# Patient Record
Sex: FEMALE | Race: BLACK OR AFRICAN AMERICAN | URBAN - METROPOLITAN AREA
[De-identification: names, ages, dates, MRNs, and addresses within clinical notes are randomized per-mention and may not be internally consistent; named-entity substitution may affect disease eponyms.]

---

## 2017-12-01 ENCOUNTER — DOCTOR'S OFFICE (OUTPATIENT)
Dept: URBAN - METROPOLITAN AREA CLINIC 137 | Facility: CLINIC | Age: 37
Setting detail: OPHTHALMOLOGY
End: 2017-12-01
Payer: COMMERCIAL

## 2017-12-01 ENCOUNTER — OPTICAL OFFICE (OUTPATIENT)
Dept: URBAN - METROPOLITAN AREA CLINIC 146 | Facility: CLINIC | Age: 37
Setting detail: OPHTHALMOLOGY
End: 2017-12-01
Payer: COMMERCIAL

## 2017-12-01 DIAGNOSIS — H52.223: ICD-10-CM

## 2017-12-01 DIAGNOSIS — H52.4: ICD-10-CM

## 2017-12-01 DIAGNOSIS — H52.03: ICD-10-CM

## 2017-12-01 PROCEDURE — 92004 COMPRE OPH EXAM NEW PT 1/>: CPT | Performed by: OPHTHALMOLOGY

## 2017-12-01 PROCEDURE — V2020 VISION SVCS FRAMES PURCHASES: HCPCS | Performed by: OPHTHALMOLOGY

## 2017-12-01 PROCEDURE — V2203 LENS SPHCYL BIFOCAL 4.00D/.1: HCPCS | Performed by: OPHTHALMOLOGY

## 2017-12-01 ASSESSMENT — REFRACTION_CURRENTRX
OS_OVR_VA: 20/
OD_OVR_VA: 20/
OS_OVR_VA: 20/
OS_OVR_VA: 20/

## 2017-12-01 ASSESSMENT — REFRACTION_AUTOREFRACTION
OD_AXIS: 008
OD_CYLINDER: +0.75
OS_AXIS: 170
OD_SPHERE: +0.50
OS_SPHERE: PLANO
OS_CYLINDER: +0.25

## 2017-12-01 ASSESSMENT — REFRACTION_OUTSIDERX
OU_VA: 20/15-1
OD_VA1: 20/20
OD_AXIS: 180
OS_SPHERE: PLANO
OS_VA3: 20/
OD_ADD: +1.00
OD_CYLINDER: +0.50
OS_ADD: +1.00
OD_VA2: 20/J-1
OD_VA3: 20/
OS_CYLINDER: +0.25
OS_VA2: 20/J-1
OS_AXIS: 180
OS_VA1: 20/20
OD_SPHERE: PLANO

## 2017-12-01 ASSESSMENT — KERATOMETRY
OS_K2POWER_DIOPTERS: 44.75
OD_AXISANGLE_DEGREES: 097
OD_K1POWER_DIOPTERS: 44.25
OS_AXISANGLE_DEGREES: 101
OS_K1POWER_DIOPTERS: 44.25
OD_K2POWER_DIOPTERS: 44.50

## 2017-12-01 ASSESSMENT — CONFRONTATIONAL VISUAL FIELD TEST (CVF)
OD_FINDINGS: FULL
OS_FINDINGS: FULL

## 2017-12-01 ASSESSMENT — REFRACTION_MANIFEST
OU_VA: 20/
OD_VA2: 20/
OD_VA1: 20/
OS_VA1: 20/
OS_VA1: 20/
OS_VA3: 20/
OU_VA: 20/
OD_VA3: 20/
OD_VA1: 20/
OS_VA2: 20/
OS_VA3: 20/
OD_VA2: 20/
OS_VA2: 20/
OD_VA3: 20/

## 2017-12-01 ASSESSMENT — VISUAL ACUITY
OD_BCVA: 20/30
OS_BCVA: 20/25

## 2017-12-01 ASSESSMENT — AXIALLENGTH_DERIVED: OD_AL: 22.9477

## 2017-12-01 ASSESSMENT — SPHEQUIV_DERIVED: OD_SPHEQUIV: 0.875

## 2018-02-14 ENCOUNTER — HOSPITAL ENCOUNTER (EMERGENCY)
Facility: HOSPITAL | Age: 38
Discharge: HOME | End: 2018-02-14
Attending: EMERGENCY MEDICINE | Admitting: EMERGENCY MEDICINE
Payer: COMMERCIAL

## 2018-02-14 VITALS
OXYGEN SATURATION: 100 % | DIASTOLIC BLOOD PRESSURE: 94 MMHG | RESPIRATION RATE: 18 BRPM | SYSTOLIC BLOOD PRESSURE: 156 MMHG | TEMPERATURE: 98.9 F | HEART RATE: 86 BPM

## 2018-02-14 DIAGNOSIS — S50.812A: Primary | ICD-10-CM

## 2018-02-14 DIAGNOSIS — M79.622 LEFT UPPER ARM PAIN: ICD-10-CM

## 2018-02-14 PROCEDURE — 99283 EMERGENCY DEPT VISIT LOW MDM: CPT

## 2018-02-14 RX ORDER — CEPHALEXIN 500 MG/1
500 CAPSULE ORAL 4 TIMES DAILY
Qty: 28 CAPSULE | Refills: 0 | Status: SHIPPED | OUTPATIENT
Start: 2018-02-14 | End: 2018-02-21

## 2018-02-14 RX ORDER — IBUPROFEN 400 MG/1
400 TABLET ORAL ONCE
Status: COMPLETED | OUTPATIENT
Start: 2018-02-14 | End: 2018-02-14

## 2018-02-14 RX ORDER — CEPHALEXIN 250 MG/1
500 CAPSULE ORAL ONCE
Status: COMPLETED | OUTPATIENT
Start: 2018-02-14 | End: 2018-02-14

## 2018-02-14 RX ADMIN — IBUPROFEN 400 MG: 400 TABLET, FILM COATED ORAL at 16:59

## 2018-02-14 RX ADMIN — CEPHALEXIN 500 MG: 250 CAPSULE ORAL at 16:59

## 2018-02-14 NOTE — ED PROVIDER NOTES
History  Chief Complaint   Patient presents with    Arm Pain     Pt reports left shoulder pain radiating down left hand that started at 0700 this morning, pt reports left sided face "tingling" Pt ambualted into triage, alert and oriented, reports "on and off zap pain" in the head  History provided by:  Patient   used: No     34-year-old female presented with left arm pain upon waking this morning  Notes that it is in the axilla, the shoulder and also down the arm  Has a history of lymph node biopsy in the left axilla about 2 months ago and states she had some pain and paresthesias for about a week after and had resolved  No issues since  No fever, chills, chest pain, shortness of breath, rashes  She has a history of nervous scratching and has multiple excoriations on the forearm  None have the appearance of localized infection  Patient does have some lymphadenopathy in the axilla with tenderness and some pain with motion  No edema  Normal pulse, sensation and strength  Possible early infection secondary to inoculation the of the excoriations  Differential diagnosis also includes muscular strain secondary to sleeping  Will start oral antibiotics and have her return if her symptoms worsen  None       Past Medical History:   Diagnosis Date    Arthritis     Hypertension     Migraine        History reviewed  No pertinent surgical history  History reviewed  No pertinent family history  I have reviewed and agree with the history as documented  Social History   Substance Use Topics    Smoking status: Current Every Day Smoker    Smokeless tobacco: Not on file    Alcohol use Yes        Review of Systems   Constitutional: Negative for activity change, appetite change, chills and fever  Respiratory: Negative for chest tightness and shortness of breath  Gastrointestinal: Negative for abdominal pain  Musculoskeletal: Negative for back pain and neck pain     Skin: Negative for color change  Neurological: Negative for dizziness and weakness  All other systems reviewed and are negative  Physical Exam  ED Triage Vitals [02/14/18 1524]   Temperature Pulse Respirations Blood Pressure SpO2   98 9 °F (37 2 °C) 86 18 156/94 100 %      Temp Source Heart Rate Source Patient Position - Orthostatic VS BP Location FiO2 (%)   Oral Monitor Sitting Left arm --      Pain Score       8           Orthostatic Vital Signs  Vitals:    02/14/18 1524   BP: 156/94   Pulse: 86   Patient Position - Orthostatic VS: Sitting       Physical Exam   Constitutional: She is oriented to person, place, and time  She appears well-developed and well-nourished  No distress  HENT:   Head: Normocephalic  Mouth/Throat: Oropharynx is clear and moist    Neck: Normal range of motion  Neck supple  No cervical spine tenderness  Cardiovascular: Normal rate, regular rhythm and intact distal pulses  Pulmonary/Chest: Effort normal and breath sounds normal    Abdominal: Soft  She exhibits no distension  Musculoskeletal: She exhibits no deformity  Pain with motion of the left arm  Axillary lymphadenopathy with some tenderness  No erythema or edema  Neurological: She is alert and oriented to person, place, and time  Skin: Skin is warm and dry  Capillary refill takes less than 2 seconds  Multiple excoriations of the left forearm  Psychiatric: She has a normal mood and affect  Her behavior is normal    Nursing note and vitals reviewed        ED Medications  Medications   cephalexin (KEFLEX) capsule 500 mg (not administered)   ibuprofen (MOTRIN) tablet 400 mg (not administered)       Diagnostic Studies  Results Reviewed     None                 No orders to display              Procedures  Procedures       Phone Contacts  ED Phone Contact    ED Course  ED Course                                MDM  Number of Diagnoses or Management Options  Excoriation of forearm, left, initial encounter:   Left upper arm pain:   Diagnosis management comments: 78-year-old female who awoke with left arm pain this morning in the axilla radiating down the arm  Has many excoriations with the possibility of early infection  Otherwise may be muscular strain for sleeping  Neurovascular exam unremarkable  Will start on oral antibiotics and have her return if her symptoms do not improve or if they worsen  Patient Progress  Patient progress: stable    CritCare Time    Disposition  Final diagnoses:   Excoriation of forearm, left, initial encounter   Left upper arm pain     Time reflects when diagnosis was documented in both MDM as applicable and the Disposition within this note     Time User Action Codes Description Comment    2/14/2018  4:44 PM [place], [address] of forearm, left, initial encounter     2/14/2018  4:45 PM Blanquita Beckett Add [O59 227] Left upper arm pain       ED Disposition     ED Disposition Condition Comment    Discharge  Devang Flores discharge to home/self care  Condition at discharge: Good        Follow-up Information     Follow up With Specialties Details Why Contact Info Additional Information    [facility] Emergency Medicine  If symptoms worsen [address]  [phone]  ED,  [address], [place], [address]        Patient's Medications   Discharge Prescriptions    CEPHALEXIN (KEFLEX) 500 MG CAPSULE    Take 1 capsule (500 mg total) by mouth 4 (four) times a day for 7 days       Start Date: 2/14/2018 End Date: 2/21/2018       Order Dose: 500 mg       Quantity: 28 capsule    Refills: 0     No discharge procedures on file      ED Provider  Electronically Signed by           Jonathan Crooks MD  02/14/18 1721

## 2018-02-14 NOTE — DISCHARGE INSTRUCTIONS
Arm Pain   WHAT YOU NEED TO KNOW:   Your arm pain may be caused by a number of conditions  Examples include arthritis, nerve problems, or an awkward position while you sleep  X-rays did not show a broken bone in your arm or wrist  Arm pain may be a sign of a serious condition that needs immediate care, such as a heart attack  DISCHARGE INSTRUCTIONS:   Call 911 for any of the following: You have any of the following signs of a heart attack:   · Squeezing, pressure, or pain in your chest that lasts longer than 5 minutes or returns    · Discomfort or pain in your back, neck, jaw, stomach, or arm     · Trouble breathing or a fast, fluttery heartbeat    · Nausea or vomiting    · Lightheadedness or a sudden cold sweat, especially with chest pain or trouble breathing  Return to the emergency department if:   · You have severe pain, or pain that spreads from your arm to other areas  · You have swelling, tingling, or numbness in your hand or fingers, or the skin turns blue  · You cannot move your arm  Contact your healthcare provider if:   · You have questions or concerns about your condition or care  Medicines: You may need any of the following:  · Prescription pain medicine  may be given  Ask how to take this medicine safely  · NSAIDs , such as ibuprofen, help decrease swelling, pain, and fever  This medicine is available with or without a doctor's order  NSAIDs can cause stomach bleeding or kidney problems in certain people  If you take blood thinner medicine, always ask your healthcare provider if NSAIDs are safe for you  Always read the medicine label and follow directions  · Take your medicine as directed  Contact your healthcare provider if you think your medicine is not helping or if you have side effects  Tell him or her if you are allergic to any medicine  Keep a list of the medicines, vitamins, and herbs you take  Include the amounts, and when and why you take them   Bring the list or the pill bottles to follow-up visits  Carry your medicine list with you in case of an emergency  Self-care:   · Rest your arm as directed  A sling may be used to keep your arm from moving while it heals  · Apply ice as directed  Ice helps decrease pain and swelling  Ice may also help prevent tissue damage  Use an ice pack, or put crushed ice in a plastic bag  Cover it with a towel  Apply it to your arm for 20 minutes every few hours, or as directed  Ask how many times to apply ice each day, and for how many days  · Elevate your arm above the level of your heart as often as you can  This will help decrease swelling and pain  Prop your arm on pillows or blankets to keep the area elevated comfortably  · Adjust your position if you work in front of a computer  You may need arm or wrist supports or change the height of your chair  · Keep a pain record  Write down when your pain happens and how severe it is  Include any other symptoms you have with your pain  A record will help you keep track of pain cycles  Bring the record with you to your follow-up visits  It may also help your healthcare provider find out what is causing your pain  Follow up with your healthcare provider as directed: You may need physical therapy  You may need to see an orthopedic specialist  Write down your questions so you remember to ask them during your visits  © 2017 [employer] Information is for End User's use only and may not be sold, redistributed or otherwise used for commercial purposes  All illustrations and images included in CareNotes® are the copyrighted property of [employer]  or [employer] Dyer  The above information is an  only  It is not intended as medical advice for individual conditions or treatments  Talk to your doctor, nurse or pharmacist before following any medical regimen to see if it is safe and effective for you

## 2020-05-04 ENCOUNTER — TELEPHONE (OUTPATIENT)
Dept: FAMILY MEDICINE CLINIC | Facility: CLINIC | Age: 40
End: 2020-05-04

## 2020-05-05 DIAGNOSIS — I10 ESSENTIAL HYPERTENSION: Primary | ICD-10-CM

## 2020-05-06 RX ORDER — AMLODIPINE BESYLATE 5 MG/1
TABLET ORAL
Qty: 30 TABLET | Refills: 4 | Status: SHIPPED | OUTPATIENT
Start: 2020-05-06 | End: 2020-08-10

## 2020-05-19 ENCOUNTER — TELEPHONE (OUTPATIENT)
Dept: OBGYN CLINIC | Facility: CLINIC | Age: 40
End: 2020-05-19

## 2020-05-19 DIAGNOSIS — A60.00 GENITAL HERPES SIMPLEX, UNSPECIFIED SITE: Primary | ICD-10-CM

## 2020-05-19 RX ORDER — VALACYCLOVIR HYDROCHLORIDE 500 MG/1
500 TABLET, FILM COATED ORAL 2 TIMES DAILY
Qty: 6 TABLET | Refills: 0 | Status: SHIPPED | OUTPATIENT
Start: 2020-05-19 | End: 2020-10-15

## 2020-05-26 ENCOUNTER — OFFICE VISIT (OUTPATIENT)
Dept: OBGYN CLINIC | Facility: CLINIC | Age: 40
End: 2020-05-26
Payer: COMMERCIAL

## 2020-05-26 VITALS
BODY MASS INDEX: 29.32 KG/M2 | DIASTOLIC BLOOD PRESSURE: 97 MMHG | HEART RATE: 101 BPM | HEIGHT: 65 IN | SYSTOLIC BLOOD PRESSURE: 141 MMHG | WEIGHT: 176 LBS

## 2020-05-26 DIAGNOSIS — M25.562 ACUTE PAIN OF LEFT KNEE: Primary | ICD-10-CM

## 2020-05-26 PROCEDURE — 99203 OFFICE O/P NEW LOW 30 MIN: CPT | Performed by: ORTHOPAEDIC SURGERY

## 2020-05-27 ENCOUNTER — ANNUAL EXAM (OUTPATIENT)
Dept: OBGYN CLINIC | Facility: CLINIC | Age: 40
End: 2020-05-27

## 2020-05-27 VITALS
WEIGHT: 200 LBS | HEIGHT: 65 IN | SYSTOLIC BLOOD PRESSURE: 122 MMHG | DIASTOLIC BLOOD PRESSURE: 78 MMHG | BODY MASS INDEX: 33.32 KG/M2

## 2020-05-27 DIAGNOSIS — Z01.419 ROUTINE GYNECOLOGICAL EXAMINATION: Primary | ICD-10-CM

## 2020-05-27 DIAGNOSIS — Z11.3 SCREENING FOR STDS (SEXUALLY TRANSMITTED DISEASES): ICD-10-CM

## 2020-05-27 DIAGNOSIS — Z12.31 ENCOUNTER FOR SCREENING MAMMOGRAM FOR MALIGNANT NEOPLASM OF BREAST: ICD-10-CM

## 2020-05-27 PROCEDURE — 87491 CHLMYD TRACH DNA AMP PROBE: CPT | Performed by: OBSTETRICS & GYNECOLOGY

## 2020-05-27 PROCEDURE — 87510 GARDNER VAG DNA DIR PROBE: CPT | Performed by: OBSTETRICS & GYNECOLOGY

## 2020-05-27 PROCEDURE — 87480 CANDIDA DNA DIR PROBE: CPT | Performed by: OBSTETRICS & GYNECOLOGY

## 2020-05-27 PROCEDURE — 87624 HPV HI-RISK TYP POOLED RSLT: CPT | Performed by: OBSTETRICS & GYNECOLOGY

## 2020-05-27 PROCEDURE — 87660 TRICHOMONAS VAGIN DIR PROBE: CPT | Performed by: OBSTETRICS & GYNECOLOGY

## 2020-05-27 PROCEDURE — 87591 N.GONORRHOEAE DNA AMP PROB: CPT | Performed by: OBSTETRICS & GYNECOLOGY

## 2020-05-27 PROCEDURE — G0143 SCR C/V CYTO,THINLAYER,RESCR: HCPCS | Performed by: OBSTETRICS & GYNECOLOGY

## 2020-05-29 ENCOUNTER — TELEPHONE (OUTPATIENT)
Dept: OBGYN CLINIC | Facility: CLINIC | Age: 40
End: 2020-05-29

## 2020-05-29 LAB
C TRACH DNA SPEC QL NAA+PROBE: NEGATIVE
HPV HR 12 DNA CVX QL NAA+PROBE: NEGATIVE
HPV16 DNA CVX QL NAA+PROBE: NEGATIVE
HPV18 DNA CVX QL NAA+PROBE: NEGATIVE
N GONORRHOEA DNA SPEC QL NAA+PROBE: NEGATIVE

## 2020-05-30 LAB
CANDIDA RRNA VAG QL PROBE: NEGATIVE
G VAGINALIS RRNA GENITAL QL PROBE: POSITIVE
T VAGINALIS RRNA GENITAL QL PROBE: NEGATIVE

## 2020-06-01 ENCOUNTER — TELEPHONE (OUTPATIENT)
Dept: OBGYN CLINIC | Facility: CLINIC | Age: 40
End: 2020-06-01

## 2020-06-01 DIAGNOSIS — B96.89 BV (BACTERIAL VAGINOSIS): Primary | ICD-10-CM

## 2020-06-01 DIAGNOSIS — N76.0 BV (BACTERIAL VAGINOSIS): Primary | ICD-10-CM

## 2020-06-01 RX ORDER — METRONIDAZOLE 500 MG/1
500 TABLET ORAL EVERY 12 HOURS SCHEDULED
Qty: 14 TABLET | Refills: 0 | Status: SHIPPED | OUTPATIENT
Start: 2020-06-01 | End: 2020-06-08

## 2020-06-03 ENCOUNTER — HOSPITAL ENCOUNTER (OUTPATIENT)
Dept: MRI IMAGING | Facility: HOSPITAL | Age: 40
Discharge: HOME | End: 2020-06-03

## 2020-06-08 LAB
LAB AP GYN PRIMARY INTERPRETATION: NORMAL
Lab: NORMAL
PATH INTERP SPEC-IMP: NORMAL

## 2020-06-10 DIAGNOSIS — B96.89 BV (BACTERIAL VAGINOSIS): Primary | ICD-10-CM

## 2020-06-10 DIAGNOSIS — N76.0 BV (BACTERIAL VAGINOSIS): Primary | ICD-10-CM

## 2020-06-10 RX ORDER — METRONIDAZOLE 500 MG/1
500 TABLET ORAL EVERY 12 HOURS SCHEDULED
Qty: 14 TABLET | Refills: 0 | Status: SHIPPED | OUTPATIENT
Start: 2020-06-10 | End: 2020-06-17

## 2020-06-26 DIAGNOSIS — B00.9 HERPES: Primary | ICD-10-CM

## 2020-06-26 NOTE — TELEPHONE ENCOUNTER
Pt called and wanted to know if she can be put on suppressive therapy for HSV  She states she has got 2 outbreaks in the past 2mnths and wants to try and keep them under control  She is currently having outbreak now

## 2020-06-29 RX ORDER — VALACYCLOVIR HYDROCHLORIDE 500 MG/1
500 TABLET, FILM COATED ORAL DAILY
Qty: 30 TABLET | Refills: 3 | Status: SHIPPED | OUTPATIENT
Start: 2020-06-29 | End: 2020-12-01

## 2020-08-08 ENCOUNTER — HOSPITAL ENCOUNTER (EMERGENCY)
Facility: HOSPITAL | Age: 40
Discharge: HOME | End: 2020-08-08
Attending: EMERGENCY MEDICINE | Admitting: EMERGENCY MEDICINE
Payer: COMMERCIAL

## 2020-08-08 VITALS
SYSTOLIC BLOOD PRESSURE: 126 MMHG | BODY MASS INDEX: 31.78 KG/M2 | TEMPERATURE: 98 F | OXYGEN SATURATION: 98 % | RESPIRATION RATE: 18 BRPM | WEIGHT: 191 LBS | DIASTOLIC BLOOD PRESSURE: 77 MMHG | HEART RATE: 118 BPM

## 2020-08-08 DIAGNOSIS — K61.1 PERI-RECTAL ABSCESS: Primary | ICD-10-CM

## 2020-08-08 LAB
ALBUMIN SERPL BCP-MCNC: 3.8 G/DL (ref 3.4–4.8)
ALP SERPL-CCNC: 75.1 U/L (ref 35–140)
ALT SERPL W P-5'-P-CCNC: 15 U/L (ref 5–54)
ANION GAP SERPL CALCULATED.3IONS-SCNC: 7 MMOL/L (ref 4–13)
AST SERPL W P-5'-P-CCNC: 14 U/L (ref 15–41)
BASOPHILS # BLD AUTO: 0.04 THOUSANDS/ΜL (ref 0–0.1)
BASOPHILS NFR BLD AUTO: 0 % (ref 0–1)
BILIRUB SERPL-MCNC: 0.37 MG/DL (ref 0.3–1.2)
BUN SERPL-MCNC: 9 MG/DL (ref 6–20)
CALCIUM SERPL-MCNC: 9 MG/DL (ref 8.4–10.2)
CHLORIDE SERPL-SCNC: 105 MMOL/L (ref 96–108)
CO2 SERPL-SCNC: 25 MMOL/L (ref 22–33)
CREAT SERPL-MCNC: 0.77 MG/DL (ref 0.4–1.1)
EOSINOPHIL # BLD AUTO: 0.12 THOUSAND/ΜL (ref 0–0.61)
EOSINOPHIL NFR BLD AUTO: 1 % (ref 0–6)
ERYTHROCYTE [DISTWIDTH] IN BLOOD BY AUTOMATED COUNT: 14.6 % (ref 11.6–15.1)
GFR SERPL CREATININE-BSD FRML MDRD: 112 ML/MIN/1.73SQ M
GLUCOSE SERPL-MCNC: 91 MG/DL (ref 65–140)
HCT VFR BLD AUTO: 40.7 % (ref 34.8–46.1)
HGB BLD-MCNC: 13.8 G/DL (ref 11.5–15.4)
IMM GRANULOCYTES # BLD AUTO: 0.04 THOUSAND/UL (ref 0–0.2)
IMM GRANULOCYTES NFR BLD AUTO: 0 % (ref 0–2)
LACTATE SERPL-SCNC: 0.8 MMOL/L (ref 0–2)
LYMPHOCYTES # BLD AUTO: 3.61 THOUSANDS/ΜL (ref 0.6–4.47)
LYMPHOCYTES NFR BLD AUTO: 30 % (ref 14–44)
MCH RBC QN AUTO: 29.1 PG (ref 26.8–34.3)
MCHC RBC AUTO-ENTMCNC: 33.9 G/DL (ref 31.4–37.4)
MCV RBC AUTO: 86 FL (ref 82–98)
MONOCYTES # BLD AUTO: 1.08 THOUSAND/ΜL (ref 0.17–1.22)
MONOCYTES NFR BLD AUTO: 9 % (ref 4–12)
NEUTROPHILS # BLD AUTO: 7.26 THOUSANDS/ΜL (ref 1.85–7.62)
NEUTS SEG NFR BLD AUTO: 60 % (ref 43–75)
PLATELET # BLD AUTO: 307 THOUSANDS/UL (ref 149–390)
PMV BLD AUTO: 9.3 FL (ref 8.9–12.7)
POTASSIUM SERPL-SCNC: 3.4 MMOL/L (ref 3.5–5)
PROT SERPL-MCNC: 6.9 G/DL (ref 6.4–8.3)
RBC # BLD AUTO: 4.74 MILLION/UL (ref 3.81–5.12)
SODIUM SERPL-SCNC: 137 MMOL/L (ref 133–145)
WBC # BLD AUTO: 12.15 THOUSAND/UL (ref 4.31–10.16)

## 2020-08-08 PROCEDURE — 83605 ASSAY OF LACTIC ACID: CPT | Performed by: PHYSICIAN ASSISTANT

## 2020-08-08 PROCEDURE — 99284 EMERGENCY DEPT VISIT MOD MDM: CPT | Performed by: PHYSICIAN ASSISTANT

## 2020-08-08 PROCEDURE — 46045 I&D ABSC TRANAL UNDER ANES: CPT | Performed by: SURGERY

## 2020-08-08 PROCEDURE — 85025 COMPLETE CBC W/AUTO DIFF WBC: CPT | Performed by: PHYSICIAN ASSISTANT

## 2020-08-08 PROCEDURE — 99283 EMERGENCY DEPT VISIT LOW MDM: CPT

## 2020-08-08 PROCEDURE — 96375 TX/PRO/DX INJ NEW DRUG ADDON: CPT

## 2020-08-08 PROCEDURE — 80053 COMPREHEN METABOLIC PANEL: CPT | Performed by: PHYSICIAN ASSISTANT

## 2020-08-08 PROCEDURE — 96374 THER/PROPH/DIAG INJ IV PUSH: CPT

## 2020-08-08 PROCEDURE — NC001 PR NO CHARGE: Performed by: SURGERY

## 2020-08-08 PROCEDURE — 36415 COLL VENOUS BLD VENIPUNCTURE: CPT | Performed by: PHYSICIAN ASSISTANT

## 2020-08-08 RX ORDER — BUPIVACAINE HYDROCHLORIDE AND EPINEPHRINE 2.5; 5 MG/ML; UG/ML
10 INJECTION, SOLUTION EPIDURAL; INFILTRATION; INTRACAUDAL; PERINEURAL ONCE
Status: DISCONTINUED | OUTPATIENT
Start: 2020-08-08 | End: 2020-08-08 | Stop reason: HOSPADM

## 2020-08-08 RX ORDER — IBUPROFEN 600 MG/1
600 TABLET ORAL EVERY 6 HOURS PRN
Qty: 30 TABLET | Refills: 0 | Status: SHIPPED | OUTPATIENT
Start: 2020-08-08 | End: 2020-10-15

## 2020-08-08 RX ORDER — DIPHENHYDRAMINE HYDROCHLORIDE 50 MG/ML
12.5 INJECTION INTRAMUSCULAR; INTRAVENOUS ONCE
Status: COMPLETED | OUTPATIENT
Start: 2020-08-08 | End: 2020-08-08

## 2020-08-08 RX ORDER — MORPHINE SULFATE 4 MG/ML
4 INJECTION, SOLUTION INTRAMUSCULAR; INTRAVENOUS ONCE
Status: COMPLETED | OUTPATIENT
Start: 2020-08-08 | End: 2020-08-08

## 2020-08-08 RX ADMIN — DIPHENHYDRAMINE HYDROCHLORIDE 12.5 MG: 50 INJECTION, SOLUTION INTRAMUSCULAR; INTRAVENOUS at 11:31

## 2020-08-08 RX ADMIN — MORPHINE SULFATE 4 MG: 4 INJECTION INTRAVENOUS at 11:28

## 2020-08-08 NOTE — PROCEDURES
Preoperative diagnosis:  Perianal abscess  Postoperative diagnosis:  Same  Procedure: Incision and drainage  Surgeon:  Rosas Simmons  Anesthesia:  Local  Estimated blood loss:  Less than 2 cc    Patient was identified by me and placed in the prone position on her stretcher  Her buttocks were now taped apart  Betadine was used to prep the area and 1% neutralized lidocaine/bupivacaine with epinephrine was infused as a local anesthetic  When the area was anesthetized, an elliptical incision was made on the area of most fluctuance  Liquid pus as well as what appeared to be a small inflamed area or cyst was removed  There was no tracking to the anus    The wound was packed and a dressing was applied

## 2020-08-08 NOTE — ED PROVIDER NOTES
History  Chief Complaint   Patient presents with    Abscess     krishna rectal abscess  ( hx  of )     69-year-old female with history of hypertension and prior abscesses to her perineum comes in today complaining of an abscess to her right buttock around her anus that started yesterday  She denies any drainage or fevers  She denies ever seeing a surgeon for these in the past           Prior to Admission Medications   Prescriptions Last Dose Informant Patient Reported? Taking? amLODIPine (NORVASC) 5 mg tablet 2020 at Unknown time  No Yes   Sig: TAKE 1 TABLET BY MOUTH EVERY DAY FOR BLOOD PRESSURE   valACYclovir (VALTREX) 500 mg tablet 2020 at Unknown time  No Yes   Sig: Take 1 tablet (500 mg total) by mouth 2 (two) times a day for 3 days   valACYclovir (VALTREX) 500 mg tablet   No No   Sig: Take 1 tablet (500 mg total) by mouth daily      Facility-Administered Medications: None       Past Medical History:   Diagnosis Date    Arthritis     Hypertension     Migraine        Past Surgical History:   Procedure Laterality Date    APPENDECTOMY      BREAST BIOPSY       SECTION      HYSTERECTOMY      TUBAL LIGATION         Family History   Problem Relation Age of Onset    Lupus Maternal Aunt     Leukemia Maternal Uncle      I have reviewed and agree with the history as documented  E-Cigarette/Vaping    E-Cigarette Use Never User      E-Cigarette/Vaping Substances    Nicotine No     THC No     CBD No     Flavoring No     Other No     Unknown No      Social History     Tobacco Use    Smoking status: Current Every Day Smoker     Packs/day: 0 25     Types: Cigarettes    Smokeless tobacco: Never Used   Substance Use Topics    Alcohol use: Yes     Comment: occasional    Drug use: No       Review of Systems   Constitutional: Negative for activity change  Eyes: Negative for visual disturbance  Respiratory: Negative for shortness of breath  Cardiovascular: Negative for chest pain  Musculoskeletal: Negative for back pain  Skin: Negative for color change  Neurological: Negative for dizziness and headaches  Psychiatric/Behavioral: Negative for behavioral problems  Physical Exam  Physical Exam  Constitutional:       General: She is not in acute distress  Appearance: She is well-developed  HENT:      Head: Normocephalic and atraumatic  Eyes:      Conjunctiva/sclera: Conjunctivae normal    Pulmonary:      Effort: Pulmonary effort is normal  No respiratory distress  Genitourinary:     Comments: Right buttock about 2 cm from the anus there is a 2 cm raised area which is fluctuant, consistent with abscess  Multiple hemorrhoid tags noted  Musculoskeletal: Normal range of motion  Skin:     General: Skin is warm and dry  Findings: No rash  Neurological:      Mental Status: She is alert and oriented to person, place, and time     Psychiatric:         Behavior: Behavior normal          Vital Signs  ED Triage Vitals [08/08/20 1049]   Temperature Pulse Respirations Blood Pressure SpO2   98 °F (36 7 °C) (!) 118 18 (!) 149/115 98 %      Temp src Heart Rate Source Patient Position - Orthostatic VS BP Location FiO2 (%)   -- -- -- -- --      Pain Score       Worst Possible Pain           Vitals:    08/08/20 1049 08/08/20 1152   BP: (!) 149/115 126/77   Pulse: (!) 118          Visual Acuity      ED Medications  Medications   bupivacaine-epinephrine (PF) (MARCAINE/EPINEPHRINE PF) 0 25 %-1:662253 injection 10 mL (has no administration in time range)   morphine (PF) 4 mg/mL injection 4 mg (4 mg Intravenous Given 8/8/20 1128)   diphenhydrAMINE (BENADRYL) injection 12 5 mg (12 5 mg Intravenous Given 8/8/20 1131)       Diagnostic Studies  Results Reviewed     Procedure Component Value Units Date/Time    Comprehensive metabolic panel [[id number]]  (Abnormal) Collected:  08/08/20 1128    Lab Status:  Final result Specimen:  Blood from Arm, Right Updated:  08/08/20 1213     Sodium 137 mmol/L Potassium 3 4 mmol/L      Chloride 105 mmol/L      CO2 25 mmol/L      ANION GAP 7 mmol/L      BUN 9 mg/dL      Creatinine 0 77 mg/dL      Glucose 91 mg/dL      Calcium 9 0 mg/dL      AST 14 U/L      ALT 15 U/L      Alkaline Phosphatase 75 1 U/L      Total Protein 6 9 g/dL      Albumin 3 8 g/dL      Total Bilirubin 0 37 mg/dL      eGFR 112 ml/min/1 73sq m     Narrative:       [place] guidelines for Chronic Kidney Disease (CKD):     Stage 1 with normal or high GFR (GFR > 90 mL/min/1 73 square meters)    Stage 2 Mild CKD (GFR = 60-89 mL/min/1 73 square meters)    Stage 3A Moderate CKD (GFR = 45-59 mL/min/1 73 square meters)    Stage 3B Moderate CKD (GFR = 30-44 mL/min/1 73 square meters)    Stage 4 Severe CKD (GFR = 15-29 mL/min/1 73 square meters)    Stage 5 End Stage CKD (GFR <15 mL/min/1 73 square meters)  Note: GFR calculation is accurate only with a steady state creatinine    Lactic acid [[id number]]  (Normal) Collected:  08/08/20 1128    Lab Status:  Final result Specimen:  Blood from Arm, Right Updated:  08/08/20 1213     LACTIC ACID 0 8 mmol/L     Narrative:       Result may be elevated if tourniquet was used during collection      CBC and differential [[id number]]  (Abnormal) Collected:  08/08/20 1128    Lab Status:  Final result Specimen:  Blood from Arm, Right Updated:  08/08/20 1203     WBC 12 15 Thousand/uL      RBC 4 74 Million/uL      Hemoglobin 13 8 g/dL      Hematocrit 40 7 %      MCV 86 fL      MCH 29 1 pg      MCHC 33 9 g/dL      RDW 14 6 %      MPV 9 3 fL      Platelets 559 Thousands/uL      Neutrophils Relative 60 %      Immat GRANS % 0 %      Lymphocytes Relative 30 %      Monocytes Relative 9 %      Eosinophils Relative 1 %      Basophils Relative 0 %      Neutrophils Absolute 7 26 Thousands/µL      Immature Grans Absolute 0 04 Thousand/uL      Lymphocytes Absolute 3 61 Thousands/µL      Monocytes Absolute 1 08 Thousand/µL      Eosinophils Absolute 0 12 Thousand/µL Basophils Absolute 0 04 Thousands/µL                  No orders to display              Procedures  Procedures         ED Course  ED Course as of Aug 08 1247   Sat Aug 08, 2020   1115 Spoke with Dr Stella Booker, general surgery  He will be in in about 40 minutes to assess patient      1215 Dr Stella Booker here to see patient          US AUDIT      Most Recent Value   Initial Alcohol Screen: US AUDIT-C    1  How often do you have a drink containing alcohol? 2 Filed at: 08/08/2020 1051   2  How many drinks containing alcohol do you have on a typical day you are drinking? 1 Filed at: 08/08/2020 1051   3b  FEMALE Any Age, or MALE 65+: How often do you have 4 or more drinks on one occassion? 2 Filed at: 08/08/2020 1051   Audit-C Score  5 Filed at: 08/08/2020 1051                  PAULIE/DAST-10      Most Recent Value   How many times in the past year have you    Used an illegal drug or used a prescription medication for non-medical reasons? Never Filed at: 08/08/2020 1051                                MDM  Number of Diagnoses or Management Options  Maile-rectal abscess:   Diagnosis management comments: Superficial abscess drained bedside by Dr Stella Booker  Disposition  Final diagnoses:   Maile-rectal abscess     Time reflects when diagnosis was documented in both MDM as applicable and the Disposition within this note     Time User Action Codes Description Comment    8/8/2020 12:45 PM Gutierrez Crespo Add [K61 1] Maile-rectal abscess       ED Disposition     ED Disposition Condition Date/Time Comment    Discharge Stable Sat Aug 8, 2020 12:45 PM Harpreet Valderrama discharge to home/self care              Follow-up Information     Follow up With Specialties Details Why Contact Info    Galina Spencer MD General Surgery Go in 2 weeks  [address]  [facility] [address]  [phone]            Patient's Medications   Discharge Prescriptions    IBUPROFEN (MOTRIN) 600 MG TABLET    Take 1 tablet (600 mg total) by mouth every 6 (six) hours as needed for mild pain for up to 10 days       Start Date: 8/8/2020  End Date: 8/18/2020       Order Dose: 600 mg       Quantity: 30 tablet    Refills: 0     No discharge procedures on file      PDMP Review     None          ED Provider  Electronically Signed by           Johnathan Pollock PA-C  08/08/20 1241

## 2020-08-08 NOTE — CONSULTS
The patient is a 48-year-old [facility] female who has a 24 hour history of perianal pain  She has had prior perineal abscess incision and drainages  She started taking warm soaks yesterday and when the pain got to be too much, she came in today  Past medical history is most pertinent for hypertension for which she takes amlodipine  There are no medication allergies, history of heart murmur or Ortho hardware  The patient is a overweight black female in no distress  On the right buttock about 2 cm lateral to the anus, there is a 1 25 cm swelling which is quite painful  This is a abscess which needs drainage  Patient told she could tolerate this under local anesthesia    Please see procedure note

## 2020-08-10 DIAGNOSIS — I10 ESSENTIAL HYPERTENSION: ICD-10-CM

## 2020-08-10 RX ORDER — AMLODIPINE BESYLATE 5 MG/1
TABLET ORAL
Qty: 90 TABLET | Refills: 1 | Status: SHIPPED | OUTPATIENT
Start: 2020-08-10 | End: 2020-09-04 | Stop reason: SDUPTHER

## 2020-09-04 ENCOUNTER — OFFICE VISIT (OUTPATIENT)
Dept: FAMILY MEDICINE CLINIC | Facility: CLINIC | Age: 40
End: 2020-09-04
Payer: COMMERCIAL

## 2020-09-04 VITALS
SYSTOLIC BLOOD PRESSURE: 122 MMHG | HEIGHT: 65 IN | HEART RATE: 84 BPM | WEIGHT: 186.6 LBS | RESPIRATION RATE: 16 BRPM | BODY MASS INDEX: 31.09 KG/M2 | TEMPERATURE: 98.1 F | DIASTOLIC BLOOD PRESSURE: 90 MMHG | OXYGEN SATURATION: 98 %

## 2020-09-04 DIAGNOSIS — Z28.39 IMMUNIZATION DEFICIENCY: ICD-10-CM

## 2020-09-04 DIAGNOSIS — E53.8 CYANOCOBALAMIN DEFICIENCY: ICD-10-CM

## 2020-09-04 DIAGNOSIS — R51.9 ACUTE INTRACTABLE HEADACHE, UNSPECIFIED HEADACHE TYPE: ICD-10-CM

## 2020-09-04 DIAGNOSIS — Z12.31 VISIT FOR SCREENING MAMMOGRAM: ICD-10-CM

## 2020-09-04 DIAGNOSIS — F33.1 MODERATE EPISODE OF RECURRENT MAJOR DEPRESSIVE DISORDER (HCC): ICD-10-CM

## 2020-09-04 DIAGNOSIS — H53.8 BLURRY VISION: ICD-10-CM

## 2020-09-04 DIAGNOSIS — I10 ESSENTIAL HYPERTENSION: Primary | ICD-10-CM

## 2020-09-04 DIAGNOSIS — R73.03 PREDIABETES: ICD-10-CM

## 2020-09-04 DIAGNOSIS — E55.9 VITAMIN D DEFICIENCY: ICD-10-CM

## 2020-09-04 PROCEDURE — 3725F SCREEN DEPRESSION PERFORMED: CPT | Performed by: FAMILY MEDICINE

## 2020-09-04 PROCEDURE — 99214 OFFICE O/P EST MOD 30 MIN: CPT | Performed by: FAMILY MEDICINE

## 2020-09-04 RX ORDER — AMLODIPINE BESYLATE 10 MG/1
10 TABLET ORAL DAILY
Qty: 90 TABLET | Refills: 1 | Status: SHIPPED | OUTPATIENT
Start: 2020-09-04 | End: 2021-02-22

## 2020-09-04 RX ORDER — MIRTAZAPINE 7.5 MG/1
7.5 TABLET, FILM COATED ORAL
Qty: 90 TABLET | Refills: 1 | Status: SHIPPED | OUTPATIENT
Start: 2020-09-04 | End: 2021-01-25 | Stop reason: SDUPTHER

## 2020-09-04 NOTE — PROGRESS NOTES
Assessment/Plan:    Patient's blood pressure in office elevated especially diastolic number  Will resume mirtazapine for mood since that works in the past will increase amlodipine to 10 mg daily  Will need baseline blood work  Will see her back in 3 weeks at that time the blood work normal will proceed with CT scan of the brain for headache  I have spent 25 minutes with Patient  today in which greater than 50% of this time was spent in counseling/coordination of care regarding Risks and benefits of tx options     Problem List Items Addressed This Visit        Cardiovascular and Mediastinum    Essential hypertension - Primary    Relevant Medications    amLODIPine (NORVASC) 10 mg tablet    Other Relevant Orders    CBC and differential    Comprehensive metabolic panel    TSH, 3rd generation with Free T4 reflex    UA w Reflex to Microscopic w Reflex to Culture -Lab Collect    Microalbumin,Urine    Lipid Panel with Direct LDL reflex       Other    Headache    Relevant Orders    Sedimentation rate, automated    C-reactive protein    Iron Panel (Includes Ferritin, Iron Sat%, Iron, and TIBC)    Vitamin A    Blurry vision    Moderate episode of recurrent major depressive disorder (HCC)    Relevant Medications    mirtazapine (REMERON) 7 5 MG tablet    Prediabetes    Relevant Orders    HEMOGLOBIN A1C W/ EAG ESTIMATION      Other Visit Diagnoses     Visit for screening mammogram        Relevant Orders    Mammo screening bilateral w cad    Vitamin D deficiency        Relevant Orders    Vitamin D 25 hydroxy    Cyanocobalamin deficiency        Relevant Orders    Vitamin B12    Immunization deficiency        Relevant Orders    Hepatitis A antibody, total            Subjective:      Patient ID: Ashwin De Leon is a 35 y o  female  43-year-old female follow-up essential hypertension major depression    She has seen a year ago she was doing well on amlodipine 10 mg daily and mirtazapine 7 5 mg daily for her depression and insomnia  Since then she went off medication she has been working full-time at a [employer] store she has 5 children at home and due to coronavirus quarantine she getting very depressed no suicidal no homicide ideation she does have her mom to help her but she gets overwhelmed  She complains of headaches the past couple months behind her eyes and will cause her to have blurry vision she is compliant with her medication for blood pressure  Denies any chest pain no trouble urination no weight gain no trouble w menses  She wears glasses there is no change in prescription strength      The following portions of the patient's history were reviewed and updated as appropriate:   She has a past medical history of Abnormal Pap smear of cervix, Arthritis, Asthma, Blurry vision (2020), Essential hypertension (2020), Headache (2020), Hypertension, Migraine, Moderate episode of recurrent major depressive disorder ([address] ) (2020), and Prediabetes (2020)  ,  does not have any pertinent problems on file  ,   has a past surgical history that includes Tubal ligation (); Appendectomy;  section; Colposcopy; Hysterectomy (); Knee surgery; Cervical biopsy w/ loop electrode excision; Lymph node dissection (); Breast surgery (Left, 2018); and Breast biopsy (Left, )  ,  family history includes Diabetes in her maternal grandmother, mother, paternal aunt, and paternal grandmother; Heart attack in her mother; Heart disease in her mother; Leukemia in her maternal uncle; Lupus in her maternal aunt; Seizures in her maternal aunt  ,   reports that she has been smoking cigarettes  She has been smoking about 0 20 packs per day  She has never used smokeless tobacco  She reports current alcohol use  She reports that she does not use drugs  ,  has No Known Allergies     Current Outpatient Medications   Medication Sig Dispense Refill    amLODIPine (NORVASC) 10 mg tablet Take 1 tablet (10 mg total) by mouth daily Blood presuure 90 tablet 1    valACYclovir (VALTREX) 500 mg tablet Take 1 tablet (500 mg total) by mouth 2 (two) times a day for 3 days 6 tablet 0    ibuprofen (MOTRIN) 600 mg tablet Take 1 tablet (600 mg total) by mouth every 6 (six) hours as needed for mild pain for up to 10 days 30 tablet 0    mirtazapine (REMERON) 7 5 MG tablet Take 1 tablet (7 5 mg total) by mouth daily at bedtime 90 tablet 1    valACYclovir (VALTREX) 500 mg tablet Take 1 tablet (500 mg total) by mouth daily 30 tablet 3     No current facility-administered medications for this visit  Review of Systems   Constitutional: Positive for fatigue  Negative for activity change, appetite change and unexpected weight change  HENT: Negative for ear pain, sore throat, trouble swallowing and voice change  Eyes: Negative for photophobia and visual disturbance  Respiratory: Negative for cough, chest tightness, shortness of breath and wheezing  Cardiovascular: Negative for chest pain, palpitations and leg swelling  Gastrointestinal: Negative for abdominal pain, constipation, diarrhea, nausea, rectal pain and vomiting  Endocrine: Negative for cold intolerance, polydipsia and polyuria  Genitourinary: Negative for difficulty urinating, dysuria, flank pain, menstrual problem and pelvic pain  Musculoskeletal: Negative for arthralgias, joint swelling and myalgias  Skin: Negative for color change and rash  Allergic/Immunologic: Negative for environmental allergies and immunocompromised state  Neurological: Positive for headaches  Negative for dizziness, weakness and numbness  Hematological: Negative for adenopathy  Does not bruise/bleed easily  Psychiatric/Behavioral: Positive for dysphoric mood  Negative for decreased concentration, self-injury, sleep disturbance and suicidal ideas  The patient is nervous/anxious            Objective:  Vitals:    09/04/20 1330   BP: 122/90   BP Location: Left arm   Patient Position: Sitting Cuff Size: Standard   Pulse: 84   Resp: 16   Temp: 98 1 °F (36 7 °C)   TempSrc: Tympanic   SpO2: 98%   Weight: 84 6 kg (186 lb 9 6 oz)   Height: 5' 5" (1 651 m)     Body mass index is 31 05 kg/m²  Physical Exam  Vitals signs and nursing note reviewed  Constitutional:       Appearance: Normal appearance  She is well-developed  HENT:      Head: Normocephalic and atraumatic  Right Ear: Tympanic membrane normal       Left Ear: Tympanic membrane normal       Mouth/Throat:      Mouth: Mucous membranes are dry  Pharynx: No oropharyngeal exudate  Eyes:      Pupils: Pupils are equal, round, and reactive to light  Neck:      Musculoskeletal: Normal range of motion and neck supple  Thyroid: No thyromegaly  Cardiovascular:      Rate and Rhythm: Normal rate and regular rhythm  Heart sounds: Normal heart sounds  No murmur  Pulmonary:      Effort: Pulmonary effort is normal  No respiratory distress  Breath sounds: Normal breath sounds  No wheezing or rales  Abdominal:      General: Bowel sounds are normal  There is no distension  Palpations: Abdomen is soft  Tenderness: There is no abdominal tenderness  There is no guarding  Genitourinary:     Vagina: Normal  No vaginal discharge  Rectum: Guaiac result negative  Musculoskeletal: Normal range of motion  General: No tenderness  Skin:     General: Skin is warm and dry  Findings: No rash  Neurological:      General: No focal deficit present  Mental Status: She is alert and oriented to person, place, and time  Psychiatric:         Mood and Affect: Mood normal          Behavior: Behavior normal          Thought Content:  Thought content normal          Judgment: Judgment normal

## 2020-09-12 ENCOUNTER — APPOINTMENT (OUTPATIENT)
Dept: LAB | Facility: HOSPITAL | Age: 40
End: 2020-09-12
Payer: COMMERCIAL

## 2020-09-12 DIAGNOSIS — Z28.39 IMMUNIZATION DEFICIENCY: ICD-10-CM

## 2020-09-12 DIAGNOSIS — I10 ESSENTIAL HYPERTENSION: ICD-10-CM

## 2020-09-12 DIAGNOSIS — E55.9 VITAMIN D DEFICIENCY: ICD-10-CM

## 2020-09-12 DIAGNOSIS — R73.03 PREDIABETES: ICD-10-CM

## 2020-09-12 DIAGNOSIS — E53.8 CYANOCOBALAMIN DEFICIENCY: ICD-10-CM

## 2020-09-12 DIAGNOSIS — R51.9 ACUTE INTRACTABLE HEADACHE, UNSPECIFIED HEADACHE TYPE: ICD-10-CM

## 2020-09-12 LAB
25(OH)D3 SERPL-MCNC: 16.7 NG/ML (ref 30–100)
ALBUMIN SERPL BCP-MCNC: 4.1 G/DL (ref 3.4–4.8)
ALP SERPL-CCNC: 80.4 U/L (ref 35–140)
ALT SERPL W P-5'-P-CCNC: 17 U/L (ref 5–54)
ANION GAP SERPL CALCULATED.3IONS-SCNC: 6 MMOL/L (ref 4–13)
AST SERPL W P-5'-P-CCNC: 16 U/L (ref 15–41)
BACTERIA UR QL AUTO: ABNORMAL /HPF
BASOPHILS # BLD AUTO: 0.05 THOUSANDS/ΜL (ref 0–0.1)
BASOPHILS NFR BLD AUTO: 1 % (ref 0–1)
BILIRUB SERPL-MCNC: 0.41 MG/DL (ref 0.3–1.2)
BILIRUB UR QL STRIP: NEGATIVE
BUN SERPL-MCNC: 6 MG/DL (ref 6–20)
CALCIUM SERPL-MCNC: 8.9 MG/DL (ref 8.4–10.2)
CHLORIDE SERPL-SCNC: 106 MMOL/L (ref 96–108)
CHOLEST SERPL-MCNC: 158 MG/DL
CLARITY UR: CLEAR
CO2 SERPL-SCNC: 27 MMOL/L (ref 22–33)
COLOR UR: YELLOW
CREAT SERPL-MCNC: 0.71 MG/DL (ref 0.4–1.1)
CREAT UR-MCNC: 81.9 MG/DL
CRP SERPL QL: 0.5 MG/L (ref 0–1)
EOSINOPHIL # BLD AUTO: 0.16 THOUSAND/ΜL (ref 0–0.61)
EOSINOPHIL NFR BLD AUTO: 2 % (ref 0–6)
ERYTHROCYTE [DISTWIDTH] IN BLOOD BY AUTOMATED COUNT: 14.4 % (ref 11.6–15.1)
ERYTHROCYTE [SEDIMENTATION RATE] IN BLOOD: 2 MM/HOUR (ref 0–20)
EST. AVERAGE GLUCOSE BLD GHB EST-MCNC: 120 MG/DL
FERRITIN SERPL-MCNC: 80 NG/ML (ref 8–388)
GFR SERPL CREATININE-BSD FRML MDRD: 123 ML/MIN/1.73SQ M
GLUCOSE P FAST SERPL-MCNC: 114 MG/DL (ref 70–100)
GLUCOSE UR STRIP-MCNC: NEGATIVE MG/DL
HAV AB SER QL IA: NORMAL
HBA1C MFR BLD: 5.8 %
HCT VFR BLD AUTO: 42 % (ref 34.8–46.1)
HDLC SERPL-MCNC: 52 MG/DL
HGB BLD-MCNC: 14.5 G/DL (ref 11.5–15.4)
HGB UR QL STRIP.AUTO: ABNORMAL
IMM GRANULOCYTES # BLD AUTO: 0.01 THOUSAND/UL (ref 0–0.2)
IMM GRANULOCYTES NFR BLD AUTO: 0 % (ref 0–2)
IRON SATN MFR SERPL: 22 %
IRON SERPL-MCNC: 66 UG/DL (ref 50–170)
KETONES UR STRIP-MCNC: NEGATIVE MG/DL
LDLC SERPL CALC-MCNC: 92 MG/DL (ref 0–100)
LEUKOCYTE ESTERASE UR QL STRIP: NEGATIVE
LYMPHOCYTES # BLD AUTO: 4.77 THOUSANDS/ΜL (ref 0.6–4.47)
LYMPHOCYTES NFR BLD AUTO: 47 % (ref 14–44)
MCH RBC QN AUTO: 29.4 PG (ref 26.8–34.3)
MCHC RBC AUTO-ENTMCNC: 34.5 G/DL (ref 31.4–37.4)
MCV RBC AUTO: 85 FL (ref 82–98)
MICROALBUMIN UR-MCNC: 8.2 MG/L (ref 0–20)
MICROALBUMIN/CREAT 24H UR: 10 MG/G CREATININE (ref 0–30)
MONOCYTES # BLD AUTO: 0.79 THOUSAND/ΜL (ref 0.17–1.22)
MONOCYTES NFR BLD AUTO: 8 % (ref 4–12)
NEUTROPHILS # BLD AUTO: 4.09 THOUSANDS/ΜL (ref 1.85–7.62)
NEUTS SEG NFR BLD AUTO: 42 % (ref 43–75)
NITRITE UR QL STRIP: NEGATIVE
NON-SQ EPI CELLS URNS QL MICRO: ABNORMAL /HPF
PH UR STRIP.AUTO: 7.5 [PH]
PLATELET # BLD AUTO: 305 THOUSANDS/UL (ref 149–390)
PMV BLD AUTO: 9 FL (ref 8.9–12.7)
POTASSIUM SERPL-SCNC: 3.5 MMOL/L (ref 3.5–5)
PROT SERPL-MCNC: 7 G/DL (ref 6.4–8.3)
PROT UR STRIP-MCNC: NEGATIVE MG/DL
RBC # BLD AUTO: 4.93 MILLION/UL (ref 3.81–5.12)
RBC #/AREA URNS AUTO: ABNORMAL /HPF
SODIUM SERPL-SCNC: 139 MMOL/L (ref 133–145)
SP GR UR STRIP.AUTO: 1.01 (ref 1–1.03)
TIBC SERPL-MCNC: 297 UG/DL (ref 250–450)
TRIGL SERPL-MCNC: 68.9 MG/DL
TSH SERPL DL<=0.05 MIU/L-ACNC: 1.56 UIU/ML (ref 0.34–5.6)
UROBILINOGEN UR QL STRIP.AUTO: 0.2 E.U./DL
VIT B12 SERPL-MCNC: 792 PG/ML (ref 100–900)
WBC # BLD AUTO: 9.87 THOUSAND/UL (ref 4.31–10.16)
WBC #/AREA URNS AUTO: ABNORMAL /HPF

## 2020-09-12 PROCEDURE — 84443 ASSAY THYROID STIM HORMONE: CPT

## 2020-09-12 PROCEDURE — 82728 ASSAY OF FERRITIN: CPT

## 2020-09-12 PROCEDURE — 82306 VITAMIN D 25 HYDROXY: CPT

## 2020-09-12 PROCEDURE — 86708 HEPATITIS A ANTIBODY: CPT

## 2020-09-12 PROCEDURE — 83550 IRON BINDING TEST: CPT

## 2020-09-12 PROCEDURE — 36415 COLL VENOUS BLD VENIPUNCTURE: CPT

## 2020-09-12 PROCEDURE — 82043 UR ALBUMIN QUANTITATIVE: CPT

## 2020-09-12 PROCEDURE — 84590 ASSAY OF VITAMIN A: CPT

## 2020-09-12 PROCEDURE — 80061 LIPID PANEL: CPT

## 2020-09-12 PROCEDURE — 83036 HEMOGLOBIN GLYCOSYLATED A1C: CPT

## 2020-09-12 PROCEDURE — 82570 ASSAY OF URINE CREATININE: CPT

## 2020-09-12 PROCEDURE — 81001 URINALYSIS AUTO W/SCOPE: CPT | Performed by: FAMILY MEDICINE

## 2020-09-12 PROCEDURE — 85025 COMPLETE CBC W/AUTO DIFF WBC: CPT

## 2020-09-12 PROCEDURE — 83540 ASSAY OF IRON: CPT

## 2020-09-12 PROCEDURE — 80053 COMPREHEN METABOLIC PANEL: CPT

## 2020-09-12 PROCEDURE — 85652 RBC SED RATE AUTOMATED: CPT

## 2020-09-12 PROCEDURE — 86140 C-REACTIVE PROTEIN: CPT

## 2020-09-12 PROCEDURE — 82607 VITAMIN B-12: CPT

## 2020-09-12 PROCEDURE — 81003 URINALYSIS AUTO W/O SCOPE: CPT | Performed by: FAMILY MEDICINE

## 2020-09-15 LAB — VIT A SERPL-MCNC: 44.8 UG/DL (ref 20.1–62)

## 2020-09-18 ENCOUNTER — HOSPITAL ENCOUNTER (OUTPATIENT)
Dept: MAMMOGRAPHY | Facility: HOSPITAL | Age: 40
Discharge: HOME | End: 2020-09-18
Attending: OBSTETRICS & GYNECOLOGY
Payer: COMMERCIAL

## 2020-09-18 VITALS — BODY MASS INDEX: 30.99 KG/M2 | WEIGHT: 186 LBS | HEIGHT: 65 IN

## 2020-09-18 DIAGNOSIS — Z12.31 ENCOUNTER FOR SCREENING MAMMOGRAM FOR MALIGNANT NEOPLASM OF BREAST: ICD-10-CM

## 2020-09-18 PROCEDURE — 77063 BREAST TOMOSYNTHESIS BI: CPT

## 2020-09-18 PROCEDURE — 77067 SCR MAMMO BI INCL CAD: CPT

## 2020-09-18 NOTE — RESULT ENCOUNTER NOTE
Please notify patient normal result dense breast overall reassuring mammo  Benign finding to be repeated in 12m

## 2020-09-30 ENCOUNTER — TELEPHONE (OUTPATIENT)
Dept: FAMILY MEDICINE CLINIC | Facility: CLINIC | Age: 40
End: 2020-09-30

## 2020-09-30 NOTE — TELEPHONE ENCOUNTER
Patient called asking for a note to be excused from work today she was tested for covid it was negative but she does have a migraine today and was told by her employer she needs a note stating she can get migraines due to her blood presuure

## 2020-10-02 ENCOUNTER — OFFICE VISIT (OUTPATIENT)
Dept: FAMILY MEDICINE CLINIC | Facility: CLINIC | Age: 40
End: 2020-10-02
Payer: COMMERCIAL

## 2020-10-02 VITALS
TEMPERATURE: 98.6 F | WEIGHT: 194 LBS | SYSTOLIC BLOOD PRESSURE: 114 MMHG | RESPIRATION RATE: 16 BRPM | OXYGEN SATURATION: 99 % | BODY MASS INDEX: 32.32 KG/M2 | DIASTOLIC BLOOD PRESSURE: 80 MMHG | HEART RATE: 90 BPM | HEIGHT: 65 IN

## 2020-10-02 DIAGNOSIS — R73.03 PREDIABETES: ICD-10-CM

## 2020-10-02 DIAGNOSIS — H53.8 BLURRY VISION: ICD-10-CM

## 2020-10-02 DIAGNOSIS — F33.1 MODERATE EPISODE OF RECURRENT MAJOR DEPRESSIVE DISORDER (HCC): ICD-10-CM

## 2020-10-02 DIAGNOSIS — R51.9 ACUTE INTRACTABLE HEADACHE, UNSPECIFIED HEADACHE TYPE: ICD-10-CM

## 2020-10-02 DIAGNOSIS — E55.9 VITAMIN D DEFICIENCY: ICD-10-CM

## 2020-10-02 DIAGNOSIS — Z28.39 IMMUNIZATION DEFICIENCY: ICD-10-CM

## 2020-10-02 DIAGNOSIS — I10 ESSENTIAL HYPERTENSION: Primary | ICD-10-CM

## 2020-10-02 PROCEDURE — 3074F SYST BP LT 130 MM HG: CPT | Performed by: FAMILY MEDICINE

## 2020-10-02 PROCEDURE — 90686 IIV4 VACC NO PRSV 0.5 ML IM: CPT

## 2020-10-02 PROCEDURE — 90632 HEPA VACCINE ADULT IM: CPT

## 2020-10-02 PROCEDURE — 90471 IMMUNIZATION ADMIN: CPT

## 2020-10-02 PROCEDURE — 3079F DIAST BP 80-89 MM HG: CPT | Performed by: FAMILY MEDICINE

## 2020-10-02 PROCEDURE — 99214 OFFICE O/P EST MOD 30 MIN: CPT | Performed by: FAMILY MEDICINE

## 2020-10-02 PROCEDURE — 90472 IMMUNIZATION ADMIN EACH ADD: CPT

## 2020-10-02 RX ORDER — MAG HYDROX/ALUMINUM HYD/SIMETH 400-400-40
1 SUSPENSION, ORAL (FINAL DOSE FORM) ORAL DAILY
Qty: 90 CAPSULE | Refills: 2 | Status: SHIPPED | OUTPATIENT
Start: 2020-10-02 | End: 2021-12-02

## 2020-10-15 ENCOUNTER — HOSPITAL ENCOUNTER (EMERGENCY)
Facility: HOSPITAL | Age: 40
Discharge: HOME | End: 2020-10-15
Attending: EMERGENCY MEDICINE | Admitting: EMERGENCY MEDICINE
Payer: COMMERCIAL

## 2020-10-15 VITALS
WEIGHT: 196 LBS | OXYGEN SATURATION: 100 % | BODY MASS INDEX: 32.62 KG/M2 | RESPIRATION RATE: 17 BRPM | HEART RATE: 97 BPM | SYSTOLIC BLOOD PRESSURE: 121 MMHG | DIASTOLIC BLOOD PRESSURE: 81 MMHG | TEMPERATURE: 99.4 F

## 2020-10-15 DIAGNOSIS — J01.90 ACUTE SINUSITIS: Primary | ICD-10-CM

## 2020-10-15 LAB — SARS-COV-2 RNA RESP QL NAA+PROBE: NEGATIVE

## 2020-10-15 PROCEDURE — 99284 EMERGENCY DEPT VISIT MOD MDM: CPT | Performed by: PHYSICIAN ASSISTANT

## 2020-10-15 PROCEDURE — U0003 INFECTIOUS AGENT DETECTION BY NUCLEIC ACID (DNA OR RNA); SEVERE ACUTE RESPIRATORY SYNDROME CORONAVIRUS 2 (SARS-COV-2) (CORONAVIRUS DISEASE [COVID-19]), AMPLIFIED PROBE TECHNIQUE, MAKING USE OF HIGH THROUGHPUT TECHNOLOGIES AS DESCRIBED BY CMS-2020-01-R: HCPCS | Performed by: PHYSICIAN ASSISTANT

## 2020-10-15 PROCEDURE — 99283 EMERGENCY DEPT VISIT LOW MDM: CPT

## 2020-10-15 RX ORDER — AMOXICILLIN AND CLAVULANATE POTASSIUM 875; 125 MG/1; MG/1
1 TABLET, FILM COATED ORAL 2 TIMES DAILY
Qty: 20 TABLET | Refills: 0 | Status: SHIPPED | OUTPATIENT
Start: 2020-10-15 | End: 2020-10-25

## 2020-10-15 RX ORDER — ALBUTEROL SULFATE 90 UG/1
2 AEROSOL, METERED RESPIRATORY (INHALATION) EVERY 6 HOURS PRN
COMMUNITY
End: 2020-10-15 | Stop reason: SDUPTHER

## 2020-10-15 RX ORDER — ALBUTEROL SULFATE 90 UG/1
2 AEROSOL, METERED RESPIRATORY (INHALATION) EVERY 6 HOURS PRN
Qty: 1 INHALER | Refills: 0 | Status: SHIPPED | OUTPATIENT
Start: 2020-10-15 | End: 2021-10-01 | Stop reason: SDUPTHER

## 2020-11-17 ENCOUNTER — OFFICE VISIT (OUTPATIENT)
Dept: FAMILY MEDICINE CLINIC | Facility: CLINIC | Age: 40
End: 2020-11-17
Payer: COMMERCIAL

## 2020-11-17 VITALS
OXYGEN SATURATION: 97 % | DIASTOLIC BLOOD PRESSURE: 78 MMHG | HEIGHT: 65 IN | HEART RATE: 85 BPM | TEMPERATURE: 97.7 F | RESPIRATION RATE: 16 BRPM | BODY MASS INDEX: 32.06 KG/M2 | WEIGHT: 192.4 LBS | SYSTOLIC BLOOD PRESSURE: 116 MMHG

## 2020-11-17 DIAGNOSIS — S33.5XXA LUMBAR SPRAIN, INITIAL ENCOUNTER: ICD-10-CM

## 2020-11-17 DIAGNOSIS — M54.50 ACUTE MIDLINE LOW BACK PAIN WITHOUT SCIATICA: ICD-10-CM

## 2020-11-17 PROCEDURE — 99213 OFFICE O/P EST LOW 20 MIN: CPT | Performed by: NURSE PRACTITIONER

## 2020-11-17 PROCEDURE — 3008F BODY MASS INDEX DOCD: CPT | Performed by: FAMILY MEDICINE

## 2020-11-17 RX ORDER — IBUPROFEN 800 MG/1
800 TABLET ORAL EVERY 6 HOURS PRN
Qty: 30 TABLET | Refills: 0 | OUTPATIENT
Start: 2020-11-17 | End: 2020-12-21

## 2020-11-17 RX ORDER — CYCLOBENZAPRINE HCL 5 MG
5 TABLET ORAL
Qty: 5 TABLET | Refills: 0 | OUTPATIENT
Start: 2020-11-17 | End: 2020-12-21

## 2020-11-17 RX ORDER — PREDNISONE 20 MG/1
10 TABLET ORAL DAILY
Qty: 3 TABLET | Refills: 0 | Status: SHIPPED | OUTPATIENT
Start: 2020-11-17 | End: 2020-11-22

## 2020-11-18 ENCOUNTER — HOSPITAL ENCOUNTER (EMERGENCY)
Facility: HOSPITAL | Age: 40
Discharge: HOME | End: 2020-11-18
Attending: INTERNAL MEDICINE | Admitting: INTERNAL MEDICINE
Payer: COMMERCIAL

## 2020-11-18 ENCOUNTER — APPOINTMENT (EMERGENCY)
Dept: RADIOLOGY | Facility: HOSPITAL | Age: 40
End: 2020-11-18
Payer: COMMERCIAL

## 2020-11-18 VITALS
SYSTOLIC BLOOD PRESSURE: 132 MMHG | DIASTOLIC BLOOD PRESSURE: 90 MMHG | OXYGEN SATURATION: 99 % | RESPIRATION RATE: 17 BRPM | TEMPERATURE: 98 F | HEART RATE: 87 BPM

## 2020-11-18 DIAGNOSIS — M54.50 RIGHT-SIDED LOW BACK PAIN WITHOUT SCIATICA, UNSPECIFIED CHRONICITY: Primary | ICD-10-CM

## 2020-11-18 PROCEDURE — 99284 EMERGENCY DEPT VISIT MOD MDM: CPT | Performed by: INTERNAL MEDICINE

## 2020-11-18 PROCEDURE — 99283 EMERGENCY DEPT VISIT LOW MDM: CPT

## 2020-11-18 PROCEDURE — 72100 X-RAY EXAM L-S SPINE 2/3 VWS: CPT

## 2020-11-18 PROCEDURE — 96372 THER/PROPH/DIAG INJ SC/IM: CPT

## 2020-11-18 PROCEDURE — 73030 X-RAY EXAM OF SHOULDER: CPT

## 2020-11-18 RX ORDER — CYCLOBENZAPRINE HCL 10 MG
10 TABLET ORAL ONCE
Status: COMPLETED | OUTPATIENT
Start: 2020-11-18 | End: 2020-11-18

## 2020-11-18 RX ORDER — KETOROLAC TROMETHAMINE 30 MG/ML
30 INJECTION, SOLUTION INTRAMUSCULAR; INTRAVENOUS ONCE
Status: COMPLETED | OUTPATIENT
Start: 2020-11-18 | End: 2020-11-18

## 2020-11-18 RX ADMIN — KETOROLAC TROMETHAMINE 30 MG: 30 INJECTION, SOLUTION INTRAMUSCULAR at 07:37

## 2020-11-18 RX ADMIN — CYCLOBENZAPRINE HYDROCHLORIDE 10 MG: 10 TABLET, FILM COATED ORAL at 07:37

## 2020-12-01 DIAGNOSIS — B00.9 HERPES: ICD-10-CM

## 2020-12-01 RX ORDER — VALACYCLOVIR HYDROCHLORIDE 500 MG/1
TABLET, FILM COATED ORAL
Qty: 30 TABLET | Refills: 3 | Status: SHIPPED | OUTPATIENT
Start: 2020-12-01 | End: 2021-04-21

## 2020-12-21 ENCOUNTER — APPOINTMENT (EMERGENCY)
Dept: RADIOLOGY | Facility: HOSPITAL | Age: 40
End: 2020-12-21
Payer: COMMERCIAL

## 2020-12-21 ENCOUNTER — HOSPITAL ENCOUNTER (EMERGENCY)
Facility: HOSPITAL | Age: 40
Discharge: HOME | End: 2020-12-21
Attending: EMERGENCY MEDICINE | Admitting: EMERGENCY MEDICINE
Payer: COMMERCIAL

## 2020-12-21 ENCOUNTER — APPOINTMENT (EMERGENCY)
Dept: CT IMAGING | Facility: HOSPITAL | Age: 40
End: 2020-12-21
Payer: COMMERCIAL

## 2020-12-21 VITALS
SYSTOLIC BLOOD PRESSURE: 131 MMHG | BODY MASS INDEX: 31.99 KG/M2 | HEIGHT: 65 IN | OXYGEN SATURATION: 99 % | WEIGHT: 192 LBS | TEMPERATURE: 97.8 F | DIASTOLIC BLOOD PRESSURE: 95 MMHG | HEART RATE: 104 BPM | RESPIRATION RATE: 20 BRPM

## 2020-12-21 DIAGNOSIS — S16.1XXA CERVICAL STRAIN, ACUTE: ICD-10-CM

## 2020-12-21 DIAGNOSIS — S40.011A CONTUSION OF RIGHT SHOULDER: ICD-10-CM

## 2020-12-21 DIAGNOSIS — S33.5XXA LUMBAR SPRAIN, INITIAL ENCOUNTER: ICD-10-CM
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
## 2020-12-21 DIAGNOSIS — S70.01XA CONTUSION OF RIGHT HIP: ICD-10-CM

## 2020-12-21 LAB
ALBUMIN SERPL BCP-MCNC: 3.6 G/DL (ref 3.4–4.8)
ALP SERPL-CCNC: 69 U/L (ref 35–140)
ALT SERPL W P-5'-P-CCNC: 14 U/L (ref 5–54)
ANION GAP SERPL CALCULATED.3IONS-SCNC: 5 MMOL/L (ref 4–13)
AST SERPL W P-5'-P-CCNC: 15 U/L (ref 15–41)
BASOPHILS # BLD AUTO: 0.04 THOUSANDS/ΜL (ref 0–0.1)
BASOPHILS NFR BLD AUTO: 0 % (ref 0–1)
BILIRUB SERPL-MCNC: 0.33 MG/DL (ref 0.3–1.2)
BUN SERPL-MCNC: 10 MG/DL (ref 6–20)
CALCIUM SERPL-MCNC: 8.6 MG/DL (ref 8.4–10.2)
CHLORIDE SERPL-SCNC: 104 MMOL/L (ref 96–108)
CO2 SERPL-SCNC: 25 MMOL/L (ref 22–33)
CREAT SERPL-MCNC: 0.66 MG/DL (ref 0.4–1.1)
EOSINOPHIL # BLD AUTO: 0.16 THOUSAND/ΜL (ref 0–0.61)
EOSINOPHIL NFR BLD AUTO: 2 % (ref 0–6)
ERYTHROCYTE [DISTWIDTH] IN BLOOD BY AUTOMATED COUNT: 14.5 % (ref 11.6–15.1)
EXT PREG TEST URINE: NEGATIVE
EXT. CONTROL ED NAV: NORMAL
GFR SERPL CREATININE-BSD FRML MDRD: 128 ML/MIN/1.73SQ M
GLUCOSE SERPL-MCNC: 109 MG/DL (ref 65–140)
HCT VFR BLD AUTO: 38.7 % (ref 34.8–46.1)
HGB BLD-MCNC: 13.3 G/DL (ref 11.5–15.4)
IMM GRANULOCYTES # BLD AUTO: 0.02 THOUSAND/UL (ref 0–0.2)
IMM GRANULOCYTES NFR BLD AUTO: 0 % (ref 0–2)
LYMPHOCYTES # BLD AUTO: 2.69 THOUSANDS/ΜL (ref 0.6–4.47)
LYMPHOCYTES NFR BLD AUTO: 29 % (ref 14–44)
MCH RBC QN AUTO: 29.6 PG (ref 26.8–34.3)
MCHC RBC AUTO-ENTMCNC: 34.4 G/DL (ref 31.4–37.4)
MCV RBC AUTO: 86 FL (ref 82–98)
MONOCYTES # BLD AUTO: 0.9 THOUSAND/ΜL (ref 0.17–1.22)
MONOCYTES NFR BLD AUTO: 10 % (ref 4–12)
NEUTROPHILS # BLD AUTO: 5.42 THOUSANDS/ΜL (ref 1.85–7.62)
NEUTS SEG NFR BLD AUTO: 59 % (ref 43–75)
PLATELET # BLD AUTO: 290 THOUSANDS/UL (ref 149–390)
PMV BLD AUTO: 8.6 FL (ref 8.9–12.7)
POTASSIUM SERPL-SCNC: 3.7 MMOL/L (ref 3.5–5)
PROT SERPL-MCNC: 6.4 G/DL (ref 6.4–8.3)
RBC # BLD AUTO: 4.49 MILLION/UL (ref 3.81–5.12)
SODIUM SERPL-SCNC: 134 MMOL/L (ref 133–145)
WBC # BLD AUTO: 9.23 THOUSAND/UL (ref 4.31–10.16)

## 2020-12-21 PROCEDURE — 70450 CT HEAD/BRAIN W/O DYE: CPT

## 2020-12-21 PROCEDURE — 80053 COMPREHEN METABOLIC PANEL: CPT | Performed by: EMERGENCY MEDICINE

## 2020-12-21 PROCEDURE — 74177 CT ABD & PELVIS W/CONTRAST: CPT

## 2020-12-21 PROCEDURE — 71260 CT THORAX DX C+: CPT

## 2020-12-21 PROCEDURE — 96375 TX/PRO/DX INJ NEW DRUG ADDON: CPT

## 2020-12-21 PROCEDURE — 85025 COMPLETE CBC W/AUTO DIFF WBC: CPT | Performed by: EMERGENCY MEDICINE

## 2020-12-21 PROCEDURE — 73502 X-RAY EXAM HIP UNI 2-3 VIEWS: CPT

## 2020-12-21 PROCEDURE — 73010 X-RAY EXAM OF SHOULDER BLADE: CPT

## 2020-12-21 PROCEDURE — 99284 EMERGENCY DEPT VISIT MOD MDM: CPT | Performed by: EMERGENCY MEDICINE

## 2020-12-21 PROCEDURE — 81025 URINE PREGNANCY TEST: CPT | Performed by: EMERGENCY MEDICINE

## 2020-12-21 PROCEDURE — 73030 X-RAY EXAM OF SHOULDER: CPT

## 2020-12-21 PROCEDURE — 99284 EMERGENCY DEPT VISIT MOD MDM: CPT

## 2020-12-21 PROCEDURE — 72125 CT NECK SPINE W/O DYE: CPT

## 2020-12-21 PROCEDURE — 36415 COLL VENOUS BLD VENIPUNCTURE: CPT | Performed by: EMERGENCY MEDICINE

## 2020-12-21 PROCEDURE — 96374 THER/PROPH/DIAG INJ IV PUSH: CPT

## 2020-12-21 RX ORDER — CYCLOBENZAPRINE HCL 10 MG
10 TABLET ORAL 3 TIMES DAILY PRN
Qty: 30 TABLET | Refills: 0 | Status: SHIPPED | OUTPATIENT
Start: 2020-12-21 | End: 2021-06-23 | Stop reason: ALTCHOICE

## 2020-12-21 RX ORDER — KETOROLAC TROMETHAMINE 30 MG/ML
30 INJECTION, SOLUTION INTRAMUSCULAR; INTRAVENOUS ONCE
Status: COMPLETED | OUTPATIENT
Start: 2020-12-21 | End: 2020-12-21

## 2020-12-21 RX ORDER — MORPHINE SULFATE 4 MG/ML
4 INJECTION, SOLUTION INTRAMUSCULAR; INTRAVENOUS ONCE
Status: COMPLETED | OUTPATIENT
Start: 2020-12-21 | End: 2020-12-21

## 2020-12-21 RX ORDER — HYDROCODONE BITARTRATE AND ACETAMINOPHEN 5; 325 MG/1; MG/1
1 TABLET ORAL EVERY 6 HOURS PRN
Qty: 16 TABLET | Refills: 0 | Status: SHIPPED | OUTPATIENT
Start: 2020-12-21 | End: 2021-02-22 | Stop reason: ALTCHOICE

## 2020-12-21 RX ORDER — IBUPROFEN 600 MG/1
600 TABLET ORAL EVERY 6 HOURS PRN
Qty: 60 TABLET | Refills: 0 | Status: SHIPPED | OUTPATIENT
Start: 2020-12-21 | End: 2021-03-24 | Stop reason: ALTCHOICE

## 2020-12-21 RX ADMIN — MORPHINE SULFATE 4 MG: 4 INJECTION INTRAVENOUS at 03:27

## 2020-12-21 RX ADMIN — IOHEXOL 100 ML: 350 INJECTION, SOLUTION INTRAVENOUS at 04:07

## 2020-12-21 RX ADMIN — KETOROLAC TROMETHAMINE 30 MG: 30 INJECTION, SOLUTION INTRAMUSCULAR at 03:11

## 2020-12-23 ENCOUNTER — OFFICE VISIT (OUTPATIENT)
Dept: FAMILY MEDICINE CLINIC | Facility: CLINIC | Age: 40
End: 2020-12-23
Payer: COMMERCIAL

## 2020-12-23 VITALS
DIASTOLIC BLOOD PRESSURE: 78 MMHG | SYSTOLIC BLOOD PRESSURE: 114 MMHG | HEART RATE: 91 BPM | WEIGHT: 195.4 LBS | HEIGHT: 65 IN | TEMPERATURE: 97.9 F | BODY MASS INDEX: 32.55 KG/M2 | OXYGEN SATURATION: 98 %

## 2020-12-23 DIAGNOSIS — J02.9 PHARYNGITIS, UNSPECIFIED ETIOLOGY: ICD-10-CM

## 2020-12-23 DIAGNOSIS — E04.1 NODULE OF LEFT LOBE OF THYROID GLAND: ICD-10-CM

## 2020-12-23 DIAGNOSIS — J02.9 SORE THROAT: Primary | ICD-10-CM

## 2020-12-23 PROCEDURE — 99213 OFFICE O/P EST LOW 20 MIN: CPT | Performed by: NURSE PRACTITIONER

## 2020-12-23 PROCEDURE — 87880 STREP A ASSAY W/OPTIC: CPT | Performed by: NURSE PRACTITIONER

## 2020-12-23 PROCEDURE — 3008F BODY MASS INDEX DOCD: CPT | Performed by: NURSE PRACTITIONER

## 2020-12-23 RX ORDER — AMOXICILLIN 875 MG/1
875 TABLET, COATED ORAL 2 TIMES DAILY
Qty: 20 TABLET | Refills: 0 | Status: SHIPPED | OUTPATIENT
Start: 2020-12-23 | End: 2021-01-02

## 2020-12-31 ENCOUNTER — HOSPITAL ENCOUNTER (OUTPATIENT)
Dept: ULTRASOUND IMAGING | Facility: HOSPITAL | Age: 40
Discharge: HOME | End: 2020-12-31
Payer: COMMERCIAL

## 2020-12-31 DIAGNOSIS — E04.1 NODULE OF LEFT LOBE OF THYROID GLAND: ICD-10-CM

## 2020-12-31 PROCEDURE — 76536 US EXAM OF HEAD AND NECK: CPT

## 2021-01-05 DIAGNOSIS — E04.1 NODULE OF LEFT LOBE OF THYROID GLAND: Primary | ICD-10-CM

## 2021-01-05 NOTE — PROGRESS NOTES
Ultrasound-guided thyroid biopsy of the left lobe of the thyroid gland  Patient had recent ultrasound recommendation for thyroid biopsy at this time  Patient prefers to go to [facility] for thyroid biopsy we have notified her of the results and to contact the radiology department to schedule her procedure

## 2021-01-14 ENCOUNTER — HOSPITAL ENCOUNTER (EMERGENCY)
Facility: HOSPITAL | Age: 41
Discharge: HOME | End: 2021-01-14
Attending: INTERNAL MEDICINE
Payer: COMMERCIAL

## 2021-01-14 VITALS
BODY MASS INDEX: 32.62 KG/M2 | SYSTOLIC BLOOD PRESSURE: 115 MMHG | TEMPERATURE: 98.2 F | HEART RATE: 89 BPM | WEIGHT: 196 LBS | RESPIRATION RATE: 16 BRPM | OXYGEN SATURATION: 98 % | DIASTOLIC BLOOD PRESSURE: 85 MMHG

## 2021-01-14 DIAGNOSIS — Z20.822 ENCOUNTER FOR LABORATORY TESTING FOR COVID-19 VIRUS: Primary | ICD-10-CM

## 2021-01-14 PROCEDURE — U0003 INFECTIOUS AGENT DETECTION BY NUCLEIC ACID (DNA OR RNA); SEVERE ACUTE RESPIRATORY SYNDROME CORONAVIRUS 2 (SARS-COV-2) (CORONAVIRUS DISEASE [COVID-19]), AMPLIFIED PROBE TECHNIQUE, MAKING USE OF HIGH THROUGHPUT TECHNOLOGIES AS DESCRIBED BY CMS-2020-01-R: HCPCS | Performed by: PHYSICIAN ASSISTANT

## 2021-01-14 PROCEDURE — U0005 INFEC AGEN DETEC AMPLI PROBE: HCPCS | Performed by: PHYSICIAN ASSISTANT

## 2021-01-14 PROCEDURE — 99283 EMERGENCY DEPT VISIT LOW MDM: CPT

## 2021-01-14 PROCEDURE — 99284 EMERGENCY DEPT VISIT MOD MDM: CPT | Performed by: PHYSICIAN ASSISTANT

## 2021-01-14 NOTE — ED PROVIDER NOTES
History  Chief Complaint   Patient presents with    Headache     nausea and headache, requesting COVID test     Pt with Past Medical History: Arthritis, Asthma, HTN, Migraine, Depression, Prediabetes, Vitamin D deficiency  Past Surgical History: APPENDECTOMY,  SECTION x2, HYSTERECTOMY , [place], TUBAL LIGATION   Presents to ED requesting COVID testing; someone at work was + and pt wants to be checked, unsure who, so unsure of exact contact, pt has noted few days of intermittent HA and slight nausea, no vomiting, no fever, no cp, no sob, no abd pain, no Vomit/Diarrhea, no urinary complaints          Prior to Admission Medications   Prescriptions Last Dose Informant Patient Reported? Taking?    Cholecalciferol (Vitamin D3) 125 MCG (5000 UT) CAPS   No No   Sig: Take 1 capsule (5,000 Units total) by mouth daily   HYDROcodone-acetaminophen (NORCO) 5-325 mg per tablet   No No   Sig: Take 1 tablet by mouth every 6 (six) hours as needed for pain for up to 16 dosesMax Daily Amount: 4 tablets   albuterol (PROVENTIL HFA,VENTOLIN HFA) 90 mcg/act inhaler   No No   Sig: Inhale 2 puffs every 6 (six) hours as needed for wheezing   amLODIPine (NORVASC) 10 mg tablet   No No   Sig: Take 1 tablet (10 mg total) by mouth daily Blood presuure   cyclobenzaprine (FLEXERIL) 10 mg tablet   No No   Sig: Take 1 tablet (10 mg total) by mouth 3 (three) times a day as needed for muscle spasms   ibuprofen (MOTRIN) 600 mg tablet   No No   Sig: Take 1 tablet (600 mg total) by mouth every 6 (six) hours as needed for mild pain or moderate pain   mirtazapine (REMERON) 7 5 MG tablet   No No   Sig: Take 1 tablet (7 5 mg total) by mouth daily at bedtime   valACYclovir (VALTREX) 500 mg tablet   No No   Sig: TAKE 1 TABLET BY MOUTH EVERY DAY      Facility-Administered Medications: None       Past Medical History:   Diagnosis Date    Abnormal Pap smear of cervix     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, 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    Arthritis     Asthma     Blurry vision 2020    Essential hypertension 2020    Headache 2020    Hypertension     Immunization deficiency 10/2/2020    Hep a nonimmune    Migraine     Moderate episode of recurrent major depressive disorder ([address] ) 2020    Prediabetes 2020    Vitamin D deficiency 10/2/2020       Past Surgical History:   Procedure Laterality Date    APPENDECTOMY      BREAST BIOPSY Left 2017    BREAST BIOPSY Right     2 core biopsies    BREAST EXCISIONAL BIOPSY Left 2018    BREAST SURGERY Left 2018    Left breast mass excision    CERVICAL BIOPSY  W/ LOOP ELECTRODE EXCISION       SECTION      X2    COLPOSCOPY      HYSTERECTOMY  2012    heavy bleeding, ovaries remain, also had abnormal pap    KNEE SURGERY      LYMPH NODE DISSECTION  2018    under armpit    TUBAL LIGATION  2007       Family History   Problem Relation Age of Onset    Lupus Maternal Aunt 50    Seizures Maternal Aunt     Leukemia Maternal Uncle 25    Diabetes Mother     Heart attack Mother     Heart disease Mother         Internal Defibrilation    Diabetes Maternal Grandmother     Diabetes Paternal Grandmother     Diabetes Paternal Aunt     No Known Problems Father     Endometrial cancer Sister 29    Colon cancer Sister 28    No Known Problems Daughter     No Known Problems Son     No Known Problems Son      I have reviewed and agree with the history as documented  E-Cigarette/Vaping    E-Cigarette Use Never User      E-Cigarette/Vaping Substances    Nicotine No     THC No     CBD No     Flavoring No     Other No     Unknown No      Social History     Tobacco Use    Smoking status: Current Every Day Smoker     Packs/day: 0 50     Types: Cigarettes    Smokeless tobacco: Never Used   Substance Use Topics    Alcohol use: Yes     Frequency: 2-4 times a month     Comment: occasional    Drug use: No       Review of Systems   Constitutional: Negative for chills and fever     HENT: Negative for hearing loss, sore throat and trouble swallowing  Eyes: Negative for visual disturbance  Respiratory: Negative for cough and shortness of breath  Cardiovascular: Negative for chest pain and leg swelling  Gastrointestinal: Positive for nausea  Negative for abdominal pain and vomiting  Genitourinary: Negative for dysuria and frequency  Musculoskeletal: Negative for arthralgias and myalgias  Skin: Negative for color change and pallor  Neurological: Positive for headaches  Negative for dizziness and weakness  Psychiatric/Behavioral: Negative for behavioral problems  All other systems reviewed and are negative  Physical Exam  Physical Exam  Vitals signs and nursing note reviewed  Constitutional:       General: She is not in acute distress  Appearance: Normal appearance  She is well-developed  HENT:      Head: Normocephalic and atraumatic  Right Ear: External ear normal       Left Ear: External ear normal       Nose: Nose normal  No rhinorrhea  Mouth/Throat:      Mouth: Mucous membranes are moist       Pharynx: Oropharynx is clear  Eyes:      Conjunctiva/sclera: Conjunctivae normal    Neck:      Musculoskeletal: Normal range of motion  Cardiovascular:      Rate and Rhythm: Normal rate and regular rhythm  Pulmonary:      Effort: Pulmonary effort is normal  No respiratory distress  Breath sounds: Normal breath sounds  Musculoskeletal: Normal range of motion  Right lower leg: No edema  Left lower leg: No edema  Lymphadenopathy:      Cervical: No cervical adenopathy  Skin:     General: Skin is warm and dry  Capillary Refill: Capillary refill takes less than 2 seconds  Neurological:      General: No focal deficit present  Mental Status: She is alert and oriented to person, place, and time  Motor: No weakness     Psychiatric:         Mood and Affect: Mood normal          Behavior: Behavior normal          Vital Signs  ED Triage Vitals   Temperature Pulse Respirations Blood Pressure SpO2   01/14/21 0944 01/14/21 0944 01/14/21 0944 01/14/21 0944 01/14/21 0944   98 2 °F (36 8 °C) 82 16 111/96 99 %      Temp Source Heart Rate Source Patient Position - Orthostatic VS BP Location FiO2 (%)   01/14/21 0944 01/14/21 0944 01/14/21 1000 01/14/21 0944 --   Oral Monitor Sitting Left arm       Pain Score       --                  Vitals:    01/14/21 0944 01/14/21 1000   BP: 111/96 115/85   Pulse: 82 89   Patient Position - Orthostatic VS:  Sitting         Visual Acuity      ED Medications  Medications - No data to display    Diagnostic Studies  Results Reviewed     Procedure Component Value Units Date/Time    Novel Coronavirus (COVID-19), PCR [facility] [[id number]] Collected: 01/14/21 1001    Lab Status: In process Specimen: Nasopharyngeal Swab Updated: 01/14/21 1009                 No orders to display              Procedures  Procedures         ED Course                                           MDM    Disposition  Final diagnoses:   Encounter for laboratory testing for COVID-19 virus     Time reflects when diagnosis was documented in both MDM as applicable and the Disposition within this note     Time User Action Codes Description Comment    1/14/2021  9:56 AM Cleo Kelly Add [Z20 [id number]] Encounter for laboratory testing for COVID-19 virus       ED Disposition     ED Disposition Condition Date/Time Comment    Discharge Stable Thu Jan 14, 2021  9:58 AM Good Ramirez discharge to home/self care              Follow-up Information     Follow up With Specialties Details Why Contact Info    Cecil Roper MD Family Medicine  As needed [address] [place] [address] [phone] [address] [place]   [phone]            Discharge Medication List as of 1/14/2021  9:58 AM      CONTINUE these medications which have NOT CHANGED    Details   albuterol (PROVENTIL HFA,VENTOLIN HFA) 90 mcg/act inhaler Inhale 2 puffs every 6 (six) hours as needed for wheezing, Starting Thu 10/15/2020, Normal amLODIPine (NORVASC) 10 mg tablet Take 1 tablet (10 mg total) by mouth daily Blood presuure, Starting Fri 9/4/2020, Until Wed 12/23/2020, Normal      Cholecalciferol (Vitamin D3) 125 MCG (5000 UT) CAPS Take 1 capsule (5,000 Units total) by mouth daily, Starting Fri 10/2/2020, Until Thu 12/31/2020, Normal      cyclobenzaprine (FLEXERIL) 10 mg tablet Take 1 tablet (10 mg total) by mouth 3 (three) times a day as needed for muscle spasms, Starting Mon 12/21/2020, Normal      HYDROcodone-acetaminophen (NORCO) 5-325 mg per tablet Take 1 tablet by mouth every 6 (six) hours as needed for pain for up to 16 dosesMax Daily Amount: 4 tablets, Starting Mon 12/21/2020, Normal      ibuprofen (MOTRIN) 600 mg tablet Take 1 tablet (600 mg total) by mouth every 6 (six) hours as needed for mild pain or moderate pain, Starting Mon 12/21/2020, Normal      mirtazapine (REMERON) 7 5 MG tablet Take 1 tablet (7 5 mg total) by mouth daily at bedtime, Starting Fri 9/4/2020, Until Wed 12/23/2020, Normal      valACYclovir (VALTREX) 500 mg tablet TAKE 1 TABLET BY MOUTH EVERY DAY, Normal           No discharge procedures on file      PDMP Review       Value Time User    PDMP Reviewed  Yes 12/21/2020  5:26 AM Maeve Hester MD          ED Provider  Electronically Signed by           Shari Cobos PA-C  01/14/21 7704

## 2021-01-14 NOTE — Clinical Note
Zoe Flowers was seen and treated in our emergency department on 1/14/2021  Other - See Comments        Diagnosis:     Jessica    She may return on this date: You were tested today for COVID-19  You must continue quarantining until test results are negative  If test is positive, you must continue isolation for 10 days since onset of symptoms, must be fever free for 24 hours and show improvement of symptoms  Then follow-up with your doctor for return to work note     If you have any questions or concerns, please don't hesitate to call        Tomas Garcia PA-C    ______________________________           _______________          _______________  Hospital Representative                              Date                                Time

## 2021-01-14 NOTE — DISCHARGE INSTRUCTIONS
You were tested today for COVID-19  You must continue quarantining until test results are negative  If test is positive, you must continue isolation for 10 days since onset of symptoms, must be fever free for 24 hours and show improvement of symptoms  We are recommending a vitamin regimen of Vitamin D3 2000 IU daily, Vitamin-C 1gram twice a day, Multivitamin daily, Zinc 220 mg daily  When you sleep you should try to lay on your stomach as much as possible, or side but avoid sleeping on back      Follow-up with your doctor as needed

## 2021-01-15 LAB — SARS-COV-2 RNA SPEC QL NAA+PROBE: NOT DETECTED

## 2021-01-16 ENCOUNTER — HOSPITAL ENCOUNTER (EMERGENCY)
Facility: HOSPITAL | Age: 41
Discharge: HOME | End: 2021-01-16
Attending: EMERGENCY MEDICINE | Admitting: EMERGENCY MEDICINE
Payer: COMMERCIAL

## 2021-01-16 ENCOUNTER — APPOINTMENT (EMERGENCY)
Dept: RADIOLOGY | Facility: HOSPITAL | Age: 41
End: 2021-01-16
Payer: COMMERCIAL

## 2021-01-16 VITALS
HEIGHT: 65 IN | OXYGEN SATURATION: 97 % | RESPIRATION RATE: 16 BRPM | WEIGHT: 196 LBS | TEMPERATURE: 98.4 F | HEART RATE: 64 BPM | SYSTOLIC BLOOD PRESSURE: 127 MMHG | DIASTOLIC BLOOD PRESSURE: 76 MMHG | BODY MASS INDEX: 32.65 KG/M2

## 2021-01-16 DIAGNOSIS — M54.2 NECK PAIN: Primary | ICD-10-CM

## 2021-01-16 LAB
ANION GAP SERPL CALCULATED.3IONS-SCNC: 9 MMOL/L (ref 4–13)
BASOPHILS # BLD MANUAL: 0.12 THOUSAND/UL (ref 0–0.1)
BASOPHILS NFR MAR MANUAL: 1 % (ref 0–1)
BUN SERPL-MCNC: 13 MG/DL (ref 5–25)
CALCIUM SERPL-MCNC: 8.8 MG/DL (ref 8.3–10.1)
CHLORIDE SERPL-SCNC: 105 MMOL/L (ref 100–108)
CO2 SERPL-SCNC: 26 MMOL/L (ref 21–32)
CREAT SERPL-MCNC: 0.93 MG/DL (ref 0.6–1.3)
EOSINOPHIL # BLD MANUAL: 0 THOUSAND/UL (ref 0–0.4)
EOSINOPHIL NFR BLD MANUAL: 0 % (ref 0–6)
ERYTHROCYTE [DISTWIDTH] IN BLOOD BY AUTOMATED COUNT: 14.6 % (ref 11.6–15.1)
GFR SERPL CREATININE-BSD FRML MDRD: 88 ML/MIN/1.73SQ M
GLUCOSE SERPL-MCNC: 113 MG/DL (ref 65–140)
HCG SERPL QL: NEGATIVE
HCT VFR BLD AUTO: 40.9 % (ref 34.8–46.1)
HGB BLD-MCNC: 13.4 G/DL (ref 11.5–15.4)
LYMPHOCYTES # BLD AUTO: 61 % (ref 14–44)
LYMPHOCYTES # BLD AUTO: 7.11 THOUSAND/UL (ref 0.6–4.47)
MCH RBC QN AUTO: 29.1 PG (ref 26.8–34.3)
MCHC RBC AUTO-ENTMCNC: 32.8 G/DL (ref 31.4–37.4)
MCV RBC AUTO: 89 FL (ref 82–98)
MONOCYTES # BLD AUTO: 0.47 THOUSAND/UL (ref 0–1.22)
MONOCYTES NFR BLD: 4 % (ref 4–12)
NEUTROPHILS # BLD MANUAL: 3.84 THOUSAND/UL (ref 1.85–7.62)
NEUTS BAND NFR BLD MANUAL: 1 % (ref 0–8)
NEUTS SEG NFR BLD AUTO: 32 % (ref 43–75)
NRBC BLD AUTO-RTO: 0 /100 WBCS
PLATELET # BLD AUTO: 338 THOUSANDS/UL (ref 149–390)
PLATELET BLD QL SMEAR: ADEQUATE
PMV BLD AUTO: 9 FL (ref 8.9–12.7)
POTASSIUM SERPL-SCNC: 3.4 MMOL/L (ref 3.5–5.3)
RBC # BLD AUTO: 4.6 MILLION/UL (ref 3.81–5.12)
SODIUM SERPL-SCNC: 140 MMOL/L (ref 136–145)
TOTAL CELLS COUNTED SPEC: 100
TROPONIN I SERPL-MCNC: <0.02 NG/ML
VARIANT LYMPHS # BLD AUTO: 1 %
WBC # BLD AUTO: 11.65 THOUSAND/UL (ref 4.31–10.16)

## 2021-01-16 PROCEDURE — 72040 X-RAY EXAM NECK SPINE 2-3 VW: CPT

## 2021-01-16 PROCEDURE — 85007 BL SMEAR W/DIFF WBC COUNT: CPT | Performed by: EMERGENCY MEDICINE

## 2021-01-16 PROCEDURE — 93005 ELECTROCARDIOGRAM TRACING: CPT

## 2021-01-16 PROCEDURE — 85027 COMPLETE CBC AUTOMATED: CPT | Performed by: EMERGENCY MEDICINE

## 2021-01-16 PROCEDURE — 84484 ASSAY OF TROPONIN QUANT: CPT | Performed by: EMERGENCY MEDICINE

## 2021-01-16 PROCEDURE — 84703 CHORIONIC GONADOTROPIN ASSAY: CPT | Performed by: EMERGENCY MEDICINE

## 2021-01-16 PROCEDURE — 80048 BASIC METABOLIC PNL TOTAL CA: CPT | Performed by: EMERGENCY MEDICINE

## 2021-01-16 PROCEDURE — 36415 COLL VENOUS BLD VENIPUNCTURE: CPT | Performed by: EMERGENCY MEDICINE

## 2021-01-16 PROCEDURE — 96374 THER/PROPH/DIAG INJ IV PUSH: CPT

## 2021-01-16 PROCEDURE — 71045 X-RAY EXAM CHEST 1 VIEW: CPT

## 2021-01-16 PROCEDURE — 99285 EMERGENCY DEPT VISIT HI MDM: CPT | Performed by: EMERGENCY MEDICINE

## 2021-01-16 PROCEDURE — 99284 EMERGENCY DEPT VISIT MOD MDM: CPT

## 2021-01-16 RX ORDER — METHOCARBAMOL 500 MG/1
500 TABLET, FILM COATED ORAL 2 TIMES DAILY
Qty: 14 TABLET | Refills: 0 | Status: SHIPPED | OUTPATIENT
Start: 2021-01-16 | End: 2021-02-22 | Stop reason: ALTCHOICE

## 2021-01-16 RX ORDER — KETOROLAC TROMETHAMINE 30 MG/ML
15 INJECTION, SOLUTION INTRAMUSCULAR; INTRAVENOUS ONCE
Status: COMPLETED | OUTPATIENT
Start: 2021-01-16 | End: 2021-01-16

## 2021-01-16 RX ORDER — LIDOCAINE 50 MG/G
1 PATCH TOPICAL ONCE
Status: DISCONTINUED | OUTPATIENT
Start: 2021-01-16 | End: 2021-01-16 | Stop reason: HOSPADM

## 2021-01-16 RX ORDER — NAPROXEN 375 MG/1
375 TABLET ORAL 2 TIMES DAILY WITH MEALS
Qty: 14 TABLET | Refills: 0 | Status: SHIPPED | OUTPATIENT
Start: 2021-01-16 | End: 2021-02-22 | Stop reason: ALTCHOICE

## 2021-01-16 RX ADMIN — LIDOCAINE 5% 1 PATCH: 700 PATCH TOPICAL at 20:48

## 2021-01-16 RX ADMIN — KETOROLAC TROMETHAMINE 15 MG: 30 INJECTION, SOLUTION INTRAMUSCULAR at 20:48

## 2021-01-16 NOTE — Clinical Note
Jimmy Red was seen and treated in our emergency department on 1/16/2021  Diagnosis:     Blanche Beebe  may return to work on return date  She may return on this date: 01/18/2021         If you have any questions or concerns, please don't hesitate to call        Kerwin Duran MD    ______________________________           _______________          _______________  Hospital Representative                              Date                                Time

## 2021-01-16 NOTE — Clinical Note
Jim Gary was seen and treated in our emergency department on 1/16/2021  Diagnosis:     Nwe George  may return to work on return date  She may return on this date: 01/18/2021         If you have any questions or concerns, please don't hesitate to call        Maribel Raman MD    ______________________________           _______________          _______________  Hospital Representative                              Date                                Time

## 2021-01-17 NOTE — ED PROVIDER NOTES
History  Chief Complaint   Patient presents with    Neck Pain     c/o left sided neck stifness, radiating to her back down her arm with numbness and tingling in her fingers        History provided by:  Patient   used: No    Neck Pain  Pain location:  L side  Quality:  Aching  Pain radiates to:  Does not radiate  Pain severity:  Moderate  Duration:  1 day  Timing:  Constant  Progression:  Worsening  Chronicity:  New  Context: not fall, not MCA and not MVC    Relieved by:  None tried  Worsened by:  Nothing  Ineffective treatments:  None tried  Associated symptoms: no numbness and no syncope        Prior to Admission Medications   Prescriptions Last Dose Informant Patient Reported? Taking?    Cholecalciferol (Vitamin D3) 125 MCG (5000 UT) CAPS   No No   Sig: Take 1 capsule (5,000 Units total) by mouth daily   HYDROcodone-acetaminophen (NORCO) 5-325 mg per tablet Not Taking at Unknown time  No No   Sig: Take 1 tablet by mouth every 6 (six) hours as needed for pain for up to 16 dosesMax Daily Amount: 4 tablets   Patient not taking: Reported on 1/16/2021   albuterol (PROVENTIL HFA,VENTOLIN HFA) 90 mcg/act inhaler   No No   Sig: Inhale 2 puffs every 6 (six) hours as needed for wheezing   amLODIPine (NORVASC) 10 mg tablet   No No   Sig: Take 1 tablet (10 mg total) by mouth daily Blood presuure   cyclobenzaprine (FLEXERIL) 10 mg tablet   No No   Sig: Take 1 tablet (10 mg total) by mouth 3 (three) times a day as needed for muscle spasms   ibuprofen (MOTRIN) 600 mg tablet   No No   Sig: Take 1 tablet (600 mg total) by mouth every 6 (six) hours as needed for mild pain or moderate pain   mirtazapine (REMERON) 7 5 MG tablet   No No   Sig: Take 1 tablet (7 5 mg total) by mouth daily at bedtime   valACYclovir (VALTREX) 500 mg tablet   No No   Sig: TAKE 1 TABLET BY MOUTH EVERY DAY      Facility-Administered Medications: None       Past Medical History:   Diagnosis Date    Abnormal Pap smear of cervix     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, 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    Arthritis     Asthma     Blurry vision 2020    Essential hypertension 2020    Headache 2020    Hypertension     Immunization deficiency 10/2/2020    Hep a nonimmune    Migraine     Moderate episode of recurrent major depressive disorder ([facility] [address] ) 2020    Prediabetes 2020    Vitamin D deficiency 10/2/2020       Past Surgical History:   Procedure Laterality Date    APPENDECTOMY      BREAST BIOPSY Left 2017    BREAST BIOPSY Right     2 core biopsies    BREAST EXCISIONAL BIOPSY Left 2018    BREAST SURGERY Left 2018    Left breast mass excision    CERVICAL BIOPSY  W/ LOOP ELECTRODE EXCISION       SECTION      X2    COLPOSCOPY      HYSTERECTOMY  2012    heavy bleeding, ovaries remain, also had abnormal pap    KNEE SURGERY      LYMPH NODE DISSECTION      under armpit    TUBAL LIGATION  2007       Family History   Problem Relation Age of Onset    Lupus Maternal Aunt 50    Seizures Maternal Aunt     Leukemia Maternal Uncle 25    Diabetes Mother     Heart attack Mother     Heart disease Mother         Internal Defibrilation    Diabetes Maternal Grandmother     Diabetes Paternal Grandmother     Diabetes Paternal Aunt     No Known Problems Father     Endometrial cancer Sister 29    Colon cancer Sister 28    No Known Problems Daughter     No Known Problems Son     No Known Problems Son      I have reviewed and agree with the history as documented      E-Cigarette/Vaping    E-Cigarette Use Never User      E-Cigarette/Vaping Substances    Nicotine No     THC No     CBD No     Flavoring No     Other No     Unknown No      Social History     Tobacco Use    Smoking status: Current Every Day Smoker     Packs/day: 0 50     Types: Cigarettes    Smokeless tobacco: Never Used   Substance Use Topics    Alcohol use: Yes     Frequency: 2-4 times a month     Comment: occasional    Drug use: No       Review of Systems Cardiovascular: Negative for syncope  Musculoskeletal: Positive for neck pain  Neurological: Negative for numbness  All other systems reviewed and are negative  Physical Exam  Physical Exam  Vitals signs and nursing note reviewed  Constitutional:       General: She is not in acute distress  Appearance: She is well-developed  HENT:      Head: Normocephalic and atraumatic  Eyes:      Pupils: Pupils are equal, round, and reactive to light  Neck:      Musculoskeletal: Normal range of motion and neck supple  Cardiovascular:      Rate and Rhythm: Normal rate and regular rhythm  Heart sounds: Normal heart sounds  No murmur  Pulmonary:      Effort: Pulmonary effort is normal  No respiratory distress  Breath sounds: Normal breath sounds  No wheezing or rales  Abdominal:      General: Bowel sounds are normal  There is no distension  Palpations: Abdomen is soft  Tenderness: There is no abdominal tenderness  There is no guarding or rebound  Musculoskeletal: Normal range of motion  General: No deformity  Lymphadenopathy:      Cervical: No cervical adenopathy  Skin:     Capillary Refill: Capillary refill takes less than 2 seconds  Findings: No erythema or rash  Neurological:      Mental Status: She is alert and oriented to person, place, and time  Cranial Nerves: No cranial nerve deficit  Motor: No abnormal muscle tone        Coordination: Coordination normal    Psychiatric:         Behavior: Behavior normal          Vital Signs  ED Triage Vitals   Temperature Pulse Respirations Blood Pressure SpO2   01/16/21 1918 01/16/21 1918 01/16/21 1918 01/16/21 1934 01/16/21 1918   98 4 °F (36 9 °C) 101 16 140/90 97 %      Temp Source Heart Rate Source Patient Position - Orthostatic VS BP Location FiO2 (%)   01/16/21 1918 01/16/21 1918 01/16/21 1934 01/16/21 1934 --   Oral Monitor Sitting Right arm       Pain Score       01/16/21 1918       5           Vitals: 01/16/21 1918 01/16/21 1934 01/16/21 2030   BP:  140/90 127/76   Pulse: 101 98 64   Patient Position - Orthostatic VS:  Sitting Sitting         Visual Acuity  Visual Acuity      Most Recent Value   L Pupil Size (mm)  4   R Pupil Size (mm)  4          ED Medications  Medications   ketorolac (TORADOL) injection 15 mg (15 mg Intravenous Given 1/16/21 2048)       Diagnostic Studies  Results Reviewed     Procedure Component Value Units Date/Time    Manual Differential(PHLEBS Do Not Order) [[id number]]  (Abnormal) Collected: 01/16/21 1954    Lab Status: Final result Specimen: Blood from Arm, Right Updated: 01/16/21 2117     Segmented % 32 %      Bands % 1 %      Lymphocytes % 61 %      Monocytes % 4 %      Eosinophils, % 0 %      Basophils % 1 %      Atypical Lymphocytes % 1 %      Absolute Neutrophils 3 84 Thousand/uL      Lymphocytes Absolute 7 11 Thousand/uL      Monocytes Absolute 0 47 Thousand/uL      Eosinophils Absolute 0 00 Thousand/uL      Basophils Absolute 0 12 Thousand/uL      Total Counted 100     Platelet Estimate Adequate    CBC and differential [[id number]]  (Abnormal) Collected: 01/16/21 1954    Lab Status: Final result Specimen: Blood from Arm, Right Updated: 01/16/21 2117     WBC 11 65 Thousand/uL      RBC 4 60 Million/uL      Hemoglobin 13 4 g/dL      Hematocrit 40 9 %      MCV 89 fL      MCH 29 1 pg      MCHC 32 8 g/dL      RDW 14 6 %      MPV 9 0 fL      Platelets 333 Thousands/uL      nRBC 0 /100 WBCs     Narrative: This is an appended report  These results have been appended to a previously verified report      hCG, qualitative pregnancy [[id number]]  (Normal) Collected: 01/16/21 1954    Lab Status: Final result Specimen: Blood from Arm, Right Updated: 01/16/21 2021     Preg, Serum Negative    Basic metabolic panel [[id number]]  (Abnormal) Collected: 01/16/21 1954    Lab Status: Final result Specimen: Blood from Arm, Right Updated: 01/16/21 2021     Sodium 140 mmol/L      Potassium 3 4 mmol/L Chloride 105 mmol/L      CO2 26 mmol/L      ANION GAP 9 mmol/L      BUN 13 mg/dL      Creatinine 0 93 mg/dL      Glucose 113 mg/dL      Calcium 8 8 mg/dL      eGFR 88 ml/min/1 73sq m     Narrative:      [place] guidelines for Chronic Kidney Disease (CKD):     Stage 1 with normal or high GFR (GFR > 90 mL/min/1 73 square meters)    Stage 2 Mild CKD (GFR = 60-89 mL/min/1 73 square meters)    Stage 3A Moderate CKD (GFR = 45-59 mL/min/1 73 square meters)    Stage 3B Moderate CKD (GFR = 30-44 mL/min/1 73 square meters)    Stage 4 Severe CKD (GFR = 15-29 mL/min/1 73 square meters)    Stage 5 End Stage CKD (GFR <15 mL/min/1 73 square meters)  Note: GFR calculation is accurate only with a steady state creatinine    Troponin I [[id number]]  (Normal) Collected: 01/16/21 1954    Lab Status: Final result Specimen: Blood from Arm, Right Updated: 01/16/21 2017     Troponin I <0 02 ng/mL                  XR chest 1 view portable   ED Interpretation by Cherelle Prakash MD (01/16 2031)   No acute disease  XR spine cervical 2 or 3 vw injury   ED Interpretation by Cherelle Prakash MD (01/16 2031)   No acute fracture identified  Procedures  ECG 12 Lead Documentation Only    Date/Time: 1/16/2021 7:30 PM  Performed by: Cherelle Prakash MD  Authorized by: Cherelle Prakash MD     Indications / Diagnosis:  Left neck pain  ECG reviewed by me, the ED Provider: yes    Interpretation:     Interpretation: normal    Rate:     ECG rate:  91    ECG rate assessment: normal    Rhythm:     Rhythm: sinus rhythm    Ectopy:     Ectopy: none    QRS:     QRS axis:  Normal    QRS intervals:  Normal  Conduction:     Conduction: normal    ST segments:     ST segments:  Normal  T waves:     T waves: normal               ED Course                                           MDM  Number of Diagnoses or Management Options  Neck pain: new and requires workup  Diagnosis management comments: Left-sided neck pain  History of physical exam consistent with cervical radiculopathy  EKG, laboratory studies unremarkable  Cervical spine x-ray, chest x-ray with no acute abnormalities  Patient given Toradol, Lidoderm patch improvement of symptoms  PCP follow-up recommended, return precautions discussed with patient       Amount and/or Complexity of Data Reviewed  Clinical lab tests: ordered and reviewed  Tests in the radiology section of CPT®: ordered and reviewed  Tests in the medicine section of CPT®: ordered and reviewed    Risk of Complications, Morbidity, and/or Mortality  Presenting problems: high  Diagnostic procedures: moderate  Management options: high    Patient Progress  Patient progress: improved      Disposition  Final diagnoses:   Neck pain     Time reflects when diagnosis was documented in both MDM as applicable and the Disposition within this note     Time User Action Codes Description Comment    1/16/2021  8:49 PM Lucio Henry Add [M54 2] Neck pain       ED Disposition     ED Disposition Condition Date/Time Comment    Discharge Stable Sat Jan 16, 2021  8:49 PM Lanre Pollock discharge to home/self care              Follow-up Information     Follow up With Specialties Details Why Contact Info Additional Information    Al Pinedo MD Family Medicine Schedule an appointment as soon as possible for a visit in 2 days As needed [address] [facility], [address] [place] [address] [phone] Emergency Department Emergency Medicine Go to  If symptoms worsen [address] [facility]  [address] Emergency Department, [address], [place], [address] [facility] Physical Therapy Call in 3 days follow-up neck pain [phone]          Discharge Medication List as of 1/16/2021  8:58 PM      START taking these medications    Details   methocarbamol (ROBAXIN) 500 mg tablet Take 1 tablet (500 mg total) by mouth 2 (two) times a day for 7 days, Starting Sat 1/16/2021, Until Sat 1/23/2021, Print      naproxen (NAPROSYN) 375 mg tablet Take 1 tablet (375 mg total) by mouth 2 (two) times a day with meals for 7 days, Starting Sat 1/16/2021, Until Sat 1/23/2021, Print         CONTINUE these medications which have NOT CHANGED    Details   albuterol (PROVENTIL HFA,VENTOLIN HFA) 90 mcg/act inhaler Inhale 2 puffs every 6 (six) hours as needed for wheezing, Starting Thu 10/15/2020, Normal      amLODIPine (NORVASC) 10 mg tablet Take 1 tablet (10 mg total) by mouth daily Blood presuure, Starting Fri 9/4/2020, Until Wed 12/23/2020, Normal      Cholecalciferol (Vitamin D3) 125 MCG (5000 UT) CAPS Take 1 capsule (5,000 Units total) by mouth daily, Starting Fri 10/2/2020, Until Thu 12/31/2020, Normal      cyclobenzaprine (FLEXERIL) 10 mg tablet Take 1 tablet (10 mg total) by mouth 3 (three) times a day as needed for muscle spasms, Starting Mon 12/21/2020, Normal      HYDROcodone-acetaminophen (NORCO) 5-325 mg per tablet Take 1 tablet by mouth every 6 (six) hours as needed for pain for up to 16 dosesMax Daily Amount: 4 tablets, Starting Mon 12/21/2020, Normal      ibuprofen (MOTRIN) 600 mg tablet Take 1 tablet (600 mg total) by mouth every 6 (six) hours as needed for mild pain or moderate pain, Starting Mon 12/21/2020, Normal      mirtazapine (REMERON) 7 5 MG tablet Take 1 tablet (7 5 mg total) by mouth daily at bedtime, Starting Fri 9/4/2020, Until Wed 12/23/2020, Normal      valACYclovir (VALTREX) 500 mg tablet TAKE 1 TABLET BY MOUTH EVERY DAY, Normal           No discharge procedures on file      PDMP Review       Value Time User    PDMP Reviewed  Yes 12/21/2020  5:26 AM Maeve Hester MD          ED Provider  Electronically Signed by           Jesse Ulloa MD  01/16/21 3468

## 2021-01-19 LAB
ATRIAL RATE: 95 BPM
P AXIS: 66 DEGREES
PR INTERVAL: 196 MS
QRS AXIS: 66 DEGREES
QRSD INTERVAL: 78 MS
QT INTERVAL: 326 MS
QTC INTERVAL: 401 MS
T WAVE AXIS: 47 DEGREES
VENTRICULAR RATE: 91 BPM

## 2021-01-19 PROCEDURE — 93010 ELECTROCARDIOGRAM REPORT: CPT | Performed by: INTERNAL MEDICINE

## 2021-01-25 ENCOUNTER — OFFICE VISIT (OUTPATIENT)
Dept: FAMILY MEDICINE CLINIC | Facility: CLINIC | Age: 41
End: 2021-01-25
Payer: COMMERCIAL

## 2021-01-25 VITALS
WEIGHT: 197.2 LBS | BODY MASS INDEX: 31.69 KG/M2 | RESPIRATION RATE: 18 BRPM | OXYGEN SATURATION: 98 % | TEMPERATURE: 98.3 F | SYSTOLIC BLOOD PRESSURE: 112 MMHG | HEIGHT: 66 IN | HEART RATE: 89 BPM | DIASTOLIC BLOOD PRESSURE: 80 MMHG

## 2021-01-25 DIAGNOSIS — Z72.0 TOBACCO USE: ICD-10-CM

## 2021-01-25 DIAGNOSIS — E04.1 NODULE OF LEFT LOBE OF THYROID GLAND: Primary | ICD-10-CM

## 2021-01-25 DIAGNOSIS — E27.8 ADRENAL MASS, LEFT (HCC): ICD-10-CM

## 2021-01-25 DIAGNOSIS — F33.1 MODERATE EPISODE OF RECURRENT MAJOR DEPRESSIVE DISORDER (HCC): ICD-10-CM

## 2021-01-25 DIAGNOSIS — I10 ESSENTIAL HYPERTENSION: ICD-10-CM

## 2021-01-25 DIAGNOSIS — R63.8 INCREASED BMI: ICD-10-CM

## 2021-01-25 PROCEDURE — 3079F DIAST BP 80-89 MM HG: CPT | Performed by: FAMILY MEDICINE

## 2021-01-25 PROCEDURE — 3074F SYST BP LT 130 MM HG: CPT | Performed by: FAMILY MEDICINE

## 2021-01-25 PROCEDURE — 4004F PT TOBACCO SCREEN RCVD TLK: CPT | Performed by: FAMILY MEDICINE

## 2021-01-25 PROCEDURE — 3008F BODY MASS INDEX DOCD: CPT | Performed by: FAMILY MEDICINE

## 2021-01-25 PROCEDURE — 99214 OFFICE O/P EST MOD 30 MIN: CPT | Performed by: FAMILY MEDICINE

## 2021-01-25 RX ORDER — VARENICLINE TARTRATE 25 MG
KIT ORAL
Qty: 53 TABLET | Refills: 0 | Status: SHIPPED | OUTPATIENT
Start: 2021-01-25 | End: 2021-02-22 | Stop reason: ALTCHOICE

## 2021-01-25 RX ORDER — MIRTAZAPINE 15 MG/1
15 TABLET, FILM COATED ORAL
Qty: 30 TABLET | Refills: 1 | Status: SHIPPED | OUTPATIENT
Start: 2021-01-25 | End: 2021-02-22 | Stop reason: SDUPTHER
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
## 2021-01-26 NOTE — NURSING NOTE
Call placed to patient to discuss upcoming appointment at [place] [address] radiology department and complete consultation with patient  Patient is having US guided thyroid biopsy   Reviewed with patient her allergies NKDA, no current anticoagulant medication present , also discussed the pre and post procedure expectations  Patient states " Am I going to be put to sleep, I am a very anxious person " Explained to the patient that this study is done under local skin anesthetics and if she wishes she can communicate with her primary to ask for something to calm her but then she will be required to get a   Her response " If it is not in the OR I should be fine " Explained to the patient that the staff is very understanding and will work and comfort her  Reminded patient of location and time expected for procedure, Patient expressed understanding by verbalizing and repeating instructions  My number was also supplied to patient to call if any further questions or concerns should arise pre or post procedure

## 2021-01-27 ENCOUNTER — HOSPITAL ENCOUNTER (OUTPATIENT)
Dept: RADIOLOGY | Facility: HOSPITAL | Age: 41
Discharge: HOME | End: 2021-01-27
Admitting: STUDENT IN AN ORGANIZED HEALTH CARE EDUCATION/TRAINING PROGRAM
Payer: COMMERCIAL

## 2021-01-27 DIAGNOSIS — E04.1 NODULE OF LEFT LOBE OF THYROID GLAND: ICD-10-CM

## 2021-01-27 PROCEDURE — 88173 CYTOPATH EVAL FNA REPORT: CPT | Performed by: PATHOLOGY

## 2021-01-27 PROCEDURE — 10005 FNA BX W/US GDN 1ST LES: CPT

## 2021-01-27 RX ORDER — LIDOCAINE HYDROCHLORIDE 10 MG/ML
2 INJECTION, SOLUTION EPIDURAL; INFILTRATION; INTRACAUDAL; PERINEURAL ONCE
Status: COMPLETED | OUTPATIENT
Start: 2021-01-27 | End: 2021-01-27

## 2021-01-27 RX ADMIN — LIDOCAINE HYDROCHLORIDE 2.5 ML: 10 INJECTION, SOLUTION EPIDURAL; INFILTRATION; INTRACAUDAL; PERINEURAL at 09:55

## 2021-02-22 ENCOUNTER — OFFICE VISIT (OUTPATIENT)
Dept: FAMILY MEDICINE CLINIC | Facility: CLINIC | Age: 41
End: 2021-02-22
Payer: COMMERCIAL

## 2021-02-22 VITALS
OXYGEN SATURATION: 98 % | SYSTOLIC BLOOD PRESSURE: 126 MMHG | RESPIRATION RATE: 14 BRPM | BODY MASS INDEX: 31.34 KG/M2 | HEIGHT: 66 IN | DIASTOLIC BLOOD PRESSURE: 84 MMHG | TEMPERATURE: 97.5 F | HEART RATE: 91 BPM | WEIGHT: 195 LBS

## 2021-02-22 DIAGNOSIS — F33.1 MODERATE EPISODE OF RECURRENT MAJOR DEPRESSIVE DISORDER (HCC): ICD-10-CM

## 2021-02-22 DIAGNOSIS — I10 ESSENTIAL HYPERTENSION: ICD-10-CM

## 2021-02-22 DIAGNOSIS — R06.83 SNORING: Primary | ICD-10-CM

## 2021-02-22 DIAGNOSIS — Z72.0 TOBACCO USE: ICD-10-CM

## 2021-02-22 DIAGNOSIS — G47.00 INSOMNIA, UNSPECIFIED TYPE: ICD-10-CM

## 2021-02-22 DIAGNOSIS — E04.1 NODULE OF LEFT LOBE OF THYROID GLAND: ICD-10-CM

## 2021-02-22 PROCEDURE — 3725F SCREEN DEPRESSION PERFORMED: CPT | Performed by: FAMILY MEDICINE

## 2021-02-22 PROCEDURE — 99214 OFFICE O/P EST MOD 30 MIN: CPT | Performed by: FAMILY MEDICINE

## 2021-02-22 RX ORDER — AMLODIPINE BESYLATE 10 MG/1
10 TABLET ORAL DAILY
Qty: 90 TABLET | Refills: 1 | Status: SHIPPED | OUTPATIENT
Start: 2021-02-22 | End: 2021-08-16

## 2021-02-22 RX ORDER — VARENICLINE TARTRATE 1 MG/1
1 TABLET, FILM COATED ORAL 2 TIMES DAILY
Qty: 60 TABLET | Refills: 2 | Status: SHIPPED | OUTPATIENT
Start: 2021-02-22 | End: 2021-03-24 | Stop reason: SDUPTHER

## 2021-02-22 RX ORDER — QUETIAPINE FUMARATE 25 MG/1
25 TABLET, FILM COATED ORAL
Qty: 30 TABLET | Refills: 1 | Status: SHIPPED | OUTPATIENT
Start: 2021-02-22 | End: 2021-03-17

## 2021-02-22 RX ORDER — MIRTAZAPINE 15 MG/1
7.5 TABLET, FILM COATED ORAL
Qty: 30 TABLET | Refills: 1
Start: 2021-02-22 | End: 2021-03-24 | Stop reason: ALTCHOICE

## 2021-02-22 NOTE — PROGRESS NOTES
Assessment/Plan:    Regarding patient's mood she is feeling better could be from increased dose of mirtazapine and also thyroid biopsy results came back normal   Will decrease mirtazapine to 7 5 mg daily continue Chantix 1 mg twice a day and on Seroquel at bedtime to help to sleep  Refer patient to Sleep Medicine for evaluation  See her back in 1 month  I have spent 25 minutes with Patient  today in which greater than 50% of this time was spent in counseling/coordination of care regarding Risks and benefits of tx options  Problem List Items Addressed This Visit        Endocrine    Nodule of left lobe of thyroid gland       Other    Moderate episode of recurrent major depressive disorder (HCC)    Relevant Medications    varenicline (CHANTIX) 1 mg tablet    mirtazapine (REMERON) 15 mg tablet    QUEtiapine (SEROquel) 25 mg tablet    Tobacco use    Relevant Medications    varenicline (CHANTIX) 1 mg tablet    Insomnia    Relevant Medications    QUEtiapine (SEROquel) 25 mg tablet      Other Visit Diagnoses     Snoring    -  Primary    Relevant Orders    Ambulatory referral to Sleep Medicine            Subjective:      Patient ID: Sydnie Comer is a 38 y o  female  79-year-old female follow-up tobacco smoking cessation depression anxiety and thyroid nodule biopsy  Thyroid nodule biopsy came back pathology benign  Last visit mirtazapine was increased to 15 mg daily for increased stress  Her nephew has leukemia at 1year-old he is being treated  she feels better although she still can not able to sleep  She does snore wake up very tired  She was on Chantix for 3 weeks she noticed she smoking more than before so she stopped was sought no side effect medication        The following portions of the patient's history were reviewed and updated as appropriate:   Past Medical History:  She has a past medical history of Abnormal Pap smear of cervix, Adrenal mass, left ([address] ) (1/25/2021), Arthritis, Asthma, Blurry vision (2020), Essential hypertension (2020), Headache (2020), Hypertension, Immunization deficiency (10/2/2020), Insomnia (2021), Migraine, Moderate episode of recurrent major depressive disorder ([facility] [address] ) (2020), Prediabetes (2020), Tobacco use (2021), and Vitamin D deficiency (10/2/2020)  ,  _______________________________________________________________________  Medical Problems:  does not have any pertinent problems on file ,  _______________________________________________________________________  Past Surgical History:   has a past surgical history that includes Tubal ligation (); Appendectomy;  section; Colposcopy; Knee surgery; Cervical biopsy w/ loop electrode excision; Lymph node dissection (); Breast surgery (Left, 2018); Breast biopsy (Left, 2017); Breast excisional biopsy (Left, 2018); Breast biopsy (Right); Hysterectomy (); and US guided thyroid biopsy (2021)  ,  _______________________________________________________________________  Family History:  family history includes Colon cancer (age of onset: 28) in her sister; Diabetes in her maternal grandmother, mother, paternal aunt, and paternal grandmother; Endometrial cancer (age of onset: 29) in her sister; Heart attack in her mother; Heart disease in her mother; Leukemia (age of onset: 25) in her maternal uncle; Lupus (age of onset: 50) in her maternal aunt; No Known Problems in her daughter, father, son, and son; Seizures in her maternal aunt  ,  _______________________________________________________________________  Social History:   reports that she has been smoking cigarettes  She has been smoking about 1 00 pack per day  She has never used smokeless tobacco  She reports current alcohol use  She reports that she does not use drugs  ,  _______________________________________________________________________  Allergies:  has No Known Allergies     _______________________________________________________________________  Current Outpatient Medications   Medication Sig Dispense Refill    albuterol (PROVENTIL HFA,VENTOLIN HFA) 90 mcg/act inhaler Inhale 2 puffs every 6 (six) hours as needed for wheezing 1 Inhaler 0    amLODIPine (NORVASC) 10 mg tablet TAKE 1 TABLET (10 MG TOTAL) BY MOUTH DAILY BLOOD PRESUURE 90 tablet 1    Cholecalciferol (Vitamin D3) 125 MCG (5000 UT) CAPS Take 1 capsule (5,000 Units total) by mouth daily 90 capsule 2    cyclobenzaprine (FLEXERIL) 10 mg tablet Take 1 tablet (10 mg total) by mouth 3 (three) times a day as needed for muscle spasms 30 tablet 0    ibuprofen (MOTRIN) 600 mg tablet Take 1 tablet (600 mg total) by mouth every 6 (six) hours as needed for mild pain or moderate pain 60 tablet 0    mirtazapine (REMERON) 15 mg tablet Take 0 5 tablets (7 5 mg total) by mouth daily at bedtime 30 tablet 1    valACYclovir (VALTREX) 500 mg tablet TAKE 1 TABLET BY MOUTH EVERY DAY 30 tablet 3    QUEtiapine (SEROquel) 25 mg tablet Take 1 tablet (25 mg total) by mouth daily at bedtime 30 tablet 1    varenicline (CHANTIX) 1 mg tablet Take 1 tablet (1 mg total) by mouth 2 (two) times a day 60 tablet 2     No current facility-administered medications for this visit       _______________________________________________________________________  Review of Systems   Constitutional: Negative for activity change, appetite change, fatigue and unexpected weight change  HENT: Negative for ear pain, sore throat, trouble swallowing and voice change  Eyes: Negative for photophobia and visual disturbance  Respiratory: Negative for cough, chest tightness, shortness of breath and wheezing  Cardiovascular: Negative for chest pain, palpitations and leg swelling  Gastrointestinal: Negative for abdominal pain, constipation, diarrhea, nausea, rectal pain and vomiting  Endocrine: Negative for cold intolerance, polydipsia and polyuria  Genitourinary: Negative for difficulty urinating, dysuria, flank pain, menstrual problem and pelvic pain  Musculoskeletal: Negative for arthralgias, joint swelling and myalgias  Skin: Negative for color change and rash  Allergic/Immunologic: Negative for environmental allergies and immunocompromised state  Neurological: Negative for dizziness, weakness, numbness and headaches  Hematological: Negative for adenopathy  Does not bruise/bleed easily  Psychiatric/Behavioral: Positive for sleep disturbance  Negative for decreased concentration, dysphoric mood, self-injury and suicidal ideas  The patient is not nervous/anxious  Objective:  Vitals:    02/22/21 1454   BP: 126/84   BP Location: Left arm   Patient Position: Sitting   Cuff Size: Standard   Pulse: 91   Resp: 14   Temp: 97 5 °F (36 4 °C)   TempSrc: Temporal   SpO2: 98%   Weight: 88 5 kg (195 lb)   Height: 5' 5 5" (1 664 m)     Body mass index is 31 96 kg/m²  Physical Exam  Vitals signs and nursing note reviewed  Constitutional:       Appearance: Normal appearance  HENT:      Head: Normocephalic and atraumatic  Neurological:      General: No focal deficit present  Mental Status: She is alert and oriented to person, place, and time  Mental status is at baseline  Psychiatric:         Mood and Affect: Mood normal          Behavior: Behavior normal          Thought Content:  Thought content normal          Judgment: Judgment normal

## 2021-02-28 ENCOUNTER — HOSPITAL ENCOUNTER (EMERGENCY)
Facility: HOSPITAL | Age: 41
Discharge: HOME | End: 2021-02-28
Attending: EMERGENCY MEDICINE | Admitting: EMERGENCY MEDICINE
Payer: COMMERCIAL

## 2021-02-28 VITALS
DIASTOLIC BLOOD PRESSURE: 96 MMHG | SYSTOLIC BLOOD PRESSURE: 130 MMHG | HEART RATE: 92 BPM | RESPIRATION RATE: 18 BRPM | OXYGEN SATURATION: 100 % | TEMPERATURE: 98.4 F

## 2021-02-28 DIAGNOSIS — Z20.822 CONTACT WITH AND (SUSPECTED) EXPOSURE TO COVID-19: Primary | ICD-10-CM

## 2021-02-28 PROCEDURE — 99281 EMR DPT VST MAYX REQ PHY/QHP: CPT | Performed by: PHYSICIAN ASSISTANT

## 2021-02-28 PROCEDURE — 99283 EMERGENCY DEPT VISIT LOW MDM: CPT

## 2021-02-28 PROCEDURE — U0005 INFEC AGEN DETEC AMPLI PROBE: HCPCS | Performed by: PHYSICIAN ASSISTANT

## 2021-02-28 PROCEDURE — U0003 INFECTIOUS AGENT DETECTION BY NUCLEIC ACID (DNA OR RNA); SEVERE ACUTE RESPIRATORY SYNDROME CORONAVIRUS 2 (SARS-COV-2) (CORONAVIRUS DISEASE [COVID-19]), AMPLIFIED PROBE TECHNIQUE, MAKING USE OF HIGH THROUGHPUT TECHNOLOGIES AS DESCRIBED BY CMS-2020-01-R: HCPCS | Performed by: PHYSICIAN ASSISTANT

## 2021-02-28 NOTE — DISCHARGE INSTRUCTIONS
You were tested today for COVID-19  You must continue quarantining until test results are negative  If test is positive, you must continue isolation for 10 days since onset of symptoms, must be fever free for 24 hours and show improvement of symptoms  Please follow-up with your primary care doctor for any notes needed  We are recommending a vitamin regimen of Vitamin D3 2000 IU daily, Vitamin-C 1gram twice a day, Multivitamin daily, Zinc 220 mg daily    When you sleep you should try to lay on your stomach as much as possible, or side but avoid sleeping on back

## 2021-02-28 NOTE — ED PROVIDER NOTES
History  Chief Complaint   Patient presents with    Medical Problem     pt presents to ed for covid exposure, son tested positive     Pt with PMH: Left Adrenal mass, Arthritis, Asthma, HTN, Headache, Insomnia, Migraine, Moderate episode of recurrent major depressive disorder, Tobacco use, Vitamin D deficiency  Past Surgical History: APPENDECTOMY, B/L BREAST BIOPSY, CERVICAL LEEP,  SECTION X2, HYSTERECTOMY , T/L KNEE SURGERY, axillary LYMPH NODE DISSECTION, TUBAL LIGATION , [address] THYROID BIOPSY 2021  presents to ED requesting COVID testing  Pt states she lives with son and daughter (who is also here being tested), and just found out that son tested + for COVID  Pt  states no prolonged contact bc son isn't home much and not around much, but wanted to come get tested to be sure, bc work  Patient denies any complaints to me emergency department  Prior to Admission Medications   Prescriptions Last Dose Informant Patient Reported? Taking?    Cholecalciferol (Vitamin D3) 125 MCG (5000 UT) CAPS   No No   Sig: Take 1 capsule (5,000 Units total) by mouth daily   QUEtiapine (SEROquel) 25 mg tablet   No No   Sig: Take 1 tablet (25 mg total) by mouth daily at bedtime   albuterol (PROVENTIL HFA,VENTOLIN HFA) 90 mcg/act inhaler   No No   Sig: Inhale 2 puffs every 6 (six) hours as needed for wheezing   amLODIPine (NORVASC) 10 mg tablet   No No   Sig: TAKE 1 TABLET (10 MG TOTAL) BY MOUTH DAILY BLOOD PRESUURE   cyclobenzaprine (FLEXERIL) 10 mg tablet   No No   Sig: Take 1 tablet (10 mg total) by mouth 3 (three) times a day as needed for muscle spasms   ibuprofen (MOTRIN) 600 mg tablet   No No   Sig: Take 1 tablet (600 mg total) by mouth every 6 (six) hours as needed for mild pain or moderate pain   mirtazapine (REMERON) 15 mg tablet   No No   Sig: Take 0 5 tablets (7 5 mg total) by mouth daily at bedtime   valACYclovir (VALTREX) 500 mg tablet   No No   Sig: TAKE 1 TABLET BY MOUTH EVERY DAY varenicline (CHANTIX) 1 mg tablet   No No   Sig: Take 1 tablet (1 mg total) by mouth 2 (two) times a day      Facility-Administered Medications: None       Past Medical History:   Diagnosis Date    Abnormal Pap smear of cervix     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, 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    Adrenal mass, left ([facility] [address] ) 2021    1 8 x 2 1 cm on ct 2020    Arthritis     Asthma     Blurry vision 2020    Essential hypertension 2020    Headache 2020    Hypertension     Immunization deficiency 10/2/2020    Hep a nonimmune    Insomnia 2021    Migraine     Moderate episode of recurrent major depressive disorder ([facility] [address] ) 2020    Prediabetes 2020    Tobacco use 2021    Vitamin D deficiency 10/2/2020       Past Surgical History:   Procedure Laterality Date    APPENDECTOMY      BREAST BIOPSY Left 2017    BREAST BIOPSY Right     2 core biopsies    BREAST EXCISIONAL BIOPSY Left 2018    BREAST SURGERY Left 2018    Left breast mass excision    CERVICAL BIOPSY  W/ LOOP ELECTRODE EXCISION       SECTION      X2    COLPOSCOPY      HYSTERECTOMY      heavy bleeding, ovaries remain, also had abnormal pap    KNEE SURGERY      LYMPH NODE DISSECTION      under armpit    TUBAL LIGATION  2007    US GUIDED THYROID BIOPSY  2021       Family History   Problem Relation Age of Onset    Lupus Maternal Aunt 50    Seizures Maternal Aunt     Leukemia Maternal Uncle 25    Diabetes Mother     Heart attack Mother     Heart disease Mother         Internal Defibrilation    Diabetes Maternal Grandmother     Diabetes Paternal Grandmother     Diabetes Paternal Aunt     No Known Problems Father     Endometrial cancer Sister 29    Colon cancer Sister 28    No Known Problems Daughter     No Known Problems Son     No Known Problems Son      I have reviewed and agree with the history as documented      E-Cigarette/Vaping    E-Cigarette Use Never User      E-Cigarette/Vaping Substances  Nicotine No     THC No     CBD No     Flavoring No     Other No     Unknown No      Social History     Tobacco Use    Smoking status: Current Every Day Smoker     Packs/day: 1 00     Types: Cigarettes    Smokeless tobacco: Never Used   Substance Use Topics    Alcohol use: Yes     Frequency: 2-4 times a month     Comment: occasional    Drug use: No       Review of Systems   Constitutional: Negative for chills and fever  HENT: Negative for hearing loss and sore throat  Eyes: Negative for visual disturbance  Respiratory: Negative for cough and shortness of breath  Cardiovascular: Negative for chest pain and leg swelling  Gastrointestinal: Negative for abdominal pain and vomiting  Genitourinary: Negative for dysuria and frequency  Musculoskeletal: Negative for arthralgias and myalgias  Skin: Negative for color change and pallor  Neurological: Negative for dizziness, weakness and headaches  Psychiatric/Behavioral: Negative for behavioral problems  All other systems reviewed and are negative  Physical Exam  Physical Exam  Vitals signs and nursing note reviewed  Constitutional:       General: She is not in acute distress  Appearance: Normal appearance  She is well-developed  She is not ill-appearing  HENT:      Head: Normocephalic and atraumatic  Right Ear: External ear normal       Left Ear: External ear normal       Nose: Nose normal       Mouth/Throat:      Mouth: Mucous membranes are moist       Pharynx: Oropharynx is clear  Eyes:      Conjunctiva/sclera: Conjunctivae normal    Neck:      Musculoskeletal: Normal range of motion  Cardiovascular:      Rate and Rhythm: Normal rate and regular rhythm  Pulmonary:      Effort: Pulmonary effort is normal  No respiratory distress  Breath sounds: Normal breath sounds  Musculoskeletal: Normal range of motion  General: No signs of injury  Skin:     General: Skin is warm and dry  Findings: No rash  Neurological:      General: No focal deficit present  Mental Status: She is alert and oriented to person, place, and time  Motor: No weakness  Psychiatric:         Behavior: Behavior normal          Vital Signs  ED Triage Vitals [02/28/21 1144]   Temperature Pulse Respirations Blood Pressure SpO2   98 4 °F (36 9 °C) 92 18 130/96 100 %      Temp Source Heart Rate Source Patient Position - Orthostatic VS BP Location FiO2 (%)   Oral Monitor -- Right arm --      Pain Score       No Pain           Vitals:    02/28/21 1144   BP: 130/96   Pulse: 92         Visual Acuity      ED Medications  Medications - No data to display    Diagnostic Studies  Results Reviewed     Procedure Component Value Units Date/Time    Novel Coronavirus Akshat [facility] [[id number]] Collected: 02/28/21 1200    Lab Status: No result Specimen: Nares from Nasopharyngeal Swab                  No orders to display              Procedures  Procedures         ED Course                             SBIRT 22yo+      Most Recent Value   SBIRT (25 yo +)   In order to provide better care to our patients, we are screening all of our patients for alcohol and drug use  Would it be okay to ask you these screening questions? No Filed at: 02/28/2021 1146                    MDM    Disposition  Final diagnoses:   Contact with and (suspected) exposure to covid-19     Time reflects when diagnosis was documented in both MDM as applicable and the Disposition within this note     Time User Action Codes Description Comment    2/28/2021 11:49 AM Samson Owusu Add [Z20 [id number]] Contact with and (suspected) exposure to covid-19       ED Disposition     ED Disposition Condition Date/Time Comment    Discharge Stable Sun Feb 28, 2021 11:48 AM Leonardo Acevedo discharge to home/self care              Follow-up Information     Follow up With Specialties Details Why Contact Info    Russ Cartagena MD Family Medicine  As needed [address] [place] [address],  [address] [place] [address] [address]            Discharge Medication List as of 2/28/2021 11:49 AM      CONTINUE these medications which have NOT CHANGED    Details   albuterol (PROVENTIL HFA,VENTOLIN HFA) 90 mcg/act inhaler Inhale 2 puffs every 6 (six) hours as needed for wheezing, Starting Thu 10/15/2020, Normal      amLODIPine (NORVASC) 10 mg tablet TAKE 1 TABLET (10 MG TOTAL) BY MOUTH DAILY BLOOD PRESUURE, Starting Mon 2/22/2021, Until Sun 5/23/2021, Normal      Cholecalciferol (Vitamin D3) 125 MCG (5000 UT) CAPS Take 1 capsule (5,000 Units total) by mouth daily, Starting Fri 10/2/2020, Until Mon 2/22/2021, Normal      cyclobenzaprine (FLEXERIL) 10 mg tablet Take 1 tablet (10 mg total) by mouth 3 (three) times a day as needed for muscle spasms, Starting Mon 12/21/2020, Normal      ibuprofen (MOTRIN) 600 mg tablet Take 1 tablet (600 mg total) by mouth every 6 (six) hours as needed for mild pain or moderate pain, Starting Mon 12/21/2020, Normal      mirtazapine (REMERON) 15 mg tablet Take 0 5 tablets (7 5 mg total) by mouth daily at bedtime, Starting Mon 2/22/2021, Until Wed 3/24/2021, No Print      QUEtiapine (SEROquel) 25 mg tablet Take 1 tablet (25 mg total) by mouth daily at bedtime, Starting Mon 2/22/2021, Until Wed 3/24/2021, Normal      valACYclovir (VALTREX) 500 mg tablet TAKE 1 TABLET BY MOUTH EVERY DAY, Normal      varenicline (CHANTIX) 1 mg tablet Take 1 tablet (1 mg total) by mouth 2 (two) times a day, Starting Mon 2/22/2021, Until Wed 3/24/2021, Normal           No discharge procedures on file      PDMP Review       Value Time User    PDMP Reviewed  Yes 12/21/2020  5:26 AM Franki Sweeney MD          ED Provider  Electronically Signed by           Radha Juarez PA-C  02/28/21 9218

## 2021-03-01 LAB — SARS-COV-2 RNA RESP QL NAA+PROBE: NEGATIVE

## 2021-03-17 DIAGNOSIS — G47.00 INSOMNIA, UNSPECIFIED TYPE: ICD-10-CM

## 2021-03-17 DIAGNOSIS — F33.1 MODERATE EPISODE OF RECURRENT MAJOR DEPRESSIVE DISORDER (HCC): ICD-10-CM

## 2021-03-17 RX ORDER — QUETIAPINE FUMARATE 25 MG/1
TABLET, FILM COATED ORAL
Qty: 30 TABLET | Refills: 1 | Status: SHIPPED | OUTPATIENT
Start: 2021-03-17 | End: 2021-03-24 | Stop reason: SDUPTHER

## 2021-03-24 ENCOUNTER — OFFICE VISIT (OUTPATIENT)
Dept: FAMILY MEDICINE CLINIC | Facility: CLINIC | Age: 41
End: 2021-03-24
Payer: COMMERCIAL

## 2021-03-24 VITALS
WEIGHT: 201.2 LBS | HEART RATE: 86 BPM | HEIGHT: 66 IN | TEMPERATURE: 97.4 F | SYSTOLIC BLOOD PRESSURE: 126 MMHG | BODY MASS INDEX: 32.33 KG/M2 | DIASTOLIC BLOOD PRESSURE: 90 MMHG | OXYGEN SATURATION: 99 %

## 2021-03-24 DIAGNOSIS — F33.1 MODERATE EPISODE OF RECURRENT MAJOR DEPRESSIVE DISORDER (HCC): ICD-10-CM

## 2021-03-24 DIAGNOSIS — I10 ESSENTIAL HYPERTENSION: ICD-10-CM

## 2021-03-24 DIAGNOSIS — M25.462 BILATERAL KNEE EFFUSIONS: Primary | ICD-10-CM

## 2021-03-24 DIAGNOSIS — M25.461 BILATERAL KNEE EFFUSIONS: Primary | ICD-10-CM

## 2021-03-24 DIAGNOSIS — G47.00 INSOMNIA, UNSPECIFIED TYPE: ICD-10-CM

## 2021-03-24 DIAGNOSIS — Z72.0 TOBACCO USE: ICD-10-CM

## 2021-03-24 PROCEDURE — 99214 OFFICE O/P EST MOD 30 MIN: CPT | Performed by: FAMILY MEDICINE

## 2021-03-24 RX ORDER — VARENICLINE TARTRATE 1 MG/1
1 TABLET, FILM COATED ORAL 2 TIMES DAILY
Qty: 60 TABLET | Refills: 3 | Status: SHIPPED | OUTPATIENT
Start: 2021-03-24 | End: 2021-08-03

## 2021-03-24 RX ORDER — PREDNISONE 20 MG/1
20 TABLET ORAL DAILY
Qty: 7 TABLET | Refills: 0 | Status: SHIPPED | OUTPATIENT
Start: 2021-03-24 | End: 2021-03-31

## 2021-03-24 RX ORDER — MELOXICAM 7.5 MG/1
7.5 TABLET ORAL DAILY
Qty: 30 TABLET | Refills: 1 | Status: SHIPPED | OUTPATIENT
Start: 2021-03-24 | End: 2021-05-17

## 2021-03-24 RX ORDER — QUETIAPINE FUMARATE 25 MG/1
25 TABLET, FILM COATED ORAL
Qty: 30 TABLET | Refills: 2 | Status: SHIPPED | OUTPATIENT
Start: 2021-03-24 | End: 2021-06-23 | Stop reason: ALTCHOICE

## 2021-03-24 NOTE — PROGRESS NOTES
Assessment/Plan:    Refer to orthopedics for bilateral knee effusion left more than right  She will need arthrocentesis  Advised for knee brace  Start patient on prednisone meloxicam and topical diclofenac  Her blood pressure is stable continue amlodipine  Stop mirtazapine since she is doing better  Continue Seroquel at nighttime to help with sleep and mood  Continue Chantix  See her back in 3 months sooner if needed  She will need hep a vaccine then and complete physical   Recommend pneumonia vaccine for her since she smoked and obesity  I have spent 25 minutes with Patient  today in which greater than 50% of this time was spent in counseling/coordination of care regarding Risks and benefits of tx options  Problem List Items Addressed This Visit        Cardiovascular and Mediastinum    Essential hypertension       Musculoskeletal and Integument    Bilateral knee effusions - Primary    Relevant Medications    meloxicam (MOBIC) 7 5 mg tablet    Diclofenac Sodium (VOLTAREN) 1 %    predniSONE 20 mg tablet    Other Relevant Orders    Ambulatory referral to Orthopedic Surgery       Other    Moderate episode of recurrent major depressive disorder (HCC)    Relevant Medications    QUEtiapine (SEROquel) 25 mg tablet    varenicline (CHANTIX) 1 mg tablet    Tobacco use    Relevant Medications    varenicline (CHANTIX) 1 mg tablet    Insomnia    Relevant Medications    QUEtiapine (SEROquel) 25 mg tablet            Subjective:      Patient ID: Jimmy Red is a 38 y o  female  42-year-old female follow-up mood and tobacco smoking cessation  Patient is on mirtazapine 7 5 mg daily Seroquel 25 mg at bedtime to help with insomnia  Since then she is doing much better her mood is stable stress is gone and she is sleeping better at night  She is also smoking last   She taking Chantix 1 mg twice a day  She complained of left knee pain worse than the right and swollen the past week    She works at [employer] where house on her feet all day very painful  She had right knee effusion in the past and had to get fluid removed and had a surgery done for ligament torn  She has a knee brace  She has ibuprofen to take does not seem to help  She has high blood pressure on amlodipine 10 mg daily  Denies any trauma  The following portions of the patient's history were reviewed and updated as appropriate:   Past Medical History:  She has a past medical history of Abnormal Pap smear of cervix, [facility] ) (2021), Arthritis, Asthma, Blurry vision (2020), Essential hypertension (2020), Headache (2020), Hypertension, Immunization deficiency (10/2/2020), Insomnia (2021), Migraine, Moderate episode of recurrent major depressive disorder ([facility] ) (2020), Prediabetes (2020), Tobacco use (2021), and Vitamin D deficiency (10/2/2020)  ,  _______________________________________________________________________  Medical Problems:  does not have any pertinent problems on file ,  _______________________________________________________________________  Past Surgical History:   has a past surgical history that includes Tubal ligation (); Appendectomy;  section; Colposcopy; Knee surgery; Cervical biopsy w/ loop electrode excision; Lymph node dissection (); Breast surgery (Left, 2018); Breast biopsy (Left, 2017); Breast excisional biopsy (Left, 2018); Breast biopsy (Right); Hysterectomy (); and US guided thyroid biopsy (2021)  ,  _______________________________________________________________________  Family History:  family history includes Colon cancer (age of onset: 28) in her sister; Diabetes in her maternal grandmother, mother, paternal aunt, and paternal grandmother; Endometrial cancer (age of onset: 29) in her sister;  Heart attack in her mother; Heart disease in her mother; Leukemia (age of onset: 25) in her maternal uncle; Lupus (age of onset: 50) in her maternal aunt; No Known Problems in her daughter, father, son, and son; Seizures in her maternal aunt  ,  _______________________________________________________________________  Social History:   reports that she has been smoking cigarettes  She has been smoking about 1 00 pack per day  She has never used smokeless tobacco  She reports current alcohol use  She reports that she does not use drugs  ,  _______________________________________________________________________  Allergies:  has No Known Allergies     _______________________________________________________________________  Current Outpatient Medications   Medication Sig Dispense Refill    albuterol (PROVENTIL HFA,VENTOLIN HFA) 90 mcg/act inhaler Inhale 2 puffs every 6 (six) hours as needed for wheezing 1 Inhaler 0    amLODIPine (NORVASC) 10 mg tablet TAKE 1 TABLET (10 MG TOTAL) BY MOUTH DAILY BLOOD PRESUURE 90 tablet 1    Cholecalciferol (Vitamin D3) 125 MCG (5000 UT) CAPS Take 1 capsule (5,000 Units total) by mouth daily 90 capsule 2    cyclobenzaprine (FLEXERIL) 10 mg tablet Take 1 tablet (10 mg total) by mouth 3 (three) times a day as needed for muscle spasms 30 tablet 0    QUEtiapine (SEROquel) 25 mg tablet Take 1 tablet (25 mg total) by mouth daily at bedtime 30 tablet 2    valACYclovir (VALTREX) 500 mg tablet TAKE 1 TABLET BY MOUTH EVERY DAY 30 tablet 3    varenicline (CHANTIX) 1 mg tablet Take 1 tablet (1 mg total) by mouth 2 (two) times a day 60 tablet 3    Diclofenac Sodium (VOLTAREN) 1 % Apply 2 g topically 4 (four) times a day 100 g 2    meloxicam (MOBIC) 7 5 mg tablet Take 1 tablet (7 5 mg total) by mouth daily 30 tablet 1    predniSONE 20 mg tablet Take 1 tablet (20 mg total) by mouth daily for 7 days 7 tablet 0     No current facility-administered medications for this visit       _______________________________________________________________________  Review of Systems   Constitutional: Negative for activity change, appetite change, fatigue and unexpected weight change  HENT: Negative for ear pain, sore throat, trouble swallowing and voice change  Eyes: Negative for photophobia and visual disturbance  Respiratory: Negative for cough, chest tightness, shortness of breath and wheezing  Cardiovascular: Negative for chest pain, palpitations and leg swelling  Gastrointestinal: Negative for abdominal pain, constipation, diarrhea, nausea, rectal pain and vomiting  Endocrine: Negative for cold intolerance, polydipsia and polyuria  Genitourinary: Negative for difficulty urinating, dysuria, flank pain, menstrual problem and pelvic pain  Musculoskeletal: Negative for arthralgias, joint swelling and myalgias  Skin: Negative for color change and rash  Allergic/Immunologic: Negative for environmental allergies and immunocompromised state  Neurological: Negative for dizziness, weakness, numbness and headaches  Hematological: Negative for adenopathy  Does not bruise/bleed easily  Psychiatric/Behavioral: Negative for decreased concentration, dysphoric mood, self-injury, sleep disturbance and suicidal ideas  The patient is not nervous/anxious  Objective:  Vitals:    03/24/21 1610   BP: 126/90   BP Location: Left arm   Patient Position: Sitting   Cuff Size: Standard   Pulse: 86   Temp: (!) 97 4 °F (36 3 °C)   TempSrc: Temporal   SpO2: 99%   Weight: 91 3 kg (201 lb 3 2 oz)   Height: 5' 5 5" (1 664 m)     Body mass index is 32 97 kg/m²  Physical Exam  Vitals signs and nursing note reviewed  Constitutional:       Appearance: Normal appearance  She is obese  Pulmonary:      Effort: Pulmonary effort is normal    Musculoskeletal:         General: Swelling and tenderness present  Comments: BL knee w fluid, tender on palpation, no erythema, no warthm  +1 pitting edema BL LE   Skin:     General: Skin is warm and dry  Neurological:      Mental Status: She is alert and oriented to person, place, and time  Mental status is at baseline  Psychiatric:         Mood and Affect: Mood normal          Behavior: Behavior normal          Thought Content:  Thought content normal          Judgment: Judgment normal

## 2021-03-31 ENCOUNTER — OFFICE VISIT (OUTPATIENT)
Dept: OBGYN CLINIC | Facility: CLINIC | Age: 41
End: 2021-03-31
Payer: COMMERCIAL

## 2021-03-31 VITALS
HEIGHT: 65 IN | HEART RATE: 86 BPM | SYSTOLIC BLOOD PRESSURE: 131 MMHG | BODY MASS INDEX: 31.82 KG/M2 | WEIGHT: 191 LBS | DIASTOLIC BLOOD PRESSURE: 89 MMHG

## 2021-03-31 DIAGNOSIS — M23.92 INTERNAL DERANGEMENT OF LEFT KNEE: Primary | ICD-10-CM

## 2021-03-31 PROCEDURE — 99213 OFFICE O/P EST LOW 20 MIN: CPT | Performed by: PHYSICIAN ASSISTANT

## 2021-03-31 PROCEDURE — 4004F PT TOBACCO SCREEN RCVD TLK: CPT | Performed by: PHYSICIAN ASSISTANT

## 2021-03-31 NOTE — PROGRESS NOTES
Patient Name:  Anuj Granados  MRN:  [id number]    Assessment & Plan     Left knee pain  Possible medial meniscus tear  1  Patient has tried and failed conservative measurements including home exercise program, anti-inflammatories, and activity modification  2  MRI left knee be obtained to evaluate for meniscal pathology  3  Follow-up after MRI  Chief Complaint     Left Knee Pain    History of the Present Illness     Anuj Granados is a 38 y o  female who reports to the office today for evaluation of left knee  Patient sustained twisting injury at work 5/14/20 since then she notes chronic left knee pain and swelling  She notes subjective weakness and instability  Pain is localized primarily to the medial aspect of the knee  Pain is worse with walking up down steps  She states her knee will give out on her as well  She has tried meloxicam oral steroids without relief  She has also participated in a home exercise program over the past two months without relief as well  No numbness or tingling  No fevers or chills  Physical Exam     /89   Pulse 86   Ht 5' 5" (1 651 m)   Wt 86 6 kg (191 lb)   BMI 31 78 kg/m²     Left knee: Skin intact  Small effusion  No erythema ecchymosis or swelling  Tenderness to palpation medial joint line  No tenderness to palpation lateral joint line  Range of motion 0-120 with pain noted at terminal flexion  Stable to varus and valgus stress  Stable Lachman test   Positive Lashaun's test     Eyes:  Anicteric sclerae  ENT:  Trachea midline  Lungs:  Normal respiratory effort  Cardiovascular:  Capillary refill is less than 2 seconds  Lymphatic:  No palpable lymphadenopathy  Skin:  Intact without erythema  Neurologic:  Sensation grossly intact to light touch  Psychiatric:  Mood and affect are appropriate      Past Medical History:   Diagnosis Date    Abnormal Pap smear of cervix     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, 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    Adrenal mass, left ([address] ) 1/25/2021 1 8 x 2 1 cm on ct 2020    Arthritis     Asthma     Blurry vision 2020    Essential hypertension 2020    Headache 2020    Hypertension     Immunization deficiency 10/2/2020    Hep a nonimmune    Insomnia 2021    Migraine     Moderate episode of recurrent major depressive disorder [facility] ) 2020    Prediabetes 2020    Tobacco use 2021    Vitamin D deficiency 10/2/2020       Past Surgical History:   Procedure Laterality Date    APPENDECTOMY      BREAST BIOPSY Left 2017    BREAST BIOPSY Right     2 core biopsies    BREAST EXCISIONAL BIOPSY Left 2018    BREAST SURGERY Left 2018    Left breast mass excision    CERVICAL BIOPSY  W/ LOOP ELECTRODE EXCISION       SECTION      X2    COLPOSCOPY      HYSTERECTOMY      heavy bleeding, ovaries remain, also had abnormal pap    KNEE SURGERY      LYMPH NODE DISSECTION      under armpit    TUBAL LIGATION  2007    US GUIDED THYROID BIOPSY  2021       No Known Allergies    Current Outpatient Medications on File Prior to Visit   Medication Sig Dispense Refill    albuterol (PROVENTIL HFA,VENTOLIN HFA) 90 mcg/act inhaler Inhale 2 puffs every 6 (six) hours as needed for wheezing 1 Inhaler 0    amLODIPine (NORVASC) 10 mg tablet TAKE 1 TABLET (10 MG TOTAL) BY MOUTH DAILY BLOOD PRESUURE 90 tablet 1    Cholecalciferol (Vitamin D3) 125 MCG (5000 UT) CAPS Take 1 capsule (5,000 Units total) by mouth daily 90 capsule 2    cyclobenzaprine (FLEXERIL) 10 mg tablet Take 1 tablet (10 mg total) by mouth 3 (three) times a day as needed for muscle spasms 30 tablet 0    Diclofenac Sodium (VOLTAREN) 1 % Apply 2 g topically 4 (four) times a day 100 g 2    meloxicam (MOBIC) 7 5 mg tablet Take 1 tablet (7 5 mg total) by mouth daily 30 tablet 1    predniSONE 20 mg tablet Take 1 tablet (20 mg total) by mouth daily for 7 days 7 tablet 0    QUEtiapine (SEROquel) 25 mg tablet Take 1 tablet (25 mg total) by mouth daily at bedtime 30 tablet 2    valACYclovir (VALTREX) 500 mg tablet TAKE 1 TABLET BY MOUTH EVERY DAY 30 tablet 3    varenicline (CHANTIX) 1 mg tablet Take 1 tablet (1 mg total) by mouth 2 (two) times a day 60 tablet 3     No current facility-administered medications on file prior to visit  Social History     Tobacco Use    Smoking status: Current Every Day Smoker     Packs/day: 1 00     Types: Cigarettes    Smokeless tobacco: Never Used   Substance Use Topics    Alcohol use: Yes     Frequency: 2-4 times a month     Comment: occasional    Drug use: No       Family History   Problem Relation Age of Onset    Lupus Maternal Aunt 50    Seizures Maternal Aunt     Leukemia Maternal Uncle 25    Diabetes Mother     Heart attack Mother     Heart disease Mother         Internal Defibrilation    Diabetes Maternal Grandmother     Diabetes Paternal Grandmother     Diabetes Paternal Aunt     No Known Problems Father     Endometrial cancer Sister 29    Colon cancer Sister 28    No Known Problems Daughter     No Known Problems Son     No Known Problems Son        Review of Systems     As stated in the HPI  All other systems were reviewed and are negative

## 2021-03-31 NOTE — LETTER
March 31, 2021     Patient: Nasir Hester   YOB: 1980   Date of Visit: 3/31/2021       To Whom it May Concern:    Nasir Hester is under my professional care  She was seen in my office on 3/31/2021  If you have any questions or concerns, please don't hesitate to call           Sincerely,          Patito Sahu PA-C

## 2021-04-01 ENCOUNTER — OFFICE VISIT (OUTPATIENT)
Dept: PULMONOLOGY | Facility: CLINIC | Age: 41
End: 2021-04-01
Payer: COMMERCIAL

## 2021-04-01 VITALS
SYSTOLIC BLOOD PRESSURE: 134 MMHG | RESPIRATION RATE: 18 BRPM | OXYGEN SATURATION: 94 % | DIASTOLIC BLOOD PRESSURE: 86 MMHG | HEART RATE: 91 BPM | BODY MASS INDEX: 32.82 KG/M2 | HEIGHT: 65 IN | TEMPERATURE: 97.9 F | WEIGHT: 197 LBS

## 2021-04-01 DIAGNOSIS — G47.19 EXCESSIVE DAYTIME SLEEPINESS: Primary | ICD-10-CM

## 2021-04-01 DIAGNOSIS — R06.83 SNORING: ICD-10-CM

## 2021-04-01 DIAGNOSIS — G25.81 RESTLESS LEG SYNDROME: ICD-10-CM

## 2021-04-01 PROCEDURE — 99245 OFF/OP CONSLTJ NEW/EST HI 55: CPT | Performed by: INTERNAL MEDICINE

## 2021-04-01 NOTE — LETTER
April 1, 2021     MD Bernadine Brown 5  [address] [facility]  Professor Brianna [address]    Patient: Viki Fall   YOB: 1980   Date of Visit: 4/1/2021       Dear Dr Johnnie Mchugh:    Thank you for referring Viki Fall to me for evaluation  Below are my notes for this consultation  If you have questions, please do not hesitate to call me  I look forward to following your patient along with you  Sincerely,        Angel Baeza DO        CC: No Recipients  Angel Baeza DO  4/1/2021 12:59 PM  Sign when Signing Visit  Sleep Consultation   Viki Fall 38 y o  female MRN: [id number]      Reason for consultation: Snoring, daytime sleepiness    Requesting physician: Dr Johnnie Mchugh    Assessment/Plan  38 y o  F with PMHx of prediabetes, HTN, asthma, tobacco abuse, depression who comes in for evaluation of snoring and excessive daytime sleepiness  1   Snoring/Excessive daytime sleepiness -  Mallampati class 4, Body mass index is 32 78 kg/m² ,    She is at risk for obstructive sleep apnea based on [employer] survey  -  Check a home study to assess for obstructive sleep apnea      -  I discussed in depth the diagnostic studies and treatment options involved with obstructive sleep apnea      -  I also discussed in depth the risk of leaving sleep apnea untreated including hypertension, heart failure, arrhythmia, MI and stroke  -  The patient is agreeable to undergo testing and treatment of obstructive sleep apnea  She understands that pitfalls she may encounter along the way and is willing to attempt CPAP treatment  2  Restless legs       -  Treat HIRAL as above       -  Check Iron studies and magnesium to ensure that is not a cause for PLMs    3  Sleep onset insomnia - she was recently given low dose seroquel to help her fall asleep  She has not yet started using it  4   Current everyday smoker - Rx for Chantix was given  She will start that in an attempt to quit smoking  She does admit to some dyspnea on exertion  Will consider a PFT at next visit  History of Present Illness   HPI:  Aniyah Rivas is a 38 y o  female with PMHx as below who comes in for evaluation of snoring and daytime sleepiness  Patient notes weight gain and symptoms of difficulty falling asleep, difficulty staying asleep, snoring, excessive daytime sleepiness with an [place] score of 9, awakenings with snoring, morning headaches, and awakenings with dry mouth  she note symptoms of restless legs  she denies symptoms of cataplexy, sleep paralysis, hypnopompic or hypnagogic hallucinations  Sleep History:  she goes to bed at approximately 10 pm, will get to sleep in 30 - 45 min, will get out of bed at 4:50 am   she will get up 3 times at night for unknown reason  It will then take a few minutes to fall back asleep    she naps on the weekend for 30 minutes but does not feel better  She does not take any medications to help her fall asleep but was recently prescribed Seroquel  She has not yet taken the medication  ROS:   Review of Systems   Constitutional: Positive for fatigue and unexpected weight change  Eyes: Negative  Respiratory: Positive for apnea and shortness of breath  Cardiovascular: Positive for leg swelling  Gastrointestinal: Negative  Endocrine: Negative  Genitourinary: Negative  Musculoskeletal: Negative  Skin: Negative  Allergic/Immunologic: Negative  Neurological: Negative  Hematological: Negative            Historical Information   Past Medical History:   Diagnosis Date    Abnormal Pap smear of cervix     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, 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    Adrenal mass, left ([address] ) 1/25/2021    1 8 x 2 1 cm on ct 12/21/2020    Arthritis     Asthma     Blurry vision 9/4/2020    Essential hypertension 9/4/2020    Headache 9/4/2020    Hypertension     Immunization deficiency 10/2/2020    Hep a nonimmune    Insomnia 2/22/2021    Migraine     Moderate episode of recurrent major depressive disorder ([facility] [address] ) 2020    Prediabetes 2020    Tobacco use 2021    Vitamin D deficiency 10/2/2020     Past Surgical History:   Procedure Laterality Date    APPENDECTOMY      BREAST BIOPSY Left 2017    BREAST BIOPSY Right     2 core biopsies    BREAST EXCISIONAL BIOPSY Left 2018    BREAST SURGERY Left 2018    Left breast mass excision    CERVICAL BIOPSY  W/ LOOP ELECTRODE EXCISION       SECTION      X2    COLPOSCOPY      HYSTERECTOMY      heavy bleeding, ovaries remain, also had abnormal pap    KNEE SURGERY      LYMPH NODE DISSECTION      under armpit    TUBAL LIGATION     [address] THYROID BIOPSY  2021     Family History   Problem Relation Age of Onset    Lupus Maternal Aunt 50    Seizures Maternal Aunt     Leukemia Maternal Uncle 25    Diabetes Mother     Heart attack Mother     Heart disease Mother         Internal Defibrilation    Diabetes Maternal Grandmother     Diabetes Paternal Grandmother     Diabetes Paternal Aunt     No Known Problems Father     Endometrial cancer Sister 29    Colon cancer Sister 28    No Known Problems Daughter     No Known Problems Son     No Known Problems Son      Social History     Socioeconomic History    Marital status: Single     Spouse name: Not on file    Number of children: Not on file    Years of education: 8    Highest education level: Not on file   Occupational History    Not on file   Social Needs    Financial resource strain: Not on file    Food insecurity     Worry: Not on file     Inability: Not on file    Transportation needs     Medical: Not on file     Non-medical: Not on file   Tobacco Use    Smoking status: Current Every Day Smoker     Packs/day: 1 00     Years: 20 00     Pack years: 20 00     Types: Cigarettes    Smokeless tobacco: Never Used    Tobacco comment: pt is down to  5 packs a day   Substance and Sexual Activity    Alcohol use:  Yes Frequency: 2-4 times a month     Comment: occasional    Drug use: No    Sexual activity: Not Currently     Partners: Male   Lifestyle    Physical activity     Days per week: Not on file     Minutes per session: Not on file    Stress: Not on file   Relationships    Social connections     Talks on phone: Not on file     Gets together: Not on file     Attends Taoism service: Not on file     Active member of club or organization: Not on file     Attends meetings of clubs or organizations: Not on file     Relationship status: Not on file    Intimate partner violence     Fear of current or ex partner: Not on file     Emotionally abused: Not on file     Physically abused: Not on file     Forced sexual activity: Not on file   Other Topics Concern    Not on file   Social History Narrative    Most recent tobacco use screenin-    Do you currently or have you served in the [employer]: No    Were you activated, into active duty, as a member of the [employer] or as a Reservist: No    Occupation: unemployed    Education: 10    Marital status: Single    Sexual orientation: Heterosexual    Exercise level: Occasional    Diet: Regular    General stress level: Low    Alcohol intake: Occasional    Caffeine intake: Moderate    Chewing tobacco: none    Illicit drugs: denies    Guns present in home: No    Seat belts used routinely: Yes    Sunscreen used routinely: No    Smoke alarm in home: Yes    Advance directive: No    Live alone or with others: with others    Are there stairs in your home: Yes    Pets: Yes    Deaf or serious difficulty hearing: No    Blind or serious difficulty seeing: Yes    Difficulty concentrating, remembering or making decisions: No    Difficulty walking or climbing stairs: No    Difficulty dressing or bathing: No    Difficulty doing errands alone: No    Passive smoke exposure:  Yes    Are you currently employed: No    Asbestos exposure: No    TB exposure: No    Environmental exposure: No    Animal exposure: Yes    cat and dog    Blood Transfusion: No    Sexually active: No    Presence of domestic violence: No    Do you feel safe at home: Yes    no cpap or nebulizer       Occupational History: Warehouse    Meds/Allergies   No Known Allergies    Home medications:  Prior to Admission medications    Medication Sig Start Date End Date Taking? Authorizing Provider   albuterol (PROVENTIL HFA,VENTOLIN HFA) 90 mcg/act inhaler Inhale 2 puffs every 6 (six) hours as needed for wheezing 10/15/20  Yes Jorden Pen D Severino, PA-C   amLODIPine (NORVASC) 10 mg tablet TAKE 1 TABLET (10 MG TOTAL) BY MOUTH DAILY BLOOD PRESUURE 2/22/21 5/23/21 Yes Al Pinedo MD   cyclobenzaprine (FLEXERIL) 10 mg tablet Take 1 tablet (10 mg total) by mouth 3 (three) times a day as needed for muscle spasms 12/21/20  Yes Daron Hensley MD   Diclofenac Sodium (VOLTAREN) 1 % Apply 2 g topically 4 (four) times a day 3/24/21  Yes Al Pinedo MD   meloxicam (MOBIC) 7 5 mg tablet Take 1 tablet (7 5 mg total) by mouth daily 3/24/21 4/23/21 Yes Al Pinedo MD   QUEtiapine (SEROquel) 25 mg tablet Take 1 tablet (25 mg total) by mouth daily at bedtime 3/24/21  Yes Al Pinedo MD   Cholecalciferol (Vitamin D3) 125 MCG (5000 UT) CAPS Take 1 capsule (5,000 Units total) by mouth daily 10/2/20 3/24/21  Al Pinedo MD   predniSONE 20 mg tablet Take 1 tablet (20 mg total) by mouth daily for 7 days 3/24/21 3/31/21  Al Pinedo MD   valACYclovir (VALTREX) 500 mg tablet TAKE 1 TABLET BY MOUTH EVERY DAY 12/1/20 3/24/21  Marcelo Gruber MD   varenicline (CHANTIX) 1 mg tablet Take 1 tablet (1 mg total) by mouth 2 (two) times a day  Patient not taking: Reported on 4/1/2021 3/24/21 4/23/21  Al Pinedo MD       Vitals:   Blood pressure 134/86, pulse 91, temperature 97 9 °F (36 6 °C), temperature source Tympanic, resp  rate 18, height 5' 5" (1 651 m), weight 89 4 kg (197 lb), SpO2 94 % , RA, Body mass index is 32 78 kg/m²         Physical Exam  General: Pleasant, Awake alert and oriented x 3, conversant without conversational dyspnea, NAD, normal affect  HEENT:  PERRL, Sclera noninjected, nonicteric OU, Nares patent,  no craniofacial abnormalities, Mucous membranes, moist, no oral lesions, normal dentition  NECK: Trachea midline, no accessory muscle use, no stridor, no cervical or supraclavicular adenopathy, JVP not elevated  CARDIAC: Reg, single s1/S2, no m/r/g  PULM: CTA bilaterally no wheezing, rhonchi or rales  ABD: Normoactive bowel sounds, soft nontender, nondistended, no rebound, no rigidity, no guarding  EXT: No cyanosis, no clubbing, no edema, normal capillary refill  NEURO: no focal neurologic deficits, AAOx3, moving all extremities appropriately    Labs: I have personally reviewed pertinent lab results    Lab Results   Component Value Date    WBC 11 65 (H) 01/16/2021    HGB 13 4 01/16/2021    HCT 40 9 01/16/2021    MCV 89 01/16/2021     01/16/2021      Lab Results   Component Value Date    CALCIUM 8 8 01/16/2021    K 3 4 (L) 01/16/2021    CO2 26 01/16/2021     01/16/2021    BUN 13 01/16/2021    CREATININE 0 93 01/16/2021     Lab Results   Component Value Date    IRON 66 09/12/2020    TIBC 297 09/12/2020    FERRITIN 80 09/12/2020     Lab Results   Component Value Date    [id number] 273 09/12/2020     CXR - IMPRESSION:  No acute cardiopulmonary disease      CT chest - IMPRESSION:  No acute pathology visualized on CT of the chest, abdomen and pelvis with IV without oral contrast     Sleep studies:  None    DO Evelyn Doherty 73 Sleep Physician

## 2021-04-01 NOTE — PROGRESS NOTES
Sleep Consultation   Jose Burns 38 y o  female MRN: [id number]      Reason for consultation: Snoring, daytime sleepiness    Requesting physician: Dr Jens Haines    Assessment/Plan  38 y o  F with PMHx of prediabetes, HTN, asthma, tobacco abuse, depression who comes in for evaluation of snoring and excessive daytime sleepiness  1   Snoring/Excessive daytime sleepiness -  Mallampati class 4, Body mass index is 32 78 kg/m² ,    She is at risk for obstructive sleep apnea based on [employer] survey  -  Check a home study to assess for obstructive sleep apnea      -  I discussed in depth the diagnostic studies and treatment options involved with obstructive sleep apnea      -  I also discussed in depth the risk of leaving sleep apnea untreated including hypertension, heart failure, arrhythmia, MI and stroke  -  The patient is agreeable to undergo testing and treatment of obstructive sleep apnea  She understands that pitfalls she may encounter along the way and is willing to attempt CPAP treatment  2  Restless legs       -  Treat HIRAL as above       -  Check Iron studies and magnesium to ensure that is not a cause for PLMs    3  Sleep onset insomnia - she was recently given low dose seroquel to help her fall asleep  She has not yet started using it  4   Current everyday smoker - Rx for Chantix was given  She will start that in an attempt to quit smoking  She does admit to some dyspnea on exertion  Will consider a PFT at next visit  History of Present Illness   HPI:  Jose Burns is a 38 y o  female with PMHx as below who comes in for evaluation of snoring and daytime sleepiness  Patient notes weight gain and symptoms of difficulty falling asleep, difficulty staying asleep, snoring, excessive daytime sleepiness with an [place] score of 9, awakenings with snoring, morning headaches, and awakenings with dry mouth  she note symptoms of restless legs    she denies symptoms of cataplexy, sleep paralysis, hypnopompic or hypnagogic hallucinations  Sleep History:  she goes to bed at approximately 10 pm, will get to sleep in 30 - 45 min, will get out of bed at 4:50 am   she will get up 3 times at night for unknown reason  It will then take a few minutes to fall back asleep    she naps on the weekend for 30 minutes but does not feel better  She does not take any medications to help her fall asleep but was recently prescribed Seroquel  She has not yet taken the medication  ROS:   Review of Systems   Constitutional: Positive for fatigue and unexpected weight change  Eyes: Negative  Respiratory: Positive for apnea and shortness of breath  Cardiovascular: Positive for leg swelling  Gastrointestinal: Negative  Endocrine: Negative  Genitourinary: Negative  Musculoskeletal: Negative  Skin: Negative  Allergic/Immunologic: Negative  Neurological: Negative  Hematological: Negative            Historical Information   Past Medical History:   Diagnosis Date    Abnormal Pap smear of cervix     2018 HSV 1, 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    Adrenal mass, left ([facility] [address] ) 2021    1 8 x 2 1 cm on ct 2020    Arthritis     Asthma     Blurry vision 2020    Essential hypertension 2020    Headache 2020    Hypertension     Immunization deficiency 10/2/2020    Hep a nonimmune    Insomnia 2021    Migraine     Moderate episode of recurrent major depressive disorder ([facility] [address] ) 2020    Prediabetes 2020    Tobacco use 2021    Vitamin D deficiency 10/2/2020     Past Surgical History:   Procedure Laterality Date    APPENDECTOMY      BREAST BIOPSY Left 2017    BREAST BIOPSY Right     2 core biopsies    BREAST EXCISIONAL BIOPSY Left 2018    BREAST SURGERY Left 2018    Left breast mass excision    CERVICAL BIOPSY  W/ LOOP ELECTRODE EXCISION       SECTION      X2    COLPOSCOPY      HYSTERECTOMY      heavy bleeding, ovaries remain, also had abnormal pap    KNEE SURGERY      LYMPH NODE DISSECTION  2018    under armpit    TUBAL LIGATION  2007    US GUIDED THYROID BIOPSY  1/27/2021     Family History   Problem Relation Age of Onset    Lupus Maternal Aunt 50    Seizures Maternal Aunt     Leukemia Maternal Uncle 25    Diabetes Mother     Heart attack Mother     Heart disease Mother         Internal Defibrilation    Diabetes Maternal Grandmother     Diabetes Paternal Grandmother     Diabetes Paternal Aunt     No Known Problems Father     Endometrial cancer Sister 29    Colon cancer Sister 28    No Known Problems Daughter     No Known Problems Son     No Known Problems Son      Social History     Socioeconomic History    Marital status: Single     Spouse name: Not on file    Number of children: Not on file    Years of education: 8    Highest education level: Not on file   Occupational History    Not on file   Social Needs    Financial resource strain: Not on file    Food insecurity     Worry: Not on file     Inability: Not on file    Transportation needs     Medical: Not on file     Non-medical: Not on file   Tobacco Use    Smoking status: Current Every Day Smoker     Packs/day: 1 00     Years: 20 00     Pack years: 20 00     Types: Cigarettes    Smokeless tobacco: Never Used    Tobacco comment: pt is down to  5 packs a day   Substance and Sexual Activity    Alcohol use: Yes     Frequency: 2-4 times a month     Comment: occasional    Drug use: No    Sexual activity: Not Currently     Partners: Male   Lifestyle    Physical activity     Days per week: Not on file     Minutes per session: Not on file    Stress: Not on file   Relationships    Social connections     Talks on phone: Not on file     Gets together: Not on file     Attends Tenriism service: Not on file     Active member of club or organization: Not on file     Attends meetings of clubs or organizations: Not on file     Relationship status: Not on file   [facility] Intimate partner violence     Fear of current or ex partner: Not on file     Emotionally abused: Not on file     Physically abused: Not on file     Forced sexual activity: Not on file   Other Topics Concern    Not on file   Social History Narrative    Most recent tobacco use screenin-    Do you currently or have you served in the [employer]: No    Were you activated, into active duty, as a member of the [employer] or as a Reservist: No    Occupation: unemployed    Education: 10    Marital status: Single    Sexual orientation: Heterosexual    Exercise level: Occasional    Diet: Regular    General stress level: Low    Alcohol intake: Occasional    Caffeine intake: Moderate    Chewing tobacco: none    Illicit drugs: denies    Guns present in home: No    Seat belts used routinely: Yes    Sunscreen used routinely: No    Smoke alarm in home: Yes    Advance directive: No    Live alone or with others: with others    Are there stairs in your home: Yes    Pets: Yes    Deaf or serious difficulty hearing: No    Blind or serious difficulty seeing: Yes    Difficulty concentrating, remembering or making decisions: No    Difficulty walking or climbing stairs: No    Difficulty dressing or bathing: No    Difficulty doing errands alone: No    Passive smoke exposure: Yes    Are you currently employed: No    Asbestos exposure: No    TB exposure: No    Environmental exposure: No    Animal exposure: Yes    cat and dog    Blood Transfusion: No    Sexually active: No    Presence of domestic violence: No    Do you feel safe at home: Yes    no cpap or nebulizer       Occupational History: Warehouse    Meds/Allergies   No Known Allergies    Home medications:  Prior to Admission medications    Medication Sig Start Date End Date Taking?  Authorizing Provider   albuterol (PROVENTIL HFA,VENTOLIN HFA) 90 mcg/act inhaler Inhale 2 puffs every 6 (six) hours as needed for wheezing 10/15/20  Yes Shannon D Severino, PA-C   amLODIPine (NORVASC) 10 mg tablet TAKE 1 TABLET (10 MG TOTAL) BY MOUTH DAILY BLOOD PRESUURE 2/22/21 5/23/21 Yes Al Pinedo MD   cyclobenzaprine (FLEXERIL) 10 mg tablet Take 1 tablet (10 mg total) by mouth 3 (three) times a day as needed for muscle spasms 12/21/20  Yes Alfie Kyle MD   Diclofenac Sodium (VOLTAREN) 1 % Apply 2 g topically 4 (four) times a day 3/24/21  Yes Al Pinedo MD   meloxicam (MOBIC) 7 5 mg tablet Take 1 tablet (7 5 mg total) by mouth daily 3/24/21 4/23/21 Yes Al Pinedo MD   QUEtiapine (SEROquel) 25 mg tablet Take 1 tablet (25 mg total) by mouth daily at bedtime 3/24/21  Yes Al Pinedo MD   Cholecalciferol (Vitamin D3) 125 MCG (5000 UT) CAPS Take 1 capsule (5,000 Units total) by mouth daily 10/2/20 3/24/21  Al Pinedo MD   predniSONE 20 mg tablet Take 1 tablet (20 mg total) by mouth daily for 7 days 3/24/21 3/31/21  Al Pinedo MD   valACYclovir (VALTREX) 500 mg tablet TAKE 1 TABLET BY MOUTH EVERY DAY 12/1/20 3/24/21  Ced Mejia MD   varenicline (CHANTIX) 1 mg tablet Take 1 tablet (1 mg total) by mouth 2 (two) times a day  Patient not taking: Reported on 4/1/2021 3/24/21 4/23/21  Al Pinedo MD       Vitals:   Blood pressure 134/86, pulse 91, temperature 97 9 °F (36 6 °C), temperature source Tympanic, resp  rate 18, height 5' 5" (1 651 m), weight 89 4 kg (197 lb), SpO2 94 % , RA, Body mass index is 32 78 kg/m²         Physical Exam  General: Pleasant, Awake alert and oriented x 3, conversant without conversational dyspnea, NAD, normal affect  HEENT:  PERRL, Sclera noninjected, nonicteric OU, Nares patent,  no craniofacial abnormalities, Mucous membranes, moist, no oral lesions, normal dentition  NECK: Trachea midline, no accessory muscle use, no stridor, no cervical or supraclavicular adenopathy, JVP not elevated  CARDIAC: Reg, single s1/S2, no m/r/g  PULM: CTA bilaterally no wheezing, rhonchi or rales  ABD: Normoactive bowel sounds, soft nontender, nondistended, no rebound, no rigidity, no guarding  EXT: No cyanosis, no clubbing, no edema, normal capillary refill  NEURO: no focal neurologic deficits, AAOx3, moving all extremities appropriately    Labs: I have personally reviewed pertinent lab results    Lab Results   Component Value Date    WBC 11 65 (H) 01/16/2021    HGB 13 4 01/16/2021    HCT 40 9 01/16/2021    MCV 89 01/16/2021     01/16/2021      Lab Results   Component Value Date    CALCIUM 8 8 01/16/2021    K 3 4 (L) 01/16/2021    CO2 26 01/16/2021     01/16/2021    BUN 13 01/16/2021    CREATININE 0 93 01/16/2021     Lab Results   Component Value Date    IRON 66 09/12/2020    TIBC 297 09/12/2020    FERRITIN 80 09/12/2020     Lab Results   Component Value Date    [id number] 647 09/12/2020     CXR - IMPRESSION:  No acute cardiopulmonary disease      CT chest - IMPRESSION:  No acute pathology visualized on CT of the chest, abdomen and pelvis with IV without oral contrast     Sleep studies:  None    DO Evelyn Steel 73 Sleep Physician

## 2021-04-12 ENCOUNTER — TELEPHONE (OUTPATIENT)
Dept: SLEEP CENTER | Facility: CLINIC | Age: 41
End: 2021-04-12

## 2021-04-12 DIAGNOSIS — G47.33 OSA (OBSTRUCTIVE SLEEP APNEA): Primary | ICD-10-CM

## 2021-04-19 ENCOUNTER — TRANSCRIBE ORDERS (OUTPATIENT)
Dept: SLEEP CENTER | Facility: CLINIC | Age: 41
End: 2021-04-19

## 2021-04-20 ENCOUNTER — HOSPITAL ENCOUNTER (OUTPATIENT)
Dept: MRI IMAGING | Facility: HOSPITAL | Age: 41
Discharge: HOME | End: 2021-04-20
Payer: COMMERCIAL

## 2021-04-20 DIAGNOSIS — B00.9 HERPES: ICD-10-CM

## 2021-04-20 DIAGNOSIS — M23.92 INTERNAL DERANGEMENT OF LEFT KNEE: ICD-10-CM

## 2021-04-20 PROCEDURE — 73721 MRI JNT OF LWR EXTRE W/O DYE: CPT

## 2021-04-20 PROCEDURE — G1004 CDSM NDSC: HCPCS

## 2021-04-21 RX ORDER — VALACYCLOVIR HYDROCHLORIDE 500 MG/1
TABLET, FILM COATED ORAL
Qty: 30 TABLET | Refills: 3 | Status: SHIPPED | OUTPATIENT
Start: 2021-04-21 | End: 2021-09-28

## 2021-05-15 DIAGNOSIS — M25.461 BILATERAL KNEE EFFUSIONS: ICD-10-CM

## 2021-05-15 DIAGNOSIS — M25.462 BILATERAL KNEE EFFUSIONS: ICD-10-CM

## 2021-05-17 RX ORDER — MELOXICAM 7.5 MG/1
TABLET ORAL
Qty: 30 TABLET | Refills: 1 | Status: SHIPPED | OUTPATIENT
Start: 2021-05-17 | End: 2021-06-23 | Stop reason: ALTCHOICE

## 2021-06-17 ENCOUNTER — OFFICE VISIT (OUTPATIENT)
Dept: OBGYN CLINIC | Facility: CLINIC | Age: 41
End: 2021-06-17
Payer: COMMERCIAL

## 2021-06-17 VITALS
WEIGHT: 200.2 LBS | DIASTOLIC BLOOD PRESSURE: 82 MMHG | HEIGHT: 65 IN | BODY MASS INDEX: 33.36 KG/M2 | SYSTOLIC BLOOD PRESSURE: 120 MMHG

## 2021-06-17 DIAGNOSIS — Z01.411 ENCOUNTER FOR GYNECOLOGICAL EXAMINATION WITH ABNORMAL FINDING: Primary | ICD-10-CM

## 2021-06-17 DIAGNOSIS — Z12.31 ENCOUNTER FOR SCREENING MAMMOGRAM FOR BREAST CANCER: ICD-10-CM

## 2021-06-17 DIAGNOSIS — B37.2 CUTANEOUS CANDIDIASIS: ICD-10-CM

## 2021-06-17 PROBLEM — J02.9 SORE THROAT: Status: RESOLVED | Noted: 2020-12-23 | Resolved: 2021-06-17

## 2021-06-17 PROBLEM — J02.9 PHARYNGITIS: Status: RESOLVED | Noted: 2020-12-23 | Resolved: 2021-06-17

## 2021-06-17 PROCEDURE — 4004F PT TOBACCO SCREEN RCVD TLK: CPT | Performed by: PHYSICIAN ASSISTANT

## 2021-06-17 PROCEDURE — 3079F DIAST BP 80-89 MM HG: CPT | Performed by: PHYSICIAN ASSISTANT

## 2021-06-17 PROCEDURE — G0143 SCR C/V CYTO,THINLAYER,RESCR: HCPCS | Performed by: PHYSICIAN ASSISTANT

## 2021-06-17 PROCEDURE — 0503F POSTPARTUM CARE VISIT: CPT | Performed by: PHYSICIAN ASSISTANT

## 2021-06-17 PROCEDURE — 87624 HPV HI-RISK TYP POOLED RSLT: CPT | Performed by: PHYSICIAN ASSISTANT

## 2021-06-17 PROCEDURE — 99396 PREV VISIT EST AGE 40-64: CPT | Performed by: PHYSICIAN ASSISTANT

## 2021-06-17 PROCEDURE — 3008F BODY MASS INDEX DOCD: CPT | Performed by: PHYSICIAN ASSISTANT

## 2021-06-17 PROCEDURE — 3074F SYST BP LT 130 MM HG: CPT | Performed by: PHYSICIAN ASSISTANT

## 2021-06-17 RX ORDER — NYSTATIN 100000 U/G
CREAM TOPICAL 2 TIMES DAILY
Qty: 30 G | Refills: 0 | Status: SHIPPED | OUTPATIENT
Start: 2021-06-17 | End: 2022-01-19

## 2021-06-17 NOTE — PROGRESS NOTES
Assessment/Plan:    No problem-specific Assessment & Plan notes found for this encounter  Diagnoses and all orders for this visit:    Encounter for gynecological examination with abnormal finding  -     Liquid-based pap, screening    Cutaneous candidiasis  -     nystatin (MYCOSTATIN) cream; Apply topically 2 (two) times a day    Encounter for screening mammogram for breast cancer  -     Mammo screening bilateral w 3d & cad; Future        Vaginal Pap done  Order for mammogram entered  Rx for Nystatin cream for breast rash sent to pharmacy  Call if symptoms worsen or change  If no problems, patient to return in 1 year for routine gyn care  Subjective:      Patient ID: Parisa Young is a 38 y o  female  Patient is here for yearly gyn exam   States she is doing well overall  S/p JOSHUA in 2012; still has both ovaries  Experiences hot flashes  Patient denies bowel/bladder changes, vaginal bleeding, pelvic pain, bloating, abdominal pain, n/v, and change in appetite  Recently had thyroid biopsy  Has history of HPV; vaginal Pap last year was negative with negative HPV  Patient due for mammogram in September  Complains of pruritic, erythematous rash under b/l breasts with odor the last week  Has been using hydrocortisone; rash is mostly resolved  Still having some mild irritation  She denies new masses, nipple discharge, and pain/tenderness  The following portions of the patient's history were reviewed and updated as appropriate: allergies, current medications, past family history, past medical history, past social history, past surgical history and problem list     Review of Systems   Constitutional: Positive for diaphoresis (Hot flashes)  Negative for appetite change and unexpected weight change  Cardiovascular:        Pruritic rash under b/l breasts  No masses, nipple discharge, and pain/tenderness     Gastrointestinal: Negative for abdominal distention, abdominal pain, constipation, diarrhea, nausea and vomiting  Genitourinary: Negative for difficulty urinating, dysuria, frequency, genital sores, hematuria, menstrual problem, pelvic pain, urgency, vaginal bleeding, vaginal discharge and vaginal pain  Objective:      /82 (BP Location: Left arm, Patient Position: Sitting, Cuff Size: Standard)   Ht 5' 5" (1 651 m)   Wt 90 8 kg (200 lb 3 2 oz)   BMI 33 32 kg/m²          Physical Exam  Vitals reviewed  Exam conducted with a chaperone present  Constitutional:       Appearance: Normal appearance  She is well-developed  Neck:      Thyroid: No thyromegaly  Pulmonary:      Effort: Pulmonary effort is normal    Chest:      Breasts: Breasts are symmetrical          Right: Normal  No swelling, bleeding, inverted nipple, mass, nipple discharge, skin change or tenderness  Left: Normal  No swelling, bleeding, inverted nipple, mass, nipple discharge, skin change or tenderness  Comments: No visible rash present  Abdominal:      General: Abdomen is flat  There is no distension  Palpations: Abdomen is soft  Tenderness: There is no abdominal tenderness  Genitourinary:     General: Normal vulva  Pubic Area: No rash  Labia:         Right: No rash, tenderness, lesion or injury  Left: No rash, tenderness, lesion or injury  Vagina: Normal  No vaginal discharge, erythema, tenderness or bleeding  Uterus: Absent  Adnexa: Right adnexa normal and left adnexa normal         Right: No mass, tenderness or fullness  Left: No mass, tenderness or fullness  Comments: Cervix and uterus surgically absent  Musculoskeletal:      Cervical back: Neck supple  Lymphadenopathy:      Cervical: No cervical adenopathy  Upper Body:      Right upper body: No supraclavicular or axillary adenopathy  Left upper body: No supraclavicular or axillary adenopathy  Lower Body: No right inguinal adenopathy  No left inguinal adenopathy  Skin:     General: Skin is warm and dry  Neurological:      Mental Status: She is alert and oriented to person, place, and time  Psychiatric:         Mood and Affect: Mood normal          Behavior: Behavior normal  Behavior is cooperative  Thought Content:  Thought content normal          Judgment: Judgment normal

## 2021-06-17 NOTE — PATIENT INSTRUCTIONS
Mammogram due September 2021  Rx for Nystatin cream sent to pharmacy  Call if symptoms worsen or change

## 2021-06-18 LAB
HPV HR 12 DNA CVX QL NAA+PROBE: NEGATIVE
HPV16 DNA CVX QL NAA+PROBE: NEGATIVE
HPV18 DNA CVX QL NAA+PROBE: NEGATIVE

## 2021-06-23 ENCOUNTER — OFFICE VISIT (OUTPATIENT)
Dept: FAMILY MEDICINE CLINIC | Facility: CLINIC | Age: 41
End: 2021-06-23
Payer: COMMERCIAL

## 2021-06-23 ENCOUNTER — TELEPHONE (OUTPATIENT)
Dept: FAMILY MEDICINE CLINIC | Facility: CLINIC | Age: 41
End: 2021-06-23

## 2021-06-23 VITALS
TEMPERATURE: 98.1 F | WEIGHT: 204 LBS | OXYGEN SATURATION: 98 % | DIASTOLIC BLOOD PRESSURE: 84 MMHG | BODY MASS INDEX: 33.99 KG/M2 | SYSTOLIC BLOOD PRESSURE: 112 MMHG | HEIGHT: 65 IN | HEART RATE: 100 BPM

## 2021-06-23 DIAGNOSIS — Z11.4 SCREENING FOR HIV (HUMAN IMMUNODEFICIENCY VIRUS): ICD-10-CM

## 2021-06-23 DIAGNOSIS — I10 ESSENTIAL HYPERTENSION: ICD-10-CM

## 2021-06-23 DIAGNOSIS — Z11.59 NEED FOR HEPATITIS C SCREENING TEST: Primary | ICD-10-CM

## 2021-06-23 DIAGNOSIS — E55.9 VITAMIN D DEFICIENCY: ICD-10-CM

## 2021-06-23 DIAGNOSIS — E27.8 ADRENAL MASS, LEFT (HCC): ICD-10-CM

## 2021-06-23 DIAGNOSIS — R68.2 DRY MOUTH: ICD-10-CM

## 2021-06-23 DIAGNOSIS — E54 ASCORBIC ACID DEFICIENCY: ICD-10-CM

## 2021-06-23 DIAGNOSIS — M25.462 BILATERAL KNEE EFFUSIONS: ICD-10-CM

## 2021-06-23 DIAGNOSIS — M25.461 BILATERAL KNEE EFFUSIONS: ICD-10-CM

## 2021-06-23 DIAGNOSIS — Z72.0 TOBACCO USE: ICD-10-CM

## 2021-06-23 DIAGNOSIS — Z23 NEED FOR VACCINATION: ICD-10-CM

## 2021-06-23 DIAGNOSIS — R73.03 PREDIABETES: ICD-10-CM

## 2021-06-23 LAB
LAB AP GYN PRIMARY INTERPRETATION: NORMAL
Lab: NORMAL

## 2021-06-23 PROCEDURE — 90732 PPSV23 VACC 2 YRS+ SUBQ/IM: CPT | Performed by: FAMILY MEDICINE

## 2021-06-23 PROCEDURE — 90471 IMMUNIZATION ADMIN: CPT | Performed by: FAMILY MEDICINE

## 2021-06-23 PROCEDURE — 90472 IMMUNIZATION ADMIN EACH ADD: CPT | Performed by: FAMILY MEDICINE

## 2021-06-23 PROCEDURE — 99214 OFFICE O/P EST MOD 30 MIN: CPT | Performed by: FAMILY MEDICINE

## 2021-06-23 PROCEDURE — 90632 HEPA VACCINE ADULT IM: CPT | Performed by: FAMILY MEDICINE

## 2021-06-23 NOTE — PROGRESS NOTES
Assessment/Plan:    Will need blood work for patient since she has prediabetes last year  With dry mouth concern her sugar  Will try to statins with swallow to help with dry mouth  Second hep a vaccine given to her today to finish the series  Advised to check with insurance for HPV vaccine coverage  Pneumovax 23 given to her today  Advised to quit smoking  Continue amlodipine 10 milligram daily  Will stop Seroquel since the cause her severe drowsiness  Close monitor needed  Will see her back in 3 months for physical     I have spent 25 minutes with Patient  today in which greater than 50% of this time was spent in counseling/coordination of care regarding Prognosis, Risks and benefits of tx options and Intructions for management           Problem List Items Addressed This Visit        Cardiovascular and Mediastinum    Essential hypertension    Relevant Orders    Comprehensive metabolic panel       Musculoskeletal and Integument    Bilateral knee effusions    Relevant Orders    TROY Screen w/ Reflex to Titer/Pattern    RF Screen w/ Reflex to Titer    Uric acid    Sedimentation rate, automated    C-reactive protein    Lyme Total Antibody Profile with reflex to WB       Other    Prediabetes    Relevant Orders    Hemoglobin A1C    CBC and differential    TSH, 3rd generation with Free T4 reflex    Vitamin D deficiency    Relevant Orders    Vitamin D 25 hydroxy    Tobacco use    Adrenal mass, left (HCC)    Relevant Orders    Cortisol Level, AM Specimen      Other Visit Diagnoses     Need for hepatitis C screening test    -  Primary    Relevant Orders    Hepatitis C Antibody ([facility])    Screening for HIV (human immunodeficiency virus)        Relevant Orders    HIV 1/2 Antigen/Antibody (4th Generation) w Reflex [facility]    Ascorbic acid deficiency        Relevant Orders    Vitamin C    Dry mouth        Relevant Medications    nystatin (MYCOSTATIN) 500,000 units/5 mL suspension    Need for vaccination Relevant Orders    PNEUMOCOCCAL POLYSACCHARIDE VACCINE 23-VALENT =>1YO SQ IM (Completed)    HEPATITIS A VACCINE ADULT IM (Completed)            Subjective:      Patient ID: Harpreet Valderrama is a 38 y o  female  50-year-old female follow-up mood blood pressure and smoking  He she went off mirtazapine because her mood is getting better  Seroquel making her too sleepy and she did want wake up to go to work  She went off that too  Currently she is not taking anything for mood  Last month her got brother  from overdose and that has stressing her out a lot but she is dealing with it better  She continue to smoke  She is cutting down quite a bit  She has see Orthopedic for left knee swelling  MRI done show effusion in the knee and loss of cartilage but no ligament tear  Orthopedic has not been contacting patient for appointment  She had right knee arthroscopy done in the past for the same reason  She complained of dry mouth the past month  She tried Biotene with no relief improvement  She is on amlodipine 10 milligram daily for blood pressure  She needs 2nd hep a vaccine  The following portions of the patient's history were reviewed and updated as appropriate:   Past Medical History:  She has a past medical history of Abnormal Pap smear of cervix, Adrenal mass, left ([facility] [address] ) (2021), Arthritis, Asthma, Blurry vision (2020), Essential hypertension (2020), Headache (2020), Hypertension, Immunization deficiency (10/2/2020), Insomnia (2021), Migraine, Moderate episode of recurrent major depressive disorder ([facility] [address] ) (2020), Prediabetes (2020), Tobacco use (2021), and Vitamin D deficiency (10/2/2020)  ,  _______________________________________________________________________  Medical Problems:  does not have any pertinent problems on file ,  _______________________________________________________________________  Past Surgical History:   has a past surgical history that includes Tubal ligation (); Appendectomy;  section; Colposcopy; Knee surgery; Cervical biopsy w/ loop electrode excision; Lymph node dissection (); Breast surgery (Left, 2018); Breast biopsy (Left, 2017); Breast excisional biopsy (Left, 2018); Breast biopsy (Right); Hysterectomy (); and US guided thyroid biopsy (2021)  ,  _______________________________________________________________________  Family History:  family history includes Colon cancer (age of onset: 28) in her sister; Diabetes in her maternal grandmother, mother, paternal aunt, and paternal grandmother; Endometrial cancer (age of onset: 29) in her sister; Heart attack in her mother; Heart disease in her mother; Leukemia (age of onset: 25) in her maternal uncle; Lupus (age of onset: 50) in her maternal aunt; No Known Problems in her daughter, father, son, and son; Seizures in her maternal aunt  ,  _______________________________________________________________________  Social History:   reports that she has been smoking cigarettes  She has a 20 00 pack-year smoking history  She has never used smokeless tobacco  She reports current alcohol use  She reports that she does not use drugs  ,  _______________________________________________________________________  Allergies:  has No Known Allergies     _______________________________________________________________________  Current Outpatient Medications   Medication Sig Dispense Refill    albuterol (PROVENTIL HFA,VENTOLIN HFA) 90 mcg/act inhaler Inhale 2 puffs every 6 (six) hours as needed for wheezing 1 Inhaler 0    amLODIPine (NORVASC) 10 mg tablet TAKE 1 TABLET (10 MG TOTAL) BY MOUTH DAILY BLOOD PRESUURE 90 tablet 1    Cholecalciferol (Vitamin D3) 125 MCG (5000 UT) CAPS Take 1 capsule (5,000 Units total) by mouth daily 90 capsule 2    Diclofenac Sodium (VOLTAREN) 1 % Apply 2 g topically 4 (four) times a day 100 g 2    nystatin (MYCOSTATIN) cream Apply topically 2 (two) times a day 30 g 0    valACYclovir (VALTREX) 500 mg tablet TAKE 1 TABLET BY MOUTH EVERY DAY 30 tablet 3    nystatin (MYCOSTATIN) 500,000 units/5 mL suspension Apply 5 mL (500,000 Units total) to the mouth or throat 3 (three) times a day For dry mouth 473 mL 1    varenicline (CHANTIX) 1 mg tablet Take 1 tablet (1 mg total) by mouth 2 (two) times a day (Patient not taking: Reported on 4/1/2021) 60 tablet 3     No current facility-administered medications for this visit      _______________________________________________________________________  Review of Systems   Constitutional: Negative for activity change, appetite change, fatigue and unexpected weight change  HENT: Negative for ear pain, sore throat, trouble swallowing and voice change  Eyes: Negative for photophobia and visual disturbance  Respiratory: Negative for cough, chest tightness, shortness of breath and wheezing  Cardiovascular: Negative for chest pain, palpitations and leg swelling  Gastrointestinal: Negative for abdominal pain, constipation, diarrhea, nausea, rectal pain and vomiting  Endocrine: Negative for cold intolerance, polydipsia and polyuria  Genitourinary: Negative for difficulty urinating, dysuria, flank pain, menstrual problem and pelvic pain  Musculoskeletal: Positive for arthralgias and joint swelling  Negative for myalgias  Skin: Negative for color change and rash  Allergic/Immunologic: Negative for environmental allergies and immunocompromised state  Neurological: Negative for dizziness, weakness, numbness and headaches  Hematological: Negative for adenopathy  Does not bruise/bleed easily  Psychiatric/Behavioral: Negative for decreased concentration, dysphoric mood, self-injury, sleep disturbance and suicidal ideas  The patient is not nervous/anxious            Objective:  Vitals:    06/23/21 1352   BP: 112/84   BP Location: Left arm   Patient Position: Sitting   Cuff Size: Standard   Pulse: 100   Temp: 98 1 °F (36 7 °C)   TempSrc: Tympanic   SpO2: 98%   Weight: 92 5 kg (204 lb)   Height: 5' 5" (1 651 m)     Body mass index is 33 95 kg/m²  Physical Exam  Vitals and nursing note reviewed  Constitutional:       Appearance: Normal appearance  She is normal weight  Pulmonary:      Effort: Pulmonary effort is normal    Musculoskeletal:         General: Swelling present  Skin:     General: Skin is warm and dry  Neurological:      General: No focal deficit present  Mental Status: She is alert and oriented to person, place, and time  Mental status is at baseline  Psychiatric:         Mood and Affect: Mood normal          Behavior: Behavior normal          Thought Content:  Thought content normal          Judgment: Judgment normal

## 2021-06-28 ENCOUNTER — APPOINTMENT (OUTPATIENT)
Dept: LAB | Facility: HOSPITAL | Age: 41
End: 2021-06-28
Attending: FAMILY MEDICINE
Payer: COMMERCIAL

## 2021-06-28 DIAGNOSIS — E54 ASCORBIC ACID DEFICIENCY: ICD-10-CM

## 2021-06-28 DIAGNOSIS — M25.462 BILATERAL KNEE EFFUSIONS: ICD-10-CM

## 2021-06-28 DIAGNOSIS — M25.461 BILATERAL KNEE EFFUSIONS: ICD-10-CM

## 2021-06-28 DIAGNOSIS — E55.9 VITAMIN D DEFICIENCY: ICD-10-CM

## 2021-06-28 DIAGNOSIS — I10 ESSENTIAL HYPERTENSION: ICD-10-CM

## 2021-06-28 DIAGNOSIS — Z11.4 SCREENING FOR HIV (HUMAN IMMUNODEFICIENCY VIRUS): ICD-10-CM

## 2021-06-28 DIAGNOSIS — R73.03 PREDIABETES: ICD-10-CM

## 2021-06-28 DIAGNOSIS — Z11.59 NEED FOR HEPATITIS C SCREENING TEST: ICD-10-CM

## 2021-06-28 LAB
ALBUMIN SERPL BCP-MCNC: 4.1 G/DL (ref 3.4–4.8)
ALP SERPL-CCNC: 68 U/L (ref 35–140)
ALT SERPL W P-5'-P-CCNC: 15 U/L (ref 5–54)
ANION GAP SERPL CALCULATED.3IONS-SCNC: 9 MMOL/L (ref 4–13)
AST SERPL W P-5'-P-CCNC: 22 U/L (ref 15–41)
BASOPHILS # BLD AUTO: 0.05 THOUSANDS/ΜL (ref 0–0.1)
BASOPHILS NFR BLD AUTO: 0 % (ref 0–1)
BILIRUB SERPL-MCNC: 0.37 MG/DL (ref 0.3–1.2)
BUN SERPL-MCNC: 12 MG/DL (ref 6–20)
CALCIUM SERPL-MCNC: 9.1 MG/DL (ref 8.4–10.2)
CHLORIDE SERPL-SCNC: 103 MMOL/L (ref 96–108)
CO2 SERPL-SCNC: 23 MMOL/L (ref 22–33)
CREAT SERPL-MCNC: 0.8 MG/DL (ref 0.4–1.1)
CRP SERPL QL: 0.9 MG/L (ref 0–1)
EOSINOPHIL # BLD AUTO: 0.16 THOUSAND/ΜL (ref 0–0.61)
EOSINOPHIL NFR BLD AUTO: 1 % (ref 0–6)
ERYTHROCYTE [DISTWIDTH] IN BLOOD BY AUTOMATED COUNT: 14.5 % (ref 11.6–15.1)
ERYTHROCYTE [SEDIMENTATION RATE] IN BLOOD: 6 MM/HOUR (ref 0–20)
EST. AVERAGE GLUCOSE BLD GHB EST-MCNC: 120 MG/DL
GFR SERPL CREATININE-BSD FRML MDRD: 106 ML/MIN/1.73SQ M
GLUCOSE P FAST SERPL-MCNC: 127 MG/DL (ref 70–105)
HBA1C MFR BLD: 5.8 %
HCT VFR BLD AUTO: 39.8 % (ref 34.8–46.1)
HGB BLD-MCNC: 13.5 G/DL (ref 11.5–15.4)
IMM GRANULOCYTES # BLD AUTO: 0.02 THOUSAND/UL (ref 0–0.2)
IMM GRANULOCYTES NFR BLD AUTO: 0 % (ref 0–2)
LYMPHOCYTES # BLD AUTO: 4.73 THOUSANDS/ΜL (ref 0.6–4.47)
LYMPHOCYTES NFR BLD AUTO: 41 % (ref 14–44)
MCH RBC QN AUTO: 29.6 PG (ref 26.8–34.3)
MCHC RBC AUTO-ENTMCNC: 33.9 G/DL (ref 31.4–37.4)
MCV RBC AUTO: 87 FL (ref 82–98)
MONOCYTES # BLD AUTO: 0.82 THOUSAND/ΜL (ref 0.17–1.22)
MONOCYTES NFR BLD AUTO: 7 % (ref 4–12)
NEUTROPHILS # BLD AUTO: 5.83 THOUSANDS/ΜL (ref 1.85–7.62)
NEUTS SEG NFR BLD AUTO: 51 % (ref 43–75)
PLATELET # BLD AUTO: 321 THOUSANDS/UL (ref 149–390)
PMV BLD AUTO: 9.5 FL (ref 8.9–12.7)
POTASSIUM SERPL-SCNC: 3.6 MMOL/L (ref 3.5–5)
PROT SERPL-MCNC: 7.1 G/DL (ref 6.4–8.3)
RBC # BLD AUTO: 4.56 MILLION/UL (ref 3.81–5.12)
SODIUM SERPL-SCNC: 135 MMOL/L (ref 133–145)
TSH SERPL DL<=0.05 MIU/L-ACNC: 1.35 UIU/ML (ref 0.34–5.6)
URATE SERPL-MCNC: 5.4 MG/DL (ref 2.6–8)
WBC # BLD AUTO: 11.61 THOUSAND/UL (ref 4.31–10.16)

## 2021-06-28 PROCEDURE — 84443 ASSAY THYROID STIM HORMONE: CPT

## 2021-06-28 PROCEDURE — 85025 COMPLETE CBC W/AUTO DIFF WBC: CPT

## 2021-06-28 PROCEDURE — 86140 C-REACTIVE PROTEIN: CPT

## 2021-06-28 PROCEDURE — 36415 COLL VENOUS BLD VENIPUNCTURE: CPT

## 2021-06-28 PROCEDURE — 82306 VITAMIN D 25 HYDROXY: CPT

## 2021-06-28 PROCEDURE — 83036 HEMOGLOBIN GLYCOSYLATED A1C: CPT

## 2021-06-28 PROCEDURE — 86618 LYME DISEASE ANTIBODY: CPT

## 2021-06-28 PROCEDURE — 86038 ANTINUCLEAR ANTIBODIES: CPT

## 2021-06-28 PROCEDURE — 87389 HIV-1 AG W/HIV-1&-2 AB AG IA: CPT

## 2021-06-28 PROCEDURE — 86430 RHEUMATOID FACTOR TEST QUAL: CPT

## 2021-06-28 PROCEDURE — 84550 ASSAY OF BLOOD/URIC ACID: CPT

## 2021-06-28 PROCEDURE — 80053 COMPREHEN METABOLIC PANEL: CPT

## 2021-06-28 PROCEDURE — 85652 RBC SED RATE AUTOMATED: CPT

## 2021-06-28 PROCEDURE — 82180 ASSAY OF ASCORBIC ACID: CPT

## 2021-06-28 PROCEDURE — 86803 HEPATITIS C AB TEST: CPT

## 2021-06-29 LAB
25(OH)D3 SERPL-MCNC: 21.5 NG/ML (ref 30–100)
B BURGDOR IGG+IGM SER-ACNC: 36
HCV AB SER QL: NORMAL
RHEUMATOID FACT SER QL LA: NEGATIVE

## 2021-06-30 LAB
HIV 1+2 AB+HIV1 P24 AG SERPL QL IA: NORMAL
RYE IGE QN: NEGATIVE

## 2021-07-01 ENCOUNTER — OFFICE VISIT (OUTPATIENT)
Dept: PULMONOLOGY | Facility: CLINIC | Age: 41
End: 2021-07-01
Payer: COMMERCIAL

## 2021-07-01 ENCOUNTER — TELEPHONE (OUTPATIENT)
Dept: FAMILY MEDICINE CLINIC | Facility: CLINIC | Age: 41
End: 2021-07-01

## 2021-07-01 VITALS
RESPIRATION RATE: 18 BRPM | HEIGHT: 65 IN | TEMPERATURE: 97.5 F | SYSTOLIC BLOOD PRESSURE: 128 MMHG | BODY MASS INDEX: 34.02 KG/M2 | DIASTOLIC BLOOD PRESSURE: 70 MMHG | HEART RATE: 93 BPM | WEIGHT: 204.2 LBS | OXYGEN SATURATION: 98 %

## 2021-07-01 DIAGNOSIS — R06.02 SHORTNESS OF BREATH: ICD-10-CM

## 2021-07-01 DIAGNOSIS — Z72.0 TOBACCO USE: Primary | ICD-10-CM

## 2021-07-01 DIAGNOSIS — G47.00 INSOMNIA, UNSPECIFIED TYPE: ICD-10-CM

## 2021-07-01 DIAGNOSIS — R06.83 SNORING: ICD-10-CM

## 2021-07-01 DIAGNOSIS — G47.19 EXCESSIVE DAYTIME SLEEPINESS: ICD-10-CM

## 2021-07-01 PROCEDURE — 3078F DIAST BP <80 MM HG: CPT | Performed by: INTERNAL MEDICINE

## 2021-07-01 PROCEDURE — 99214 OFFICE O/P EST MOD 30 MIN: CPT | Performed by: INTERNAL MEDICINE

## 2021-07-01 PROCEDURE — 4004F PT TOBACCO SCREEN RCVD TLK: CPT | Performed by: INTERNAL MEDICINE

## 2021-07-01 PROCEDURE — 3008F BODY MASS INDEX DOCD: CPT | Performed by: INTERNAL MEDICINE

## 2021-07-01 PROCEDURE — 3074F SYST BP LT 130 MM HG: CPT | Performed by: INTERNAL MEDICINE

## 2021-07-01 NOTE — PROGRESS NOTES
Progress note - Pulmonary Medicine   Mildred Faria 38 y o  female MRN: [id number]       Impression & Plan:   38 y o  F with PMHx of prediabetes, HTN, asthma, tobacco abuse, depression who comes in for evaluation of snoring and excessive daytime sleepiness  1   Snoring/Excessive daytime sleepiness -  Mallampati class 4, Body mass index is 32 78 kg/m² ,    She is at risk for obstructive sleep apnea based on [employer] survey  -  Patient much have in lab diagnostic study  She had difficulty scheduling it as she did not have someone to watch her child so she can get the study  We will attempt to reschedule  -  I reminded her of the risk of leaving sleep apnea untreated including hypertension, heart failure, arrhythmia, MI and stroke      2  Restless legs - Iron studies are adequate       -  Will hold on therapy at this time until we can treat possible HIRAL as above        3    Current everyday smoker         -  Check PFTs to assess for obstructive lung disease        -  Spiriva samples were given to trial to see if there is any benefit        -  Continue PRN albuterol every 6 hrs        -  She has tried Chantix but has not had a lot of benefit  She will trial nicotine replacement therapy     ______________________________________________________________________    HPI:    Mildred Faria presents today for follow-up for HIRAL, RLS and current everyday smoker  She has noted shortness of breath, some wheezing and cough  She is still smoking 1/2 pack per day  She has been trying Chantix with little success to quit  She has previously tried patches but had a rash  She is interested in quitting and willing to try alternative treatment  She still has snoring, daytime sleepiness  She has noted some improvement with switch to daytime shift for work  She still notes restless leg symptoms as well  Review of Systems:  Review of Systems   Constitutional: Negative  HENT: Negative      Eyes: Negative  Respiratory: Positive for cough, shortness of breath and wheezing  Negative for chest tightness  Cardiovascular: Negative  Gastrointestinal: Negative  Endocrine: Negative  Genitourinary: Negative  Musculoskeletal: Negative  Skin: Negative  Neurological: Negative  Hematological: Negative  Psychiatric/Behavioral: Negative  Social history updates:  Social History     Tobacco Use   Smoking Status Current Every Day Smoker    Packs/day: 1 00    Years: 20 00    Pack years: 20 00    Types: Cigarettes   Smokeless Tobacco Never Used   Tobacco Comment    pt is down to  5 packs a day     Social History     Socioeconomic History    Marital status: Single     Spouse name: Not on file    Number of children: Not on file    Years of education: 8    Highest education level: Not on file   Occupational History    Not on file   Tobacco Use    Smoking status: Current Every Day Smoker     Packs/day: 1 00     Years: 20 00     Pack years: 20 00     Types: Cigarettes    Smokeless tobacco: Never Used    Tobacco comment: pt is down to  5 packs a day   Vaping Use    Vaping Use: Never used   Substance and Sexual Activity    Alcohol use: Yes     Comment: occasional    Drug use: No    Sexual activity: Not Currently     Partners: Male     Birth control/protection: Female Sterilization   Other Topics Concern    Not on file   Social History Narrative    Most recent tobacco use screenin-    Do you currently or have you served in [employer]: No    Were you activated, into active duty, as a member of the [employer] or as a Reservist: No    Occupation: unemployed    Education: 10    Marital status: Single    Sexual orientation: Heterosexual    Exercise level: Occasional    Diet: Regular    General stress level: Low    Alcohol intake: Occasional    Caffeine intake:  Moderate    Chewing tobacco: none    Illicit drugs: denies    Guns present in home: No    Seat belts used routinely: Yes    Sunscreen used routinely: No    Smoke alarm in home: Yes    Advance directive: No    Live alone or with others: with others    Are there stairs in your home: Yes    Pets: Yes    Deaf or serious difficulty hearing: No    Blind or serious difficulty seeing: Yes    Difficulty concentrating, remembering or making decisions: No    Difficulty walking or climbing stairs: No    Difficulty dressing or bathing: No    Difficulty doing errands alone: No    Passive smoke exposure: Yes    Are you currently employed: No    Asbestos exposure: No    TB exposure: No    Environmental exposure: No    Animal exposure: Yes    cat and dog    Blood Transfusion: No    Sexually active: No    Presence of domestic violence: No    Do you feel safe at home: Yes    no cpap or nebulizer     Social Determinants of Health     Financial Resource Strain:     Difficulty of Paying Living Expenses:    Food Insecurity:     Worried About Running Out of Food in the Last Year:     Ran Out of Food in the Last Year:    Transportation Needs:     Lack of Transportation (Medical):      Lack of Transportation (Non-Medical):    Physical Activity:     Days of Exercise per Week:     Minutes of Exercise per Session:    Stress:     Feeling of Stress :    Social Connections:     Frequency of Communication with Friends and Family:     Frequency of Social Gatherings with Friends and Family:     Attends Zoro[employer]ism Services:     Active Member of Clubs or Organizations:     Attends Club or Organization Meetings:     Marital Status:    Intimate Partner Violence:     Fear of Current or Ex-Partner:     Emotionally Abused:     Physically Abused:     Sexually Abused:        PhysicalExamination:  Vitals:   /70 (BP Location: Right arm, Patient Position: Sitting, Cuff Size: Adult)   Pulse 93   Temp 97 5 °F (36 4 °C) (Tympanic)   Resp 18   Ht 5' 5" (1 651 m)   Wt 92 6 kg (204 lb 3 2 oz)   SpO2 98%   BMI 33 98 kg/m²   General: Pleasant, Awake alert and oriented x 3, conversant without conversational dyspnea, NAD, normal affect  HEENT:  PERRL, Sclera noninjected, nonicteric OU, Nares patent,  no craniofacial abnormalities, Mucous membranes, moist, no oral lesions, normal dentition  NECK: Trachea midline, no accessory muscle use, no stridor, no cervical or supraclavicular adenopathy, JVP not elevated  CARDIAC: Reg, single s1/S2, no m/r/g  PULM: CTA bilaterally no wheezing, rhonchi or rales  ABD: Normoactive bowel sounds, soft nontender, nondistended, no rebound, no rigidity, no guarding  EXT: No cyanosis, no clubbing, no edema, normal capillary refill  NEURO: no focal neurologic deficits, AAOx3, moving all extremities appropriately      Diagnostic Data:  Labs: I personally reviewed the most recent laboratory data pertinent to today's visit  I have personally reviewed pertinent lab results  Lab Results   Component Value Date    WBC 11 61 (H) 06/28/2021    HGB 13 5 06/28/2021    HCT 39 8 06/28/2021    MCV 87 06/28/2021     06/28/2021     Lab Results   Component Value Date    CALCIUM 9 1 06/28/2021    K 3 6 06/28/2021    CO2 23 06/28/2021     06/28/2021    BUN 12 06/28/2021    CREATININE 0 80 06/28/2021     No results found for: IGE  Lab Results   Component Value Date    ALT 15 06/28/2021    AST 22 06/28/2021    ALKPHOS 68 0 06/28/2021       PFT results: The most recent pulmonary function tests were reviewed    None    Imaging:  I personally reviewed the images on the [facility] system pertinent to today's visit  CXR - IMPRESSION:  No acute cardiopulmonary disease      CT chest - IMPRESSION:  No acute pathology visualized on CT of the chest, abdomen and pelvis with IV without oral contrast       Luis Olvera DO

## 2021-07-01 NOTE — TELEPHONE ENCOUNTER
Patient informed of recent lab results  She has no questions at this time  She would like to schedule a COVID vaccine  The patient was instruction to try scheduling through the [facility], and was encouraged to call us back if she had difficulty setting up an appointment

## 2021-07-01 NOTE — TELEPHONE ENCOUNTER
----- Message from Darrell Bryant MD sent at 7/1/2021  9:52 AM EDT -----  Please let pt know blood work shows her blood sugar still in the prediabetes range, need to watch her sugar intake, lower it, her iron and vitamin d level is getting better, need to continue with vitamin d supplement   She test negative for lyme and a  utoimmune arthritis, no rheumatoid arthritis

## 2021-07-01 NOTE — LETTER
July 1, 2021     Patient: Joyce Rivers   YOB: 1980   Date of Visit: 7/1/2021       To Whom it May Concern:    Joyce Rivers is under my professional care  She was seen in my office on 7/1/2021  She may return to school on 7/2/21  If you have any questions or concerns, please don't hesitate to call           Sincerely,          Guadalupe Cortez,         CC: No Recipients

## 2021-07-01 NOTE — LETTER
July 1, 2021     MD Bernadine Yung 5  [address] [facility]  [address]    Patient: Juan Alberto Chavis   YOB: 1980   Date of Visit: 7/1/2021       Dear Dr Jarret Calvillo:    Thank you for referring Juan Alberto Chavis to me for evaluation  Below are my notes for this consultation  If you have questions, please do not hesitate to call me  I look forward to following your patient along with you  Sincerely,        Jaxson Valdez DO        CC: No Recipients  Jaxson Valdez DO  7/1/2021  8:06 PM  Sign when Signing Visit  Progress note - Pulmonary Medicine   Juan Alberto Chavis 38 y o  female MRN: [id number]       Impression & Plan:   38 y o  F with PMHx of prediabetes, HTN, asthma, tobacco abuse, depression who comes in for evaluation of snoring and excessive daytime sleepiness  1   Snoring/Excessive daytime sleepiness -  Mallampati class 4, Body mass index is 32 78 kg/m² ,    She is at risk for obstructive sleep apnea based on [employer] survey  -  Patient much have in lab diagnostic study  She had difficulty scheduling it as she did not have someone to watch her child so she can get the study  We will attempt to reschedule  -  I reminded her of the risk of leaving sleep apnea untreated including hypertension, heart failure, arrhythmia, MI and stroke      2  Restless legs - Iron studies are adequate       -  Will hold on therapy at this time until we can treat possible HIRAL as above        3    Current everyday smoker         -  Check PFTs to assess for obstructive lung disease        -  Spiriva samples were given to trial to see if there is any benefit        -  Continue PRN albuterol every 6 hrs        -  She has tried Chantix but has not had a lot of benefit  She will trial nicotine replacement therapy     ______________________________________________________________________    HPI:    Juan Alberto Chavis presents today for follow-up for HIRAL, RLS and current everyday smoker  She has noted shortness of breath, some wheezing and cough  She is still smoking 1/2 pack per day  She has been trying Chantix with little success to quit  She has previously tried patches but had a rash  She is interested in quitting and willing to try alternative treatment  She still has snoring, daytime sleepiness  She has noted some improvement with switch to daytime shift for work  She still notes restless leg symptoms as well  Review of Systems:  Review of Systems   Constitutional: Negative  HENT: Negative  Eyes: Negative  Respiratory: Positive for cough, shortness of breath and wheezing  Negative for chest tightness  Cardiovascular: Negative  Gastrointestinal: Negative  Endocrine: Negative  Genitourinary: Negative  Musculoskeletal: Negative  Skin: Negative  Neurological: Negative  Hematological: Negative  Psychiatric/Behavioral: Negative            Social history updates:  Social History     Tobacco Use   Smoking Status Current Every Day Smoker    Packs/day: 1 00    Years: 20 00    Pack years: 20 00    Types: Cigarettes   Smokeless Tobacco Never Used   Tobacco Comment    pt is down to  5 packs a day     Social History     Socioeconomic History    Marital status: Single     Spouse name: Not on file    Number of children: Not on file    Years of education: 8    Highest education level: Not on file   Occupational History    Not on file   Tobacco Use    Smoking status: Current Every Day Smoker     Packs/day: 1 00     Years: 20 00     Pack years: 20 00     Types: Cigarettes    Smokeless tobacco: Never Used    Tobacco comment: pt is down to  5 packs a day   Vaping Use    Vaping Use: Never used   Substance and Sexual Activity    Alcohol use: Yes     Comment: occasional    Drug use: No    Sexual activity: Not Currently     Partners: Male     Birth control/protection: Female Sterilization   Other Topics Concern    Not on file   Social History Narrative    Most recent tobacco use screenin-    Do you currently or have you served in the [employer]: No    Were you activated, into active duty, as a member of the [employer] or as a Reservist: No    Occupation: unemployed    Education: 10    Marital status: Single    Sexual orientation: Heterosexual    Exercise level: Occasional    Diet: Regular    General stress level: Low    Alcohol intake: Occasional    Caffeine intake: Moderate    Chewing tobacco: none    Illicit drugs: denies    Guns present in home: No    Seat belts used routinely: Yes    Sunscreen used routinely: No    Smoke alarm in home: Yes    Advance directive: No    Live alone or with others: with others    Are there stairs in your home: Yes    Pets: Yes    Deaf or serious difficulty hearing: No    Blind or serious difficulty seeing: Yes    Difficulty concentrating, remembering or making decisions: No    Difficulty walking or climbing stairs: No    Difficulty dressing or bathing: No    Difficulty doing errands alone: No    Passive smoke exposure: Yes    Are you currently employed: No    Asbestos exposure: No    TB exposure: No    Environmental exposure: No    Animal exposure: Yes    cat and dog    Blood Transfusion: No    Sexually active: No    Presence of domestic violence: No    Do you feel safe at home: Yes    no cpap or nebulizer     Social Determinants of Health     Financial Resource Strain:     Difficulty of Paying Living Expenses:    Food Insecurity:     Worried About Running Out of Food in the Last Year:     Ran Out of Food in the Last Year:    Transportation Needs:     Lack of Transportation (Medical):      Lack of Transportation (Non-Medical):    Physical Activity:     Days of Exercise per Week:     Minutes of Exercise per Session:    Stress:     Feeling of Stress :    Social Connections:     Frequency of Communication with Friends and Family:     Frequency of Social Gatherings with Friends and Family:     Attends Taoism Services:     Active Member of Clubs or Organizations:     Attends Club or Organization Meetings:     Marital Status:    Intimate Partner Violence:     Fear of Current or Ex-Partner:     Emotionally Abused:     Physically Abused:     Sexually Abused:        PhysicalExamination:  Vitals:   /70 (BP Location: Right arm, Patient Position: Sitting, Cuff Size: Adult)   Pulse 93   Temp 97 5 °F (36 4 °C) (Tympanic)   Resp 18   Ht 5' 5" (1 651 m)   Wt 92 6 kg (204 lb 3 2 oz)   SpO2 98%   BMI 33 98 kg/m²   General: Pleasant, Awake alert and oriented x 3, conversant without conversational dyspnea, NAD, normal affect  HEENT:  PERRL, Sclera noninjected, nonicteric OU, Nares patent,  no craniofacial abnormalities, Mucous membranes, moist, no oral lesions, normal dentition  NECK: Trachea midline, no accessory muscle use, no stridor, no cervical or supraclavicular adenopathy, JVP not elevated  CARDIAC: Reg, single s1/S2, no m/r/g  PULM: CTA bilaterally no wheezing, rhonchi or rales  ABD: Normoactive bowel sounds, soft nontender, nondistended, no rebound, no rigidity, no guarding  EXT: No cyanosis, no clubbing, no edema, normal capillary refill  NEURO: no focal neurologic deficits, AAOx3, moving all extremities appropriately      Diagnostic Data:  Labs: I personally reviewed the most recent laboratory data pertinent to today's visit  I have personally reviewed pertinent lab results  Lab Results   Component Value Date    WBC 11 61 (H) 06/28/2021    HGB 13 5 06/28/2021    HCT 39 8 06/28/2021    MCV 87 06/28/2021     06/28/2021     Lab Results   Component Value Date    CALCIUM 9 1 06/28/2021    K 3 6 06/28/2021    CO2 23 06/28/2021     06/28/2021    BUN 12 06/28/2021    CREATININE 0 80 06/28/2021     No results found for: IGE  Lab Results   Component Value Date    ALT 15 06/28/2021    AST 22 06/28/2021    ALKPHOS 68 0 06/28/2021       PFT results:   The most recent pulmonary function tests were reviewed    None    Imaging:  I personally reviewed the images on the [facility] system pertinent to today's visit  CXR - IMPRESSION:  No acute cardiopulmonary disease      CT chest - IMPRESSION:  No acute pathology visualized on CT of the chest, abdomen and pelvis with IV without oral contrast       Vedia Pounds, DO

## 2021-07-02 LAB — VIT C SERPL-MCNC: 1.2 MG/DL (ref 0.4–2)

## 2021-07-28 ENCOUNTER — HOSPITAL ENCOUNTER (OUTPATIENT)
Dept: PULMONOLOGY | Facility: HOSPITAL | Age: 41
Discharge: HOME | End: 2021-07-28
Attending: INTERNAL MEDICINE
Payer: COMMERCIAL

## 2021-07-28 DIAGNOSIS — R06.02 SHORTNESS OF BREATH: ICD-10-CM

## 2021-07-28 DIAGNOSIS — Z72.0 TOBACCO USE: ICD-10-CM

## 2021-07-28 PROCEDURE — 94729 DIFFUSING CAPACITY: CPT

## 2021-07-28 PROCEDURE — 94760 N-INVAS EAR/PLS OXIMETRY 1: CPT

## 2021-07-28 PROCEDURE — 94729 DIFFUSING CAPACITY: CPT | Performed by: INTERNAL MEDICINE

## 2021-07-28 PROCEDURE — 94060 EVALUATION OF WHEEZING: CPT

## 2021-07-28 PROCEDURE — 94726 PLETHYSMOGRAPHY LUNG VOLUMES: CPT | Performed by: INTERNAL MEDICINE

## 2021-07-28 PROCEDURE — 94726 PLETHYSMOGRAPHY LUNG VOLUMES: CPT

## 2021-07-28 PROCEDURE — 94060 EVALUATION OF WHEEZING: CPT | Performed by: INTERNAL MEDICINE

## 2021-07-28 RX ORDER — ALBUTEROL SULFATE 2.5 MG/3ML
2.5 SOLUTION RESPIRATORY (INHALATION) ONCE
Status: COMPLETED | OUTPATIENT
Start: 2021-07-28 | End: 2021-07-28

## 2021-07-28 RX ADMIN — ALBUTEROL SULFATE 2.5 MG: 2.5 SOLUTION RESPIRATORY (INHALATION) at 15:29

## 2021-08-03 ENCOUNTER — OFFICE VISIT (OUTPATIENT)
Dept: OBGYN CLINIC | Facility: CLINIC | Age: 41
End: 2021-08-03
Payer: COMMERCIAL

## 2021-08-03 VITALS
WEIGHT: 197 LBS | DIASTOLIC BLOOD PRESSURE: 77 MMHG | SYSTOLIC BLOOD PRESSURE: 116 MMHG | HEIGHT: 65 IN | BODY MASS INDEX: 32.82 KG/M2 | HEART RATE: 88 BPM

## 2021-08-03 DIAGNOSIS — M17.12 PRIMARY OSTEOARTHRITIS OF LEFT KNEE: Primary | ICD-10-CM

## 2021-08-03 PROCEDURE — 99213 OFFICE O/P EST LOW 20 MIN: CPT | Performed by: ORTHOPAEDIC SURGERY

## 2021-08-03 PROCEDURE — 20610 DRAIN/INJ JOINT/BURSA W/O US: CPT | Performed by: ORTHOPAEDIC SURGERY

## 2021-08-03 PROCEDURE — 3078F DIAST BP <80 MM HG: CPT | Performed by: ORTHOPAEDIC SURGERY

## 2021-08-03 PROCEDURE — 3008F BODY MASS INDEX DOCD: CPT | Performed by: ORTHOPAEDIC SURGERY

## 2021-08-03 PROCEDURE — 4004F PT TOBACCO SCREEN RCVD TLK: CPT | Performed by: ORTHOPAEDIC SURGERY

## 2021-08-03 PROCEDURE — 3074F SYST BP LT 130 MM HG: CPT | Performed by: ORTHOPAEDIC SURGERY

## 2021-08-03 RX ORDER — METHYLPREDNISOLONE ACETATE 40 MG/ML
1 INJECTION, SUSPENSION INTRA-ARTICULAR; INTRALESIONAL; INTRAMUSCULAR; SOFT TISSUE
Status: COMPLETED | OUTPATIENT
Start: 2021-08-03 | End: 2021-08-03

## 2021-08-03 RX ORDER — LIDOCAINE HYDROCHLORIDE 10 MG/ML
4 INJECTION, SOLUTION INFILTRATION; PERINEURAL
Status: COMPLETED | OUTPATIENT
Start: 2021-08-03 | End: 2021-08-03

## 2021-08-03 RX ADMIN — LIDOCAINE HYDROCHLORIDE 4 ML: 10 INJECTION, SOLUTION INFILTRATION; PERINEURAL at 10:35

## 2021-08-03 RX ADMIN — METHYLPREDNISOLONE ACETATE 1 ML: 40 INJECTION, SUSPENSION INTRA-ARTICULAR; INTRALESIONAL; INTRAMUSCULAR; SOFT TISSUE at 10:35

## 2021-08-03 NOTE — LETTER
August 3, 2021     Patient: Renuka Lakhani   YOB: 1980   Date of Visit: 8/3/2021       To Whom it May Concern:    Renuka Lakhani is under my professional care  She was seen in my office on 8/3/2021  If you have any questions or concerns, please don't hesitate to call           Sincerely,          Ann Forrest MD

## 2021-08-03 NOTE — PROGRESS NOTES
Patient Name:  Kamala Pineda  MRN:  [id number]    Assessment & Plan     Left knee DJD  1  Steroid injection performed today into the left knee  2  Activities as tolerated with modification to avoid pain  3  Follow-up as needed  Briefly discussed hyaluronic acid injections in the future as well  History of the Present Illness     79-year-old female returns to the office today for follow-up regarding her left knee  She underwent an MRI and is here to review the results  She was last seen on 3/31/21  She still notes persistent pain in the left knee  Pain is localized primarily to the lateral aspect  Pain is worse with prolonged standing and walking  She notes mild swelling  She denies stiffness  She denies weakness or instability  She takes over-the-counter anti-inflammatories with mild relief  General ROS:  Negative for fever or chills  Neurological ROS:  Negative for numbness or tingling  Physical Exam     /77   Pulse 88   Ht 5' 5" (1 651 m)   Wt 89 4 kg (197 lb)   BMI 32 78 kg/m²     Left knee:  No gross deformity  Skin intact  No erythema ecchymosis or swelling  No effusion  No joint line tenderness  Range of motion is intact and full without pain  Stable to varus and valgus stress  Stable Lachman test   Negative Lashaun's test     Data Review     I have personally reviewed pertinent films in PACS, and my interpretation follows  MRI left knee 4/20/21:  Tricompartmental degenerative changes  No evidence of meniscal pathology        Large joint arthrocentesis: L knee  Procedure Details  Location: knee - L knee  Needle size: 22 G  Ultrasound guidance: no  Approach: anterolateral  Medications administered: 4 mL lidocaine 1 %; 1 mL methylPREDNISolone acetate 40 mg/mL    Patient tolerance: patient tolerated the procedure well with no immediate complications  Dressing:  Sterile dressing applied          Scribe Attestation    I,:  Eulalia Spence PA-C am acting as a scribe while in the presence of the attending physician :       I,:  Wan Sargent MD personally performed the services described in this documentation    as scribed in my presence :

## 2021-09-22 ENCOUNTER — OFFICE VISIT (OUTPATIENT)
Dept: FAMILY MEDICINE CLINIC | Facility: CLINIC | Age: 41
End: 2021-09-22
Payer: COMMERCIAL

## 2021-09-22 ENCOUNTER — TELEPHONE (OUTPATIENT)
Dept: FAMILY MEDICINE CLINIC | Facility: CLINIC | Age: 41
End: 2021-09-22

## 2021-09-22 VITALS
WEIGHT: 202.6 LBS | TEMPERATURE: 98.8 F | SYSTOLIC BLOOD PRESSURE: 116 MMHG | HEART RATE: 88 BPM | HEIGHT: 65 IN | DIASTOLIC BLOOD PRESSURE: 74 MMHG | BODY MASS INDEX: 33.76 KG/M2 | OXYGEN SATURATION: 98 %

## 2021-09-22 DIAGNOSIS — Z28.39 IMMUNIZATION DEFICIENCY: ICD-10-CM

## 2021-09-22 DIAGNOSIS — E55.9 VITAMIN D DEFICIENCY: ICD-10-CM

## 2021-09-22 DIAGNOSIS — I10 ESSENTIAL HYPERTENSION: Primary | ICD-10-CM

## 2021-09-22 DIAGNOSIS — Z72.0 TOBACCO USE: ICD-10-CM

## 2021-09-22 DIAGNOSIS — E53.8 CYANOCOBALAMIN DEFICIENCY: ICD-10-CM

## 2021-09-22 DIAGNOSIS — M25.461 BILATERAL KNEE EFFUSIONS: ICD-10-CM

## 2021-09-22 DIAGNOSIS — R73.03 PREDIABETES: ICD-10-CM

## 2021-09-22 DIAGNOSIS — M25.462 BILATERAL KNEE EFFUSIONS: ICD-10-CM

## 2021-09-22 DIAGNOSIS — R63.8 INCREASED BMI: ICD-10-CM

## 2021-09-22 DIAGNOSIS — E66.9 OBESITY (BMI 30.0-34.9): ICD-10-CM

## 2021-09-22 DIAGNOSIS — F33.1 MODERATE EPISODE OF RECURRENT MAJOR DEPRESSIVE DISORDER (HCC): ICD-10-CM

## 2021-09-22 PROBLEM — E66.811 OBESITY (BMI 30.0-34.9): Status: ACTIVE | Noted: 2021-09-22

## 2021-09-22 PROCEDURE — 99214 OFFICE O/P EST MOD 30 MIN: CPT | Performed by: FAMILY MEDICINE

## 2021-09-22 RX ORDER — BUPROPION HYDROCHLORIDE 150 MG/1
150 TABLET ORAL EVERY MORNING
Qty: 90 TABLET | Refills: 1 | Status: SHIPPED | OUTPATIENT
Start: 2021-09-22 | End: 2022-01-19 | Stop reason: SDUPTHER

## 2021-09-22 NOTE — PROGRESS NOTES
Assessment/Plan:  Patient blood pressure is at goal   Continue amlodipine 10 mg daily  Refer to weight management for obesity  Monitor his blood sugar  Patient refused flu COVID vaccine  Check with insurance for HPV vaccine coverage  Will notify her orthopedic for left knee pain swelling not improved on steroid injection  Start patient on Wellbutrin to help with mood and quit smoking  She only had Pap smear with gyn  Close monitor needed  Follow-up blood sugar in the future  I have spent 25 minutes with Patient  today in which greater than 50% of this time was spent in counseling/coordination of care regarding Prognosis, Risks and benefits of tx options and Intructions for management  Problem List Items Addressed This Visit        Cardiovascular and Mediastinum    Essential hypertension - Primary       Other    Moderate episode of recurrent major depressive disorder (HCC)    Relevant Medications    buPROPion (WELLBUTRIN XL) 150 mg 24 hr tablet    Prediabetes    Tobacco use    Relevant Medications    buPROPion (WELLBUTRIN XL) 150 mg 24 hr tablet    Obesity (BMI 30 0-34  9)    Relevant Orders    Ambulatory referral to Weight Management      Other Visit Diagnoses     Increased BMI                Subjective:      Patient ID: Bertha Larry is a 38 y o  female  48-year-old female follow-up blood pressure she is on amlodipine 10 mg daily  She is off all medicine for her mood since she is doing better  However she suffer a loss of her 9week-old nephew sudden infant death syndrome and she had to bury her brother  from overdose  Stress is high  She working 12 hour shift  Her 3 yo 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 has 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  She suffer from left knee pains swelling  Last steroid injection did not help  She benefit a lot from arthrocentesis in the past   She saw pulmonologist was given Spiriva sample she does well shortness of breath improved  She still smokes    She has not had COVID vaccine she is afraid of the vaccine  She works at [employer]  She complained of weight gain unable to lose weight  She has prediabetes  The following portions of the patient's history were reviewed and updated as appropriate:   Past Medical History:  She has a past medical history of Abnormal Pap smear of cervix, Adrenal mass, left ([facility] ) (2021), Arthritis, Asthma, Blurry vision (2020), Essential hypertension (2020), Headache (2020), Hypertension, Immunization deficiency (10/2/2020), Insomnia (2021), Migraine, Moderate episode of recurrent major depressive disorder ([facility] ) (2020), Obesity (BMI 30 0-34 9) (2021), Prediabetes (2020), Tobacco use (2021), and Vitamin D deficiency (10/2/2020)  ,  _______________________________________________________________________  Medical Problems:  does not have any pertinent problems on file ,  _______________________________________________________________________  Past Surgical History:   has a past surgical history that includes Tubal ligation (); Appendectomy;  section; Colposcopy; Knee surgery; Cervical biopsy w/ loop electrode excision; Lymph node dissection (); Breast surgery (Left, 2018); Breast biopsy (Left, 2017); Breast excisional biopsy (Left, 2018); Breast biopsy (Right); Hysterectomy (); and US guided thyroid biopsy (2021)  ,  _______________________________________________________________________  Family History:  family history includes Colon cancer (age of onset: 28) in her sister; Diabetes in her maternal grandmother, mother, paternal aunt, and paternal grandmother; Endometrial cancer (age of onset: 29) in her sister; Heart attack in her mother; Heart disease in her mother; Leukemia (age of onset: 25) in her maternal uncle; Lupus (age of onset: 50) in her maternal aunt;  No Known Problems in her daughter, father, son, and son; Seizures in her maternal aunt ,  _______________________________________________________________________  Social History:   reports that she has been smoking cigarettes  She has a 20 00 pack-year smoking history  She has never used smokeless tobacco  She reports current alcohol use  She reports that she does not use drugs  ,  _______________________________________________________________________  Allergies:  has No Known Allergies     _______________________________________________________________________  Current Outpatient Medications   Medication Sig Dispense Refill    albuterol (PROVENTIL HFA,VENTOLIN HFA) 90 mcg/act inhaler Inhale 2 puffs every 6 (six) hours as needed for wheezing 1 Inhaler 0    amLODIPine (NORVASC) 10 mg tablet TAKE 1 TABLET (10 MG TOTAL) BY MOUTH DAILY BLOOD PRESUURE 90 tablet 0    Cholecalciferol (Vitamin D3) 125 MCG (5000 UT) CAPS Take 1 capsule (5,000 Units total) by mouth daily 90 capsule 2    Diclofenac Sodium (VOLTAREN) 1 % Apply 2 g topically 4 (four) times a day 100 g 2    nystatin (MYCOSTATIN) 500,000 units/5 mL suspension Apply 5 mL (500,000 Units total) to the mouth or throat 3 (three) times a day For dry mouth 473 mL 1    nystatin (MYCOSTATIN) cream Apply topically 2 (two) times a day 30 g 0    valACYclovir (VALTREX) 500 mg tablet TAKE 1 TABLET BY MOUTH EVERY DAY 30 tablet 3    buPROPion (WELLBUTRIN XL) 150 mg 24 hr tablet Take 1 tablet (150 mg total) by mouth every morning 90 tablet 1     No current facility-administered medications for this visit      _______________________________________________________________________  Review of Systems   Constitutional: Positive for unexpected weight change  Negative for activity change, appetite change and fatigue  HENT: Negative for ear pain, sore throat, trouble swallowing and voice change  Eyes: Negative for photophobia and visual disturbance  Respiratory: Negative for cough, chest tightness, shortness of breath and wheezing      Cardiovascular: Negative for chest pain, palpitations and leg swelling  Gastrointestinal: Negative for abdominal pain, constipation, diarrhea, nausea, rectal pain and vomiting  Endocrine: Negative for cold intolerance, polydipsia and polyuria  Genitourinary: Negative for difficulty urinating, dysuria, flank pain, menstrual problem and pelvic pain  Musculoskeletal: Positive for arthralgias and joint swelling  Negative for myalgias  Skin: Negative for color change and rash  Allergic/Immunologic: Negative for environmental allergies and immunocompromised state  Neurological: Negative for dizziness, weakness, numbness and headaches  Hematological: Negative for adenopathy  Does not bruise/bleed easily  Psychiatric/Behavioral: Negative for decreased concentration, dysphoric mood, self-injury, sleep disturbance and suicidal ideas  The patient is nervous/anxious  Objective:  Vitals:    09/22/21 1305   BP: 116/74   BP Location: Left arm   Patient Position: Sitting   Cuff Size: Standard   Pulse: 88   Temp: 98 8 °F (37 1 °C)   TempSrc: Tympanic   SpO2: 98%   Weight: 91 9 kg (202 lb 9 6 oz)   Height: 5' 5" (1 651 m)     Body mass index is 33 71 kg/m²  Physical Exam  Vitals and nursing note reviewed  Constitutional:       Appearance: Normal appearance  Pulmonary:      Effort: Pulmonary effort is normal    Genitourinary:     Rectum: Guaiac stool:      Neurological:      Mental Status: She is alert and oriented to person, place, and time  Mental status is at baseline  Psychiatric:         Mood and Affect: Mood normal          Behavior: Behavior normal          Thought Content:  Thought content normal          Judgment: Judgment normal

## 2021-09-22 NOTE — TELEPHONE ENCOUNTER
At check out, pt said that you were going to order her lab work, however none was ordered  Did you want to do that?

## 2021-09-22 NOTE — LETTER
September 22, 2021     Patient: Hima Bhagat   YOB: 1980   Date of Visit: 9/22/2021       To Whom it May Concern:    Hima Bhagat is under my professional care  She was seen in my office on 9/22/2021  If you have any questions or concerns, please don't hesitate to call           Sincerely,          Valeriy Curran MD

## 2021-09-27 ENCOUNTER — TELEPHONE (OUTPATIENT)
Dept: EMERGENCY DEPT | Facility: HOSPITAL | Age: 41
End: 2021-09-27

## 2021-09-27 ENCOUNTER — HOSPITAL ENCOUNTER (EMERGENCY)
Facility: HOSPITAL | Age: 41
Discharge: HOME | End: 2021-09-27
Payer: COMMERCIAL

## 2021-09-27 VITALS
TEMPERATURE: 98.5 F | HEIGHT: 64 IN | SYSTOLIC BLOOD PRESSURE: 125 MMHG | BODY MASS INDEX: 34.49 KG/M2 | OXYGEN SATURATION: 100 % | DIASTOLIC BLOOD PRESSURE: 96 MMHG | WEIGHT: 202 LBS | RESPIRATION RATE: 20 BRPM | HEART RATE: 98 BPM

## 2021-09-27 DIAGNOSIS — Z20.822 COVID-19 VIRUS TEST RESULT UNKNOWN: ICD-10-CM

## 2021-09-27 DIAGNOSIS — B00.9 HERPES: ICD-10-CM

## 2021-09-27 DIAGNOSIS — T88.7XXA MEDICATION SIDE EFFECT: ICD-10-CM

## 2021-09-27 DIAGNOSIS — R09.81 NASAL CONGESTION: ICD-10-CM

## 2021-09-27 DIAGNOSIS — R68.2 DRY MOUTH: Primary | ICD-10-CM

## 2021-09-27 LAB — SARS-COV-2 RNA RESP QL NAA+PROBE: POSITIVE

## 2021-09-27 PROCEDURE — 99283 EMERGENCY DEPT VISIT LOW MDM: CPT

## 2021-09-27 PROCEDURE — 87635 SARS-COV-2 COVID-19 AMP PRB: CPT | Performed by: PHYSICIAN ASSISTANT

## 2021-09-27 PROCEDURE — 99284 EMERGENCY DEPT VISIT MOD MDM: CPT | Performed by: PHYSICIAN ASSISTANT

## 2021-09-27 NOTE — ED PROVIDER NOTES
History  Chief Complaint   Patient presents with    Medication Problem     pt reports starting new medication on thursday, is now experiencing sore throat, dry mouth, and diminished sense of test     19-year-old female is coming in today complaining of dry mouth, sore throat, decreased sense of taste that has been progressively getting worse since starting Wellbutrin on Thursday  She also has nasal congestion and occasional loss of smell secondary to the congestion  No known exposures to COVID that she is aware of  Not vaccinated for COVID          Prior to Admission Medications   Prescriptions Last Dose Informant Patient Reported? Taking?    Cholecalciferol (Vitamin D3) 125 MCG (5000 UT) CAPS   No No   Sig: Take 1 capsule (5,000 Units total) by mouth daily   Diclofenac Sodium (VOLTAREN) 1 %   No No   Sig: Apply 2 g topically 4 (four) times a day   albuterol (PROVENTIL HFA,VENTOLIN HFA) 90 mcg/act inhaler   No No   Sig: Inhale 2 puffs every 6 (six) hours as needed for wheezing   amLODIPine (NORVASC) 10 mg tablet   No No   Sig: TAKE 1 TABLET (10 MG TOTAL) BY MOUTH DAILY BLOOD PRESUURE   buPROPion (WELLBUTRIN XL) 150 mg 24 hr tablet   No No   Sig: Take 1 tablet (150 mg total) by mouth every morning   nystatin (MYCOSTATIN) 500,000 units/5 mL suspension   No No   Sig: Apply 5 mL (500,000 Units total) to the mouth or throat 3 (three) times a day For dry mouth   nystatin (MYCOSTATIN) cream   No No   Sig: Apply topically 2 (two) times a day   valACYclovir (VALTREX) 500 mg tablet   No No   Sig: TAKE 1 TABLET BY MOUTH EVERY DAY      Facility-Administered Medications: None       Past Medical History:   Diagnosis Date    Abnormal Pap smear of cervix     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, 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    Adrenal mass, left (HCC) 1/25/2021    1 8 x 2 1 cm on ct 12/21/2020    Arthritis     Asthma     Blurry vision 9/4/2020    Essential hypertension 9/4/2020    Headache 9/4/2020    Hypertension     Immunization deficiency 10/2/2020    Hep a nonimmune    Insomnia 2021    Migraine     Moderate episode of recurrent major depressive disorder ([address] ) 2020    Obesity (BMI 30 0-34 9) 2021    Prediabetes 2020    Tobacco use 2021    Vitamin D deficiency 10/2/2020       Past Surgical History:   Procedure Laterality Date    APPENDECTOMY      BREAST BIOPSY Left 2017    BREAST BIOPSY Right     2 core biopsies    BREAST EXCISIONAL BIOPSY Left 2018    BREAST SURGERY Left 2018    Left breast mass excision    CERVICAL BIOPSY  W/ LOOP ELECTRODE EXCISION       SECTION      X2    COLPOSCOPY      HYSTERECTOMY  2012    heavy bleeding, ovaries remain, also had abnormal pap    KNEE SURGERY      LYMPH NODE DISSECTION  2018    under armpit    TUBAL LIGATION     [address] THYROID BIOPSY  2021       Family History   Problem Relation Age of Onset    Lupus Maternal Aunt 50    Seizures Maternal Aunt     Leukemia Maternal Uncle 25    Diabetes Mother     Heart attack Mother     Heart disease Mother         Internal Defibrilation    Diabetes Maternal Grandmother     Diabetes Paternal Grandmother     Diabetes Paternal Aunt     No Known Problems Father     Endometrial cancer Sister 29    Colon cancer Sister 28    No Known Problems Daughter     No Known Problems Son     No Known Problems Son      I have reviewed and agree with the history as documented      E-Cigarette/Vaping    E-Cigarette Use Never User      E-Cigarette/Vaping Substances    Nicotine No     THC No     CBD No     Flavoring No     Other No     Unknown No      Social History     Tobacco Use    Smoking status: Current Every Day Smoker     Packs/day: 1 00     Years: 20 00     Pack years: 20 00     Types: Cigarettes    Smokeless tobacco: Never Used    Tobacco comment: pt is down to  5 packs a day   Vaping Use    Vaping Use: Never used   Substance Use Topics    Alcohol use: Yes     Comment: occasional    Drug use: No       Review of Systems   Constitutional: Negative for fever  HENT: Positive for congestion and sore throat  Negative for nosebleeds  Eyes: Negative for redness  Respiratory: Negative for shortness of breath  Cardiovascular: Negative for chest pain  Gastrointestinal: Negative for blood in stool  Genitourinary: Negative for hematuria  Musculoskeletal: Negative for gait problem  Skin: Negative for rash  Neurological: Negative for seizures  Psychiatric/Behavioral: Negative for behavioral problems  Physical Exam  Physical Exam  Constitutional:       General: She is not in acute distress  Appearance: Normal appearance  HENT:      Head: Normocephalic and atraumatic  Right Ear: Tympanic membrane and external ear normal       Left Ear: External ear normal  A middle ear effusion is present  Nose: Congestion present  Mouth/Throat:      Mouth: Mucous membranes are moist       Pharynx: No oropharyngeal exudate or posterior oropharyngeal erythema  Eyes:      Extraocular Movements: Extraocular movements intact  Cardiovascular:      Rate and Rhythm: Normal rate  Pulmonary:      Effort: Pulmonary effort is normal    Musculoskeletal:         General: Normal range of motion  Cervical back: Normal range of motion  Skin:     General: Skin is warm and dry  Neurological:      General: No focal deficit present  Mental Status: She is alert     Psychiatric:         Mood and Affect: Mood normal          Behavior: Behavior normal          Vital Signs  ED Triage Vitals   Temperature Pulse Respirations Blood Pressure SpO2   09/27/21 1834 09/27/21 1833 09/27/21 1833 09/27/21 1833 09/27/21 1833   98 5 °F (36 9 °C) 98 20 125/96 100 %      Temp Source Heart Rate Source Patient Position - Orthostatic VS BP Location FiO2 (%)   09/27/21 1834 09/27/21 1833 09/27/21 1833 09/27/21 1833 --   Oral Monitor Standing Left arm       Pain Score       --                  Vitals:    09/27/21 1833   BP: 125/96 Pulse: 98   Patient Position - Orthostatic VS: Standing         Visual Acuity      ED Medications  Medications - No data to display    Diagnostic Studies  Results Reviewed     Procedure Component Value Units Date/Time    Novel Coronavirus [facility] - 2 Hour Stat [[id number]] Collected: 09/27/21 1845    Lab Status: In process Specimen: Nares from Nose Updated: 09/27/21 1849                 No orders to display              Procedures  Procedures         ED Course                                           MDM  Number of Diagnoses or Management Options  COVID-19 virus test result unknown  Dry mouth  Medication side effect  Nasal congestion  Diagnosis management comments: Will check for COVID and if neg, advised patient to discuss med side effects with PCP      Disposition  Final diagnoses:   Dry mouth   Medication side effect   Nasal congestion   COVID-19 virus test result unknown     Time reflects when diagnosis was documented in both MDM as applicable and the Disposition within this note     Time User Action Codes Description Comment    9/27/2021  6:44 PM Ayaan Spicer Add [R68 2] Dry mouth     9/27/2021  6:44 PM Ashley Gillespie, [address] [place] [address] [id number]  [id number]] Medication side effect     9/27/2021  6:44 PM Ayaan Spicer Add [R09 81] Nasal congestion     9/27/2021  6:45 PM Ayaan Spicer Add [Z20 822] UCGHL-17 virus test result unknown       ED Disposition     ED Disposition Condition Date/Time Comment    Discharge Stable Mon Sep 27, 2021  6:44 PM Ashley Peck discharge to home/self care              Follow-up Information     Follow up With Specialties Details Why Contact Info    Zenon Stafford MD Family Medicine   [address] [place] [address] [facility]   [phone]            Discharge Medication List as of 9/27/2021  6:46 PM      CONTINUE these medications which have NOT CHANGED    Details   albuterol (PROVENTIL HFA,VENTOLIN HFA) 90 mcg/act inhaler Inhale 2 puffs every 6 (six) hours as needed for wheezing, Starting Thu 10/15/2020, Normal      amLODIPine (NORVASC) 10 mg tablet TAKE 1 TABLET (10 MG TOTAL) BY MOUTH DAILY BLOOD PRESUURE, Starting Mon 8/16/2021, Until Sun 11/14/2021, Normal      buPROPion (WELLBUTRIN XL) 150 mg 24 hr tablet Take 1 tablet (150 mg total) by mouth every morning, Starting Wed 9/22/2021, Normal      Cholecalciferol (Vitamin D3) 125 MCG (5000 UT) CAPS Take 1 capsule (5,000 Units total) by mouth daily, Starting Fri 10/2/2020, Until Wed 9/22/2021, Normal      Diclofenac Sodium (VOLTAREN) 1 % Apply 2 g topically 4 (four) times a day, Starting Wed 3/24/2021, Normal      nystatin (MYCOSTATIN) 500,000 units/5 mL suspension Apply 5 mL (500,000 Units total) to the mouth or throat 3 (three) times a day For dry mouth, Starting Wed 6/23/2021, Normal      nystatin (MYCOSTATIN) cream Apply topically 2 (two) times a day, Starting Thu 6/17/2021, Normal      valACYclovir (VALTREX) 500 mg tablet TAKE 1 TABLET BY MOUTH EVERY DAY, Normal           No discharge procedures on file      PDMP Review       Value Time User    PDMP Reviewed  Yes 12/21/2020  5:26 AM Ella Gilbert MD          ED Provider  Electronically Signed by           Shola Staton PA-C  09/27/21 2036

## 2021-09-28 ENCOUNTER — TELEMEDICINE (OUTPATIENT)
Dept: FAMILY MEDICINE CLINIC | Facility: CLINIC | Age: 41
End: 2021-09-28
Payer: COMMERCIAL

## 2021-09-28 ENCOUNTER — TELEPHONE (OUTPATIENT)
Dept: FAMILY MEDICINE CLINIC | Facility: CLINIC | Age: 41
End: 2021-09-28

## 2021-09-28 ENCOUNTER — TELEPHONE (OUTPATIENT)
Dept: EMERGENCY DEPT | Facility: HOSPITAL | Age: 41
End: 2021-09-28

## 2021-09-28 DIAGNOSIS — R73.03 PREDIABETES: ICD-10-CM

## 2021-09-28 DIAGNOSIS — Z72.0 TOBACCO USE: ICD-10-CM

## 2021-09-28 DIAGNOSIS — E66.9 OBESITY (BMI 30.0-34.9): ICD-10-CM

## 2021-09-28 DIAGNOSIS — I10 ESSENTIAL HYPERTENSION: Primary | ICD-10-CM

## 2021-09-28 DIAGNOSIS — E27.8 ADRENAL MASS, LEFT (HCC): ICD-10-CM

## 2021-09-28 PROCEDURE — 99214 OFFICE O/P EST MOD 30 MIN: CPT | Performed by: NURSE PRACTITIONER

## 2021-09-28 RX ORDER — ONDANSETRON 2 MG/ML
4 INJECTION INTRAMUSCULAR; INTRAVENOUS ONCE AS NEEDED
Status: CANCELLED | OUTPATIENT
Start: 2021-09-29

## 2021-09-28 RX ORDER — VALACYCLOVIR HYDROCHLORIDE 500 MG/1
TABLET, FILM COATED ORAL
Qty: 30 TABLET | Refills: 3 | Status: SHIPPED | OUTPATIENT
Start: 2021-09-28 | End: 2022-03-07

## 2021-09-28 RX ORDER — SODIUM CHLORIDE 9 MG/ML
20 INJECTION, SOLUTION INTRAVENOUS ONCE
Status: CANCELLED | OUTPATIENT
Start: 2021-09-29

## 2021-09-28 RX ORDER — ACETAMINOPHEN 325 MG/1
650 TABLET ORAL ONCE AS NEEDED
Status: CANCELLED | OUTPATIENT
Start: 2021-09-29

## 2021-09-28 RX ORDER — ALBUTEROL SULFATE 90 UG/1
3 AEROSOL, METERED RESPIRATORY (INHALATION) ONCE AS NEEDED
Status: CANCELLED | OUTPATIENT
Start: 2021-09-29

## 2021-09-28 NOTE — TELEPHONE ENCOUNTER
----- Message from Catarino Solano PA-C sent at 9/27/2021  8:07 PM EDT -----  Informed patient of positive COVID results  Explained she would be a candidate for MAB infusion  Advised f/u with PCP to get that set up   Advised return to ED for severe SOB, quarantine x 10+ days, household members need to quarantine as well

## 2021-09-28 NOTE — PATIENT INSTRUCTIONS
COVID-19 and Chronic Health Conditions   WHAT YOU NEED TO KNOW:   Your chronic condition may increase your risk for COVID-19 or serious problems it can cause  Healthcare providers might need to make changes that affect how you usually manage your chronic health condition  Providers may change hours of operation or not have patients come in to be seen  You may not be able to make appointments to get blood drawn or to have tests or procedures  This may continue until the virus that causes COVID-19 is controlled  Until then, you can take steps to manage your condition  The steps will also lower your risk for COVID-19 or the serious problems it causes  If you do develop COVID-19, healthcare providers will tell you when it is okay to be around others after you recover  This depends on your chronic condition, any symptoms of COVID-19 that developed, and how severe the symptoms were  DISCHARGE INSTRUCTIONS:   If you think you may be infected with the coronavirus,  do the following to protect others:  · If emergency care is needed,  tell the  about the possible infection, or call ahead and tell the emergency department  · Call a healthcare provider  for instructions if symptoms are mild  Do not  arrive without calling first  Your provider will need to protect staff members and other patients  · Cover your mouth and nose  while you are getting medical care  This will help lower the risk of infecting others  Call your local emergency number [phone] in the [address] [place] [address],[address]) or an emergency department if:   · You have trouble breathing or shortness of breath  · You have chest pain or pressure that lasts longer than 5 minutes  · You become confused or hard to wake  · Your lips or face are blue  · You have a fever of 104°F (40°C) or higher  Call your doctor or healthcare provider if:   · You do not  have symptoms of COVID-19 but had close physical contact within 14 days with someone who tested positive      · You have questions or concerns about your COVID-19 or your chronic condition  What you need to know about serious problems from the virus:  Serious health problems may improve or continue for weeks or months, and possibly years  Health problems that continue may be called long COVID  · The virus can affect many parts of the body  Information is still being learned  You may develop these or other health problems:    ? Serious lower respiratory conditions, such as pneumonia or acute respiratory distress syndrome (ARDS)    ? Blood vessel damage, leading to blood clots    ? Organ damage from a lack of oxygen or from blood clots    ? Sleep problems    ? Problems thinking clearly, remembering information, or concentrating    ? Mood changes, depression, or anxiety    · Anyone can develop serious problems from the virus,  but some health conditions increase the risk  Healthcare professionals are still learning how the virus affects a person who has a chronic health condition  Protect yourself from infection, even if your chronic condition is not listed below  More is being learned about the virus every day  Health conditions known to increase the risk for COVID-19 or its serious complications include the following:    ? Obesity, diabetes, cancer, Down syndrome, or a liver disease    ? Kidney failure, chronic kidney disease, or sickle cell disease    ? Lung disease, COPD, moderate-to-severe asthma, cystic fibrosis, or pulmonary fibrosis (scarring in your lungs)    ? A severe heart disease, such as coronary artery disease or heart failure    ? A brain blood vessel disorder or disease, or a condition such as dementia    ? Hypertension (or high blood pressure), or thalassemia    ? An immune system problem, HIV or AIDS, or a blood, bone marrow, or organ transplant    · Other factors that increase your risk  are age 72 or older or living in a long-term care facility   Pregnancy, cigarette smoking, or long-term corticosteroid use can also increase your risk  How the 2019 coronavirus spreads: The virus spreads quickly and easily  The virus can be passed starting 2 days before symptoms begin or before a positive test if symptoms never begin  The following are ways the virus is thought to spread, but more information may be coming:  · Droplets are the main way all coronaviruses spread  The virus travels in droplets that form when a person talks, coughs, or sneezes  The droplets can also float in the air for minutes or hours  Infection happens when you breathe in the droplets or get them in your eyes or nose  Close personal contact with an infected person increases your risk for infection  This means being within 6 feet (2 meters) of the person for at least 15 minutes over 24 hours  · Person-to-person contact can spread the virus  For example, a person with the virus on his or her hands can spread it by shaking hands with someone  · The virus can stay on objects and surfaces for a short time  You may become infected by touching the object or surface and then touching your eyes or mouth  · An infected animal may be able to infect a person who touches it  This may happen at live markets or on a farm  Manage your chronic health condition during this time:  If you do not have a regular healthcare provider, experts recommend you contact a local community health center or health department  The following can help you manage your condition and prevent COVID-19:  · Get emergency care for your condition if needed  Talk to your healthcare providers about symptoms of your chronic condition that need immediate care  Your providers can help you create a plan or add exacerbation management to your plan  The plan will include when to go to an emergency department and when to call your local emergency number  This will depend on where you live and the services that are available during this time      · Go to dialysis appointments as scheduled  It is important to stay on schedule  You will need to have enough food to be able to follow the emergency diet plan if you must miss a session  The emergency diet needs to be part of the management plan for your condition  · Reschedule any upcoming appointments as needed  Medical facilities may be closed until the coronavirus is better controlled  This means you may need to reschedule a surgery, procedure, or check-up appointment  If you cannot have a phone or video appointment, you will need to make a new appointment  Some providers may be scheduling appointments several months in advance  Some surgeries and procedures will happen as scheduled  This depends on the medical condition and the reason for the surgery or procedure  You may need to have extra testing for COVID-19 several days before  · Follow any regular management plan you use  Your healthcare provider will tell you if you need to make any changes to your regular management plan  For example, if you have asthma, continue to follow your asthma action plan  If you have diabetes, you may need to check your blood sugar level more often  Stress and illness can make blood sugar levels go up  You may need to adjust medicine such as insulin  If you have a heart condition or high blood pressure, you may need to check your blood pressure more often  Stress and illness can also raise your blood pressure  · Talk to your healthcare providers about your medicines  You may be able to get more than 1 month of medicine at a time  This will lower the number of times you need to go to a pharmacy to get your medicines  Make sure you have enough medicine if you have a condition that can lead to an emergency  Examples include asthma medicines, insulin, or an epinephrine pen  Check the expiration dates on the medicines you currently have  Ask for refills as soon as possible, if needed   If it is not time to refill prescriptions, you may be able to get an emergency supply of some medicines  Medicine plans vary, so ask your healthcare provider or pharmacist for options  · Have supplies available in your home  If possible, get extra supplies you use regularly  Examples include absorbent pads, syringes, and wound cleaning solutions  This will limit the number of trips out of your home to get supplies  · Know the signs and symptoms of COVID-19  Signs and symptoms usually start about 5 days after infection but can take 2 to 14 days  Signs and symptoms range from mild to severe  You may feel like you have the flu or a bad cold  Your chronic health condition may cause some of the same symptoms COVID-19 causes  This can make it hard to know if a symptom is from COVID-19 or your chronic condition  Keep a record of any new or worsening symptom you have  This is especially important if you have a condition that often causes shortness of breath  Your provider can tell you if you should be tested for COVID-19  Information is still being learned  Tell your healthcare provider if you think you were infected but develop signs or symptoms not listed below:    ? A cough    ? Shortness of breath or trouble breathing that may become severe    ? A fever of at least 100 4°F, or 38°C (may be lower in adults 65 or older)    ? Chills that might include shaking    ? Muscle pain, body aches, or a headache    ? A sore throat    ? Suddenly not being able to taste or smell anything    ? Feeling very tired (fatigue)    ? Congestion (stuffy head and nose), or a runny nose    ? Diarrhea, nausea, or vomiting    What you can do to prevent having to go out of your home during this time:   · Ask your healthcare provider for other ways to have appointments  Some providers offer phone, video, or other types of appointments  · Have food, medicines, and other supplies delivered  Some pharmacies can send certain medicines to you through the mail   Grocery stores and restaurants may be able to deliver food and other items  If possible, have delivered items placed somewhere  Try not to have someone hand you an item  You will be so close to the person that the virus can spread between you  · Ask someone to get items you need  The person can get groceries, medicines, or other needed items for you  Choose a person who does not have signs or symptoms of COVID-19 or has tested negative for it  The person should not be waiting for test results  He or she should not have a condition that increases the risk for COVID-19 or serious problems it causes  Lower your risk for COVID-19:  The best way to prevent infection is to avoid anyone who is infected, but this can be hard to do  An infected person can spread the virus before signs or symptoms begin, or even if signs or symptoms never develop  The following are ways to prevent the spread of the virus and lower your risk for COVID-19:      · Wash your hands often throughout the day  Use soap and water  Rub your soapy hands together, lacing your fingers  Wash the front and back of each hand, and in between your fingers  Use the fingers of one hand to scrub under the fingernails of the other hand  Wash for at least 20 seconds  Rinse with warm, running water for several seconds  Then dry your hands with a clean towel or paper towel  Use hand  that contains alcohol if soap and water are not available  Do not touch your eyes, nose, or mouth without washing your hands first  Teach children how to wash their hands  · Cover sneezes and coughs  Turn your face away and cover your mouth and nose with a tissue  Throw the tissue away  Use the bend of your arm if a tissue is not available  Then wash your hands well with soap and water or use hand   Turn your head and cover your face if someone near you is coughing or sneezing  Teach children how to cover a cough or sneeze  Remind them to wash their hands      · Make a habit of not touching your face   If you get the virus on your hands, you can transfer it to your eyes, nose, or mouth and become infected  · Wear a face covering (mask) around anyone who does not live in your home  A covering helps protect the person wearing it from being infected or passing the virus to others  Use a cloth covering with at least 2 layers  You can also create layers by putting a cloth face covering over a disposable non-medical mask  Cover your mouth and your nose  The covering should fit snugly against the bridge of your nose  Securely fasten it under your chin and on the sides of your face  Do not use coverings on children younger than 2 years or on anyone who has breathing problems or cannot remove it  Your healthcare provider can tell you what to do if you cannot wear a face covering  · Clean and disinfect high-touch surfaces and objects in your home often  Some surfaces and objects may need to be cleaned several times each day, depending on how often they are used  Use disinfecting wipes, or make a solution by mixing 4 teaspoons of bleach with 1 quart (4 cups) of water  Do not  use any cleaning or disinfecting products that can trigger an asthma attack or other breathing problems  Open windows or have circulating air as you clean  Do not  mix ammonia with bleach  This will create toxic fumes  How to follow social distancing guidelines:  Social distancing means people avoid close personal contact so the virus cannot spread from one person to another  Close personal contact means being within 6 feet (2 meters) of someone for at least 15 minutes over 24 hours  National and local social distancing rules vary  Rules may change over time as restrictions are lifted, but they may return if an outbreak happens where you live  It is important to know and follow all current social distancing rules in your area  The following are general guidelines:  · Stay home if you are sick or think you may have COVID-19    It is important to stay home if you are waiting for a testing appointment or for test results  Even if you do not have symptoms, you can pass the virus to others  · Limit trips out of your home  Plan your route so you make the fewest stops possible to limit contact  Keep track of places you go  This will help contact tracers notify others if you become infected  Wear a face covering while you are out  You will need to wear the covering on mass transit, such as a bus or the subway  You will also need to wear it while you travel on an airplane  · Stay at least 6 feet (2 meters) away from anyone who does not live in your home  Do not shake hands with, hug, or kiss a person as a greeting  Stand or walk as far from others as possible, especially around anyone who is sneezing or coughing  If you must use public transportation (such as a bus or subway), try to sit or stand away from others  Check in on anyone who lives alone  · Do not have anyone over to your home unless it is necessary  It is okay to have medical workers or other helpers in to provide care  Wear a face covering, and remind others to wear a mask or face covering  Remind them to wash their hands when they arrive and before they leave  Do not  have anyone over just to visit, even if you both do not feel sick  The virus can pass from one of you to the other before symptoms of COVID-19 begin  Some people never even develop symptoms  It is important that you continue social distancing with everyone, including children  It may be hard to tell a child not to hug or kiss you  Explain that this is how he or she can help you stay healthy  · Do not go to someone else's home unless it is necessary  Do not go over to visit, even if the person is lonely  Only go if you need to help him or her  · Avoid in-person gatherings and crowds  Gatherings or crowds of 10 or more individuals can cause the virus to spread   Avoid places such as [place], beaches, sporting events, and tourist attractions  For events such as parties, holiday meals, [employer] services, and conferences, attend virtually (on a computer), if possible  · Stay safe if you must go out to work  Keep physical distance between you and other workers as much as possible  Follow your employer's rules so everyone stays safe  Help strengthen your immune system:   · Ask about vaccines you may need  Talk to your healthcare provider about COVID-19 vaccines  The current vaccines are given as a shot in 1 or 2 doses  Your healthcare provider can give you more information about what to expect, depending on your chronic condition  Get the influenza (flu) vaccine as soon as recommended each year, usually starting in September or October  Get the pneumonia vaccine if recommended  · Do not smoke  Nicotine and other chemicals in cigarettes and cigars can increase your risk for infection and for serious COVID-19 effects  Ask your healthcare provider for information if you currently smoke and need help to quit  E-cigarettes or smokeless tobacco still contain nicotine  Talk to your healthcare provider before you use these products  · Eat a variety of healthy foods  Examples include vegetables, fruits, whole-grain breads and cereals, lean meats and poultry, fish, low-fat dairy products, and cooked beans  Healthy foods contain nutrients that help keep your immune system strong  · Find ways to manage stress  You may be feeling more stressed than usual because of the COVID-19 outbreak  The situation is very stressful to many people  Talk to your healthcare providers about ways to manage stress during this time  Stress can lead to breathing problems or make the problems worse  Stress can trigger an attack or exacerbation of many health conditions  It is important to do things that help you feel more relaxed, such as the following:     ? Pick 1 or 2 times a day to watch the news   Constant news watching can increase your stress levels  ? Talk to a friend on the phone or through a video chat  ? Take a warm, soothing bath  ? Listen to music  ? Exercise can also help relieve stress  This may be hard if your regular gym or outdoor exercise area is closed  If you do not have exercise equipment at home, try walking inside your home  You can walk quickly or turn on music and dance  Follow up with your doctor or healthcare provider as directed: Your providers will tell you when you can come in for tests, procedures, or check-ups  Bring your symptom record with you to all appointments  Write down your questions so you remember to ask them during your visits  For more information:   · Centers for Disease Control and Prevention  [address] , [address]  Phone: 5- 887 - 8496754  Phone: 2- 539 - 3014931  Web Address: [employer]    © [address] [place] [address] 2021 Information is for End User's use only and may not be sold, redistributed or otherwise used for commercial purposes  All illustrations and images included in CareNotes® are the copyrighted property of [employer] D A M [employer] Inc  or [facility] Alejandra Early   The above information is an  only  It is not intended as medical advice for individual conditions or treatments  Talk to your doctor, nurse or pharmacist before following any medical regimen to see if it is safe and effective for you

## 2021-09-28 NOTE — PROGRESS NOTES
COVID-19 Outpatient Progress Note- VIRTUAL [facility]     Assessment/Plan:   patient of Dr Chapo Desai presents in office via virtual visit and well for complaints of few days of sore throat sinus congestion postnasal dripping intermittent dry cough    she was tested for COVID yesterday and positive she has not been immunized   her BMI is 34 67 she has underlying medical history of hypertension pre diabetes and she is a smoker  discussed supportive measures hydration vitamin-C zinc supplements adding aspirin 81 mg once daily for now for the next few weeks   I have ordered the monoclonal antibody treatment as agreed by patient    schedule that [facility] for tomorrow at 8:00 a m    will follow-up with patient in 48 hours  she is aware if any shortness of breath chest pain increased fevers or generalized weakness to go to the ER    Problem List Items Addressed This Visit        Cardiovascular and Mediastinum    Essential hypertension - Primary       Other    Prediabetes    Tobacco use    Adrenal mass, left (HCC)    Obesity (BMI 30 0-34  9)          CASIRIVIMAB-IMDEVIMAB COVID-19 EUA      Plan start: 9/28/2021    Plan provider: Myla Wilson MD  Casirivimab and imdevimab are investigational medicines used to treat mild to moderate symptoms of COVID-19 in non-hospitalized adults and adolescents (15years of age and older who weigh at least 80 pounds (40 kg)), and who are at high risk for developing severe COVID-19 symptoms or the need for hospitalization  Casirivimab and imdevimab are investigational because they are still being studied  There is limited information known about the safety and effectiveness of using casirivimab and imdevimab to treat people with COVID-19  The FDA has authorized the emergency use of casirivimab and imdevimab for the treatment of COVID-19 under an Emergency Use Authorization (EUA)  Possible side effects of casirivimab and imdevimab:     Allergic reactions can happen during and after infusion with casirivimab and imdevimab  Possible reactions include: fever, chills, nausea, headache, shortness of breath, low blood pressure, wheezing, swelling of your lips, face, or throat, rash including hives, itching, muscle aches, and dizziness  The side effects of getting any medicine by vein may include brief pain, bleeding, bruising of the skin, soreness, swelling, and possible infection at the infusion site  These are not all the possible side effects of casirivimab and imdevimab  Not a lot of people have been given casirivimab and imdevimab  Serious and unexpected side effects may happen  Casirivimab and imdevimab are still being studied so it is possible that all of the risks are not known at this time  It is possible that casirivimab and imdevimab could interfere with your body's own ability to fight off a future infection of SARS-CoV-2  Similarly, casirivimab and imdevimab may reduce your body's immune response to a vaccine for SARS-CoV-2  Specific studies have not been conducted to address these possible risks  Emergency Use Authorization:    The [facility] FDA has made casirivimab and imdevimab available under an emergency access mechanism called an EUA  The EUA is supported by a [place] of Health and Human Service (HHS) declaration that circumstances exist to justify the emergency use of drugs and biological products during the COVID-19 pandemic  Casirivimab and imdevimab have not undergone the same type of review as an FDA-approved or cleared product  The FDA may issue an EUA when certain criteria are met, which includes that there are no adequate, approved, available alternatives  In addition, the FDA decision is based on the totality of scientific evidence available showing that it is reasonable to believe that the product meets certain criteria for safety, performance, and labeling and may be effective in treatment of patients during the COVID-19 pandemic   All of these criteria must be met to allow for the product to be used in the treatment of patients during the COVID-19 pandemic  The EUA for casirivimab and imdevimab is in effect for the duration of the COVID-19 declaration justifying emergency use of these products, unless terminated or revoked (after which the products may no longer be used)  Regarding COVID-19 Vaccination:    Currently there is no data or safety or efficacy of COVID-19 vaccination in persons who received monoclonal antibodies as part of COVID-19 treatment  Treatment should be deferred for at least 90 days to avoid interference of the treatment with vaccine-induced immune responses (this is based on estimated half-life of therapies and evidence suggesting reinfection is uncommon within 90 days of initial infection)  The patient consents to proceed with casirivimab and imdevimab infusion  Verification of patient location:    Patient is located in the following state in which I hold an active license [place]    Encounter provider Ava Mckeon    Provider located at [address] [place] [address] [address] [facility] [address] [facility] [address] [phone]    Recent Visits  Date Type Provider Dept   09/22/21 Telephone Raymundo Coyle MD [facility]   09/22/21 Telephone Raymundo Coyle MD [facility]   09/22/21 Office Visit Al Bautista MD [facility] [id number] [facility] recent visits within past 7 days and meeting all other requirements  Today's Visits  Date Type Provider Dept   09/28/21 Telemedicine ADDIE Mckeon 112 today's visits and meeting all other requirements  Future Appointments  No visits were found meeting these conditions  Showing future appointments within next 150 days and meeting all other requirements     This virtual check-in was done via [employer] and patient was informed that this is a secure, HIPAA-compliant platform   She agrees to proceed  Patient agrees to participate in a virtual check in via telephone or video visit instead of presenting to the office to address urgent/immediate medical needs  Patient is aware this is a billable service  After connecting through [facility], the patient was identified by name and date of birth  Juan Alberto Chavis was informed that this was a telemedicine visit and that the exam was being conducted confidentially over secure lines  My office door was closed  No one else was in the room  Juan Alberto Chavis acknowledged consent and understanding of privacy and security of the telemedicine visit  I informed the patient that I have reviewed her record in Epic and presented the opportunity for her to ask any questions regarding the visit today  The patient agreed to participate  Subjective:   Juan Alberto Chavis is a 38 y o  female who has been screened for COVID-19  Symptom change since last report: unchanged  Patient's symptoms include chills, fatigue, nasal congestion, rhinorrhea, sore throat, anosmia, loss of taste and cough  Patient denies abdominal pain, nausea, diarrhea and headaches  Date of symptom onset: 9/26/2021  Date of exposure: 9/21/2021  Date of positive COVID-19 PCR: 9/28/2021  COVID-19 vaccination status: Not vaccinated    Karen Roman has been staying home and has isolated themselves in her home  She is taking care to not share personal items and is cleaning all surfaces that are touched often, like counters, tabletops, and doorknobs using household cleaning sprays or wipes  She is wearing a mask when she leaves her room       Lab Results   Component Value Date    SARSCOV2 Positive (A) 09/27/2021    SARSCOV2 Not Detected 01/14/2021    SARSCOV2 Negative 07/30/2020     Past Medical History:   Diagnosis Date    Abnormal Pap smear of cervix     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, 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    Adrenal mass, left ([address] ) 1/25/2021    1 8 x 2 1 cm on ct 12/21/2020    Arthritis     Asthma     Blurry vision 9/4/2020    Essential hypertension 2020    Headache 2020    Hypertension     Immunization deficiency 10/2/2020    Hep a nonimmune    Insomnia 2021    Migraine     Moderate episode of recurrent major depressive disorder ([facility] ) 2020    Obesity (BMI 30 0-34 9) 2021    Prediabetes 2020    Tobacco use 2021    Vitamin D deficiency 10/2/2020     Past Surgical History:   Procedure Laterality Date    APPENDECTOMY      BREAST BIOPSY Left 2017    BREAST BIOPSY Right     2 core biopsies    BREAST EXCISIONAL BIOPSY Left 2018    BREAST SURGERY Left 2018    Left breast mass excision    CERVICAL BIOPSY  W/ LOOP ELECTRODE EXCISION       SECTION      X2    COLPOSCOPY      HYSTERECTOMY      heavy bleeding, ovaries remain, also had abnormal pap    KNEE SURGERY      LYMPH NODE DISSECTION      under armpit    TUBAL LIGATION      US GUIDED THYROID BIOPSY  2021     Current Outpatient Medications   Medication Sig Dispense Refill    albuterol (PROVENTIL HFA,VENTOLIN HFA) 90 mcg/act inhaler Inhale 2 puffs every 6 (six) hours as needed for wheezing 1 Inhaler 0    amLODIPine (NORVASC) 10 mg tablet TAKE 1 TABLET (10 MG TOTAL) BY MOUTH DAILY BLOOD PRESUURE 90 tablet 0    buPROPion (WELLBUTRIN XL) 150 mg 24 hr tablet Take 1 tablet (150 mg total) by mouth every morning 90 tablet 1    Cholecalciferol (Vitamin D3) 125 MCG (5000 UT) CAPS Take 1 capsule (5,000 Units total) by mouth daily 90 capsule 2    Diclofenac Sodium (VOLTAREN) 1 % Apply 2 g topically 4 (four) times a day 100 g 2    nystatin (MYCOSTATIN) 500,000 units/5 mL suspension Apply 5 mL (500,000 Units total) to the mouth or throat 3 (three) times a day For dry mouth 473 mL 1    valACYclovir (VALTREX) 500 mg tablet TAKE 1 TABLET BY MOUTH EVERY DAY 30 tablet 3    nystatin (MYCOSTATIN) cream Apply topically 2 (two) times a day (Patient not taking: Reported on 2021) 30 g 0     No current facility-administered medications for this visit  No Known Allergies    Review of Systems   Constitutional: Positive for chills and fatigue  HENT: Positive for congestion, rhinorrhea and sore throat  Respiratory: Positive for cough  Gastrointestinal: Negative for abdominal pain, diarrhea and nausea  Genitourinary: Negative for difficulty urinating and flank pain  Neurological: Negative for headaches  Psychiatric/Behavioral: Negative for sleep disturbance and suicidal ideas  The patient is nervous/anxious  Objective: There were no vitals filed for this visit  Physical Exam  Constitutional:       Comments: BMI 34 67    HENT:      Head: Atraumatic  Nose: Congestion and rhinorrhea present  Pulmonary:      Effort: Pulmonary effort is normal    Neurological:      Mental Status: She is alert and oriented to person, place, and time  Psychiatric:         Mood and Affect: Mood normal          Behavior: Behavior normal        SPENT 25 minutes with patient care and plan of care monitoring and follow up needed    VIRTUAL VISIT DISCLAIMER    Malorie Romeo verbally agrees to participate in [employer]  Pt is aware that [employer] could be limited without vital signs or the ability to perform a full hands-on physical Marene Dad understands she or the provider may request at any time to terminate the video visit and request the patient to seek care or treatment in person

## 2021-09-28 NOTE — RESULT ENCOUNTER NOTE
Informed patient of positive COVID results  Explained she would be a candidate for MAB infusion  Advised f/u with PCP to get that set up   Advised return to ED for severe SOB, quarantine x 10+ days, household members need to quarantine as well

## 2021-09-28 NOTE — Clinical Note
Please add patient on the schedule for Wednesday 9/30/2021    At the same time as today for virtual follow-up post monoclonal antibodies treatment

## 2021-09-29 ENCOUNTER — TELEPHONE (OUTPATIENT)
Dept: FAMILY MEDICINE CLINIC | Facility: CLINIC | Age: 41
End: 2021-09-29

## 2021-09-29 ENCOUNTER — HOSPITAL ENCOUNTER (OUTPATIENT)
Dept: INFUSION CENTER | Facility: HOSPITAL | Age: 41
Discharge: HOME | End: 2021-09-29
Attending: FAMILY MEDICINE
Payer: COMMERCIAL

## 2021-09-29 VITALS
SYSTOLIC BLOOD PRESSURE: 109 MMHG | HEART RATE: 92 BPM | DIASTOLIC BLOOD PRESSURE: 69 MMHG | RESPIRATION RATE: 18 BRPM | OXYGEN SATURATION: 98 % | TEMPERATURE: 99.8 F

## 2021-09-29 DIAGNOSIS — E27.8 ADRENAL MASS, LEFT (HCC): ICD-10-CM

## 2021-09-29 DIAGNOSIS — I10 ESSENTIAL HYPERTENSION: Primary | ICD-10-CM

## 2021-09-29 DIAGNOSIS — Z72.0 TOBACCO USE: ICD-10-CM

## 2021-09-29 DIAGNOSIS — E66.9 OBESITY (BMI 30.0-34.9): ICD-10-CM

## 2021-09-29 DIAGNOSIS — R73.03 PREDIABETES: ICD-10-CM

## 2021-09-29 PROCEDURE — M0243 CASIRIVI AND IMDEVI INFUSION: HCPCS | Performed by: FAMILY MEDICINE

## 2021-09-29 RX ORDER — ONDANSETRON 2 MG/ML
4 INJECTION INTRAMUSCULAR; INTRAVENOUS ONCE AS NEEDED
Status: CANCELLED | OUTPATIENT
Start: 2021-09-29

## 2021-09-29 RX ORDER — ALBUTEROL SULFATE 90 UG/1
3 AEROSOL, METERED RESPIRATORY (INHALATION) ONCE AS NEEDED
Status: DISCONTINUED | OUTPATIENT
Start: 2021-09-29 | End: 2021-10-02 | Stop reason: HOSPADM

## 2021-09-29 RX ORDER — ONDANSETRON 2 MG/ML
4 INJECTION INTRAMUSCULAR; INTRAVENOUS ONCE AS NEEDED
Status: DISCONTINUED | OUTPATIENT
Start: 2021-09-29 | End: 2021-10-02 | Stop reason: HOSPADM

## 2021-09-29 RX ORDER — ACETAMINOPHEN 325 MG/1
650 TABLET ORAL ONCE AS NEEDED
Status: CANCELLED | OUTPATIENT
Start: 2021-09-29

## 2021-09-29 RX ORDER — SODIUM CHLORIDE 9 MG/ML
20 INJECTION, SOLUTION INTRAVENOUS ONCE
Status: CANCELLED | OUTPATIENT
Start: 2021-09-29

## 2021-09-29 RX ORDER — ALBUTEROL SULFATE 90 UG/1
3 AEROSOL, METERED RESPIRATORY (INHALATION) ONCE AS NEEDED
Status: CANCELLED | OUTPATIENT
Start: 2021-09-29

## 2021-09-29 RX ORDER — ACETAMINOPHEN 325 MG/1
650 TABLET ORAL ONCE AS NEEDED
Status: DISCONTINUED | OUTPATIENT
Start: 2021-09-29 | End: 2021-10-02 | Stop reason: HOSPADM

## 2021-09-29 RX ORDER — SODIUM CHLORIDE 9 MG/ML
20 INJECTION, SOLUTION INTRAVENOUS ONCE
Status: COMPLETED | OUTPATIENT
Start: 2021-09-29 | End: 2021-09-29

## 2021-09-29 RX ADMIN — IMDEVIMAB: 1332 INJECTION, SOLUTION, CONCENTRATE INTRAVENOUS at 08:31

## 2021-09-29 RX ADMIN — SODIUM CHLORIDE 20 ML/HR: 9 INJECTION, SOLUTION INTRAVENOUS at 08:14

## 2021-09-29 NOTE — TELEPHONE ENCOUNTER
Hydrate   Tylenol or motrin as needed for fevers or headaches   Discussed yesterday   Tylenol 500 mg every 6 8 hours - Ibuprofen up to 400 mg in between if needed

## 2021-10-01 ENCOUNTER — TELEMEDICINE (OUTPATIENT)
Dept: FAMILY MEDICINE CLINIC | Facility: CLINIC | Age: 41
End: 2021-10-01
Payer: COMMERCIAL

## 2021-10-01 DIAGNOSIS — J01.90 ACUTE SINUSITIS: ICD-10-CM

## 2021-10-01 DIAGNOSIS — U07.1 COVID-19: Primary | ICD-10-CM

## 2021-10-01 PROCEDURE — 99213 OFFICE O/P EST LOW 20 MIN: CPT | Performed by: NURSE PRACTITIONER

## 2021-10-01 RX ORDER — ALBUTEROL SULFATE 90 UG/1
2 AEROSOL, METERED RESPIRATORY (INHALATION) EVERY 6 HOURS PRN
Qty: 18 G | Refills: 1 | Status: SHIPPED | OUTPATIENT
Start: 2021-10-01 | End: 2021-10-31

## 2021-10-01 RX ORDER — AMOXICILLIN 875 MG/1
875 TABLET, COATED ORAL 2 TIMES DAILY
Qty: 10 TABLET | Refills: 0 | Status: SHIPPED | OUTPATIENT
Start: 2021-10-01 | End: 2021-10-06

## 2021-10-05 ENCOUNTER — TELEMEDICINE (OUTPATIENT)
Dept: FAMILY MEDICINE CLINIC | Facility: CLINIC | Age: 41
End: 2021-10-05
Payer: COMMERCIAL

## 2021-10-05 DIAGNOSIS — Z86.16 PERSONAL HISTORY OF COVID-19: Primary | ICD-10-CM

## 2021-10-05 PROCEDURE — G2012 BRIEF CHECK IN BY MD/QHP: HCPCS | Performed by: NURSE PRACTITIONER

## 2021-10-06 ENCOUNTER — TELEPHONE (OUTPATIENT)
Dept: FAMILY MEDICINE CLINIC | Facility: CLINIC | Age: 41
End: 2021-10-06

## 2021-10-21 DIAGNOSIS — I83.813 VARICOSE VEINS OF BOTH LOWER EXTREMITIES WITH PAIN: Primary | ICD-10-CM

## 2021-10-27 ENCOUNTER — CONSULT (OUTPATIENT)
Dept: VASCULAR SURGERY | Facility: CLINIC | Age: 41
End: 2021-10-27
Payer: COMMERCIAL

## 2021-10-27 VITALS
TEMPERATURE: 98.6 F | HEIGHT: 64 IN | SYSTOLIC BLOOD PRESSURE: 126 MMHG | HEART RATE: 101 BPM | WEIGHT: 200 LBS | RESPIRATION RATE: 18 BRPM | BODY MASS INDEX: 34.15 KG/M2 | DIASTOLIC BLOOD PRESSURE: 84 MMHG

## 2021-10-27 DIAGNOSIS — I83.813 VARICOSE VEINS OF BOTH LOWER EXTREMITIES WITH PAIN: Primary | ICD-10-CM

## 2021-10-27 PROCEDURE — 99243 OFF/OP CNSLTJ NEW/EST LOW 30: CPT | Performed by: NURSE PRACTITIONER

## 2021-11-09 DIAGNOSIS — I10 ESSENTIAL HYPERTENSION: ICD-10-CM

## 2021-11-09 RX ORDER — AMLODIPINE BESYLATE 10 MG/1
10 TABLET ORAL DAILY
Qty: 90 TABLET | Refills: 0 | Status: SHIPPED | OUTPATIENT
Start: 2021-11-09 | End: 2022-01-19 | Stop reason: SDUPTHER

## 2021-12-01 DIAGNOSIS — E55.9 VITAMIN D DEFICIENCY: ICD-10-CM

## 2021-12-02 RX ORDER — CHOLECALCIFEROL (VITAMIN D3) 125 MCG
CAPSULE ORAL
Qty: 30 CAPSULE | Refills: 8 | Status: SHIPPED | OUTPATIENT
Start: 2021-12-02 | End: 2022-05-20 | Stop reason: SDUPTHER

## 2021-12-30 ENCOUNTER — APPOINTMENT (OUTPATIENT)
Dept: LAB | Facility: HOSPITAL | Age: 41
End: 2021-12-30
Attending: FAMILY MEDICINE
Payer: COMMERCIAL

## 2021-12-30 DIAGNOSIS — R73.03 PREDIABETES: ICD-10-CM

## 2021-12-30 DIAGNOSIS — M25.461 BILATERAL KNEE EFFUSIONS: ICD-10-CM

## 2021-12-30 DIAGNOSIS — Z72.0 TOBACCO USE: ICD-10-CM

## 2021-12-30 DIAGNOSIS — Z28.39 IMMUNIZATION DEFICIENCY: ICD-10-CM

## 2021-12-30 DIAGNOSIS — M25.462 BILATERAL KNEE EFFUSIONS: ICD-10-CM

## 2021-12-30 DIAGNOSIS — E53.8 CYANOCOBALAMIN DEFICIENCY: ICD-10-CM

## 2021-12-30 DIAGNOSIS — E55.9 VITAMIN D DEFICIENCY: ICD-10-CM

## 2021-12-30 DIAGNOSIS — I10 ESSENTIAL HYPERTENSION: ICD-10-CM

## 2021-12-30 LAB
25(OH)D3 SERPL-MCNC: 19.2 NG/ML (ref 30–100)
ALBUMIN SERPL BCP-MCNC: 4.2 G/DL (ref 3.4–4.8)
ALP SERPL-CCNC: 83.3 U/L (ref 35–140)
ALT SERPL W P-5'-P-CCNC: 15 U/L (ref 5–54)
ANION GAP SERPL CALCULATED.3IONS-SCNC: 7 MMOL/L (ref 4–13)
AST SERPL W P-5'-P-CCNC: 15 U/L (ref 15–41)
BASOPHILS # BLD MANUAL: 0.09 THOUSAND/UL (ref 0–0.1)
BASOPHILS NFR MAR MANUAL: 1 % (ref 0–1)
BILIRUB SERPL-MCNC: 0.37 MG/DL (ref 0.3–1.2)
BUN SERPL-MCNC: 12 MG/DL (ref 6–20)
CALCIUM SERPL-MCNC: 9.3 MG/DL (ref 8.4–10.2)
CHLORIDE SERPL-SCNC: 104 MMOL/L (ref 96–108)
CHOLEST SERPL-MCNC: 156 MG/DL
CO2 SERPL-SCNC: 27 MMOL/L (ref 22–33)
CREAT SERPL-MCNC: 0.78 MG/DL (ref 0.4–1.1)
EOSINOPHIL # BLD MANUAL: 0.19 THOUSAND/UL (ref 0–0.4)
EOSINOPHIL NFR BLD MANUAL: 2 % (ref 0–6)
ERYTHROCYTE [DISTWIDTH] IN BLOOD BY AUTOMATED COUNT: 14.8 % (ref 11.6–15.1)
EST. AVERAGE GLUCOSE BLD GHB EST-MCNC: 123 MG/DL
GFR SERPL CREATININE-BSD FRML MDRD: 94 ML/MIN/1.73SQ M
GLUCOSE P FAST SERPL-MCNC: 102 MG/DL (ref 70–105)
HBA1C MFR BLD: 5.9 %
HCT VFR BLD AUTO: 41.8 % (ref 34.8–46.1)
HDLC SERPL-MCNC: 60 MG/DL
HGB BLD-MCNC: 13.9 G/DL (ref 11.5–15.4)
LDLC SERPL CALC-MCNC: 66 MG/DL (ref 0–100)
LYMPHOCYTES # BLD AUTO: 4.93 THOUSAND/UL (ref 0.6–4.47)
LYMPHOCYTES # BLD AUTO: 52 % (ref 14–44)
MCH RBC QN AUTO: 29 PG (ref 26.8–34.3)
MCHC RBC AUTO-ENTMCNC: 33.3 G/DL (ref 31.4–37.4)
MCV RBC AUTO: 87 FL (ref 82–98)
MONOCYTES # BLD AUTO: 0.57 THOUSAND/UL (ref 0–1.22)
MONOCYTES NFR BLD: 6 % (ref 4–12)
NEUTROPHILS # BLD MANUAL: 3.51 THOUSAND/UL (ref 1.85–7.62)
NEUTS SEG NFR BLD AUTO: 37 % (ref 43–75)
PLATELET # BLD AUTO: 317 THOUSANDS/UL (ref 149–390)
PLATELET BLD QL SMEAR: ADEQUATE
PMV BLD AUTO: 8.6 FL (ref 8.9–12.7)
POTASSIUM SERPL-SCNC: 4 MMOL/L (ref 3.5–5)
PROT SERPL-MCNC: 7.6 G/DL (ref 6.4–8.3)
RBC # BLD AUTO: 4.8 MILLION/UL (ref 3.81–5.12)
RBC MORPH BLD: NORMAL
RUBV IGG SERPL IA-ACNC: >175 IU/ML
SODIUM SERPL-SCNC: 138 MMOL/L (ref 133–145)
TRIGL SERPL-MCNC: 149.6 MG/DL
TSH SERPL DL<=0.05 MIU/L-ACNC: 1.77 UIU/ML (ref 0.34–5.6)
URATE SERPL-MCNC: 4.5 MG/DL (ref 2.6–8)
VARIANT LYMPHS # BLD AUTO: 2 %
VIT B12 SERPL-MCNC: 550 PG/ML (ref 100–900)
WBC # BLD AUTO: 9.48 THOUSAND/UL (ref 4.31–10.16)

## 2021-12-30 PROCEDURE — 84550 ASSAY OF BLOOD/URIC ACID: CPT

## 2021-12-30 PROCEDURE — 36415 COLL VENOUS BLD VENIPUNCTURE: CPT

## 2021-12-30 PROCEDURE — 83036 HEMOGLOBIN GLYCOSYLATED A1C: CPT

## 2021-12-30 PROCEDURE — 86038 ANTINUCLEAR ANTIBODIES: CPT

## 2021-12-30 PROCEDURE — 82306 VITAMIN D 25 HYDROXY: CPT

## 2021-12-30 PROCEDURE — 86735 MUMPS ANTIBODY: CPT

## 2021-12-30 PROCEDURE — 86708 HEPATITIS A ANTIBODY: CPT

## 2021-12-30 PROCEDURE — 86762 RUBELLA ANTIBODY: CPT

## 2021-12-30 PROCEDURE — 86706 HEP B SURFACE ANTIBODY: CPT

## 2021-12-30 PROCEDURE — 85027 COMPLETE CBC AUTOMATED: CPT

## 2021-12-30 PROCEDURE — 84443 ASSAY THYROID STIM HORMONE: CPT

## 2021-12-30 PROCEDURE — 80061 LIPID PANEL: CPT

## 2021-12-30 PROCEDURE — 82607 VITAMIN B-12: CPT

## 2021-12-30 PROCEDURE — 86430 RHEUMATOID FACTOR TEST QUAL: CPT

## 2021-12-30 PROCEDURE — 80053 COMPREHEN METABOLIC PANEL: CPT

## 2021-12-30 PROCEDURE — 85007 BL SMEAR W/DIFF WBC COUNT: CPT

## 2021-12-30 PROCEDURE — 86765 RUBEOLA ANTIBODY: CPT

## 2021-12-31 LAB
HAV AB SER QL IA: REACTIVE
HBV SURFACE AB SER-ACNC: >1000 MIU/ML
RHEUMATOID FACT SER QL LA: NEGATIVE
RYE IGE QN: NEGATIVE

## 2022-01-03 LAB
MEV IGG SER QL: NORMAL
MUV IGG SER QL: NORMAL

## 2022-01-19 ENCOUNTER — OFFICE VISIT (OUTPATIENT)
Dept: FAMILY MEDICINE CLINIC | Facility: CLINIC | Age: 42
End: 2022-01-19
Payer: COMMERCIAL

## 2022-01-19 ENCOUNTER — TELEPHONE (OUTPATIENT)
Dept: FAMILY MEDICINE CLINIC | Facility: CLINIC | Age: 42
End: 2022-01-19

## 2022-01-19 VITALS
TEMPERATURE: 97.9 F | OXYGEN SATURATION: 98 % | HEART RATE: 95 BPM | BODY MASS INDEX: 34.15 KG/M2 | HEIGHT: 64 IN | DIASTOLIC BLOOD PRESSURE: 98 MMHG | SYSTOLIC BLOOD PRESSURE: 138 MMHG | WEIGHT: 200 LBS

## 2022-01-19 DIAGNOSIS — R53.83 FATIGUE, UNSPECIFIED TYPE: ICD-10-CM

## 2022-01-19 DIAGNOSIS — R06.83 SNORING: ICD-10-CM

## 2022-01-19 DIAGNOSIS — F33.1 MODERATE EPISODE OF RECURRENT MAJOR DEPRESSIVE DISORDER (HCC): ICD-10-CM

## 2022-01-19 DIAGNOSIS — R73.03 PREDIABETES: ICD-10-CM

## 2022-01-19 DIAGNOSIS — E55.9 VITAMIN D DEFICIENCY: ICD-10-CM

## 2022-01-19 DIAGNOSIS — Z72.0 TOBACCO USE: ICD-10-CM

## 2022-01-19 DIAGNOSIS — U07.1 COVID-19: ICD-10-CM

## 2022-01-19 DIAGNOSIS — I10 ESSENTIAL HYPERTENSION: ICD-10-CM

## 2022-01-19 DIAGNOSIS — E53.8 CYANOCOBALAMIN DEFICIENCY: Primary | ICD-10-CM

## 2022-01-19 PROCEDURE — 99214 OFFICE O/P EST MOD 30 MIN: CPT | Performed by: FAMILY MEDICINE

## 2022-01-19 RX ORDER — AMLODIPINE BESYLATE 10 MG/1
10 TABLET ORAL DAILY
Qty: 90 TABLET | Refills: 0 | Status: SHIPPED | OUTPATIENT
Start: 2022-01-19 | End: 2022-04-18

## 2022-01-19 RX ORDER — ALBUTEROL SULFATE 90 UG/1
AEROSOL, METERED RESPIRATORY (INHALATION)
COMMUNITY
Start: 2021-12-01 | End: 2022-04-25

## 2022-01-19 RX ORDER — BUPROPION HYDROCHLORIDE 150 MG/1
150 TABLET ORAL EVERY MORNING
Qty: 90 TABLET | Refills: 1 | Status: SHIPPED | OUTPATIENT
Start: 2022-01-19

## 2022-01-19 NOTE — LETTER
January 19, 2022     Patient: Horace Oakes   YOB: 1980   Date of Visit: 1/19/2022       To Whom it May Concern:    Horace Oakes is under my professional care  She was seen in my office on 1/19/2022  If you have any questions or concerns, please don't hesitate to call           Sincerely,          Ml Harrell MD

## 2022-01-19 NOTE — PROGRESS NOTES
Assessment/Plan:  Blood pressure repeat is 120/82 continue amlodipine 10 mg daily  Advised to follow-up with sleep medicine for snoring and rule out sleep apnea  Continue Wellbutrin for now  Advised to COVID vaccine  Review blood test results with patient detail A1c is 5 9 much improved from before  LDL 66 vitamin-D is low 19 B12 is low at 500  Advised vitamin-D B12 supplementation  Monitor her sugar every 6 months  Follow-up in 3 months blood pressure  Advised to check with insurance for HPV vaccine coverage  I have spent 30 minutes with Patient  today in which greater than 50% of this time was spent in counseling/coordination of care regarding Diagnostic results, Prognosis, Risks and benefits of tx options and Intructions for management  Problem List Items Addressed This Visit        Cardiovascular and Mediastinum    Essential hypertension    Relevant Medications    amLODIPine (NORVASC) 10 mg tablet       Other    Moderate episode of recurrent major depressive disorder (HCC)    Relevant Medications    buPROPion (WELLBUTRIN XL) 150 mg 24 hr tablet    Prediabetes    Vitamin D deficiency    Tobacco use    Relevant Medications    buPROPion (WELLBUTRIN XL) 150 mg 24 hr tablet    COVID-19      Other Visit Diagnoses     Cyanocobalamin deficiency    -  Primary    Relevant Medications    cyanocobalamin (VITAMIN B-12) 2000 MCG tablet    Fatigue, unspecified type        Snoring                Subjective:      Patient ID: Cayla Oates is a 42 y o  female  69-year-old female follow-up essential hypertension she is on amlodipine 10 mg daily  She continues to smoke for mood and smoking cessation she is on Wellbutrin 150 mg daily  She takes medication every day  Stress trigger her to smoke again  She is in a relationship with her boyfriend for 2 years  She had hysterectomy in the past   She had COVID infection September 27th she did not get vaccinated    She had antibody infusion and then she felt better  She has chronic fatigue and trouble with sleeping her boyfriend noticed she is snoring a lot at night  She has a referral to Sleep Medicine but never went  The following portions of the patient's history were reviewed and updated as appropriate:   Past Medical History:  She has a past medical history of Abnormal Pap smear of cervix, Adrenal mass, left ([facility] ) (2021), Arthritis, Asthma, Blurry vision (2020), Essential hypertension (2020), Headache (2020), Hypertension, Immunization deficiency (10/2/2020), Insomnia (2021), Migraine, Moderate episode of recurrent major depressive disorder ([facility] ) (2020), Obesity (BMI 30 0-34 9) (2021), Prediabetes (2020), Tobacco use (2021), and Vitamin D deficiency (10/2/2020)  ,  _______________________________________________________________________  Medical Problems:  does not have any pertinent problems on file ,  _______________________________________________________________________  Past Surgical History:   has a past surgical history that includes Tubal ligation (); Appendectomy;  section; Colposcopy; Knee surgery; Cervical biopsy w/ loop electrode excision; Lymph node dissection (); Breast surgery (Left, 2018); Breast biopsy (Left, 2017); Breast excisional biopsy (Left, 2018); Breast biopsy (Right); Hysterectomy (); and US guided thyroid biopsy (2021)  ,  _______________________________________________________________________  Family History:  family history includes Colon cancer (age of onset: 28) in her sister; Diabetes in her maternal grandmother, mother, paternal aunt, and paternal grandmother; Endometrial cancer (age of onset: 29) in her sister; Heart attack in her mother; Heart disease in her mother; Leukemia (age of onset: 25) in her maternal uncle; Lupus (age of onset: 50) in her maternal aunt;  No Known Problems in her daughter, father, son, and son; Seizures in her maternal aunt ,  _______________________________________________________________________  Social History:   reports that she has been smoking cigarettes  She has a 10 00 pack-year smoking history  She has never used smokeless tobacco  She reports current alcohol use  She reports that she does not use drugs  ,  _______________________________________________________________________  Allergies:  has No Known Allergies     _______________________________________________________________________  Current Outpatient Medications   Medication Sig Dispense Refill    albuterol (PROVENTIL HFA,VENTOLIN HFA) 90 mcg/act inhaler       amLODIPine (NORVASC) 10 mg tablet Take 1 tablet (10 mg total) by mouth daily Blood presuure 90 tablet 0    buPROPion (WELLBUTRIN XL) 150 mg 24 hr tablet Take 1 tablet (150 mg total) by mouth every morning 90 tablet 1    [employer] D3 125 MCG (5000 UT) capsule TAKE 1 CAPSULE (5,000 UNITS TOTAL) BY MOUTH DAILY 30 capsule 8    Diclofenac Sodium (VOLTAREN) 1 % Apply 2 g topically 4 (four) times a day 100 g 2    valACYclovir (VALTREX) 500 mg tablet TAKE 1 TABLET BY MOUTH EVERY DAY 30 tablet 3    cyanocobalamin (VITAMIN B-12) 2000 MCG tablet Take 1 tablet (2,000 mcg total) by mouth daily 90 tablet 2     No current facility-administered medications for this visit      _______________________________________________________________________  Review of Systems   Constitutional: Negative for activity change, appetite change, fatigue and unexpected weight change  HENT: Negative for ear pain, sore throat, trouble swallowing and voice change  Eyes: Negative for photophobia and visual disturbance  Respiratory: Negative for cough, chest tightness, shortness of breath and wheezing  Cardiovascular: Negative for chest pain, palpitations and leg swelling  Gastrointestinal: Negative for abdominal pain, constipation, diarrhea, nausea, rectal pain and vomiting     Endocrine: Negative for cold intolerance, polydipsia and polyuria  Genitourinary: Negative for difficulty urinating, dysuria, flank pain, menstrual problem and pelvic pain  Musculoskeletal: Negative for arthralgias, joint swelling and myalgias  Skin: Negative for color change and rash  Allergic/Immunologic: Negative for environmental allergies and immunocompromised state  Neurological: Negative for dizziness, weakness, numbness and headaches  Hematological: Negative for adenopathy  Does not bruise/bleed easily  Psychiatric/Behavioral: Positive for sleep disturbance  Negative for decreased concentration, dysphoric mood, self-injury and suicidal ideas  The patient is not nervous/anxious  Objective:  Vitals:    01/19/22 1558   BP: 138/98   BP Location: Left arm   Patient Position: Sitting   Cuff Size: Standard   Pulse: 95   Temp: 97 9 °F (36 6 °C)   TempSrc: Tympanic   SpO2: 98%   Weight: 90 7 kg (200 lb)   Height: 5' 4" (1 626 m)     Body mass index is 34 33 kg/m²  Physical Exam  Vitals and nursing note reviewed  Constitutional:       Appearance: Normal appearance  She is well-developed  She is obese  HENT:      Head: Normocephalic and atraumatic  Right Ear: Tympanic membrane, ear canal and external ear normal       Left Ear: Tympanic membrane, ear canal and external ear normal       Nose: Nose normal       Mouth/Throat:      Pharynx: No oropharyngeal exudate  Eyes:      Pupils: Pupils are equal, round, and reactive to light  Neck:      Thyroid: No thyromegaly  Cardiovascular:      Rate and Rhythm: Normal rate and regular rhythm  Pulses: Normal pulses  Heart sounds: Normal heart sounds  No murmur heard  Pulmonary:      Effort: Pulmonary effort is normal  No respiratory distress  Breath sounds: Normal breath sounds  No wheezing or rales  Abdominal:      General: Bowel sounds are normal  There is no distension  Palpations: Abdomen is soft  Tenderness: There is no abdominal tenderness   There is no guarding  Genitourinary:     Vagina: Normal    Musculoskeletal:         General: No tenderness  Normal range of motion  Cervical back: Normal range of motion and neck supple  Skin:     General: Skin is warm and dry  Capillary Refill: Capillary refill takes less than 2 seconds  Findings: No rash  Neurological:      General: No focal deficit present  Mental Status: She is alert and oriented to person, place, and time  Mental status is at baseline  Psychiatric:         Mood and Affect: Mood normal          Behavior: Behavior normal          Thought Content:  Thought content normal          Judgment: Judgment normal

## 2022-02-16 ENCOUNTER — HOSPITAL ENCOUNTER (EMERGENCY)
Facility: HOSPITAL | Age: 42
Discharge: HOME | End: 2022-02-16
Attending: EMERGENCY MEDICINE | Admitting: EMERGENCY MEDICINE
Payer: COMMERCIAL

## 2022-02-16 VITALS
TEMPERATURE: 98 F | HEIGHT: 64 IN | DIASTOLIC BLOOD PRESSURE: 87 MMHG | HEART RATE: 105 BPM | BODY MASS INDEX: 33.29 KG/M2 | RESPIRATION RATE: 18 BRPM | WEIGHT: 195 LBS | OXYGEN SATURATION: 100 % | SYSTOLIC BLOOD PRESSURE: 133 MMHG

## 2022-02-16 DIAGNOSIS — N76.4 ABSCESS OF RIGHT GENITAL LABIA: Primary | ICD-10-CM

## 2022-02-16 PROCEDURE — 99282 EMERGENCY DEPT VISIT SF MDM: CPT

## 2022-02-16 PROCEDURE — 99284 EMERGENCY DEPT VISIT MOD MDM: CPT | Performed by: PHYSICIAN ASSISTANT

## 2022-02-16 PROCEDURE — 56405 I&D VULVA/PERINEAL ABSCESS: CPT | Performed by: PHYSICIAN ASSISTANT

## 2022-02-16 RX ORDER — CEPHALEXIN 500 MG/1
500 CAPSULE ORAL 3 TIMES DAILY
Qty: 21 CAPSULE | Refills: 0 | Status: SHIPPED | OUTPATIENT
Start: 2022-02-16 | End: 2022-02-23

## 2022-02-16 RX ORDER — IBUPROFEN 600 MG/1
600 TABLET ORAL EVERY 6 HOURS PRN
Qty: 20 TABLET | Refills: 0 | Status: SHIPPED | OUTPATIENT
Start: 2022-02-16 | End: 2022-03-04

## 2022-02-16 RX ORDER — LIDOCAINE HYDROCHLORIDE AND EPINEPHRINE 10; 10 MG/ML; UG/ML
10 INJECTION, SOLUTION INFILTRATION; PERINEURAL ONCE
Status: COMPLETED | OUTPATIENT
Start: 2022-02-16 | End: 2022-02-16

## 2022-02-16 RX ORDER — IBUPROFEN 600 MG/1
600 TABLET ORAL ONCE
Status: COMPLETED | OUTPATIENT
Start: 2022-02-16 | End: 2022-02-16

## 2022-02-16 RX ORDER — OXYCODONE HYDROCHLORIDE AND ACETAMINOPHEN 5; 325 MG/1; MG/1
1 TABLET ORAL EVERY 6 HOURS PRN
Qty: 10 TABLET | Refills: 0 | Status: SHIPPED | OUTPATIENT
Start: 2022-02-16 | End: 2022-02-19

## 2022-02-16 RX ADMIN — IBUPROFEN 600 MG: 600 TABLET ORAL at 10:11

## 2022-02-16 RX ADMIN — LIDOCAINE HYDROCHLORIDE,EPINEPHRINE BITARTRATE 10 ML: 10; .01 INJECTION, SOLUTION INFILTRATION; PERINEURAL at 10:12

## 2022-02-16 NOTE — ED PROVIDER NOTES
History  Chief Complaint   Patient presents with    Abscess     right groin abcess x 2 days     Pt with Past Medical History: Adrenal mass, left, Arthritis, Asthma, HTN, Insomnia, Migraine, major depressive disorder  Past Surgical History: APPENDECTOMY, Left BREAST EXCISIONAL BIOPSY, Left BREAST SURGERY, CERVICAL BIOPSY  W/ LOOP EXCISION,  SECTION X2, HYSTERECTOMY- KNEE SURGERY, TUBAL LIGATION  Presents to ED c/o few day history of gradual onset of worsening, right labial abscess; that pt states did drain alittle, then "filled back up again"  PT has been waxed in past, prior h/o abscesses  NO fever, no NV, no vag bleeding/dc, no other complaints  Prior to Admission Medications   Prescriptions Last Dose Informant Patient Reported? Taking?    [facility] D3 125 MCG (5000 UT) capsule 2022 at Unknown time  No Yes   Sig: TAKE 1 CAPSULE (5,000 UNITS TOTAL) BY MOUTH DAILY   Diclofenac Sodium (VOLTAREN) 1 % Past Week at Unknown time Self No Yes   Sig: Apply 2 g topically 4 (four) times a day   albuterol (PROVENTIL HFA,VENTOLIN HFA) 90 mcg/act inhaler 2022 at Unknown time  Yes Yes   amLODIPine (NORVASC) 10 mg tablet 2022 at Unknown time  No Yes   Sig: Take 1 tablet (10 mg total) by mouth daily Blood presuure   buPROPion (WELLBUTRIN XL) 150 mg 24 hr tablet 2022 at Unknown time  No Yes   Sig: Take 1 tablet (150 mg total) by mouth every morning   cyanocobalamin (VITAMIN B-12) 2000 MCG tablet 2022 at Unknown time  No Yes   Sig: Take 1 tablet (2,000 mcg total) by mouth daily   valACYclovir (VALTREX) 500 mg tablet 2022 at Unknown time Self No Yes   Sig: TAKE 1 TABLET BY MOUTH EVERY DAY      Facility-Administered Medications: None       Past Medical History:   Diagnosis Date    Abnormal Pap smear of cervix     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, 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    Adrenal mass, left ([address] ) 2021    1 8 x 2 1 cm on ct 2020    Arthritis     Asthma     Blurry vision 2020    Essential hypertension 2020    Headache 2020    Hypertension     Immunization deficiency 10/2/2020    Hep a nonimmune    Insomnia 2021    Migraine     Moderate episode of recurrent major depressive disorder ([address] ) 2020    Obesity (BMI 30 0-34 9) 2021    Prediabetes 2020    Tobacco use 2021    Vitamin D deficiency 10/2/2020       Past Surgical History:   Procedure Laterality Date    APPENDECTOMY      BREAST BIOPSY Left 2017    BREAST BIOPSY Right     2 core biopsies    BREAST EXCISIONAL BIOPSY Left 2018    BREAST SURGERY Left 2018    Left breast mass excision    CERVICAL BIOPSY  W/ LOOP ELECTRODE EXCISION       SECTION      X2    COLPOSCOPY      HYSTERECTOMY  2012    heavy bleeding, ovaries remain, also had abnormal pap    KNEE SURGERY      LYMPH NODE DISSECTION      under armpit    TUBAL LIGATION      US GUIDED THYROID BIOPSY  2021       Family History   Problem Relation Age of Onset    Lupus Maternal Aunt 50    Seizures Maternal Aunt     Leukemia Maternal Uncle 25    Diabetes Mother     Heart attack Mother     Heart disease Mother         Internal Defibrilation    Diabetes Maternal Grandmother     Diabetes Paternal Grandmother     Diabetes Paternal Aunt     No Known Problems Father     Endometrial cancer Sister 29    Colon cancer Sister 28    No Known Problems Daughter     No Known Problems Son     No Known Problems Son      I have reviewed and agree with the history as documented      E-Cigarette/Vaping    E-Cigarette Use Never User      E-Cigarette/Vaping Substances    Nicotine No     THC No     CBD No     Flavoring No     Other No     Unknown No      Social History     Tobacco Use    Smoking status: Current Every Day Smoker     Packs/day: 0 50     Years: 20 00     Pack years: 10 00     Types: Cigarettes    Smokeless tobacco: Never Used    Tobacco comment: pt is down to  5 packs a day   Vaping Use    Vaping Use: Never used   Substance Use Topics    Alcohol use: Yes     Comment: occasional    Drug use: No       Review of Systems   Constitutional: Negative for chills and fever  HENT: Negative for hearing loss and sore throat  Respiratory: Negative for cough  Cardiovascular: Negative for chest pain  Gastrointestinal: Negative for abdominal pain and vomiting  Genitourinary: Negative for dysuria and frequency  Musculoskeletal: Negative for arthralgias and myalgias  Skin: Negative for pallor  Neurological: Negative for weakness  Psychiatric/Behavioral: Negative for behavioral problems  All other systems reviewed and are negative  Physical Exam  Physical Exam  Vitals and nursing note reviewed  Constitutional:       General: She is in acute distress (mild)  Appearance: She is well-developed  She is obese  HENT:      Head: Normocephalic and atraumatic  Right Ear: External ear normal       Left Ear: External ear normal       Nose: Nose normal       Mouth/Throat:      Mouth: Mucous membranes are moist       Pharynx: Oropharynx is clear  Eyes:      Conjunctiva/sclera: Conjunctivae normal    Cardiovascular:      Rate and Rhythm: Normal rate and regular rhythm  Pulmonary:      Effort: Pulmonary effort is normal  No respiratory distress  Genitourinary:         Comments: + tender, raised, fluctuant lesion/abscess noted to right upper labia majora, no dc,   Musculoskeletal:         General: Normal range of motion  Cervical back: Normal range of motion  Lymphadenopathy:      Cervical: No cervical adenopathy  Lower Body: No right inguinal adenopathy  No left inguinal adenopathy  Skin:     General: Skin is warm and dry  Findings: Lesion present  No erythema  Neurological:      Mental Status: She is alert     Psychiatric:         Behavior: Behavior normal          Vital Signs  ED Triage Vitals [02/16/22 0957]   Temperature Pulse Respirations Blood Pressure SpO2   98 °F (36 7 °C) 105 18 133/87 100 %      Temp Source Heart Rate Source Patient Position - Orthostatic VS BP Location FiO2 (%)   Oral -- -- -- --      Pain Score       8           Vitals:    02/16/22 0957   BP: 133/87   Pulse: 105         Visual Acuity      ED Medications  Medications   lidocaine-epinephrine (XYLOCAINE/EPINEPHRINE) 1 %-1:100,000 injection 10 mL (10 mL Infiltration Given 2/16/22 1012)   ibuprofen (MOTRIN) tablet 600 mg (600 mg Oral Given 2/16/22 1011)       Diagnostic Studies  Results Reviewed     None                 No orders to display              Procedures  Incision and drain    Date/Time: 2/16/2022 10:54 AM  Performed by: Aman Duque PA-C  Authorized by: Aman Duque PA-C   Universal Protocol:  Procedure performed by: (PA student, under my direct supervision)  Consent: Verbal consent obtained  Risks and benefits: risks, benefits and alternatives were discussed  Consent given by: patient  Time out: Immediately prior to procedure a "time out" was called to verify the correct patient, procedure, equipment, support staff and site/side marked as required  Patient understanding: patient states understanding of the procedure being performed  Patient identity confirmed: verbally with patient      Patient location:  ED  Location:     Type:  Abscess    Size:  2    Location:  Anogenital    Anogenital location:  Vulva  Pre-procedure details:     Skin preparation:  Betadine  Anesthesia (see MAR for exact dosages): Anesthesia method:  Local infiltration    Local anesthetic:  Lidocaine 1% WITH epi  Procedure details:     Complexity:  Simple    Incision types:  Single straight    Scalpel blade:  11    Approach:  Open    Incision depth:  Subcutaneous    Wound management:  Probed and deloculated    Drainage:  Purulent and bloody    Drainage amount: mild  Wound treatment:  Packing placed    Packing materials:  1/4 in iodoform gauze  Post-procedure details:     Patient tolerance of procedure:   Tolerated well, no immediate complications             ED Course                                             MDM    Disposition  Final diagnoses:   Abscess of right genital labia     Time reflects when diagnosis was documented in both MDM as applicable and the Disposition within this note     Time User Action Codes Description Comment    2/16/2022 10:12 AM Jarret Rowley Add [N76 4] Abscess of right genital labia       ED Disposition     ED Disposition Condition Date/Time Comment    Discharge Stable Wed Feb 16, 2022 10:51 AM Tamanna Rogers discharge to home/self care              Follow-up Information     Follow up With Specialties Details Why Contact Info Additional Information    Jeremie Thurston MD Family Medicine   [address] [facility] [address]       [address] Emergency Department Emergency Medicine  in 24-48 hours for packing removal [address] [facility],[phone]  [facility] Emergency Department, [address], [address] [facility] [address]          Discharge Medication List as of 2/16/2022 10:53 AM      START taking these medications    Details   cephalexin (KEFLEX) 500 mg capsule Take 1 capsule (500 mg total) by mouth 3 (three) times a day for 7 days, Starting Wed 2/16/2022, Until Wed 2/23/2022, Normal      ibuprofen (MOTRIN) 600 mg tablet Take 1 tablet (600 mg total) by mouth every 6 (six) hours as needed for mild pain, Starting Wed 2/16/2022, Normal      oxyCODONE-acetaminophen (PERCOCET) 5-325 mg per tablet Take 1 tablet by mouth every 6 (six) hours as needed for moderate pain for up to 3 days Max Daily Amount: 4 tablets, Starting Wed 2/16/2022, Until Sat 2/19/2022 at 2359, Normal         CONTINUE these medications which have NOT CHANGED    Details   albuterol (PROVENTIL HFA,VENTOLIN HFA) 90 mcg/act inhaler Starting Wed 12/1/2021, Historical Med      amLODIPine (NORVASC) 10 mg tablet Take 1 tablet (10 mg total) by mouth daily Blood presuure, Starting Wed 1/19/2022, Until Tue 4/19/2022, Normal      buPROPion (WELLBUTRIN XL) 150 mg 24 hr tablet Take 1 tablet (150 mg total) by mouth every morning, Starting Wed 1/19/2022, Normal      CVS D3 125 MCG (5000 UT) capsule TAKE 1 CAPSULE (5,000 UNITS TOTAL) BY MOUTH DAILY, Normal      cyanocobalamin (VITAMIN B-12) 2000 MCG tablet Take 1 tablet (2,000 mcg total) by mouth daily, Starting Wed 1/19/2022, Until Tue 4/19/2022, Normal      Diclofenac Sodium (VOLTAREN) 1 % Apply 2 g topically 4 (four) times a day, Starting Wed 3/24/2021, Normal      valACYclovir (VALTREX) 500 mg tablet TAKE 1 TABLET BY MOUTH EVERY DAY, Normal             No discharge procedures on file      PDMP Review       Value Time User    PDMP Reviewed  Yes 12/21/2020  5:26 AM Maximo Elaine MD          ED Provider  Electronically Signed by           Heather Carter PA-C  02/16/22 2861

## 2022-02-16 NOTE — DISCHARGE INSTRUCTIONS
Use Tylenol every 4 hours or Motrin every 6 hours; you can alternate the 2 medications taking something every 3 hours for pain or fever  Take all oral antibiotics until done  Follow-up with your doctor or ED in 24-48 hours for packing removal, recheck

## 2022-02-16 NOTE — Clinical Note
[facility] was seen and treated in our emergency department on 2/16/2022  Diagnosis:     Ondina Morales  may return to work on return date  She may return on this date: 02/18/2022         If you have any questions or concerns, please don't hesitate to call        Alondra Morley PA-C    ______________________________           _______________          _______________  Hospital Representative                              Date                                Time

## 2022-03-04 ENCOUNTER — HOSPITAL ENCOUNTER (EMERGENCY)
Facility: HOSPITAL | Age: 42
Discharge: HOME | End: 2022-03-04
Attending: EMERGENCY MEDICINE
Payer: COMMERCIAL

## 2022-03-04 ENCOUNTER — APPOINTMENT (EMERGENCY)
Dept: CT IMAGING | Facility: HOSPITAL | Age: 42
End: 2022-03-04
Payer: COMMERCIAL

## 2022-03-04 VITALS
SYSTOLIC BLOOD PRESSURE: 119 MMHG | RESPIRATION RATE: 16 BRPM | BODY MASS INDEX: 33.5 KG/M2 | DIASTOLIC BLOOD PRESSURE: 78 MMHG | HEIGHT: 64 IN | TEMPERATURE: 98.8 F | HEART RATE: 76 BPM | WEIGHT: 196.21 LBS | OXYGEN SATURATION: 100 %

## 2022-03-04 DIAGNOSIS — E04.1 THYROID NODULE: ICD-10-CM

## 2022-03-04 DIAGNOSIS — M54.9 BACK PAIN: Primary | ICD-10-CM

## 2022-03-04 DIAGNOSIS — I44.0 PROLONGED P-R INTERVAL: ICD-10-CM

## 2022-03-04 LAB
ANION GAP SERPL CALCULATED.3IONS-SCNC: 7 MMOL/L (ref 4–13)
BASOPHILS # BLD AUTO: 0.07 THOUSANDS/ΜL (ref 0–0.1)
BASOPHILS NFR BLD AUTO: 1 % (ref 0–1)
BUN SERPL-MCNC: 9 MG/DL (ref 5–25)
CALCIUM SERPL-MCNC: 9.4 MG/DL (ref 8.4–10.2)
CARDIAC TROPONIN I PNL SERPL HS: <2 NG/L
CARDIAC TROPONIN I PNL SERPL HS: <2 NG/L
CHLORIDE SERPL-SCNC: 103 MMOL/L (ref 96–108)
CO2 SERPL-SCNC: 27 MMOL/L (ref 21–32)
CREAT SERPL-MCNC: 0.76 MG/DL (ref 0.6–1.3)
EOSINOPHIL # BLD AUTO: 0.2 THOUSAND/ΜL (ref 0–0.61)
EOSINOPHIL NFR BLD AUTO: 2 % (ref 0–6)
ERYTHROCYTE [DISTWIDTH] IN BLOOD BY AUTOMATED COUNT: 14.5 % (ref 11.6–15.1)
GFR SERPL CREATININE-BSD FRML MDRD: 97 ML/MIN/1.73SQ M
GLUCOSE SERPL-MCNC: 87 MG/DL (ref 65–140)
HCT VFR BLD AUTO: 41.8 % (ref 34.8–46.1)
HGB BLD-MCNC: 14.1 G/DL (ref 11.5–15.4)
IMM GRANULOCYTES # BLD AUTO: 0.04 THOUSAND/UL (ref 0–0.2)
IMM GRANULOCYTES NFR BLD AUTO: 0 % (ref 0–2)
LYMPHOCYTES # BLD AUTO: 4.41 THOUSANDS/ΜL (ref 0.6–4.47)
LYMPHOCYTES NFR BLD AUTO: 41 % (ref 14–44)
MCH RBC QN AUTO: 29.4 PG (ref 26.8–34.3)
MCHC RBC AUTO-ENTMCNC: 33.7 G/DL (ref 31.4–37.4)
MCV RBC AUTO: 87 FL (ref 82–98)
MONOCYTES # BLD AUTO: 0.83 THOUSAND/ΜL (ref 0.17–1.22)
MONOCYTES NFR BLD AUTO: 8 % (ref 4–12)
NEUTROPHILS # BLD AUTO: 5.28 THOUSANDS/ΜL (ref 1.85–7.62)
NEUTS SEG NFR BLD AUTO: 48 % (ref 43–75)
NRBC BLD AUTO-RTO: 0 /100 WBCS
PLATELET # BLD AUTO: 321 THOUSANDS/UL (ref 149–390)
PMV BLD AUTO: 8.9 FL (ref 8.9–12.7)
POTASSIUM SERPL-SCNC: 3.6 MMOL/L (ref 3.5–5.3)
RBC # BLD AUTO: 4.8 MILLION/UL (ref 3.81–5.12)
SODIUM SERPL-SCNC: 137 MMOL/L (ref 135–147)
WBC # BLD AUTO: 10.83 THOUSAND/UL (ref 4.31–10.16)

## 2022-03-04 PROCEDURE — 36415 COLL VENOUS BLD VENIPUNCTURE: CPT | Performed by: EMERGENCY MEDICINE

## 2022-03-04 PROCEDURE — 80048 BASIC METABOLIC PNL TOTAL CA: CPT | Performed by: EMERGENCY MEDICINE

## 2022-03-04 PROCEDURE — 99284 EMERGENCY DEPT VISIT MOD MDM: CPT

## 2022-03-04 PROCEDURE — 71275 CT ANGIOGRAPHY CHEST: CPT

## 2022-03-04 PROCEDURE — 96374 THER/PROPH/DIAG INJ IV PUSH: CPT

## 2022-03-04 PROCEDURE — 74174 CTA ABD&PLVS W/CONTRAST: CPT

## 2022-03-04 PROCEDURE — 99285 EMERGENCY DEPT VISIT HI MDM: CPT | Performed by: EMERGENCY MEDICINE

## 2022-03-04 PROCEDURE — 85025 COMPLETE CBC W/AUTO DIFF WBC: CPT | Performed by: EMERGENCY MEDICINE

## 2022-03-04 PROCEDURE — 84484 ASSAY OF TROPONIN QUANT: CPT | Performed by: EMERGENCY MEDICINE

## 2022-03-04 RX ORDER — LIDOCAINE 50 MG/G
1 PATCH TOPICAL ONCE
Status: DISCONTINUED | OUTPATIENT
Start: 2022-03-04 | End: 2022-03-04 | Stop reason: HOSPADM

## 2022-03-04 RX ORDER — NAPROXEN 250 MG/1
250 TABLET ORAL 2 TIMES DAILY PRN
Qty: 20 TABLET | Refills: 0 | Status: SHIPPED | OUTPATIENT
Start: 2022-03-04 | End: 2022-04-26

## 2022-03-04 RX ORDER — KETOROLAC TROMETHAMINE 30 MG/ML
15 INJECTION, SOLUTION INTRAMUSCULAR; INTRAVENOUS ONCE
Status: COMPLETED | OUTPATIENT
Start: 2022-03-04 | End: 2022-03-04

## 2022-03-04 RX ORDER — SODIUM CHLORIDE 9 MG/ML
3 INJECTION INTRAVENOUS
Status: DISCONTINUED | OUTPATIENT
Start: 2022-03-04 | End: 2022-03-04 | Stop reason: HOSPADM

## 2022-03-04 RX ORDER — ACETAMINOPHEN 325 MG/1
975 TABLET ORAL ONCE
Status: COMPLETED | OUTPATIENT
Start: 2022-03-04 | End: 2022-03-04

## 2022-03-04 RX ORDER — ACETAMINOPHEN 500 MG
1000 TABLET ORAL EVERY 6 HOURS PRN
Qty: 30 TABLET | Refills: 0 | Status: SHIPPED | OUTPATIENT
Start: 2022-03-04

## 2022-03-04 RX ADMIN — ACETAMINOPHEN 975 MG: 325 TABLET ORAL at 12:14

## 2022-03-04 RX ADMIN — IOHEXOL 100 ML: 350 INJECTION, SOLUTION INTRAVENOUS at 13:17

## 2022-03-04 RX ADMIN — LIDOCAINE 5% 1 PATCH: 700 PATCH TOPICAL at 12:19

## 2022-03-04 RX ADMIN — KETOROLAC TROMETHAMINE 15 MG: 30 INJECTION, SOLUTION INTRAMUSCULAR at 12:13

## 2022-03-04 NOTE — DISCHARGE INSTRUCTIONS
You were evaluated for back pain with numbness in your left arm  CT scan of her chest showed normal vessels coming from your heart  In addition blood test to check for heart attack were normal     EKG of your heart showed increased TX interval   This is generally a nonsignificant finding and should be followed up with primary care doctor  In addition CT scan showed an incidental thyroid nodule  This should be followed up with an ultrasound of your thyroid to ensure you do not have cancer of the thyroid  Follow-up with your primary care doctor regarding this  Come back to emergency department if you have new or worsening symptoms

## 2022-03-04 NOTE — Clinical Note
Maggie Donato was seen and treated in our emergency department on 3/4/2022  No restrictions            Diagnosis:     Preeti Meza  may return to work on return date  She may return on this date: 03/06/2022         If you have any questions or concerns, please don't hesitate to call        Sheldon Saunders, DO    ______________________________           _______________          _______________  Hospital Representative                              Date                                Time

## 2022-03-04 NOTE — ED PROVIDER NOTES
History  Chief Complaint   Patient presents with    Back Pain     Pt presents to ED from work w/ upper left back pain starting one hour ago, denies injury  Pt (+) hx HTN, asthma  43 y/o Female PMH asthma, HTN, obesity, tobacco abuse, adrenal mass presents with left back pain, decreased sensation in LUE  Pain radiates to the neck  Not worse with movement  Patient can think of no inciting event  No associated symptoms  Family history of CAD  No SOB  Back Pain  Location:  Thoracic spine  Quality:  Aching  Radiates to: L neck  Pain severity:  Moderate  Pain is:  Same all the time  Onset quality:  Sudden  Timing:  Constant  Progression:  Worsening  Chronicity:  New  Relieved by:  Nothing  Worsened by:  Nothing  Ineffective treatments:  None tried  Associated symptoms: numbness (LUE)        Prior to Admission Medications   Prescriptions Last Dose Informant Patient Reported? Taking?    [employer]S D3 125 MCG (5000 UT) capsule   No No   Sig: TAKE 1 CAPSULE (5,000 UNITS TOTAL) BY MOUTH DAILY   Diclofenac Sodium (VOLTAREN) 1 %  Self No No   Sig: Apply 2 g topically 4 (four) times a day   albuterol (PROVENTIL HFA,VENTOLIN HFA) 90 mcg/act inhaler   Yes No   amLODIPine (NORVASC) 10 mg tablet   No No   Sig: Take 1 tablet (10 mg total) by mouth daily Blood presuure   buPROPion (WELLBUTRIN XL) 150 mg 24 hr tablet   No No   Sig: Take 1 tablet (150 mg total) by mouth every morning   cyanocobalamin (VITAMIN B-12) 2000 MCG tablet   No No   Sig: Take 1 tablet (2,000 mcg total) by mouth daily   valACYclovir (VALTREX) 500 mg tablet  Self No No   Sig: TAKE 1 TABLET BY MOUTH EVERY DAY      Facility-Administered Medications: None       Past Medical History:   Diagnosis Date    Abnormal Pap smear of cervix     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, 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    Adrenal mass, left (HCC) 1/25/2021    1 8 x 2 1 cm on ct 12/21/2020    Arthritis     Asthma     Blurry vision 9/4/2020    Essential hypertension 9/4/2020    Headache 9/4/2020    Hypertension     Immunization deficiency 10/2/2020    Hep a nonimmune    Insomnia 2021    Migraine     Moderate episode of recurrent major depressive disorder ([facility] [address] ) 2020    Obesity (BMI 30 0-34 9) 2021    Prediabetes 2020    Tobacco use 2021    Vitamin D deficiency 10/2/2020       Past Surgical History:   Procedure Laterality Date    APPENDECTOMY      BREAST BIOPSY Left 2017    BREAST BIOPSY Right     2 core biopsies    BREAST EXCISIONAL BIOPSY Left 2018    BREAST SURGERY Left 2018    Left breast mass excision    CERVICAL BIOPSY  W/ LOOP ELECTRODE EXCISION       SECTION      X2    COLPOSCOPY      HYSTERECTOMY  2012    heavy bleeding, ovaries remain, also had abnormal pap    KNEE SURGERY      LYMPH NODE DISSECTION  2018    under armpit    TUBAL LIGATION     [address] THYROID BIOPSY  2021       Family History   Problem Relation Age of Onset    Lupus Maternal Aunt 50    Seizures Maternal Aunt     Leukemia Maternal Uncle 25    Diabetes Mother     Heart attack Mother     Heart disease Mother         Internal Defibrilation    Diabetes Maternal Grandmother     Diabetes Paternal Grandmother     Diabetes Paternal Aunt     No Known Problems Father     Endometrial cancer Sister 29    Colon cancer Sister 28    No Known Problems Daughter     No Known Problems Son     No Known Problems Son      I have reviewed and agree with the history as documented      E-Cigarette/Vaping    E-Cigarette Use Never User      E-Cigarette/Vaping Substances    Nicotine No     THC No     CBD No     Flavoring No     Other No     Unknown No      Social History     Tobacco Use    Smoking status: Current Every Day Smoker     Packs/day: 0 50     Years: 20 00     Pack years: 10 00     Types: Cigarettes    Smokeless tobacco: Never Used    Tobacco comment: pt is down to  5 packs a day   Vaping Use    Vaping Use: Never used   Substance Use Topics    Alcohol use: Yes     Comment: occasional    Drug use: No       Review of Systems   Musculoskeletal: Positive for back pain  Neurological: Positive for numbness (LUE)  All other systems reviewed and are negative  Physical Exam  Physical Exam  Vitals and nursing note reviewed  Constitutional:       General: She is not in acute distress  Appearance: Normal appearance  She is not ill-appearing  HENT:      Head: Normocephalic and atraumatic  Right Ear: External ear normal       Left Ear: External ear normal       Nose: Nose normal       Mouth/Throat:      Mouth: Mucous membranes are moist    Eyes:      General:         Right eye: No discharge  Left eye: No discharge  Conjunctiva/sclera: Conjunctivae normal    Neck:      Comments: No pain on palpation of the cervical spine or lateral neck  Cardiovascular:      Rate and Rhythm: Normal rate and regular rhythm  Pulses: Normal pulses  Heart sounds: No murmur heard  Pulmonary:      Effort: Pulmonary effort is normal       Breath sounds: Normal breath sounds  Abdominal:      General: Abdomen is flat  There is no distension  Tenderness: There is no abdominal tenderness  Musculoskeletal:         General: Tenderness (Minimal tenderness to palpation the posterior rib cage lateral to musculature in the left mid thoracic region  No central spinal tenderness ) present  Normal range of motion  Cervical back: Normal range of motion  Skin:     General: Skin is warm  Capillary Refill: Capillary refill takes less than 2 seconds  Findings: No rash  Neurological:      General: No focal deficit present  Mental Status: She is alert  Mental status is at baseline  Cranial Nerves: No cranial nerve deficit  Sensory: Sensory deficit ( sensation is intact light touch throughout the upper extremities but patient notes slightly diminished sensation in left upper extremity ) present  Motor: No weakness  Psychiatric:         Mood and Affect: Mood normal          Behavior: Behavior normal          Vital Signs  ED Triage Vitals   Temperature Pulse Respirations Blood Pressure SpO2   03/04/22 1116 03/04/22 1116 03/04/22 1116 03/04/22 1116 03/04/22 1116   98 8 °F (37 1 °C) 86 16 154/98 100 %      Temp Source Heart Rate Source Patient Position - Orthostatic VS BP Location FiO2 (%)   03/04/22 1116 03/04/22 1334 03/04/22 1334 03/04/22 1334 --   Oral Monitor Lying Right arm       Pain Score       03/04/22 1213       6           Vitals:    03/04/22 1116 03/04/22 1334   BP: 154/98 119/78   Pulse: 86 76   Patient Position - Orthostatic VS:  Lying         Visual Acuity  Visual Acuity      Most Recent Value   L Pupil Size (mm) 3   R Pupil Size (mm) 3          ED Medications  Medications   ketorolac (TORADOL) injection 15 mg (15 mg Intravenous Given 3/4/22 1213)   acetaminophen (TYLENOL) tablet 975 mg (975 mg Oral Given 3/4/22 1214)   iohexol (OMNIPAQUE) 350 MG/ML injection (SINGLE-DOSE) 100 mL (100 mL Intravenous Given 3/4/22 1317)       Diagnostic Studies  Results Reviewed     Procedure Component Value Units Date/Time    HS Troponin I 2hr [[id number]] Collected: 03/04/22 1424    Lab Status: Final result Specimen: Blood from Arm, Left Updated: 03/04/22 1455     hs TnI 2hr <2 ng/L      Delta 2hr hsTnI --    HS Troponin 0hr (reflex protocol) [[id number]]  (Normal) Collected: 03/04/22 1212    Lab Status: Final result Specimen: Blood from Arm, Left Updated: 03/04/22 1252     hs TnI 0hr <2 ng/L     Basic metabolic panel [[id number]] Collected: 03/04/22 1212    Lab Status: Final result Specimen: Blood from Arm, Left Updated: 03/04/22 1246     Sodium 137 mmol/L      Potassium 3 6 mmol/L      Chloride 103 mmol/L      CO2 27 mmol/L      ANION GAP 7 mmol/L      BUN 9 mg/dL      Creatinine 0 76 mg/dL      Glucose 87 mg/dL      Calcium 9 4 mg/dL      eGFR 97 ml/min/1 73sq m     Narrative:      [place] guidelines for Chronic Kidney Disease (CKD):     Stage 1 with normal or high GFR (GFR > 90 mL/min/1 73 square meters)    Stage 2 Mild CKD (GFR = 60-89 mL/min/1 73 square meters)    Stage 3A Moderate CKD (GFR = 45-59 mL/min/1 73 square meters)    Stage 3B Moderate CKD (GFR = 30-44 mL/min/1 73 square meters)    Stage 4 Severe CKD (GFR = 15-29 mL/min/1 73 square meters)    Stage 5 End Stage CKD (GFR <15 mL/min/1 73 square meters)  Note: GFR calculation is accurate only with a steady state creatinine    CBC and differential [[id number]]  (Abnormal) Collected: 03/04/22 1212    Lab Status: Final result Specimen: Blood from Arm, Left Updated: 03/04/22 1222     WBC 10 83 Thousand/uL      RBC 4 80 Million/uL      Hemoglobin 14 1 g/dL      Hematocrit 41 8 %      MCV 87 fL      MCH 29 4 pg      MCHC 33 7 g/dL      RDW 14 5 %      MPV 8 9 fL      Platelets 654 Thousands/uL      nRBC 0 /100 WBCs      Neutrophils Relative 48 %      Immat GRANS % 0 %      Lymphocytes Relative 41 %      Monocytes Relative 8 %      Eosinophils Relative 2 %      Basophils Relative 1 %      Neutrophils Absolute 5 28 Thousands/µL      Immature Grans Absolute 0 04 Thousand/uL      Lymphocytes Absolute 4 41 Thousands/µL      Monocytes Absolute 0 83 Thousand/µL      Eosinophils Absolute 0 20 Thousand/µL      Basophils Absolute 0 07 Thousands/µL                  CTA dissection protocol chest/abdomen/pelvis   Final Result by Leana Fernandez MD (03/04 8224)   No aortic aneurysm or dissection  Incidental thyroid nodule(s) for which nonemergent thyroid ultrasound is recommended  The study was marked in EPIC for significant notification        Workstation performed: [id number]                    Procedures  Procedures         ED Course  ED Course as of 03/04/22 1942   Fri Mar 04, 2022   1203 Procedure Note: EKG  Date/Time: 03/04/22 12:04 PM   Interpreted by: Nick Cueto  Indications / Diagnosis: back pain  ECG reviewed by me, the ED Provider: yes   The EKG demonstrates:  Rhythm: normal sinus  Intervals: Prolonged ME  Axis: normal axis  QRS/Blocks: normal QRS  ST Changes: No acute ST Changes, no STD/LAI  One PVC, bimodal T wave in III                                 SBIRT 22yo+      Most Recent Value   SBIRT (24 yo +)    In order to provide better care to our patients, we are screening all of our patients for alcohol and drug use  Would it be okay to ask you these screening questions? Yes Filed at: 03/04/2022 1256   Initial Alcohol Screen: US AUDIT-C     1  How often do you have a drink containing alcohol? 0 Filed at: 03/04/2022 1256   2  How many drinks containing alcohol do you have on a typical day you are drinking? 0 Filed at: 03/04/2022 1256   3b  FEMALE Any Age, or MALE 65+: How often do you have 4 or more drinks on one occassion? 0 Filed at: 03/04/2022 1256   Audit-C Score 0 Filed at: 03/04/2022 1256   PAULIE: How many times in the past year have you    Used an illegal drug or used a prescription medication for non-medical reasons? Daily or Almost Daily Filed at: 03/04/2022 1256   DAST-10: In the past 12 months       1  Have you used drugs other than those required for medical reasons? 0 Filed at: 03/04/2022 1256   2  Do you use more than one drug at a time? 0 Filed at: 03/04/2022 1256   3  Have you had medical problems as a result of your drug use (e g , memory loss, hepatitis, convulsions, bleeding, etc )? 0 Filed at: 03/04/2022 1256   4  Have you had "blackouts" or "flashbacks" as a result of drug use? YesNo 0 Filed at: 03/04/2022 1256   5  Do you ever feel bad or guilty about your drug use? 0 Filed at: 03/04/2022 1256   6  Does your spouse (or parent) ever complain about your involvement with drugs? 0 Filed at: 03/04/2022 1256   7  Have you neglected your family because of your use of drugs? 0 Filed at: 03/04/2022 1256   8  Have you engaged in illegal activities in order to obtain drugs? 0 Filed at: 03/04/2022 1256   9   Have you ever experienced withdrawal symptoms (felt sick) when you stopped taking drugs? 0 Filed at: 03/04/2022 1256   10  Are you always able to stop using drugs when you want to? 0 Filed at: 03/04/2022 1256   DAST-10 Score 0 Filed at: 03/04/2022 1256                    [facility]  Number of Diagnoses or Management Options  Back pain: new and requires workup  Prolonged P-R interval: new and requires workup  Thyroid nodule: new and requires workup  Diagnosis management comments: Patient presents with lateral posterior thoracic pain and associated numbness to the left arm as well as radiation of the paraspinal pain to the neck  Will evaluate for large vessel dissection, ACS, arrhythmia, electrolyte abnormalities, pneumonia, pneumothorax  Workup without acute findings other than prolonged MT interval and thyroid nodule  Patient informed to follow-up with thyroid nodule to ensure this is not cancers  Patient expressed understanding of this  Recommended follow-up with PCP regarding prolonged MT interval     Patient will be discharged home  Follow-up with PCP  Return precautions given         Amount and/or Complexity of Data Reviewed  Clinical lab tests: ordered and reviewed  Tests in the radiology section of CPT®: ordered and reviewed  Review and summarize past medical records: yes  Discuss the patient with other providers: yes  Independent visualization of images, tracings, or specimens: yes    Risk of Complications, Morbidity, and/or Mortality  Presenting problems: moderate  Diagnostic procedures: moderate  Management options: moderate        Disposition  Final diagnoses:   Back pain   Prolonged P-R interval   Thyroid nodule     Time reflects when diagnosis was documented in both MDM as applicable and the Disposition within this note     Time User Action Codes Description Comment    3/4/2022 12:06 PM Coyne Holter Add [M54 9] Back pain     3/4/2022 12:06 PM Coyne Holter Add [I44 0] Prolonged P-R interval     3/4/2022  4:05 PM Felton Quiroz [E04 1] Thyroid nodule       ED Disposition     ED Disposition Condition Date/Time Comment    Discharge Stable Fri Mar 4, 2022  4:05 PM Aung Ryan discharge to home/self care              Follow-up Information     Follow up With Specialties Details Why Contact Info Additional Information    Al Pinedo MD Family Medicine  For re-evaluation as soon as possible [address] [facility] [address] [facility],  [address] [facility]   [address] [facility] Emergency Department Emergency Medicine  If symptoms worsen [address] [facility],[address] [id number]  [address] [facility] Emergency Department, [address], [address]          Discharge Medication List as of 3/4/2022  4:10 PM      START taking these medications    Details   acetaminophen (TYLENOL) 500 mg tablet Take 2 tablets (1,000 mg total) by mouth every 6 (six) hours as needed for mild pain for up to 15 doses, Starting Fri 3/4/2022, Normal      naproxen (Naprosyn) 250 mg tablet Take 1 tablet (250 mg total) by mouth 2 (two) times a day as needed for mild pain for up to 10 days, Starting Fri 3/4/2022, Until Mon 3/14/2022 at 2359, Normal         CONTINUE these medications which have NOT CHANGED    Details   albuterol (PROVENTIL HFA,VENTOLIN HFA) 90 mcg/act inhaler Starting Wed 12/1/2021, Historical Med      amLODIPine (NORVASC) 10 mg tablet Take 1 tablet (10 mg total) by mouth daily Blood presuure, Starting Wed 1/19/2022, Until Tue 4/19/2022, Normal      buPROPion (WELLBUTRIN XL) 150 mg 24 hr tablet Take 1 tablet (150 mg total) by mouth every morning, Starting Wed 1/19/2022, Normal      CVS D3 125 MCG (5000 UT) capsule TAKE 1 CAPSULE (5,000 UNITS TOTAL) BY MOUTH DAILY, Normal      cyanocobalamin (VITAMIN B-12) 2000 MCG tablet Take 1 tablet (2,000 mcg total) by mouth daily, Starting Wed 1/19/2022, Until Tue 4/19/2022, Normal      Diclofenac Sodium (VOLTAREN) 1 % Apply 2 g topically 4 (four) times a day, Starting Wed 3/24/2021, Normal      valACYclovir (VALTREX) 500 mg tablet TAKE 1 TABLET BY MOUTH EVERY DAY, Normal             No discharge procedures on file      PDMP Review       Value Time User    PDMP Reviewed  Yes 12/21/2020  5:26 AM Get Landa MD          ED Provider  Electronically Signed by           Chi Gerard DO  03/04/22 1941

## 2022-03-04 NOTE — Clinical Note
Horace Oakes was seen and treated in our emergency department on 3/4/2022  No restrictions            Diagnosis:     Jessica    She may return on this date: If you have any questions or concerns, please don't hesitate to call        Johan Amos, DO    ______________________________           _______________          _______________  Hospital Representative                              Date                                Time

## 2022-03-06 DIAGNOSIS — B00.9 HERPES: ICD-10-CM

## 2022-03-07 RX ORDER — VALACYCLOVIR HYDROCHLORIDE 500 MG/1
TABLET, FILM COATED ORAL
Qty: 30 TABLET | Refills: 3 | Status: SHIPPED | OUTPATIENT
Start: 2022-03-07 | End: 2022-04-06

## 2022-03-25 ENCOUNTER — HOSPITAL ENCOUNTER (OUTPATIENT)
Dept: SLEEP CENTER | Facility: CLINIC | Age: 42
Discharge: HOME | End: 2022-03-25
Payer: COMMERCIAL

## 2022-03-25 DIAGNOSIS — G47.33 OSA (OBSTRUCTIVE SLEEP APNEA): ICD-10-CM

## 2022-03-25 PROCEDURE — 95810 POLYSOM 6/> YRS 4/> PARAM: CPT

## 2022-03-25 PROCEDURE — 95810 POLYSOM 6/> YRS 4/> PARAM: CPT | Performed by: INTERNAL MEDICINE

## 2022-03-26 NOTE — PROGRESS NOTES
Sleep Study Documentation    Pre-Sleep Study       Sleep testing procedure explained to patient:YES    Patient napped prior to study:NO    Caffeine:Dayshift worker after 12PM   Caffeine use:YES- tea  18 ounces or more and soda  12 to 26 ounces    Alcohol:Dayshift workers after 5PM: Alcohol use:NO    Typical day for patient:YES       Study Documentation    Sleep Study Indications: Excessive Daytime Sleepiness and Snoring    Sleep Study: Diagnostic   Snore: Moderate  Supplemental O2: no    O2 flow rate (L/min) range N/A  O2 flow rate (L/min) final N/A  Minimum SaO2 76%  Baseline SaO2 96%    EKG abnormalities: no     EEG abnormalities: no    Sleep Study Recorded < 2 hours: N/A    Sleep Study Recorded > 2 hours but incomplete study: N/A    Sleep Study Recorded 6 hours but no sleep obtained: NO    Patient classification: employed       Post-Sleep Study    Medication used at bedtime or during sleep study:NO    Patient reports time it took to fall asleep:less than 20 minutes    Patient reports waking up during study:1 to 2 times  Patient reports returning to sleep without difficulty  Patient reports sleeping 4 to 6 hours with dreaming      Patient reports sleep during study:better than usual    Patient rated sleepiness: Somewhat sleepy or tired

## 2022-03-29 ENCOUNTER — TELEPHONE (OUTPATIENT)
Dept: SLEEP CENTER | Facility: CLINIC | Age: 42
End: 2022-03-29

## 2022-03-29 NOTE — TELEPHONE ENCOUNTER
Spoke with the patient advised sleep study results and scheduled her for CPAP study  I reviewed the patient liability acknowledgement form with the patient on the telephone  Patients who cancel their morning sleep study after 3:00 pm the day prior to the study, or cancel their evening sleep study after 12:00 pm on the day of the study, or fail to arrive for their scheduled appointment will be charged a $100 No Show Fee  Sleep studies scheduled on Sundays must be cancelled by 12:00 noon on the preceding Friday  The patient acknowledges verbally that they are financially responsible for a $100 No Show charge if they do not cancel their sleep study by the cancellation time requirement listed above  This charge will not be covered by their insurance company  This charge must be paid prior to the test being re-scheduled

## 2022-04-12 ENCOUNTER — HOSPITAL ENCOUNTER (EMERGENCY)
Facility: HOSPITAL | Age: 42
Discharge: HOME | End: 2022-04-12
Attending: EMERGENCY MEDICINE | Admitting: EMERGENCY MEDICINE
Payer: COMMERCIAL

## 2022-04-12 VITALS
TEMPERATURE: 98.4 F | RESPIRATION RATE: 16 BRPM | SYSTOLIC BLOOD PRESSURE: 129 MMHG | OXYGEN SATURATION: 98 % | DIASTOLIC BLOOD PRESSURE: 82 MMHG | HEIGHT: 65 IN | WEIGHT: 198 LBS | BODY MASS INDEX: 32.99 KG/M2 | HEART RATE: 96 BPM

## 2022-04-12 DIAGNOSIS — J32.9 SINUSITIS: ICD-10-CM

## 2022-04-12 DIAGNOSIS — H66.91 OTITIS MEDIA OF RIGHT EAR: Primary | ICD-10-CM

## 2022-04-12 PROCEDURE — 99284 EMERGENCY DEPT VISIT MOD MDM: CPT | Performed by: EMERGENCY MEDICINE

## 2022-04-12 PROCEDURE — 99282 EMERGENCY DEPT VISIT SF MDM: CPT

## 2022-04-12 RX ORDER — AMOXICILLIN AND CLAVULANATE POTASSIUM 875; 125 MG/1; MG/1
1 TABLET, FILM COATED ORAL EVERY 12 HOURS
Qty: 28 TABLET | Refills: 0 | Status: SHIPPED | OUTPATIENT
Start: 2022-04-12 | End: 2022-04-20

## 2022-04-12 RX ORDER — AMOXICILLIN AND CLAVULANATE POTASSIUM 875; 125 MG/1; MG/1
1 TABLET, FILM COATED ORAL ONCE
Status: COMPLETED | OUTPATIENT
Start: 2022-04-12 | End: 2022-04-12

## 2022-04-12 RX ADMIN — AMOXICILLIN AND CLAVULANATE POTASSIUM 1 TABLET: 875; 125 TABLET, FILM COATED ORAL at 20:46

## 2022-04-12 NOTE — Clinical Note
Valerie Christian was seen and treated in our emergency department on 4/12/2022  Diagnosis:     Iglesia Carter  may return to work on return date  She may return on this date: 04/14/2022    Excuse on 4/11 and 4/12/22     If you have any questions or concerns, please don't hesitate to call        Sejal Trent MD    ______________________________           _______________          _______________  Hospital Representative                              Date                                Time

## 2022-04-14 ENCOUNTER — TELEPHONE (OUTPATIENT)
Dept: FAMILY MEDICINE CLINIC | Facility: CLINIC | Age: 42
End: 2022-04-14

## 2022-04-14 DIAGNOSIS — H66.90 ACUTE OTITIS MEDIA, UNSPECIFIED OTITIS MEDIA TYPE: Primary | ICD-10-CM

## 2022-04-14 RX ORDER — AZITHROMYCIN 250 MG/1
TABLET, FILM COATED ORAL
Qty: 6 TABLET | Refills: 0 | Status: SHIPPED | OUTPATIENT
Start: 2022-04-14 | End: 2022-04-18

## 2022-04-14 NOTE — TELEPHONE ENCOUNTER
Patient went to [facility] on the 12 with ear infection and sinus pressure   They gave her amoxicillian   She started taking it she developed diarrhea yesterday  She still has it today     She did not go to work   She has a note that covers 11-12-and 13    --she will need a note to cover today   And could the antibiotic be changed to something else   [facility]   Thank you

## 2022-04-14 NOTE — TELEPHONE ENCOUNTER
Please let pt know the diarrhea will last up to 1 week due to antibiotic, I called in azithromycin for [facility] to take to [facility]  Note for work in chart

## 2022-04-17 DIAGNOSIS — I10 ESSENTIAL HYPERTENSION: ICD-10-CM

## 2022-04-18 RX ORDER — AMLODIPINE BESYLATE 10 MG/1
10 TABLET ORAL DAILY
Qty: 90 TABLET | Refills: 0 | Status: SHIPPED | OUTPATIENT
Start: 2022-04-18 | End: 2022-04-20 | Stop reason: SDUPTHER

## 2022-04-19 NOTE — ED PROVIDER NOTES
History  Chief Complaint   Patient presents with    Earache     patient reports right ear pain and pressure on right side of face since yesterday     This is a 41 y/o female that presents to the ED with c/o right sided earache  Started yesterday  Has some right sided facial pressure as well with congestion  No tooth pain  No fevers or chills  Symptoms moderate in severity  No radiation of symptoms  No aggravating or alleviating factors  No other associated symptoms  No difficulty swallowing  Prior to Admission Medications   Prescriptions Last Dose Informant Patient Reported? Taking?    CVS D3 125 MCG (5000 UT) capsule   No No   Sig: TAKE 1 CAPSULE (5,000 UNITS TOTAL) BY MOUTH DAILY   Diclofenac Sodium (VOLTAREN) 1 %  Self No No   Sig: Apply 2 g topically 4 (four) times a day   IBUPROFEN PO 4/12/2022 at 1600  Yes Yes   Sig: Take by mouth   acetaminophen (TYLENOL) 500 mg tablet   No No   Sig: Take 2 tablets (1,000 mg total) by mouth every 6 (six) hours as needed for mild pain for up to 15 doses   albuterol (PROVENTIL HFA,VENTOLIN HFA) 90 mcg/act inhaler   Yes No   buPROPion (WELLBUTRIN XL) 150 mg 24 hr tablet   No No   Sig: Take 1 tablet (150 mg total) by mouth every morning   cyanocobalamin (VITAMIN B-12) 2000 MCG tablet   No No   Sig: Take 1 tablet (2,000 mcg total) by mouth daily   naproxen (Naprosyn) 250 mg tablet   No No   Sig: Take 1 tablet (250 mg total) by mouth 2 (two) times a day as needed for mild pain for up to 10 days   valACYclovir (VALTREX) 500 mg tablet   No No   Sig: TAKE 1 TABLET BY MOUTH EVERY DAY      Facility-Administered Medications: None       Past Medical History:   Diagnosis Date    Abnormal Pap smear of cervix     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, 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    Adrenal mass, left ([address] ) 1/25/2021    1 8 x 2 1 cm on ct 12/21/2020    Arthritis     Asthma     Blurry vision 9/4/2020    Essential hypertension 9/4/2020    Headache 9/4/2020    Hypertension     Immunization deficiency 10/2/2020 Hep a nonimmune    Insomnia 2021    Migraine     Moderate episode of recurrent major depressive disorder ([facility] [address] ) 2020    Obesity (BMI 30 0-34 9) 2021    Prediabetes 2020    Tobacco use 2021    Vitamin D deficiency 10/2/2020       Past Surgical History:   Procedure Laterality Date    APPENDECTOMY      BREAST BIOPSY Left 2017    BREAST BIOPSY Right     2 core biopsies    BREAST EXCISIONAL BIOPSY Left 2018    BREAST SURGERY Left 2018    Left breast mass excision    CERVICAL BIOPSY  W/ LOOP ELECTRODE EXCISION       SECTION      X2    COLPOSCOPY      HYSTERECTOMY  2012    heavy bleeding, ovaries remain, also had abnormal pap    KNEE SURGERY      LYMPH NODE DISSECTION      under armpit    TUBAL LIGATION     [address] THYROID BIOPSY  2021       Family History   Problem Relation Age of Onset    Lupus Maternal Aunt 50    Seizures Maternal Aunt     Leukemia Maternal Uncle 25    Diabetes Mother     Heart attack Mother     Heart disease Mother         Internal Defibrilation    Diabetes Maternal Grandmother     Diabetes Paternal Grandmother     Diabetes Paternal Aunt     No Known Problems Father     Endometrial cancer Sister 29    Colon cancer Sister 28    No Known Problems Daughter     No Known Problems Son     No Known Problems Son      I have reviewed and agree with the history as documented  E-Cigarette/Vaping    E-Cigarette Use Never User      E-Cigarette/Vaping Substances    Nicotine No     THC No     CBD No     Flavoring No     Other No     Unknown No      Social History     Tobacco Use    Smoking status: Current Every Day Smoker     Packs/day: 0 25     Years: 20 00     Pack years: 5 00     Types: Cigarettes    Smokeless tobacco: Never Used    Tobacco comment: pt is down to  5 packs a day   Vaping Use    Vaping Use: Never used   Substance Use Topics    Alcohol use: Yes     Comment: occasional    Drug use:  No Review of Systems   Constitutional: Negative for chills and fever  HENT: Positive for ear pain, sinus pressure and sinus pain  Negative for facial swelling, sore throat and trouble swallowing  Eyes: Negative for discharge and itching  Respiratory: Negative for chest tightness and shortness of breath  Cardiovascular: Negative for chest pain  Skin: Negative for color change and wound  Neurological: Negative for facial asymmetry  Physical Exam  Physical Exam  Vitals and nursing note reviewed  Constitutional:       Appearance: Normal appearance  HENT:      Head: Normocephalic and atraumatic  Left Ear: Tympanic membrane, ear canal and external ear normal       Ears:      Comments: Right TM erythematous and dull in appearance  No perforation  Nose:      Comments: Boggy mucosa on right  Eyes:      Conjunctiva/sclera: Conjunctivae normal       Pupils: Pupils are equal, round, and reactive to light  Cardiovascular:      Rate and Rhythm: Normal rate and regular rhythm  Pulmonary:      Effort: Pulmonary effort is normal  No respiratory distress  Musculoskeletal:      Cervical back: Normal range of motion and neck supple  No tenderness  Lymphadenopathy:      Cervical: No cervical adenopathy  Skin:     General: Skin is warm and dry  Neurological:      General: No focal deficit present  Mental Status: She is alert and oriented to person, place, and time           Vital Signs  ED Triage Vitals [04/12/22 2025]   Temperature Pulse Respirations Blood Pressure SpO2   98 4 °F (36 9 °C) 96 16 129/82 98 %      Temp Source Heart Rate Source Patient Position - Orthostatic VS BP Location FiO2 (%)   Oral Monitor Sitting Right arm --      Pain Score       6           Vitals:    04/12/22 2025   BP: 129/82   Pulse: 96   Patient Position - Orthostatic VS: Sitting         Visual Acuity      ED Medications  Medications   amoxicillin-clavulanate (AUGMENTIN) 875-125 mg per tablet 1 tablet (1 tablet Oral Given 4/12/22 2046)       Diagnostic Studies  Results Reviewed     None                 No orders to display              Procedures  Procedures         ED Course                               SBIRT 22yo+      Most Recent Value   SBIRT (23 yo +)    In order to provide better care to our patients, we are screening all of our patients for alcohol and drug use  Would it be okay to ask you these screening questions? No Filed at: 04/12/2022 2032                    MDM    Disposition  Final diagnoses:   Otitis media of right ear   Sinusitis     Time reflects when diagnosis was documented in both MDM as applicable and the Disposition within this note     Time User Action Codes Description Comment    4/12/2022  8:43 PM Gricelda [place] Add [W60 03] Otitis media of right ear     4/12/2022  8:43 PM Gricelda [place] Add [J32 9] Sinusitis       ED Disposition     ED Disposition Condition Date/Time Comment    Discharge Stable Tue Apr 12, 2022  8:43 PM Jennifer Maria discharge to home/self care              Follow-up Information     Follow up With Specialties Details Why Contact Info    Al Pinedo MD Family Medicine In 1 week For re-evaluation and follow-up for this visit [address] [place] [address] [address] [place] [address]   [phone]            Discharge Medication List as of 4/12/2022  8:46 PM      START taking these medications    Details   amoxicillin-clavulanate (AUGMENTIN) 875-125 mg per tablet Take 1 tablet by mouth every 12 (twelve) hours for 14 days, Starting Tue 4/12/2022, Until Tue 4/26/2022, Normal         CONTINUE these medications which have NOT CHANGED    Details   IBUPROFEN PO Take by mouth, Historical Med      acetaminophen (TYLENOL) 500 mg tablet Take 2 tablets (1,000 mg total) by mouth every 6 (six) hours as needed for mild pain for up to 15 doses, Starting Fri 3/4/2022, Normal      albuterol (PROVENTIL HFA,VENTOLIN HFA) 90 mcg/act inhaler Starting Wed 12/1/2021, Historical Med      buPROPion (WELLBUTRIN XL) 150 mg 24 hr tablet Take 1 tablet (150 mg total) by mouth every morning, Starting Wed 1/19/2022, Normal      CVS D3 125 MCG (5000 UT) capsule TAKE 1 CAPSULE (5,000 UNITS TOTAL) BY MOUTH DAILY, Normal      cyanocobalamin (VITAMIN B-12) 2000 MCG tablet Take 1 tablet (2,000 mcg total) by mouth daily, Starting Wed 1/19/2022, Until Tue 4/19/2022, Normal      Diclofenac Sodium (VOLTAREN) 1 % Apply 2 g topically 4 (four) times a day, Starting Wed 3/24/2021, Normal      naproxen (Naprosyn) 250 mg tablet Take 1 tablet (250 mg total) by mouth 2 (two) times a day as needed for mild pain for up to 10 days, Starting Fri 3/4/2022, Until Mon 3/14/2022 at 2359, Normal      valACYclovir (VALTREX) 500 mg tablet TAKE 1 TABLET BY MOUTH EVERY DAY, Normal      amLODIPine (NORVASC) 10 mg tablet Take 1 tablet (10 mg total) by mouth daily Blood presuure, Starting Wed 1/19/2022, Until Tue 4/19/2022, Normal             No discharge procedures on file      PDMP Review       Value Time User    PDMP Reviewed  Yes 12/21/2020  5:26 AM Tiffany Riggs MD          ED Provider  Electronically Signed by           Sindi Soto MD  04/19/22 4010

## 2022-04-20 ENCOUNTER — OFFICE VISIT (OUTPATIENT)
Dept: FAMILY MEDICINE CLINIC | Facility: CLINIC | Age: 42
End: 2022-04-20
Payer: COMMERCIAL

## 2022-04-20 ENCOUNTER — TELEPHONE (OUTPATIENT)
Dept: FAMILY MEDICINE CLINIC | Facility: CLINIC | Age: 42
End: 2022-04-20

## 2022-04-20 VITALS
OXYGEN SATURATION: 97 % | HEIGHT: 65 IN | TEMPERATURE: 98.7 F | WEIGHT: 206 LBS | DIASTOLIC BLOOD PRESSURE: 86 MMHG | HEART RATE: 90 BPM | SYSTOLIC BLOOD PRESSURE: 122 MMHG | BODY MASS INDEX: 34.32 KG/M2

## 2022-04-20 DIAGNOSIS — E66.9 OBESITY (BMI 30.0-34.9): ICD-10-CM

## 2022-04-20 DIAGNOSIS — J32.9 CHRONIC SINUSITIS, UNSPECIFIED LOCATION: ICD-10-CM

## 2022-04-20 DIAGNOSIS — Z72.0 TOBACCO USE: ICD-10-CM

## 2022-04-20 DIAGNOSIS — Z23 NEED FOR HPV VACCINATION: Primary | ICD-10-CM

## 2022-04-20 DIAGNOSIS — I10 ESSENTIAL HYPERTENSION: ICD-10-CM

## 2022-04-20 DIAGNOSIS — G47.33 OSA (OBSTRUCTIVE SLEEP APNEA): ICD-10-CM

## 2022-04-20 PROCEDURE — 99214 OFFICE O/P EST MOD 30 MIN: CPT | Performed by: FAMILY MEDICINE

## 2022-04-20 PROCEDURE — 90651 9VHPV VACCINE 2/3 DOSE IM: CPT | Performed by: FAMILY MEDICINE

## 2022-04-20 PROCEDURE — 90471 IMMUNIZATION ADMIN: CPT | Performed by: FAMILY MEDICINE

## 2022-04-20 RX ORDER — AMLODIPINE BESYLATE 10 MG/1
10 TABLET ORAL DAILY
Qty: 90 TABLET | Refills: 1 | Status: SHIPPED | OUTPATIENT
Start: 2022-04-20 | End: 2022-05-20 | Stop reason: SDUPTHER

## 2022-04-20 NOTE — PROGRESS NOTES
BMI Counseling: Body mass index is 34 28 kg/m²  The BMI is above normal  Nutrition recommendations include decreasing portion sizes, encouraging healthy choices of fruits and vegetables, limiting drinks that contain sugar and reducing intake of cholesterol  Exercise recommendations include exercising 3-5 times per week  No pharmacotherapy was ordered  Rationale for BMI follow-up plan is due to patient being overweight or obese  Assessment/Plan:  Exam show ears are better  Refer to ENT for chronic sinusitis and obstructive sleep apnea  HPV vaccine 1  Given today 2nd HPV done 2 months 3rd HPV done 6 months  Meds refill blood pressure stable  Continue Wellbutrin for mood  Advised to quit smoking  Advised COVID vaccine  I have spent 30 minutes with Patient  today in which greater than 50% of this time was spent in counseling/coordination of care regarding Prognosis, Risks and benefits of tx options and Intructions for management  Problem List Items Addressed This Visit        Respiratory    HIRAL (obstructive sleep apnea)    Relevant Orders    Ambulatory Referral to Otolaryngology       Cardiovascular and Mediastinum    Essential hypertension    Relevant Medications    amLODIPine (NORVASC) 10 mg tablet       Other    Tobacco use    Obesity (BMI 30 0-34 9)      Other Visit Diagnoses     Need for HPV vaccination    -  Primary    Relevant Orders    HPV VACCINE 9 VALENT IM (Completed)    Chronic sinusitis, unspecified location        Relevant Orders    Ambulatory Referral to Otolaryngology            Subjective:      Patient ID: Boni Speaker is a 42 y o  female  50-year-old female follow-up right ear infection  She went to the ER was diagnosed right otitis media was placed on Augmentin she had diffuse diarrhea that she could go to work  Med was switched to azithromycin her diarrhea stopped and now here feel better  She has chronic sinusitis she does smoke 5 cigarettes a day she is cutting down  She was recent diagnosed obstructive sleep apnea  She was recommend to see ENT for evaluation since her sinus was very inflamed  She is on amlodipine for high blood pressure she is compliant medication  She had COVID infection the past and had to get antibody infusion that was 3 months ago  Her insurance cover HPV vaccine  She is sexually active  The following portions of the patient's history were reviewed and updated as appropriate:   Past Medical History:  She has a past medical history of Abnormal Pap smear of cervix, Adrenal mass, left ([facility] [address] ) (2021), Arthritis, Asthma, Blurry vision (2020), Essential hypertension (2020), Headache (2020), Hypertension, Immunization deficiency (10/2/2020), Insomnia (2021), Migraine, Moderate episode of recurrent major depressive disorder ([facility] ) (2020), Obesity (BMI 30 0-34 9) (2021), Prediabetes (2020), Tobacco use (2021), and Vitamin D deficiency (10/2/2020)  ,  _______________________________________________________________________  Medical Problems:  does not have any pertinent problems on file ,  _______________________________________________________________________  Past Surgical History:   has a past surgical history that includes Tubal ligation (); Appendectomy;  section; Colposcopy; Knee surgery; Cervical biopsy w/ loop electrode excision; Lymph node dissection (); Breast surgery (Left, 2018); Breast biopsy (Left, 2017); Breast excisional biopsy (Left, 2018); Breast biopsy (Right); Hysterectomy (); and US guided thyroid biopsy (2021)  ,  _______________________________________________________________________  Family History:  family history includes Colon cancer (age of onset: 28) in her sister; Diabetes in her maternal grandmother, mother, paternal aunt, and paternal grandmother; Endometrial cancer (age of onset: 29) in her sister;  Heart attack in her mother; Heart disease in her mother; Leukemia (age of onset: 25) in her maternal uncle; Lupus (age of onset: 50) in her maternal aunt; No Known Problems in her daughter, father, son, and son; Seizures in her maternal aunt  ,  _______________________________________________________________________  Social History:   reports that she has been smoking cigarettes  She has a 5 00 pack-year smoking history  She has never used smokeless tobacco  She reports current alcohol use  She reports that she does not use drugs  ,  _______________________________________________________________________  Allergies:  has No Known Allergies     _______________________________________________________________________  Current Outpatient Medications   Medication Sig Dispense Refill    acetaminophen (TYLENOL) 500 mg tablet Take 2 tablets (1,000 mg total) by mouth every 6 (six) hours as needed for mild pain for up to 15 doses 30 tablet 0    albuterol (PROVENTIL HFA,VENTOLIN HFA) 90 mcg/act inhaler       amLODIPine (NORVASC) 10 mg tablet Take 1 tablet (10 mg total) by mouth daily Blood presuure 90 tablet 1    buPROPion (WELLBUTRIN XL) 150 mg 24 hr tablet Take 1 tablet (150 mg total) by mouth every morning 90 tablet 1    CVS D3 125 MCG (5000 UT) capsule TAKE 1 CAPSULE (5,000 UNITS TOTAL) BY MOUTH DAILY 30 capsule 8    Diclofenac Sodium (VOLTAREN) 1 % Apply 2 g topically 4 (four) times a day 100 g 2    IBUPROFEN PO Take by mouth      cyanocobalamin (VITAMIN B-12) 2000 MCG tablet Take 1 tablet (2,000 mcg total) by mouth daily 90 tablet 2    naproxen (Naprosyn) 250 mg tablet Take 1 tablet (250 mg total) by mouth 2 (two) times a day as needed for mild pain for up to 10 days 20 tablet 0    valACYclovir (VALTREX) 500 mg tablet TAKE 1 TABLET BY MOUTH EVERY DAY 30 tablet 3     No current facility-administered medications for this visit      _______________________________________________________________________  Review of Systems   Constitutional: Negative for activity change, appetite change, fatigue and unexpected weight change  HENT: Positive for sinus pressure  Negative for ear pain, sore throat, trouble swallowing and voice change  Eyes: Negative for photophobia and visual disturbance  Respiratory: Negative for cough, chest tightness, shortness of breath and wheezing  Cardiovascular: Negative for chest pain, palpitations and leg swelling  Gastrointestinal: Negative for abdominal pain, constipation, diarrhea, nausea, rectal pain and vomiting  Endocrine: Negative for cold intolerance, polydipsia and polyuria  Genitourinary: Negative for difficulty urinating, dysuria, flank pain, menstrual problem and pelvic pain  Musculoskeletal: Negative for arthralgias, joint swelling and myalgias  Skin: Negative for color change and rash  Allergic/Immunologic: Negative for environmental allergies and immunocompromised state  Neurological: Negative for dizziness, weakness, numbness and headaches  Hematological: Negative for adenopathy  Does not bruise/bleed easily  Psychiatric/Behavioral: Negative for decreased concentration, dysphoric mood, self-injury, sleep disturbance and suicidal ideas  The patient is not nervous/anxious  Objective:  Vitals:    04/20/22 1631   BP: 122/86   BP Location: Right arm   Patient Position: Sitting   Cuff Size: Large   Pulse: 90   Temp: 98 7 °F (37 1 °C)   TempSrc: Tympanic   SpO2: 97%   Weight: 93 4 kg (206 lb)   Height: 5' 5" (1 651 m)     Body mass index is 34 28 kg/m²  Physical Exam  Vitals and nursing note reviewed  Constitutional:       Appearance: Normal appearance  She is well-developed  She is obese  HENT:      Head: Normocephalic and atraumatic  Right Ear: Tympanic membrane, ear canal and external ear normal       Left Ear: Tympanic membrane, ear canal and external ear normal       Nose: Nose normal       Mouth/Throat:      Mouth: Mucous membranes are dry  Pharynx: Oropharynx is clear   No oropharyngeal exudate  Eyes:      Extraocular Movements: Extraocular movements intact  Pupils: Pupils are equal, round, and reactive to light  Neck:      Thyroid: No thyromegaly  Cardiovascular:      Rate and Rhythm: Normal rate and regular rhythm  Heart sounds: Normal heart sounds  No murmur heard  Pulmonary:      Effort: Pulmonary effort is normal  No respiratory distress  Breath sounds: Normal breath sounds  No wheezing or rales  Abdominal:      General: Bowel sounds are normal  There is no distension  Palpations: Abdomen is soft  Tenderness: There is no abdominal tenderness  There is no guarding  Genitourinary:     Vagina: Normal    Musculoskeletal:         General: No tenderness  Normal range of motion  Cervical back: Normal range of motion and neck supple  Skin:     General: Skin is warm and dry  Capillary Refill: Capillary refill takes less than 2 seconds  Findings: No rash  Neurological:      General: No focal deficit present  Mental Status: She is alert and oriented to person, place, and time  Mental status is at baseline  Psychiatric:         Mood and Affect: Mood normal          Behavior: Behavior normal          Thought Content:  Thought content normal          Judgment: Judgment normal

## 2022-04-22 DIAGNOSIS — J01.90 ACUTE SINUSITIS, UNSPECIFIED: ICD-10-CM

## 2022-04-25 RX ORDER — ALBUTEROL SULFATE 90 UG/1
AEROSOL, METERED RESPIRATORY (INHALATION)
Qty: 6.7 G | Refills: 1 | Status: SHIPPED | OUTPATIENT
Start: 2022-04-25 | End: 2022-08-01

## 2022-04-26 ENCOUNTER — TELEPHONE (OUTPATIENT)
Dept: FAMILY MEDICINE CLINIC | Facility: CLINIC | Age: 42
End: 2022-04-26

## 2022-04-26 ENCOUNTER — OFFICE VISIT (OUTPATIENT)
Dept: FAMILY MEDICINE CLINIC | Facility: CLINIC | Age: 42
End: 2022-04-26
Payer: COMMERCIAL

## 2022-04-26 VITALS
RESPIRATION RATE: 17 BRPM | OXYGEN SATURATION: 98 % | SYSTOLIC BLOOD PRESSURE: 128 MMHG | BODY MASS INDEX: 34.42 KG/M2 | WEIGHT: 206.6 LBS | DIASTOLIC BLOOD PRESSURE: 80 MMHG | HEIGHT: 65 IN | TEMPERATURE: 98.2 F | HEART RATE: 97 BPM

## 2022-04-26 DIAGNOSIS — R59.1 LYMPHADENOPATHY: Primary | ICD-10-CM

## 2022-04-26 DIAGNOSIS — M79.89 LEFT AXILLARY SWELLING: ICD-10-CM

## 2022-04-26 DIAGNOSIS — R22.1 LOCALIZED SWELLING, MASS AND LUMP, NECK: ICD-10-CM

## 2022-04-26 DIAGNOSIS — T88.1XXA POSTIMMUNIZATION REACTION, INITIAL ENCOUNTER: ICD-10-CM

## 2022-04-26 PROCEDURE — 99214 OFFICE O/P EST MOD 30 MIN: CPT | Performed by: NURSE PRACTITIONER

## 2022-04-26 RX ORDER — IBUPROFEN 800 MG/1
800 TABLET ORAL EVERY 8 HOURS PRN
Qty: 30 TABLET | Refills: 0 | Status: SHIPPED | OUTPATIENT
Start: 2022-04-26 | End: 2022-05-06

## 2022-04-26 RX ORDER — CEPHALEXIN 500 MG/1
500 CAPSULE ORAL EVERY 8 HOURS SCHEDULED
Qty: 21 CAPSULE | Refills: 0 | Status: SHIPPED | OUTPATIENT
Start: 2022-04-26 | End: 2022-05-03

## 2022-04-26 NOTE — LETTER
April 26, 2022     Patient: Adrianne Fairbanks  YOB: 1980  Date of Visit: 4/26/2022      To Whom it May Concern:    Adrianne Fairbanks is under my professional care  Maryana Gamino was seen in my office on 4/26/2022  Maryana Gamino may return to school on 5/2/2022  If you have any questions or concerns, please don't hesitate to call           Sincerely,          BETY Rivas        CC: Adrianne Fairbanks

## 2022-04-26 NOTE — LETTER
April 26, 2022     Patient: Neelima Ferrell  YOB: 1980  Date of Visit: 4/26/2022      To Whom it May Concern:    Neelima Ferrell is under my professional care  [facility] was seen in my office on 4/26/2022  [facility] may return to work on 04/27/2022 as tolearted light duty, no lifting pulling   If you have any questions or concerns, please don't hesitate to call           Sincerely,          Colletta Feathers, CRNP        CC: Neelima Ferrell

## 2022-04-26 NOTE — PROGRESS NOTES
BMI Counseling: Body mass index is 34 38 kg/m²  The BMI is above normal  Nutrition recommendations include decreasing portion sizes, encouraging healthy choices of fruits and vegetables, decreasing fast food intake, consuming healthier snacks, limiting drinks that contain sugar, moderation in carbohydrate intake, increasing intake of lean protein, reducing intake of saturated and trans fat and reducing intake of cholesterol  Exercise recommendations include vigorous physical activity 75 minutes/week, exercising 3-5 times per week and strength training exercises  No pharmacotherapy was ordered  Patient referred to PCP  Rationale for BMI follow-up plan is due to patient being overweight or obese  Assessment/Plan:    Pt presents in office for follow up left neck swelling and swollen lymph nodes  Also swollen lymph nodes in her left axilla tender to touch and having a hard time moving the arm   One week ago she had gotten the HPV vaccine - guardacil and this started after  Prior to that she had COVID   CBC reviewed elevated WBC back in march   Pt and mom are very nervous as they have extensive history in the family of cancer   Discuss that lymphadenopathy is inflamatary and reactive to her immune system working with the HPV    However due to the nature and extent of the swelling   She was started on antibiotic , NSAID s  Discussed symptoms management   Follow up repeat CBC and US ordered to determine what is going on   Follow up in 2 week   If worsening of pain and swelling despite treatment--> ER   Problem List Items Addressed This Visit     None      Visit Diagnoses     Lymphadenopathy    -  Primary    Relevant Medications    cephalexin (KEFLEX) 500 mg capsule    ibuprofen (MOTRIN) 800 mg tablet    Other Relevant Orders    CBC and differential    Comprehensive metabolic panel    UA (URINE) with reflex to Scope    US head neck soft tissue    US axilla    Localized swelling, mass and lump, neck        left side     Relevant Orders    US axilla    Left axillary swelling        Relevant Orders    US axilla    Postimmunization reaction, initial encounter        POST HPV GUARDACIL on      Relevant Orders    US axilla            Subjective:      Patient ID: Jennifer Maria is a 42 y o  female  HPI    The following portions of the patient's history were reviewed and updated as appropriate:   Past Medical History:  She has a past medical history of Abnormal Pap smear of cervix, Adrenal mass, left ([facility] [address] ) (2021), Arthritis, Asthma, Blurry vision (2020), Essential hypertension (2020), Headache (2020), Hypertension, Immunization deficiency (10/2/2020), Insomnia (2021), Migraine, Moderate [facility] ([facility]), Obesity (BMI 30 0-34 9) (2021), Prediabetes (2020), Tobacco use (2021), and Vitamin D deficiency (10/2/2020)  ,  _______________________________________________________________________  Medical Problems:  does not have any pertinent problems on file ,  _______________________________________________________________________  Past Surgical History:   has a past surgical history that includes Tubal ligation (); Appendectomy;  section; Colposcopy; Knee surgery; Cervical biopsy w/ loop electrode excision; Lymph node dissection (); Breast surgery (Left, 2018); Breast biopsy (Left, 2017); Breast excisional biopsy (Left, 2018); Breast biopsy (Right); Hysterectomy (); and US guided thyroid biopsy (2021)  ,  _______________________________________________________________________  Family History:  family history includes Colon cancer (age of onset: 28) in her sister; Diabetes in her maternal grandmother, mother, paternal aunt, and paternal grandmother; Endometrial cancer (age of onset: 29) in her sister;  Heart attack in her mother; Heart disease in her mother; Leukemia (age of onset: 25) in her maternal uncle; Lupus (age of onset: 50) in her maternal aunt; No Known Problems in her daughter, father, son, and son; Seizures in her maternal aunt  ,  _______________________________________________________________________  Social History:   reports that she has been smoking cigarettes  She has a 5 00 pack-year smoking history  She has never used smokeless tobacco  She reports current alcohol use  She reports that she does not use drugs  ,  _______________________________________________________________________  Allergies:  has No Known Allergies     _______________________________________________________________________  Current Outpatient Medications   Medication Sig Dispense Refill    acetaminophen (TYLENOL) 500 mg tablet Take 2 tablets (1,000 mg total) by mouth every 6 (six) hours as needed for mild pain for up to 15 doses 30 tablet 0    albuterol (PROVENTIL HFA,VENTOLIN HFA) 90 mcg/act inhaler TAKE 2 PUFFS BY MOUTH EVERY 6 HOURS AS NEEDED FOR WHEEZE 6 7 g 1    amLODIPine (NORVASC) 10 mg tablet Take 1 tablet (10 mg total) by mouth daily Blood presuure 90 tablet 1    buPROPion (WELLBUTRIN XL) 150 mg 24 hr tablet Take 1 tablet (150 mg total) by mouth every morning 90 tablet 1    cephalexin (KEFLEX) 500 mg capsule Take 1 capsule (500 mg total) by mouth every 8 (eight) hours for 7 days 21 capsule 0    [employer] D3 125 MCG (5000 UT) capsule TAKE 1 CAPSULE (5,000 UNITS TOTAL) BY MOUTH DAILY 30 capsule 8    cyanocobalamin (VITAMIN B-12) 2000 MCG tablet Take 1 tablet (2,000 mcg total) by mouth daily 90 tablet 2    Diclofenac Sodium (VOLTAREN) 1 % Apply 2 g topically 4 (four) times a day 100 g 2    ibuprofen (MOTRIN) 800 mg tablet Take 1 tablet (800 mg total) by mouth every 8 (eight) hours as needed for mild pain (inflamation - TAKE WITH food) for up to 10 days 30 tablet 0    valACYclovir (VALTREX) 500 mg tablet TAKE 1 TABLET BY MOUTH EVERY DAY 30 tablet 3     No current facility-administered medications for this visit  _______________________________________________________________________  Review of Systems   Constitutional: Positive for chills and fatigue  Negative for fever  HENT: Negative for congestion, nosebleeds, sinus pressure and sore throat  Eyes: Negative  Respiratory: Positive for cough  Negative for shortness of breath  Cardiovascular: Negative for chest pain and palpitations  Gastrointestinal: Negative for abdominal distention, nausea and vomiting  Genitourinary: Negative for difficulty urinating and flank pain  Musculoskeletal: Positive for arthralgias, neck pain and neck stiffness  Skin: Negative for rash  Neurological: Positive for headaches  Hematological: Positive for adenopathy (left neck swollen lymph nodes and left exailla post HPV immunization )  Psychiatric/Behavioral: Negative for sleep disturbance and suicidal ideas  The patient is nervous/anxious  Objective:  Vitals:    04/26/22 0917   BP: 128/80   BP Location: Right arm   Patient Position: Sitting   Cuff Size: Standard   Pulse: 97   Resp: 17   Temp: 98 2 °F (36 8 °C)   TempSrc: Temporal   SpO2: 98%   Weight: 93 7 kg (206 lb 9 6 oz)   Height: 5' 5" (1 651 m)     Body mass index is 34 38 kg/m²  Physical Exam  Vitals and nursing note reviewed  Constitutional:       Appearance: She is ill-appearing  Comments: BMI 34 38    HENT:      Nose: No congestion or rhinorrhea  Mouth/Throat:      Pharynx: No posterior oropharyngeal erythema  Cardiovascular:      Rate and Rhythm: Normal rate  Pulses: Normal pulses  Heart sounds: Normal heart sounds  Pulmonary:      Breath sounds: Normal breath sounds  Abdominal:      Palpations: Abdomen is soft  Musculoskeletal:         General: Swelling and tenderness present  Cervical back: Normal range of motion  Comments: To left neck and left axilla    Skin:     Capillary Refill: Capillary refill takes less than 2 seconds     Neurological:      Mental Status: She is oriented to person, place, and time     Psychiatric:         Mood and Affect: Mood normal          Behavior: Behavior normal

## 2022-04-26 NOTE — PATIENT INSTRUCTIONS
Lymphadenopathy   WHAT YOU NEED TO KNOW:   Lymphadenopathy is swelling of your lymph nodes  Lymph nodes are small organs that are part of your immune system  The lymph nodes are found throughout your body  They are most easily felt in your neck, under your arms, and near your groin  Lymphadenopathy can occur in one or more areas of your body  It is usually caused by an infection  DISCHARGE INSTRUCTIONS:   Return to the emergency department if:   · The swollen lymph nodes bleed  · You have swollen lymph nodes in your neck that affect your breathing or swallowing  Contact your healthcare provider if:   · You have a fever  · You have a new swollen and painful lymph node  · You have a skin rash  · Your lymph node remains swollen or painful, or it gets bigger  · Your lymph node has red streaks around it, or the skin around the lymph node is red  · You have questions or concerns about your condition or care  Follow up with your doctor as directed:  Write down your questions so you remember to ask them during your visits  Self-care:   · Do not poke or squeeze  the swollen lymph nodes  · Apply heat to the swollen glands  You may use warm compresses, or an electric heating pad set on low  · Rest as needed  If you have a fever, rest until your temperature returns to normal  Return to your normal daily activities slowly after your fever is gone  © Copyright [employer] 2022 Information is for End User's use only and may not be sold, redistributed or otherwise used for commercial purposes  All illustrations and images included in CareNotes® are the copyrighted property of [employer]  or Yinka Morgan  The above information is an  only  It is not intended as medical advice for individual conditions or treatments  Talk to your doctor, nurse or pharmacist before following any medical regimen to see if it is safe and effective for you

## 2022-04-26 NOTE — TELEPHONE ENCOUNTER
Servando Juansavanna was given a not to be out for the rest of the week, but she wants to go back tomorrow on light duty,

## 2022-04-28 ENCOUNTER — APPOINTMENT (EMERGENCY)
Dept: CT IMAGING | Facility: HOSPITAL | Age: 42
End: 2022-04-28
Payer: COMMERCIAL

## 2022-04-28 ENCOUNTER — HOSPITAL ENCOUNTER (EMERGENCY)
Facility: HOSPITAL | Age: 42
Discharge: HOME | End: 2022-04-28
Attending: EMERGENCY MEDICINE | Admitting: EMERGENCY MEDICINE
Payer: COMMERCIAL

## 2022-04-28 VITALS
SYSTOLIC BLOOD PRESSURE: 136 MMHG | HEART RATE: 78 BPM | OXYGEN SATURATION: 100 % | RESPIRATION RATE: 20 BRPM | DIASTOLIC BLOOD PRESSURE: 96 MMHG | TEMPERATURE: 98.2 F

## 2022-04-28 DIAGNOSIS — M54.2 NECK PAIN: Primary | ICD-10-CM

## 2022-04-28 DIAGNOSIS — M54.10 RADICULOPATHY: ICD-10-CM

## 2022-04-28 LAB
ANION GAP SERPL CALCULATED.3IONS-SCNC: 9 MMOL/L (ref 4–13)
BASOPHILS # BLD MANUAL: 0.3 THOUSAND/UL (ref 0–0.1)
BASOPHILS NFR MAR MANUAL: 3 % (ref 0–1)
BUN SERPL-MCNC: 10 MG/DL (ref 5–25)
CALCIUM SERPL-MCNC: 9.2 MG/DL (ref 8.3–10.1)
CHLORIDE SERPL-SCNC: 104 MMOL/L (ref 100–108)
CO2 SERPL-SCNC: 27 MMOL/L (ref 21–32)
CREAT SERPL-MCNC: 0.76 MG/DL (ref 0.6–1.3)
EOSINOPHIL # BLD MANUAL: 0.1 THOUSAND/UL (ref 0–0.4)
EOSINOPHIL NFR BLD MANUAL: 1 % (ref 0–6)
ERYTHROCYTE [DISTWIDTH] IN BLOOD BY AUTOMATED COUNT: 14.7 % (ref 11.6–15.1)
GFR SERPL CREATININE-BSD FRML MDRD: 97 ML/MIN/1.73SQ M
GLUCOSE SERPL-MCNC: 102 MG/DL (ref 65–140)
HCT VFR BLD AUTO: 40.4 % (ref 34.8–46.1)
HGB BLD-MCNC: 13.3 G/DL (ref 11.5–15.4)
LG PLATELETS BLD QL SMEAR: PRESENT
LYMPHOCYTES # BLD AUTO: 4.74 THOUSAND/UL (ref 0.6–4.47)
LYMPHOCYTES # BLD AUTO: 48 % (ref 14–44)
MCH RBC QN AUTO: 29.4 PG (ref 26.8–34.3)
MCHC RBC AUTO-ENTMCNC: 32.9 G/DL (ref 31.4–37.4)
MCV RBC AUTO: 89 FL (ref 82–98)
MONOCYTES # BLD AUTO: 0.79 THOUSAND/UL (ref 0–1.22)
MONOCYTES NFR BLD: 8 % (ref 4–12)
NEUTROPHILS # BLD MANUAL: 3.85 THOUSAND/UL (ref 1.85–7.62)
NEUTS SEG NFR BLD AUTO: 39 % (ref 43–75)
PLATELET # BLD AUTO: 284 THOUSANDS/UL (ref 149–390)
PLATELET BLD QL SMEAR: ADEQUATE
PMV BLD AUTO: 8.4 FL (ref 8.9–12.7)
POTASSIUM SERPL-SCNC: 3.8 MMOL/L (ref 3.5–5.3)
RBC # BLD AUTO: 4.52 MILLION/UL (ref 3.81–5.12)
RBC MORPH BLD: NORMAL
SODIUM SERPL-SCNC: 140 MMOL/L (ref 136–145)
VARIANT LYMPHS # BLD AUTO: 1 %
WBC # BLD AUTO: 9.87 THOUSAND/UL (ref 4.31–10.16)

## 2022-04-28 PROCEDURE — 85027 COMPLETE CBC AUTOMATED: CPT | Performed by: PHYSICIAN ASSISTANT

## 2022-04-28 PROCEDURE — 85007 BL SMEAR W/DIFF WBC COUNT: CPT | Performed by: PHYSICIAN ASSISTANT

## 2022-04-28 PROCEDURE — 80048 BASIC METABOLIC PNL TOTAL CA: CPT | Performed by: PHYSICIAN ASSISTANT

## 2022-04-28 PROCEDURE — 70491 CT SOFT TISSUE NECK W/DYE: CPT

## 2022-04-28 PROCEDURE — 96374 THER/PROPH/DIAG INJ IV PUSH: CPT

## 2022-04-28 PROCEDURE — 99284 EMERGENCY DEPT VISIT MOD MDM: CPT

## 2022-04-28 PROCEDURE — 36415 COLL VENOUS BLD VENIPUNCTURE: CPT | Performed by: PHYSICIAN ASSISTANT

## 2022-04-28 PROCEDURE — 99284 EMERGENCY DEPT VISIT MOD MDM: CPT | Performed by: PHYSICIAN ASSISTANT

## 2022-04-28 RX ORDER — METHYLPREDNISOLONE 4 MG/1
TABLET ORAL
Qty: 21 TABLET | Refills: 0 | Status: SHIPPED | OUTPATIENT
Start: 2022-04-28

## 2022-04-28 RX ORDER — ACETAMINOPHEN 325 MG/1
975 TABLET ORAL ONCE
Status: COMPLETED | OUTPATIENT
Start: 2022-04-28 | End: 2022-04-28

## 2022-04-28 RX ORDER — KETOROLAC TROMETHAMINE 30 MG/ML
15 INJECTION, SOLUTION INTRAMUSCULAR; INTRAVENOUS ONCE
Status: COMPLETED | OUTPATIENT
Start: 2022-04-28 | End: 2022-04-28

## 2022-04-28 RX ADMIN — ACETAMINOPHEN 975 MG: 325 TABLET ORAL at 17:14

## 2022-04-28 RX ADMIN — IOHEXOL 85 ML: 350 INJECTION, SOLUTION INTRAVENOUS at 18:20

## 2022-04-28 RX ADMIN — KETOROLAC TROMETHAMINE 15 MG: 30 INJECTION, SOLUTION INTRAMUSCULAR at 17:14

## 2022-04-28 NOTE — DISCHARGE INSTRUCTIONS
Take Medrol Dosepak as directed with food  Follow-up with family doctor  You may also continue taking a 1000 mg of Tylenol up to 3 times daily but avoid ibuprofen and other NSAIDs while on Medrol Dosepak  Return to the ER with any significant change or worsening of your symptoms

## 2022-04-28 NOTE — ED PROVIDER NOTES
History  Chief Complaint   Patient presents with    Neck Pain     PT c/o neck swelling (left side) since Monday  Was seen at PCP was given abx and ibuprophen  Today at work pain increases and is radiating into shoulder     48-year-old female history of asthma, arthritis, hypertension presents complaining of neck pain  Patient reports that since Monday she has noticed some pain and swelling in her left anterior neck  Patient was seen by her PCP and was prescribed Keflex and 800 mg ibuprofen which she has been taking compliantly without any relief of her symptoms  Patient reports she was at work today and notes that her pain was severe, much worse than prior  She describes a sharp yet aching pain in her left anterior neck and reports paresthesias down her left arm  She states she can visibly see swelling in the mirror  She denies any recent trauma to her head, neck or back  Denies any previous injury  Denies any recent fevers, chills, unintentional weight loss  Patient reports that she was told by her PCP that they are likely swollen lymph nodes and was scheduled for outpatient ultrasound  Patient denies any recent illness  Denies any other complaints or concerns at this time  Prior to Admission Medications   Prescriptions Last Dose Informant Patient Reported? Taking?    [facility] D3 125 MCG (5000 UT) capsule   No No   Sig: TAKE 1 CAPSULE (5,000 UNITS TOTAL) BY MOUTH DAILY   Diclofenac Sodium (VOLTAREN) 1 %  Self No No   Sig: Apply 2 g topically 4 (four) times a day   acetaminophen (TYLENOL) 500 mg tablet   No No   Sig: Take 2 tablets (1,000 mg total) by mouth every 6 (six) hours as needed for mild pain for up to 15 doses   albuterol (PROVENTIL HFA,VENTOLIN HFA) 90 mcg/act inhaler   No No   Sig: TAKE 2 PUFFS BY MOUTH EVERY 6 HOURS AS NEEDED FOR WHEEZE   amLODIPine (NORVASC) 10 mg tablet   No No   Sig: Take 1 tablet (10 mg total) by mouth daily Blood presuure   buPROPion (WELLBUTRIN XL) 150 mg 24 hr tablet   No No   Sig: Take 1 tablet (150 mg total) by mouth every morning   cephalexin (KEFLEX) 500 mg capsule   No No   Sig: Take 1 capsule (500 mg total) by mouth every 8 (eight) hours for 7 days   cyanocobalamin (VITAMIN B-12) 2000 MCG tablet   No No   Sig: Take 1 tablet (2,000 mcg total) by mouth daily   ibuprofen (MOTRIN) 800 mg tablet   No No   Sig: Take 1 tablet (800 mg total) by mouth every 8 (eight) hours as needed for mild pain (inflamation - TAKE WITH food) for up to 10 days   valACYclovir (VALTREX) 500 mg tablet   No No   Sig: TAKE 1 TABLET BY MOUTH EVERY DAY      Facility-Administered Medications: None       Past Medical History:   Diagnosis Date    Abnormal Pap smear of cervix     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, 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    Adrenal mass, left ([facility] [address] ) 2021    1 8 x 2 1 cm on ct 2020    Arthritis     Asthma     Blurry vision 2020    Essential hypertension 2020    Headache 2020    Hypertension     Immunization deficiency 10/2/2020    Hep a nonimmune    Insomnia 2021    Migraine     Moderate episode of recurrent major depressive disorder ([facility] [address] ) 2020    Obesity (BMI 30 0-34 9) 2021    Prediabetes 2020    Tobacco use 2021    Vitamin D deficiency 10/2/2020       Past Surgical History:   Procedure Laterality Date    APPENDECTOMY      BREAST BIOPSY Left 2017    BREAST BIOPSY Right     2 core biopsies    BREAST EXCISIONAL BIOPSY Left 2018    BREAST SURGERY Left 2018    Left breast mass excision    CERVICAL BIOPSY  W/ LOOP ELECTRODE EXCISION       SECTION      X2    COLPOSCOPY      HYSTERECTOMY  2012    heavy bleeding, ovaries remain, also had abnormal pap    KNEE SURGERY      LYMPH NODE DISSECTION  2018    under armpit    TUBAL LIGATION  2007    US GUIDED THYROID BIOPSY  2021       Family History   Problem Relation Age of Onset    Lupus Maternal Aunt 50    Seizures Maternal Aunt     Leukemia Maternal Uncle 25    Diabetes Mother     Heart attack Mother     Heart disease Mother         Internal Defibrilation    Diabetes Maternal Grandmother     Diabetes Paternal Grandmother     Diabetes Paternal Aunt     No Known Problems Father     Endometrial cancer Sister 29    Colon cancer Sister 28    No Known Problems Daughter     No Known Problems Son     No Known Problems Son      I have reviewed and agree with the history as documented  E-Cigarette/Vaping    E-Cigarette Use Never User      E-Cigarette/Vaping Substances    Nicotine No     THC No     CBD No     Flavoring No     Other No     Unknown No      Social History     Tobacco Use    Smoking status: Current Every Day Smoker     Packs/day: 0 25     Years: 20 00     Pack years: 5 00     Types: Cigarettes    Smokeless tobacco: Never Used    Tobacco comment: pt is down to  5 packs a day   Vaping Use    Vaping Use: Never used   Substance Use Topics    Alcohol use: Yes     Comment: occasional    Drug use: No       Review of Systems   Constitutional: Negative for chills, fatigue and fever  HENT: Negative for ear pain and sore throat  Eyes: Negative for pain  Respiratory: Negative for cough, shortness of breath and wheezing  Cardiovascular: Negative for chest pain, palpitations and leg swelling  Gastrointestinal: Negative for abdominal pain, constipation, diarrhea, nausea and vomiting  Endocrine: Negative for polyuria  Genitourinary: Negative for dysuria and pelvic pain  Musculoskeletal: Positive for neck pain  Negative for arthralgias, myalgias and neck stiffness  Skin: Negative for rash  Neurological: Negative for dizziness, syncope, light-headedness and headaches  All other systems reviewed and are negative  Physical Exam  Physical Exam  Constitutional:       General: She is not in acute distress  Appearance: Normal appearance  She is well-developed  HENT:      Head: Normocephalic and atraumatic          Right Ear: External ear normal       Left Ear: External ear normal       Mouth/Throat:      Pharynx: No oropharyngeal exudate  Eyes:      Extraocular Movements: Extraocular movements intact  Cardiovascular:      Rate and Rhythm: Normal rate and regular rhythm  Heart sounds: Normal heart sounds  Pulmonary:      Effort: Pulmonary effort is normal       Breath sounds: Normal breath sounds  Abdominal:      General: Bowel sounds are normal       Palpations: Abdomen is soft  Tenderness: There is no abdominal tenderness  Musculoskeletal:         General: Normal range of motion  Cervical back: Normal range of motion  Comments: Brisk cap refill bilateral upper extremities  2+ DP pulses  Distal sensation is intact although somewhat diminished throughout left hand  5/5 strength throughout bilateral upper extremities throughout C5-C8 distribution   Skin:     General: Skin is warm  Capillary Refill: Capillary refill takes less than 2 seconds  Neurological:      General: No focal deficit present  Mental Status: She is alert and oriented to person, place, and time     Psychiatric:         Mood and Affect: Mood normal          Vital Signs  ED Triage Vitals [04/28/22 1636]   Temperature Pulse Respirations Blood Pressure SpO2   98 2 °F (36 8 °C) 89 16 137/90 100 %      Temp Source Heart Rate Source Patient Position - Orthostatic VS BP Location FiO2 (%)   Oral Right Sitting Right arm --      Pain Score       9           Vitals:    04/28/22 1636 04/28/22 1825   BP: 137/90 136/96   Pulse: 89 78   Patient Position - Orthostatic VS: Sitting Sitting         Visual Acuity      ED Medications  Medications   ketorolac (TORADOL) injection 15 mg (15 mg Intravenous Given 4/28/22 1714)   acetaminophen (TYLENOL) tablet 975 mg (975 mg Oral Given 4/28/22 1714)   iohexol (OMNIPAQUE) 350 MG/ML injection (SINGLE-DOSE) 100 mL (85 mL Intravenous Given 4/28/22 1820)       Diagnostic Studies  Results Reviewed     Procedure Component Value Units Date/Time    Basic metabolic panel [[id number]] Collected: 04/28/22 1711    Lab Status: Final result Specimen: Blood Updated: 04/28/22 1744     Sodium 140 mmol/L      Potassium 3 8 mmol/L      Chloride 104 mmol/L      CO2 27 mmol/L      ANION GAP 9 mmol/L      BUN 10 mg/dL      Creatinine 0 76 mg/dL      Glucose 102 mg/dL      Calcium 9 2 mg/dL      eGFR 97 ml/min/1 73sq m     Narrative:      [place] guidelines for Chronic Kidney Disease (CKD):     Stage 1 with normal or high GFR (GFR > 90 mL/min/1 73 square meters)    Stage 2 Mild CKD (GFR = 60-89 mL/min/1 73 square meters)    Stage 3A Moderate CKD (GFR = 45-59 mL/min/1 73 square meters)    Stage 3B Moderate CKD (GFR = 30-44 mL/min/1 73 square meters)    Stage 4 Severe CKD (GFR = 15-29 mL/min/1 73 square meters)    Stage 5 End Stage CKD (GFR <15 mL/min/1 73 square meters)  Note: GFR calculation is accurate only with a steady state creatinine    CBC and differential [[id number]]  (Abnormal) Collected: 04/28/22 1711    Lab Status: Final result Specimen: Blood Updated: 04/28/22 1739     WBC 9 87 Thousand/uL      RBC 4 52 Million/uL      Hemoglobin 13 3 g/dL      Hematocrit 40 4 %      MCV 89 fL      MCH 29 4 pg      MCHC 32 9 g/dL      RDW 14 7 %      MPV 8 4 fL      Platelets 131 Thousands/uL     Narrative: This is an appended report  These results have been appended to a previously verified report      Manual Differential(PHLEBS Do Not Order) [[id number]]  (Abnormal) Collected: 04/28/22 1711    Lab Status: Final result Specimen: Blood Updated: 04/28/22 1739     Segmented % 39 %      Lymphocytes % 48 %      Monocytes % 8 %      Eosinophils, % 1 %      Basophils % 3 %      Atypical Lymphocytes % 1 %      Absolute Neutrophils 3 85 Thousand/uL      Lymphocytes Absolute 4 74 Thousand/uL      Monocytes Absolute 0 79 Thousand/uL      Eosinophils Absolute 0 10 Thousand/uL      Basophils Absolute 0 30 Thousand/uL      Total Counted --     RBC Morphology Normal     Platelet Estimate Adequate     Large Platelet Present                 CT soft tissue neck with contrast   Final Result by Chasidy Jarrell MD (04/28 1909)      No mass, fluid collection or inflammation in the neck  Workstation performed: [id number]                    Procedures  Procedures         ED Course  ED Course as of 04/28/22 2050   Thu Apr 28, 2022 1926 Upon re-evaluation of the patient, she is feeling much better  Pain is well controlled  CT without any obvious acute abnormalities  Suspect possible thoracic outlet as cause of patient's symptoms  Will try a Medrol Dosepak recommend PCP follow-up  MDM  Number of Diagnoses or Management Options  Neck pain  Radiculopathy  Diagnosis management comments: Patient presented with concerns of some soft tissue swelling and pain in her left anterior neck  No signs of cellulitis  No palpable mass  CT negative  Suspect possible thoracic outlet given paresthesias down left upper extremity  Patient has no cardiopulmonary complaints doubt ACS  There is no upper extremity edema  Doubt DVT  Patient has outpatient ultrasound scheduled  Patient was informed of the risk of diagnostic uncertainty and was informed that she was follow-up with her PCP for re-evaluation  Will treat radiculopathy with Medrol Dosepak  Patient informed to stop taking NSAIDs due to increased risk of bowel perforation GI bleeding  Recommend continue Tylenol  Return precautions were advised, all questions were answered and patient was agreeable to plan very thankful for care  Portions of the record may have been created with voice recognition software  Occasional wrong word or "sound a like" substitutions may have occurred due to the inherent limitations of voice recognition software   Read the chart carefully and recognize, using context, where substitutions have occurred  Disposition  Final diagnoses:   Neck pain   Radiculopathy     Time reflects when diagnosis was documented in both MDM as applicable and the Disposition within this note     Time User Action Codes Description Comment    4/28/2022  7:27 PM Jamshid Brightly Add [M54 2] Neck pain     4/28/2022  7:27 PM Jamshid Brightly Add [V93 36] Radiculopathy       ED Disposition     ED Disposition Condition Date/Time Comment    Discharge Stable Thu Apr 28, 2022  7:27 PM Idella Gurley discharge to home/self care              Follow-up Information     Follow up With Specialties Details Why Contact Info    Al Pinedo MD Family Medicine Schedule an appointment as soon as possible for a visit   [facility],  [address]   [phone]            Discharge Medication List as of 4/28/2022  7:28 PM      START taking these medications    Details   methylPREDNISolone 4 MG tablet therapy pack Use as directed on package, Normal         CONTINUE these medications which have NOT CHANGED    Details   acetaminophen (TYLENOL) 500 mg tablet Take 2 tablets (1,000 mg total) by mouth every 6 (six) hours as needed for mild pain for up to 15 doses, Starting Fri 3/4/2022, Normal      albuterol (PROVENTIL HFA,VENTOLIN HFA) 90 mcg/act inhaler TAKE 2 PUFFS BY MOUTH EVERY 6 HOURS AS NEEDED FOR WHEEZE, Normal      amLODIPine (NORVASC) 10 mg tablet Take 1 tablet (10 mg total) by mouth daily Blood presuure, Starting Wed 4/20/2022, Until Tue 7/19/2022, Normal      buPROPion (WELLBUTRIN XL) 150 mg 24 hr tablet Take 1 tablet (150 mg total) by mouth every morning, Starting Wed 1/19/2022, Normal      cephalexin (KEFLEX) 500 mg capsule Take 1 capsule (500 mg total) by mouth every 8 (eight) hours for 7 days, Starting Tue 4/26/2022, Until Tue 5/3/2022, Normal      [facility]S D3 125 MCG (5000 UT) capsule TAKE 1 CAPSULE (5,000 UNITS TOTAL) BY MOUTH DAILY, Normal      cyanocobalamin (VITAMIN B-12) 2000 MCG tablet Take 1 tablet (2,000 mcg total) by mouth daily, Starting Wed 1/19/2022, Until Tue 4/19/2022, Normal      Diclofenac Sodium (VOLTAREN) 1 % Apply 2 g topically 4 (four) times a day, Starting Wed 3/24/2021, Normal      ibuprofen (MOTRIN) 800 mg tablet Take 1 tablet (800 mg total) by mouth every 8 (eight) hours as needed for mild pain (inflamation - TAKE WITH food) for up to 10 days, Starting Tue 4/26/2022, Until Fri 5/6/2022 at 2359, Normal      valACYclovir (VALTREX) 500 mg tablet TAKE 1 TABLET BY MOUTH EVERY DAY, Normal             No discharge procedures on file      PDMP Review       Value Time User    PDMP Reviewed  Yes 12/21/2020  5:26 AM Maddi Harris MD          ED Provider  Electronically Signed by           Betty Bautista PA-C  04/28/22 2050

## 2022-05-11 ENCOUNTER — TELEPHONE (OUTPATIENT)
Dept: FAMILY MEDICINE CLINIC | Facility: CLINIC | Age: 42
End: 2022-05-11

## 2022-05-11 ENCOUNTER — OFFICE VISIT (OUTPATIENT)
Dept: FAMILY MEDICINE CLINIC | Facility: CLINIC | Age: 42
End: 2022-05-11
Payer: COMMERCIAL

## 2022-05-11 VITALS
BODY MASS INDEX: 35.16 KG/M2 | OXYGEN SATURATION: 97 % | HEIGHT: 65 IN | SYSTOLIC BLOOD PRESSURE: 114 MMHG | TEMPERATURE: 98.2 F | WEIGHT: 211 LBS | DIASTOLIC BLOOD PRESSURE: 72 MMHG | HEART RATE: 85 BPM

## 2022-05-11 DIAGNOSIS — R22.1 NECK SWELLING: Primary | ICD-10-CM

## 2022-05-11 PROCEDURE — 99214 OFFICE O/P EST MOD 30 MIN: CPT | Performed by: FAMILY MEDICINE

## 2022-05-11 NOTE — PROGRESS NOTES
Assessment/Plan:       Discussed with patient imaging blood test results in detail this is reassurance  Suspect it may be due to HPV vaccine that trigger the COVID infection recently  And that triggered the immune system with lymph node enlargement now that resolved  Patient advised to get her 2nd HPV vaccine next time in the right arm instead and monitor for reaction  I have spent 30 minutes with Patient  today in which greater than 50% of this time was spent in counseling/coordination of care regarding Diagnostic results, Prognosis, Risks and benefits of tx options and Intructions for management  Problem List Items Addressed This Visit        Other    Neck swelling - Primary            Subjective:      Patient ID: Neto Cast is a 42 y o  female  50-year-old female follow-up ER visit for left-sided neck swelling  April 20th she had HPV vaccine in the left arm a week after she developed neck swelling on the left side very painful concern because family history of lymphoma  She was seen in the office had blood work done show normal differential and then she went to the ER because the left side the neck swollen more  CT of the neck show large tonsils but no other concern  Since then the swelling has resolved  She feels fine she does not have any fever or chills no cough  She does have sleep apnea and she uses CPAP machine        The following portions of the patient's history were reviewed and updated as appropriate:   Past Medical History:  She has a past medical history of Abnormal Pap smear of cervix, Adrenal mass, left ([address] ) (1/25/2021), Arthritis, Asthma, Blurry vision (9/4/2020), Essential hypertension (9/4/2020), Headache (9/4/2020), Hypertension, Immunization deficiency (10/2/2020), Insomnia (2/22/2021), Migraine, Moderate episode of recurrent major depressive disorder ([address] ) (9/4/2020), Obesity (BMI 30 0-34 9) (9/22/2021), Prediabetes (9/4/2020), Tobacco use (1/25/2021), and Vitamin D deficiency (10/2/2020)  ,  _______________________________________________________________________  Medical Problems:  does not have any pertinent problems on file ,  _______________________________________________________________________  Past Surgical History:   has a past surgical history that includes Tubal ligation (); Appendectomy;  section; Colposcopy; Knee surgery; Cervical biopsy w/ loop electrode excision; Lymph node dissection (); Breast surgery (Left, 2018); Breast biopsy (Left, ); Breast excisional biopsy (Left, 2018); Breast biopsy (Right); Hysterectomy (); and US guided thyroid biopsy (2021)  ,  _______________________________________________________________________  Family History:  family history includes Colon cancer (age of onset: 28) in her sister; Diabetes in her maternal grandmother, mother, paternal aunt, and paternal grandmother; Endometrial cancer (age of onset: 29) in her sister; Heart attack in her mother; Heart disease in her mother; Leukemia (age of onset: 25) in her maternal uncle; Lupus (age of onset: 50) in her maternal aunt; No Known Problems in her daughter, father, son, and son; Seizures in her maternal aunt  ,  _______________________________________________________________________  Social History:   reports that she has been smoking cigarettes  She has a 5 00 pack-year smoking history  She has never used smokeless tobacco  She reports current alcohol use  She reports that she does not use drugs  ,  _______________________________________________________________________  Allergies:  has No Known Allergies     _______________________________________________________________________  Current Outpatient Medications   Medication Sig Dispense Refill    acetaminophen (TYLENOL) 500 mg tablet Take 2 tablets (1,000 mg total) by mouth every 6 (six) hours as needed for mild pain for up to 15 doses 30 tablet 0    albuterol (PROVENTIL HFA,VENTOLIN HFA) 90 mcg/act inhaler TAKE 2 PUFFS BY MOUTH EVERY 6 HOURS AS NEEDED FOR WHEEZE 6 7 g 1    amLODIPine (NORVASC) 10 mg tablet Take 1 tablet (10 mg total) by mouth daily Blood presuure 90 tablet 1    buPROPion (WELLBUTRIN XL) 150 mg 24 hr tablet Take 1 tablet (150 mg total) by mouth every morning 90 tablet 1    CVS D3 125 MCG (5000 UT) capsule TAKE 1 CAPSULE (5,000 UNITS TOTAL) BY MOUTH DAILY 30 capsule 8    Diclofenac Sodium (VOLTAREN) 1 % Apply 2 g topically 4 (four) times a day 100 g 2    methylPREDNISolone 4 MG tablet therapy pack Use as directed on package 21 tablet 0    cyanocobalamin (VITAMIN B-12) 2000 MCG tablet Take 1 tablet (2,000 mcg total) by mouth daily 90 tablet 2    ibuprofen (MOTRIN) 800 mg tablet Take 1 tablet (800 mg total) by mouth every 8 (eight) hours as needed for mild pain (inflamation - TAKE WITH food) for up to 10 days 30 tablet 0    valACYclovir (VALTREX) 500 mg tablet TAKE 1 TABLET BY MOUTH EVERY DAY 30 tablet 3     No current facility-administered medications for this visit      _______________________________________________________________________  Review of Systems   Constitutional: Negative for activity change, appetite change, fatigue and unexpected weight change  HENT: Negative for ear pain, sore throat, trouble swallowing and voice change  Eyes: Negative for photophobia and visual disturbance  Respiratory: Negative for cough, chest tightness, shortness of breath and wheezing  Cardiovascular: Negative for chest pain, palpitations and leg swelling  Gastrointestinal: Negative for abdominal pain, constipation, diarrhea, nausea, rectal pain and vomiting  Endocrine: Negative for cold intolerance, polydipsia and polyuria  Genitourinary: Negative for difficulty urinating, dysuria, flank pain, menstrual problem and pelvic pain  Musculoskeletal: Negative for arthralgias, joint swelling and myalgias  Skin: Negative for color change and rash     Allergic/Immunologic: Negative for environmental allergies and immunocompromised state  Neurological: Negative for dizziness, weakness, numbness and headaches  Hematological: Negative for adenopathy  Does not bruise/bleed easily  Psychiatric/Behavioral: Negative for decreased concentration, dysphoric mood, self-injury, sleep disturbance and suicidal ideas  The patient is not nervous/anxious  Objective:  Vitals:    05/11/22 1521   BP: 114/72   BP Location: Left arm   Patient Position: Sitting   Cuff Size: Large   Pulse: 85   Temp: 98 2 °F (36 8 °C)   TempSrc: Tympanic   SpO2: 97%   Weight: 95 7 kg (211 lb)   Height: 5' 5" (1 651 m)     Body mass index is 35 11 kg/m²  Physical Exam  Vitals and nursing note reviewed  Constitutional:       Appearance: Normal appearance  She is well-developed  She is obese  HENT:      Head: Normocephalic and atraumatic  Right Ear: Tympanic membrane, ear canal and external ear normal       Left Ear: Tympanic membrane, ear canal and external ear normal       Nose: Nose normal       Mouth/Throat:      Mouth: Mucous membranes are dry  Pharynx: Oropharynx is clear  No oropharyngeal exudate  Eyes:      Extraocular Movements: Extraocular movements intact  Pupils: Pupils are equal, round, and reactive to light  Neck:      Thyroid: No thyromegaly  Cardiovascular:      Rate and Rhythm: Normal rate and regular rhythm  Pulses: Normal pulses  Heart sounds: Normal heart sounds  No murmur heard  Pulmonary:      Effort: Pulmonary effort is normal  No respiratory distress  Breath sounds: Normal breath sounds  No wheezing or rales  Abdominal:      General: Bowel sounds are normal  There is no distension  Palpations: Abdomen is soft  Tenderness: There is no abdominal tenderness  There is no guarding  Genitourinary:     Vagina: Normal    Musculoskeletal:         General: No tenderness  Normal range of motion        Cervical back: Normal range of motion and neck supple  Skin:     General: Skin is warm and dry  Capillary Refill: Capillary refill takes less than 2 seconds  Findings: No rash  Neurological:      General: No focal deficit present  Mental Status: She is alert and oriented to person, place, and time  Mental status is at baseline  Psychiatric:         Mood and Affect: Mood normal          Behavior: Behavior normal          Thought Content:  Thought content normal          Judgment: Judgment normal

## 2022-05-11 NOTE — LETTER
May 11, 2022     Patient: Nasir Hester  YOB: 1980  Date of Visit: 5/11/2022      To Whom it May Concern:    Nasir Hester is under my professional care  Mc Branch was seen in my office on 5/11/2022  If you have any questions or concerns, please don't hesitate to call           Sincerely,          Libby Israel MD

## 2022-05-17 ENCOUNTER — TELEPHONE (OUTPATIENT)
Dept: VASCULAR SURGERY | Facility: CLINIC | Age: 42
End: 2022-05-17

## 2022-05-17 NOTE — TELEPHONE ENCOUNTER
Attempted to contact patient to schedule appointment(s) listed below  Requested patient call [phone] option 3 to schedule appointment(s)  Patient's appointment(s) are due on or after March 27, 20223 month f/u o v        Dopplers  [] Abdominal Aorta Iliac (AOIL)  [] Carotid (CV)   [] Celiac and/or Mesenteric  [] Endovascular Aortic Repair (EVAR)   [] Hemodialysis Access (HD)   [] Lower Limb Arterial (LINDSAY)  [] Lower Limb Venous Duplex with Reflux (LEVDR)  [] Renal Artery  [] Upper Limb (UEA)    Advanced Imaging   [] CTA abdomen    [] CTA abdomen & pelvis    [] CT abdomen with/ without contrast  [] CT abdomen with contrast  [] CT abdomen without contrast    [] CT abdomen & pelvis with/ without contrast  [] CT abdomen & pelvis with contrast  [] CT abdomen & pelvis without contrast    Office Visit   [] New patient, patient last seen over 3 years ago  [] Risk factor modification (RFM)   [x] Follow up

## 2022-05-20 ENCOUNTER — TELEPHONE (OUTPATIENT)
Dept: FAMILY MEDICINE CLINIC | Facility: CLINIC | Age: 42
End: 2022-05-20

## 2022-05-20 DIAGNOSIS — I10 ESSENTIAL HYPERTENSION: ICD-10-CM

## 2022-05-20 DIAGNOSIS — E53.8 CYANOCOBALAMIN DEFICIENCY: ICD-10-CM

## 2022-05-20 DIAGNOSIS — E55.9 VITAMIN D DEFICIENCY: ICD-10-CM

## 2022-05-20 RX ORDER — CHOLECALCIFEROL (VITAMIN D3) 125 MCG
5000 CAPSULE ORAL DAILY
Qty: 90 CAPSULE | Refills: 2 | Status: SHIPPED | OUTPATIENT
Start: 2022-05-20 | End: 2022-08-18

## 2022-05-20 RX ORDER — AMLODIPINE BESYLATE 10 MG/1
10 TABLET ORAL DAILY
Qty: 90 TABLET | Refills: 1 | Status: SHIPPED | OUTPATIENT
Start: 2022-05-20 | End: 2022-08-18

## 2022-05-20 RX ORDER — AMLODIPINE BESYLATE 10 MG/1
10 TABLET ORAL DAILY
Qty: 90 TABLET | Refills: 1 | Status: SHIPPED | OUTPATIENT
Start: 2022-05-20 | End: 2022-05-20 | Stop reason: SDUPTHER

## 2022-05-20 RX ORDER — CHOLECALCIFEROL (VITAMIN D3) 125 MCG
5000 CAPSULE ORAL DAILY
Qty: 90 CAPSULE | Refills: 2 | Status: SHIPPED | OUTPATIENT
Start: 2022-05-20 | End: 2022-05-20 | Stop reason: SDUPTHER

## 2022-05-20 NOTE — TELEPHONE ENCOUNTER
amLODIPine (NORVASC) 10 mg tablet    cyanocobalamin (VITAMIN B-12) 2000 MCG tablet     Vitamin D3    [facility] [address]

## 2022-05-24 ENCOUNTER — HOSPITAL ENCOUNTER (EMERGENCY)
Facility: HOSPITAL | Age: 42
Discharge: HOME | End: 2022-05-24
Attending: EMERGENCY MEDICINE
Payer: COMMERCIAL

## 2022-05-24 ENCOUNTER — APPOINTMENT (EMERGENCY)
Dept: RADIOLOGY | Facility: HOSPITAL | Age: 42
End: 2022-05-24
Payer: COMMERCIAL

## 2022-05-24 VITALS
WEIGHT: 209 LBS | HEIGHT: 65 IN | SYSTOLIC BLOOD PRESSURE: 132 MMHG | BODY MASS INDEX: 34.82 KG/M2 | HEART RATE: 92 BPM | DIASTOLIC BLOOD PRESSURE: 84 MMHG | TEMPERATURE: 97.9 F | OXYGEN SATURATION: 100 % | RESPIRATION RATE: 17 BRPM

## 2022-05-24 DIAGNOSIS — M25.561 ACUTE PAIN OF RIGHT KNEE: Primary | ICD-10-CM

## 2022-05-24 PROCEDURE — 36415 COLL VENOUS BLD VENIPUNCTURE: CPT | Performed by: PHYSICIAN ASSISTANT

## 2022-05-24 PROCEDURE — 99285 EMERGENCY DEPT VISIT HI MDM: CPT | Performed by: PHYSICIAN ASSISTANT

## 2022-05-24 PROCEDURE — 99284 EMERGENCY DEPT VISIT MOD MDM: CPT

## 2022-05-24 PROCEDURE — 86618 LYME DISEASE ANTIBODY: CPT | Performed by: PHYSICIAN ASSISTANT

## 2022-05-24 PROCEDURE — 73564 X-RAY EXAM KNEE 4 OR MORE: CPT

## 2022-05-24 RX ORDER — OXYCODONE HYDROCHLORIDE AND ACETAMINOPHEN 5; 325 MG/1; MG/1
1 TABLET ORAL EVERY 6 HOURS PRN
Qty: 10 TABLET | Refills: 0 | Status: SHIPPED | OUTPATIENT
Start: 2022-05-24 | End: 2022-05-27

## 2022-05-24 RX ORDER — OXYCODONE HYDROCHLORIDE AND ACETAMINOPHEN 5; 325 MG/1; MG/1
1 TABLET ORAL ONCE
Status: COMPLETED | OUTPATIENT
Start: 2022-05-24 | End: 2022-05-24

## 2022-05-24 RX ORDER — IBUPROFEN 600 MG/1
600 TABLET ORAL ONCE
Status: COMPLETED | OUTPATIENT
Start: 2022-05-24 | End: 2022-05-24

## 2022-05-24 RX ADMIN — OXYCODONE AND ACETAMINOPHEN 1 TABLET: 5; 325 TABLET ORAL at 10:40

## 2022-05-24 RX ADMIN — IBUPROFEN 600 MG: 600 TABLET ORAL at 10:41

## 2022-05-24 NOTE — DISCHARGE INSTRUCTIONS
Use Ace wrap, for next few days for comfort, taking off to bathe or sleep or until follow-up with orthopedic doctor  Use Tylenol 650 mg every 4 hours or Anti-inflammatories like Advil, Motrin, Ibuprofen, Aleve every 6 hours; you can alternate the 2 medications taking something every 3 hours for pain, if no improvement add Percocet  Follow-up with orthopedic doctor in the next few days if no improvement in condition

## 2022-05-24 NOTE — Clinical Note
Valerie Christian was seen and treated in our emergency department on 5/24/2022  Diagnosis:     Iglesia Carter  may return to work on return date  She may return on this date: 05/27/2022         If you have any questions or concerns, please don't hesitate to call        Cristofer Luciano PA-C    ______________________________           _______________          _______________  Hospital Representative                              Date                                Time

## 2022-05-24 NOTE — Clinical Note
Jordinrhonda Cast was seen and treated in our emergency department on 5/24/2022  Diagnosis:     Jessica    She may return on this date: If you have any questions or concerns, please don't hesitate to call        Elio Pearson RN    ______________________________           _______________          _______________  Hospital Representative                              Date                                Time

## 2022-05-24 NOTE — Clinical Note
John Kapoor was seen and treated in our emergency department on 5/24/2022  Diagnosis:     Juan Landeros  may return to work on return date  She may return on this date: 05/27/2022         If you have any questions or concerns, please don't hesitate to call        Jordan Banegas PA-C    ______________________________           _______________          _______________  Hospital Representative                              Date                                Time

## 2022-05-24 NOTE — ED PROVIDER NOTES
History  Chief Complaint   Patient presents with    Knee Swelling     Patient here with c/o right knee pain and swelling since Saturday  Supporting measures including OTC Motrin, ice applied to area and elevation on a pillow applied with minimial relief  Pt with PMH: Left Adrenal mass, Arthritis, Asthma, HTN, Immunization deficiency, Migraine, Moderate episode of recurrent major depressive disorder,   Past Surgical History: Appendectomy, CERVICAL BIOPSY  W/ LOOP ELECTRODE EXCISION,  SECTION X2, HYSTERECTOMY, Right arthroscopic knee surgery for meniscus tear, TUBAL LIGATION-  Presents to ED c/o 4 day h/o atraumatic right knee pain/swelling, without relief with OTC Motrin, last dose yesterday, ice  Pt does work at a [employer] and reports bending, lifting stuff  Prior to Admission Medications   Prescriptions Last Dose Informant Patient Reported? Taking? Cholecalciferol ([facility] D3) 125 MCG (5000 UT) capsule 2022 at Unknown time  No Yes   Sig: Take 1 capsule (5,000 Units total) by mouth in the morning  Diclofenac Sodium (VOLTAREN) 1 % Not Taking at Unknown time Self No No   Sig: Apply 2 g topically 4 (four) times a day   Patient not taking: Reported on 2022   acetaminophen (TYLENOL) 500 mg tablet Not Taking at Unknown time  No No   Sig: Take 2 tablets (1,000 mg total) by mouth every 6 (six) hours as needed for mild pain for up to 15 doses   Patient not taking: Reported on 2022   albuterol (PROVENTIL HFA,VENTOLIN HFA) 90 mcg/act inhaler 2022 at Unknown time  No Yes   Sig: TAKE 2 PUFFS BY MOUTH EVERY 6 HOURS AS NEEDED FOR WHEEZE   amLODIPine (NORVASC) 10 mg tablet 2022 at Unknown time  No Yes   Sig: Take 1 tablet (10 mg total) by mouth in the morning  Blood presuure     buPROPion (WELLBUTRIN XL) 150 mg 24 hr tablet 2022 at Unknown time  No Yes   Sig: Take 1 tablet (150 mg total) by mouth every morning   cyanocobalamin (VITAMIN B-12) 2000 MCG tablet Not Taking at Unknown time  No No   Sig: Take 1 tablet (2,000 mcg total) by mouth in the morning  Patient not taking: Reported on 2022   ibuprofen (MOTRIN) 800 mg tablet   No No   Sig: Take 1 tablet (800 mg total) by mouth every 8 (eight) hours as needed for mild pain (inflamation - TAKE WITH food) for up to 10 days   methylPREDNISolone 4 MG tablet therapy pack Not Taking at Unknown time  No No   Sig: Use as directed on package   Patient not taking: Reported on 2022   tiotropium (SPIRIVA) 18 mcg inhalation capsule 2022 at Unknown time  Yes Yes   Sig: Place 18 mcg into inhaler and inhale in the morning     valACYclovir (VALTREX) 500 mg tablet   No No   Sig: TAKE 1 TABLET BY MOUTH EVERY DAY      Facility-Administered Medications: None       Past Medical History:   Diagnosis Date    Abnormal Pap smear of cervix     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, 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    Adrenal mass, left (HCC) 2021    1 8 x 2 1 cm on ct 2020    Arthritis     Asthma     Blurry vision 2020    Essential hypertension 2020    Headache 2020    Hypertension     Immunization deficiency 10/2/2020    Hep a nonimmune    Insomnia 2021    Migraine     Moderate episode of recurrent major depressive disorder ([facility] [address] ) 2020    Obesity (BMI 30 0-34 9) 2021    Prediabetes 2020    Tobacco use 2021    Vitamin D deficiency 10/2/2020       Past Surgical History:   Procedure Laterality Date    APPENDECTOMY      BREAST BIOPSY Left 2017    BREAST BIOPSY Right     2 core biopsies    BREAST EXCISIONAL BIOPSY Left 2018    BREAST SURGERY Left 2018    Left breast mass excision    CERVICAL BIOPSY  W/ LOOP ELECTRODE EXCISION       SECTION      X2    COLPOSCOPY      HYSTERECTOMY  2012    heavy bleeding, ovaries remain, also had abnormal pap    KNEE SURGERY      LYMPH NODE DISSECTION  2018    under armpit    TUBAL LIGATION  2007   [address] THYROID BIOPSY  2021       Family History   Problem Relation Age of Onset    Lupus Maternal Aunt 50    Seizures Maternal Aunt     Leukemia Maternal Uncle 25    Diabetes Mother     Heart attack Mother     Heart disease Mother         Internal Defibrilation    Diabetes Maternal Grandmother     Diabetes Paternal Grandmother     Diabetes Paternal Aunt     No Known Problems Father     Endometrial cancer Sister 29    Colon cancer Sister 28    No Known Problems Daughter     No Known Problems Son     No Known Problems Son      I have reviewed and agree with the history as documented  E-Cigarette/Vaping    E-Cigarette Use Never User      E-Cigarette/Vaping Substances    Nicotine No     THC No     CBD No     Flavoring No     Other No     Unknown No      Social History     Tobacco Use    Smoking status: Current Every Day Smoker     Packs/day: 0 50     Years: 20 00     Pack years: 10 00     Types: Cigarettes    Smokeless tobacco: Never Used    Tobacco comment: pt is down to  5 packs a day   Vaping Use    Vaping Use: Never used   Substance Use Topics    Alcohol use: Yes     Comment: occasional    Drug use: No       Review of Systems   Constitutional: Negative for chills and fever  HENT: Negative for sore throat  Eyes: Negative for visual disturbance  Respiratory: Negative for cough and shortness of breath  Cardiovascular: Negative for chest pain and leg swelling  Gastrointestinal: Negative for abdominal pain, diarrhea and vomiting  Musculoskeletal: Positive for arthralgias and joint swelling  Negative for myalgias  Skin: Negative for rash and wound  Neurological: Negative for dizziness, weakness and headaches  All other systems reviewed and are negative  Physical Exam  Physical Exam  Vitals and nursing note reviewed  Constitutional:       General: She is in acute distress  Appearance: She is well-developed  She is obese  HENT:      Head: Normocephalic and atraumatic        Right Ear: External ear normal       Left Ear: External ear normal       Nose: Nose normal       Mouth/Throat:      Mouth: Mucous membranes are moist       Pharynx: Oropharynx is clear  Eyes:      Conjunctiva/sclera: Conjunctivae normal    Cardiovascular:      Rate and Rhythm: Normal rate  Pulmonary:      Effort: Pulmonary effort is normal  No respiratory distress  Musculoskeletal:         General: Swelling and tenderness present  No deformity  Normal range of motion  Cervical back: Normal range of motion and neck supple  No tenderness  Right lower leg: No edema  Left lower leg: No edema  Comments: Healed surgical scars   Skin:     General: Skin is warm and dry  Findings: No erythema or rash  Neurological:      General: No focal deficit present  Mental Status: She is alert and oriented to person, place, and time  Motor: No weakness  Psychiatric:         Behavior: Behavior normal          Vital Signs  ED Triage Vitals [05/24/22 1022]   Temperature Pulse Respirations Blood Pressure SpO2   97 9 °F (36 6 °C) 92 17 132/84 100 %      Temp Source Heart Rate Source Patient Position - Orthostatic VS BP Location FiO2 (%)   Oral Monitor Sitting Left arm --      Pain Score       7           Vitals:    05/24/22 1022   BP: 132/84   Pulse: 92   Patient Position - Orthostatic VS: Sitting         Visual Acuity      ED Medications  Medications   ibuprofen (MOTRIN) tablet 600 mg (600 mg Oral Given 5/24/22 1041)   oxyCODONE-acetaminophen (PERCOCET) 5-325 mg per tablet 1 tablet (1 tablet Oral Given 5/24/22 1040)       Diagnostic Studies  Results Reviewed     Procedure Component Value Units Date/Time    Lyme Total Antibody Profile with reflex to Nabor Santillan [[id number]] Collected: 05/24/22 1054    Lab Status:  In process Specimen: Blood from Arm, Right Updated: 05/24/22 1112                 XR knee 4+ views Right injury   ED Interpretation by Genia Soliman PA-C (05/24 1126)   Degenerative changes, no acute process Procedures  Procedures         ED Course                                             MDM  Number of Diagnoses or Management Options  Acute pain of right knee  Diagnosis management comments: Ace wrap placed by me, pt has knee immobilizer at home, called ortho and has apt, but not for 2 weeks; will give phone number for Arcelia Verdin to see if she could be seen sooner       Amount and/or Complexity of Data Reviewed  Tests in the radiology section of CPT®: ordered and reviewed        Disposition  Final diagnoses:   Acute pain of right knee     Time reflects when diagnosis was documented in both MDM as applicable and the Disposition within this note     Time User Action Codes Description Comment    5/24/2022 11:34 AM Ever Bleak Add [M25 561] Acute pain of right knee       ED Disposition     ED Disposition   Discharge    Condition   Stable    Date/Time   Tue May 24, 2022 11:33 AM    Comment   David Half discharge to home/self care  Follow-up Information     Follow up With Specialties Details Why Contact Info Additional [facility]   [address] [id number] [phone] [address] [facility] [id number] [place] [address], [address] [place] [address] [phone]          Patient's Medications   Discharge Prescriptions    OXYCODONE-ACETAMINOPHEN (PERCOCET) 5-325 MG PER TABLET    Take 1 tablet by mouth every 6 (six) hours as needed for moderate pain for up to 3 days Max Daily Amount: 4 tablets       Start Date: 5/24/2022 End Date: 5/27/2022       Order Dose: 1 tablet       Quantity: 10 tablet    Refills: 0       No discharge procedures on file      PDMP Review       Value Time User    PDMP Reviewed  Yes 12/21/2020  5:26 AM Sb Tomlinson MD          ED Provider  Electronically Signed by           Narciso Araujo PA-C  05/24/22 4216

## 2022-05-24 NOTE — ED NOTES
D/c reviewed with pt  Pt verbalized understanding and has no further questions at this time        Brook Palacios RN  05/24/22 5329

## 2022-05-25 LAB — B BURGDOR IGG+IGM SER-ACNC: 18

## 2022-06-07 ENCOUNTER — OFFICE VISIT (OUTPATIENT)
Dept: OBGYN CLINIC | Facility: CLINIC | Age: 42
End: 2022-06-07
Payer: COMMERCIAL

## 2022-06-07 VITALS
HEART RATE: 83 BPM | SYSTOLIC BLOOD PRESSURE: 113 MMHG | DIASTOLIC BLOOD PRESSURE: 78 MMHG | WEIGHT: 209 LBS | HEIGHT: 65 IN | BODY MASS INDEX: 34.82 KG/M2

## 2022-06-07 DIAGNOSIS — M17.11 PRIMARY OSTEOARTHRITIS OF RIGHT KNEE: Primary | ICD-10-CM

## 2022-06-07 DIAGNOSIS — M25.561 CHRONIC PAIN OF RIGHT KNEE: ICD-10-CM

## 2022-06-07 DIAGNOSIS — G89.29 CHRONIC PAIN OF RIGHT KNEE: ICD-10-CM

## 2022-06-07 PROBLEM — M25.531 PAIN IN RIGHT WRIST: Status: ACTIVE | Noted: 2022-06-07

## 2022-06-07 PROCEDURE — 99214 OFFICE O/P EST MOD 30 MIN: CPT | Performed by: PHYSICAL MEDICINE & REHABILITATION

## 2022-06-07 PROCEDURE — 20610 DRAIN/INJ JOINT/BURSA W/O US: CPT | Performed by: PHYSICAL MEDICINE & REHABILITATION

## 2022-06-07 RX ORDER — LIDOCAINE HYDROCHLORIDE 10 MG/ML
3 INJECTION, SOLUTION INFILTRATION; PERINEURAL
Status: COMPLETED | OUTPATIENT
Start: 2022-06-07 | End: 2022-06-07

## 2022-06-07 RX ORDER — TRIAMCINOLONE ACETONIDE 40 MG/ML
80 INJECTION, SUSPENSION INTRA-ARTICULAR; INTRAMUSCULAR
Status: COMPLETED | OUTPATIENT
Start: 2022-06-07 | End: 2022-06-07

## 2022-06-07 RX ADMIN — LIDOCAINE HYDROCHLORIDE 3 ML: 10 INJECTION, SOLUTION INFILTRATION; PERINEURAL at 15:55

## 2022-06-07 RX ADMIN — TRIAMCINOLONE ACETONIDE 80 MG: 40 INJECTION, SUSPENSION INTRA-ARTICULAR; INTRAMUSCULAR at 15:55

## 2022-06-07 NOTE — LETTER
To Whom It May Concern,    Anuj Granados is under my professional care  She was seen in my office on June 7, 2022  Please excuse Anujlicha Granados from any work missed on this appointment date  If you have any questions or concerns, please do not hesitate to call          Sincerely,          Brady Gonsales, DO

## 2022-06-07 NOTE — PROGRESS NOTES
1  Primary osteoarthritis of right knee     2  Chronic pain of right knee       Orders Placed This Encounter   Procedures    Large joint arthrocentesis        Impression:  Right knee pain likely secondary to osteoarthritis flare  We discussed different treatment options and decided to proceed with an intra-articular steroid injection  The patient will continue her home exercise program that she was given by physical therapy from before  She can continue to use over-the-counter pain medications  I would like to see her back in three weeks to reassess  Imaging Studies (I personally reviewed images in PACS and report):  Right knee x-rays most recent to this encounter reviewed  These images show tricompartmental osteoarthritic changes  There is a possible loose body in the intercondylar notch  There is a joint effusion  Return in about 3 weeks (around 6/28/2022)  Patient is in agreement with the above plan  HPI:  Neto Cast is a 42 y o  female  who presents for evaluation of   Chief Complaint   Patient presents with    Right Knee - Pain, Swelling       Onset/Mechanism:  Chronic pain which she had previously been seeing Dr Drew Pagan for  Location:  In the knee  Radiation:  Denies  Provocative: Activity  Severity:  No improvement  Associated Symptoms:  Swelling  Treatment so far:  No recent treatment but has had previous steroid injections      Following history reviewed and updated:  Past Medical History:   Diagnosis Date    Abnormal Pap smear of cervix     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, 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    Adrenal mass, left ([facility] [address] ) 1/25/2021    1 8 x 2 1 cm on ct 12/21/2020    Arthritis     Asthma     Blurry vision 9/4/2020    Essential hypertension 9/4/2020    Headache 9/4/2020    Hypertension     Immunization deficiency 10/2/2020    Hep a nonimmune    Insomnia 2/22/2021    Migraine     Moderate episode of recurrent major depressive disorder ([facility] [address] ) 9/4/2020    Obesity (BMI 30 0-34 9) 9/22/2021    Prediabetes 2020    Tobacco use 2021    Vitamin D deficiency 10/2/2020     Past Surgical History:   Procedure Laterality Date    APPENDECTOMY      BREAST BIOPSY Left 2017    BREAST BIOPSY Right     2 core biopsies    BREAST EXCISIONAL BIOPSY Left 2018    BREAST SURGERY Left 2018    Left breast mass excision    CERVICAL BIOPSY  W/ LOOP ELECTRODE EXCISION       SECTION      X2    COLPOSCOPY      HYSTERECTOMY  2012    heavy bleeding, ovaries remain, also had abnormal pap    KNEE SURGERY      LYMPH NODE DISSECTION  2018    under armpit    TUBAL LIGATION     [facility] 44 THYROID BIOPSY  2021     Social History   Social History     Substance and Sexual Activity   Alcohol Use Yes    Comment: occasional     Social History     Substance and Sexual Activity   Drug Use No     Social History     Tobacco Use   Smoking Status Current Every Day Smoker    Packs/day: 0 50    Years: 20 00    Pack years: 10 00    Types: Cigarettes   Smokeless Tobacco Never Used   Tobacco Comment    pt is down to  5 packs a day     Family History   Problem Relation Age of Onset    Lupus Maternal Aunt 50    Seizures Maternal Aunt     Leukemia Maternal Uncle 25    Diabetes Mother     Heart attack Mother     Heart disease Mother         Internal Defibrilation    Diabetes Maternal Grandmother     Diabetes Paternal Grandmother     Diabetes Paternal Aunt     No Known Problems Father     Endometrial cancer Sister 29    Colon cancer Sister 28    No Known Problems Daughter     No Known Problems Son     No Known Problems Son      No Known Allergies     Constitutional:  /78   Pulse 83   Ht 5' 5" (1 651 m)   Wt 94 8 kg (209 lb)   BMI 34 78 kg/m²    General: NAD  Eyes: Anicteric sclerae  Neck: Supple  Lungs: Unlabored breathing  Cardiovascular: No lower extremity edema  Skin: Intact without erythema  Neurologic: Sensation intact to light touch    Psychiatric: Mood and affect are appropriate  Right Knee Exam     Tenderness   The patient is experiencing tenderness in the medial joint line and lateral joint line  Range of Motion   Extension: normal   Flexion: abnormal     Tests   Varus: negative Valgus: negative    Other   Erythema: absent  Scars: absent  Sensation: normal  Pulse: present  Swelling: moderate  Effusion: effusion present             Large joint arthrocentesis: R knee  Universal Protocol:  Consent: Verbal consent obtained  Written consent not obtained  Risks and benefits: risks, benefits and alternatives were discussed  Consent given by: patient  Timeout called at: 6/7/2022 3:54 PM   Patient understanding: patient states understanding of the procedure being performed  Patient consent: the patient's understanding of the procedure matches consent given  Site marked: the operative site was marked  Radiology Images displayed and confirmed  If images not available, report reviewed: imaging studies available  Patient identity confirmed: verbally with patient    Supporting Documentation  Indications: pain   Procedure Details  Location: knee - R knee  Needle size: 22 G  Ultrasound guidance: no  Approach: Inferolateral to patella  Medications administered: 3 mL lidocaine 1 %; 80 mg triamcinolone acetonide 40 mg/mL    Patient tolerance: patient tolerated the procedure well with no immediate complications  Dressing:  Sterile dressing applied    There was little to no resistance encountered during the injection  Prior to the procedure, the patient was informed of the following risks in layman terms:    - Risk of bleeding since a needle is involved  - Risk of infection (1/10,000 chance as per recent studies)  Signs/symptoms were discussed and they would prompt an urgent evaluation at an emergency department   - Risk of pigmentation or skin dimpling in the skin (2-3% chance as per recent studies) from the steroid    - Risk of increased pain from steroid flare (1% chance as per recent studies) that typically lasts 24-48 hours  - Risk of increased blood sugars from the steroid medication that can last for a few weeks  If the patient is a diabetic or pre-diabetic, they were encouraged to closely monitor their blood sugars and discuss with PCP if elevated more than usual or if having symptoms  After going over these risks, we decided that the benefits outweigh the risks and proceeded with the procedure

## 2022-06-08 ENCOUNTER — TELEPHONE (OUTPATIENT)
Dept: FAMILY MEDICINE CLINIC | Facility: CLINIC | Age: 42
End: 2022-06-08

## 2022-06-08 DIAGNOSIS — T78.40XA ALLERGY, INITIAL ENCOUNTER: Primary | ICD-10-CM

## 2022-06-08 RX ORDER — LORATADINE 10 MG/1
10 TABLET ORAL DAILY
Qty: 30 TABLET | Refills: 1 | Status: SHIPPED | OUTPATIENT
Start: 2022-06-08 | End: 2022-07-01

## 2022-06-08 NOTE — TELEPHONE ENCOUNTER
Pt called and left msg stating that she needs a new script for her allergy medicine she stated that they are really bad

## 2022-06-20 ENCOUNTER — CLINICAL SUPPORT (OUTPATIENT)
Dept: FAMILY MEDICINE CLINIC | Facility: CLINIC | Age: 42
End: 2022-06-20
Payer: COMMERCIAL

## 2022-06-20 DIAGNOSIS — Z23 ENCOUNTER FOR IMMUNIZATION: Primary | ICD-10-CM

## 2022-06-20 PROCEDURE — 90651 9VHPV VACCINE 2/3 DOSE IM: CPT

## 2022-06-20 PROCEDURE — 90471 IMMUNIZATION ADMIN: CPT

## 2022-07-01 DIAGNOSIS — T78.40XA ALLERGY, INITIAL ENCOUNTER: ICD-10-CM

## 2022-07-01 RX ORDER — LORATADINE 10 MG/1
10 TABLET ORAL DAILY
Qty: 90 TABLET | Refills: 1 | Status: SHIPPED | OUTPATIENT
Start: 2022-07-01

## 2022-07-27 ENCOUNTER — HOSPITAL ENCOUNTER (EMERGENCY)
Facility: HOSPITAL | Age: 42
Discharge: HOME | End: 2022-07-27
Attending: EMERGENCY MEDICINE | Admitting: EMERGENCY MEDICINE
Payer: COMMERCIAL

## 2022-07-27 VITALS
DIASTOLIC BLOOD PRESSURE: 88 MMHG | RESPIRATION RATE: 18 BRPM | SYSTOLIC BLOOD PRESSURE: 133 MMHG | HEART RATE: 85 BPM | OXYGEN SATURATION: 100 % | TEMPERATURE: 98.2 F

## 2022-07-27 DIAGNOSIS — L02.214 INGUINAL ABSCESS: Primary | ICD-10-CM

## 2022-07-27 PROCEDURE — 10061 I&D ABSCESS COMP/MULTIPLE: CPT | Performed by: EMERGENCY MEDICINE

## 2022-07-27 PROCEDURE — 99284 EMERGENCY DEPT VISIT MOD MDM: CPT | Performed by: PHYSICIAN ASSISTANT

## 2022-07-27 PROCEDURE — 99283 EMERGENCY DEPT VISIT LOW MDM: CPT

## 2022-07-27 PROCEDURE — 96372 THER/PROPH/DIAG INJ SC/IM: CPT

## 2022-07-27 RX ORDER — KETOROLAC TROMETHAMINE 30 MG/ML
15 INJECTION, SOLUTION INTRAMUSCULAR; INTRAVENOUS ONCE
Status: COMPLETED | OUTPATIENT
Start: 2022-07-27 | End: 2022-07-27

## 2022-07-27 RX ORDER — SULFAMETHOXAZOLE AND TRIMETHOPRIM 800; 160 MG/1; MG/1
1 TABLET ORAL 2 TIMES DAILY
Qty: 14 TABLET | Refills: 0 | Status: SHIPPED | OUTPATIENT
Start: 2022-07-27 | End: 2022-08-03

## 2022-07-27 RX ORDER — LIDOCAINE HYDROCHLORIDE 10 MG/ML
10 INJECTION, SOLUTION EPIDURAL; INFILTRATION; INTRACAUDAL; PERINEURAL ONCE
Status: COMPLETED | OUTPATIENT
Start: 2022-07-27 | End: 2022-07-27

## 2022-07-27 RX ADMIN — KETOROLAC TROMETHAMINE 15 MG: 30 INJECTION, SOLUTION INTRAMUSCULAR at 17:56

## 2022-07-27 RX ADMIN — LIDOCAINE HYDROCHLORIDE 10 ML: 10 INJECTION, SOLUTION EPIDURAL; INFILTRATION; INTRACAUDAL; PERINEURAL at 17:57

## 2022-07-27 NOTE — DISCHARGE INSTRUCTIONS
Please return to the emergency department for worsening symptoms including chest pain, shortness of breath, dizziness, lightheadedness, fever greater than 103, severe pain, inability to walk, fainting episodes, etc  Please follow-up with your family practice provider as soon as possible  I have sent an antibiotic over to your pharmacy  Please take as directed  Please continue using warm compresses over top of your wound to help promote further drainage

## 2022-07-28 NOTE — ED PROVIDER NOTES
History  Chief Complaint   Patient presents with    Abscess     Pt presents w/ right leg pain from "bump" pt states she is unsure if it is a boil  States the pain began while at work today  No known trauma  Denies fever     This is a 26-year-old female with past medical history significant for recurrent abscesses presenting to the emergency department today for an abscess to her right inner thigh  Notes that began earlier today  Has a history of abscesses in the same location  She denies any fever or chills  No chest pain or shortness of breath  No drainage from the abscess  Has not recently been on antibiotics  No other areas of swelling or abscess  No vaginal or anal involvement  The patient denies other complaints at this time  History provided by:  Patient   used: No    Abscess  Location:  Leg  Leg abscess location:  R upper leg  Abscess quality: fluctuance, induration, painful and warmth    Abscess quality: not draining, no itching, no redness and not weeping    Red streaking: no    Progression:  Worsening  Chronicity:  New  Context: not diabetes    Relieved by:  None tried  Worsened by:  Nothing  Ineffective treatments:  None tried  Associated symptoms: no anorexia, no fatigue, no fever, no headaches, no nausea and no vomiting    Risk factors: prior abscess    Risk factors: no hx of MRSA        Prior to Admission Medications   Prescriptions Last Dose Informant Patient Reported? Taking? Cholecalciferol ([facility] D3) 125 MCG (5000 UT) capsule   No No   Sig: Take 1 capsule (5,000 Units total) by mouth in the morning     Diclofenac Sodium (VOLTAREN) 1 %  Self No No   Sig: Apply 2 g topically 4 (four) times a day   Patient not taking: Reported on 5/24/2022   acetaminophen (TYLENOL) 500 mg tablet   No No   Sig: Take 2 tablets (1,000 mg total) by mouth every 6 (six) hours as needed for mild pain for up to 15 doses   Patient not taking: Reported on 5/24/2022   albuterol (PROVENTIL HFA,VENTOLIN HFA) 90 mcg/act inhaler   No No   Sig: TAKE 2 PUFFS BY MOUTH EVERY 6 HOURS AS NEEDED FOR WHEEZE   amLODIPine (NORVASC) 10 mg tablet   No No   Sig: Take 1 tablet (10 mg total) by mouth in the morning  Blood presuure  buPROPion (WELLBUTRIN XL) 150 mg 24 hr tablet   No No   Sig: Take 1 tablet (150 mg total) by mouth every morning   cyanocobalamin (VITAMIN B-12) 2000 MCG tablet   No No   Sig: Take 1 tablet (2,000 mcg total) by mouth in the morning  Patient not taking: Reported on 5/24/2022   ibuprofen (MOTRIN) 800 mg tablet   No No   Sig: Take 1 tablet (800 mg total) by mouth every 8 (eight) hours as needed for mild pain (inflamation - TAKE WITH food) for up to 10 days   loratadine (CLARITIN) 10 mg tablet   No No   Sig: TAKE 1 TABLET (10 MG TOTAL) BY MOUTH DAILY FOR ALLERGIES   methylPREDNISolone 4 MG tablet therapy pack   No No   Sig: Use as directed on package   Patient not taking: Reported on 5/24/2022   tiotropium (SPIRIVA) 18 mcg inhalation capsule   Yes No   Sig: Place 18 mcg into inhaler and inhale in the morning     valACYclovir (VALTREX) 500 mg tablet   No No   Sig: TAKE 1 TABLET BY MOUTH EVERY DAY      Facility-Administered Medications: None       Past Medical History:   Diagnosis Date    Abnormal Pap smear of cervix     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, 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    Adrenal mass, left ([facility] [address] ) 1/25/2021    1 8 x 2 1 cm on ct 12/21/2020    Arthritis     Asthma     Blurry vision 9/4/2020    Essential hypertension 9/4/2020    Headache 9/4/2020    Hypertension     Immunization deficiency 10/2/2020    Hep a nonimmune    Insomnia 2/22/2021    Migraine     Moderate episode of recurrent major depressive disorder ([facility] [address] ) 9/4/2020    Obesity (BMI 30 0-34 9) 9/22/2021    Prediabetes 9/4/2020    Tobacco use 1/25/2021    Vitamin D deficiency 10/2/2020       Past Surgical History:   Procedure Laterality Date    APPENDECTOMY      BREAST BIOPSY Left 2017    BREAST BIOPSY Right     2 core biopsies    BREAST EXCISIONAL BIOPSY Left 2018    BREAST SURGERY Left 2018    Left breast mass excision    CERVICAL BIOPSY  W/ LOOP ELECTRODE EXCISION       SECTION      X2    COLPOSCOPY      HYSTERECTOMY  2012    heavy bleeding, ovaries remain, also had abnormal pap    KNEE SURGERY      LYMPH NODE DISSECTION  2018    under armpit    TUBAL LIGATION     [address] 44 THYROID BIOPSY  2021       Family History   Problem Relation Age of Onset    Lupus Maternal Aunt 50    Seizures Maternal Aunt     Leukemia Maternal Uncle 25    Diabetes Mother     Heart attack Mother     Heart disease Mother         Internal Defibrilation    Diabetes Maternal Grandmother     Diabetes Paternal Grandmother     Diabetes Paternal Aunt     No Known Problems Father     Endometrial cancer Sister 29    Colon cancer Sister 28    No Known Problems Daughter     No Known Problems Son     No Known Problems Son      I have reviewed and agree with the history as documented  E-Cigarette/Vaping    E-Cigarette Use Never User      E-Cigarette/Vaping Substances    Nicotine No     THC No     CBD No     Flavoring No     Other No     Unknown No      Social History     Tobacco Use    Smoking status: Current Every Day Smoker     Packs/day: 0 50     Years: 20 00     Pack years: 10 00     Types: Cigarettes    Smokeless tobacco: Never Used    Tobacco comment: pt is down to  5 packs a day   Vaping Use    Vaping Use: Never used   Substance Use Topics    Alcohol use: Yes     Comment: occasional    Drug use: No       Review of Systems   Constitutional: Negative for appetite change, chills, diaphoresis, fatigue and fever  Eyes: Negative for visual disturbance  Respiratory: Negative for cough, chest tightness, shortness of breath and wheezing  Cardiovascular: Negative for chest pain, palpitations and leg swelling  Gastrointestinal: Negative for abdominal pain, anorexia, constipation, diarrhea, nausea and vomiting  Musculoskeletal: Negative for neck pain and neck stiffness  Skin: Positive for wound  Negative for rash  Neurological: Negative for dizziness, seizures, syncope, weakness, light-headedness, numbness and headaches  Psychiatric/Behavioral: Negative for confusion  All other systems reviewed and are negative  Physical Exam  Physical Exam  Vitals and nursing note reviewed  Constitutional:       General: She is not in acute distress  Appearance: Normal appearance  She is normal weight  She is not ill-appearing, toxic-appearing or diaphoretic  HENT:      Head: Normocephalic and atraumatic  Nose: Nose normal  No congestion or rhinorrhea  Mouth/Throat:      Mouth: Mucous membranes are moist       Pharynx: No oropharyngeal exudate or posterior oropharyngeal erythema  Eyes:      General: No scleral icterus  Right eye: No discharge  Left eye: No discharge  Extraocular Movements: Extraocular movements intact  Pupils: Pupils are equal, round, and reactive to light  Cardiovascular:      Rate and Rhythm: Normal rate and regular rhythm  Pulses: Normal pulses  Heart sounds: Normal heart sounds  No murmur heard  No friction rub  No gallop  Pulmonary:      Effort: Pulmonary effort is normal  No respiratory distress  Breath sounds: Normal breath sounds  No stridor  No wheezing, rhonchi or rales  Chest:      Chest wall: No tenderness  Abdominal:      General: Abdomen is flat  There is no distension  Palpations: Abdomen is soft  Tenderness: There is no abdominal tenderness  There is no right CVA tenderness, left CVA tenderness, guarding or rebound  Musculoskeletal:         General: Normal range of motion  Cervical back: Normal range of motion  No tenderness  Right lower leg: No edema  Left lower leg: No edema  Skin:     General: Skin is warm and dry  Capillary Refill: Capillary refill takes less than 2 seconds  Coloration: Skin is not jaundiced or pale  Comments: Area of fluctuance noted to the right inner thigh suspicious for abscess; no red streaking; no discernible surrounding cellulitis; no active drainage   Neurological:      General: No focal deficit present  Mental Status: She is alert and oriented to person, place, and time  Mental status is at baseline     Psychiatric:         Mood and Affect: Mood normal          Behavior: Behavior normal          Vital Signs  ED Triage Vitals   Temperature Pulse Respirations Blood Pressure SpO2   07/27/22 1733 07/27/22 1733 07/27/22 1733 07/27/22 1740 07/27/22 1733   98 2 °F (36 8 °C) 85 18 133/88 100 %      Temp Source Heart Rate Source Patient Position - Orthostatic VS BP Location FiO2 (%)   07/27/22 1733 07/27/22 1733 07/27/22 1733 07/27/22 1733 --   Oral Monitor Sitting Left arm       Pain Score       07/27/22 1756       8           Vitals:    07/27/22 1733 07/27/22 1740   BP:  133/88   Pulse: 85    Patient Position - Orthostatic VS: Sitting          Visual Acuity      ED Medications  Medications   lidocaine (PF) (XYLOCAINE-MPF) 1 % injection 10 mL (10 mL Infiltration Given 7/27/22 1757)   ketorolac (TORADOL) injection 15 mg (15 mg Intramuscular Given 7/27/22 1756)       Diagnostic Studies  Results Reviewed     None                 No orders to display              Procedures  Incision and drain    Date/Time: 7/28/2022 6:15 PM  Performed by: Yue Littlejohn PA-C  Authorized by: Yue Littlejohn PA-C   Universal Protocol:  Procedure performed by: David ABREU and Dr Rolando Castellano)  Consent given by: patient  Patient understanding: patient states understanding of the procedure being performed  Patient consent: the patient's understanding of the procedure matches consent given  Required items: required blood products, implants, devices, and special equipment available  Patient identity confirmed: verbally with patient      Patient location: ED  Location:     Type:  Abscess    Size:  3 cm    Location:  Lower extremity    Lower extremity location: left inner thigh  Pre-procedure details:     Skin preparation:  Betadine  Anesthesia (see MAR for exact dosages): Anesthesia method:  Local infiltration  Procedure details:     Complexity:  Simple    Needle aspiration: no      Incision types:  Single straight    Scalpel blade:  11    Approach:  Open    Incision depth:  Subcutaneous    Wound management:  Probed and deloculated    Irrigation with saline:  Completed    Hemostat:  Probed and Deloculated    Drainage:  Purulent    Drainage amount: Moderate    Wound treatment:  Wound left open    Packing materials:  None  Post-procedure details:     Patient tolerance of procedure: Tolerated well, no immediate complications  Comments:      Performed by Aisha ABREU and Dr Rolando Castellano             ED Course                               SBIRT 20yo+    Flowsheet Row Most Recent Value   SBIRT (23 yo +)    In order to provide better care to our patients, we are screening all of our patients for alcohol and drug use  Would it be okay to ask you these screening questions? No Filed at: 07/27/2022 1734                    MDM  Number of Diagnoses or Management Options  Inguinal abscess: new and requires workup  Diagnosis management comments: This is a 58-year-old female presenting to the emergency department today for right inner thigh abscess  Began this morning  No systemic signs of infection  Vital signs are stable  Abscess was drained by the procedure above  The patient is stable for discharge at this time  Wound was left open  Bactrim sent to the pharmacy  Strict return precautions were given  Recommend PCP follow-up as soon as possible  The patient and/or patient's proxy verify their understanding and agree to the plan at this time  All questions answered to the patient and/or their proxy's satisfaction    All labs reviewed and utilized in the medical decision making process  All radiology studies independently viewed by me and interpreted by the radiologist  Portions of the record may have been created with voice recognition software   Occasional wrong word or "sound a like" substitutions may have occurred due to the inherent limitations of voice recognition software   Read the chart carefully and recognize, using context, where substitutions have occurred  Amount and/or Complexity of Data Reviewed  Review and summarize past medical records: yes    Patient Progress  Patient progress: stable      Disposition  Final diagnoses:   Inguinal abscess     Time reflects when diagnosis was documented in both MDM as applicable and the Disposition within this note     Time User Action Codes Description Comment    7/27/2022  6:33 PM Veronica English Add [L02 [id number]] Inguinal abscess       ED Disposition     ED Disposition   Discharge    Condition   Stable    Date/Time   Wed Jul 27, 2022 Stormdinggonzalo Jamesmarlon 11 discharge to home/self care                 Follow-up Information     Follow up With Specialties Details Why Contact Info Additional Information    Al Fung MD Family Medicine Schedule an appointment as soon as possible for a visit   [facility] [address] [place] [address] 79   [address] [facility] Emergency Department Emergency Medicine Go to  If symptoms worsen [address] [facility],[address] [id number]  1 [facility] Emergency Department, [address], [address]          Discharge Medication List as of 7/27/2022  6:34 PM      START taking these medications    Details   sulfamethoxazole-trimethoprim (BACTRIM DS) 800-160 mg per tablet Take 1 tablet by mouth 2 (two) times a day for 7 days smx-tmp DS (BACTRIM) 800-160 mg tabs (1tab q12 D10), Starting Wed 7/27/2022, Until Wed 8/3/2022, Normal         CONTINUE these medications which have NOT CHANGED    Details acetaminophen (TYLENOL) 500 mg tablet Take 2 tablets (1,000 mg total) by mouth every 6 (six) hours as needed for mild pain for up to 15 doses, Starting Fri 3/4/2022, Normal      albuterol (PROVENTIL HFA,VENTOLIN HFA) 90 mcg/act inhaler TAKE 2 PUFFS BY MOUTH EVERY 6 HOURS AS NEEDED FOR WHEEZE, Normal      amLODIPine (NORVASC) 10 mg tablet Take 1 tablet (10 mg total) by mouth in the morning  Blood presuure , Starting Fri 5/20/2022, Until Thu 8/18/2022, Normal      buPROPion (WELLBUTRIN XL) 150 mg 24 hr tablet Take 1 tablet (150 mg total) by mouth every morning, Starting Wed 1/19/2022, Normal      Cholecalciferol ([facility]S D3) 125 MCG (5000 UT) capsule Take 1 capsule (5,000 Units total) by mouth in the morning , Starting Fri 5/20/2022, Until Thu 8/18/2022, Normal      cyanocobalamin (VITAMIN B-12) 2000 MCG tablet Take 1 tablet (2,000 mcg total) by mouth in the morning , Starting Fri 5/20/2022, Until Thu 8/18/2022, Normal      Diclofenac Sodium (VOLTAREN) 1 % Apply 2 g topically 4 (four) times a day, Starting Wed 3/24/2021, Normal      ibuprofen (MOTRIN) 800 mg tablet Take 1 tablet (800 mg total) by mouth every 8 (eight) hours as needed for mild pain (inflamation - TAKE WITH food) for up to 10 days, Starting Tue 4/26/2022, Until Fri 5/6/2022 at 2359, Normal      loratadine (CLARITIN) 10 mg tablet TAKE 1 TABLET (10 MG TOTAL) BY MOUTH DAILY FOR ALLERGIES, Starting Fri 7/1/2022, Normal      methylPREDNISolone 4 MG tablet therapy pack Use as directed on package, Normal      tiotropium (SPIRIVA) 18 mcg inhalation capsule Place 18 mcg into inhaler and inhale in the morning , Historical Med      valACYclovir (VALTREX) 500 mg tablet TAKE 1 TABLET BY MOUTH EVERY DAY, Normal             No discharge procedures on file      PDMP Review       Value Time User    PDMP Reviewed  Yes 12/21/2020  5:26 AM Demetra Moraes MD          ED Provider  Electronically Signed by           Kevin Chang PA-C  07/28/22 392

## 2022-07-30 DIAGNOSIS — J01.90 ACUTE SINUSITIS, UNSPECIFIED: ICD-10-CM

## 2022-08-01 RX ORDER — ALBUTEROL SULFATE 90 UG/1
AEROSOL, METERED RESPIRATORY (INHALATION)
Qty: 18 G | Refills: 1 | Status: SHIPPED | OUTPATIENT
Start: 2022-08-01 | End: 2022-10-24

## 2022-08-22 DIAGNOSIS — E55.9 VITAMIN D DEFICIENCY: ICD-10-CM

## 2022-08-22 DIAGNOSIS — E53.8 CYANOCOBALAMIN DEFICIENCY: ICD-10-CM

## 2022-08-22 RX ORDER — CHOLECALCIFEROL (VITAMIN D3) 125 MCG
5000 CAPSULE ORAL DAILY
Qty: 90 CAPSULE | Refills: 2 | Status: SHIPPED | OUTPATIENT
Start: 2022-08-22 | End: 2022-11-20

## 2022-08-29 DIAGNOSIS — B00.9 HERPES: ICD-10-CM

## 2022-08-29 RX ORDER — VALACYCLOVIR HYDROCHLORIDE 500 MG/1
TABLET, FILM COATED ORAL
Qty: 30 TABLET | Refills: 3 | Status: SHIPPED | OUTPATIENT
Start: 2022-08-29 | End: 2022-09-15

## 2022-09-01 ENCOUNTER — OFFICE VISIT (OUTPATIENT)
Dept: FAMILY MEDICINE CLINIC | Facility: CLINIC | Age: 42
End: 2022-09-01
Payer: COMMERCIAL

## 2022-09-01 ENCOUNTER — TELEPHONE (OUTPATIENT)
Dept: FAMILY MEDICINE CLINIC | Facility: CLINIC | Age: 42
End: 2022-09-01

## 2022-09-01 ENCOUNTER — DOCUMENTATION (OUTPATIENT)
Dept: FAMILY MEDICINE CLINIC | Facility: CLINIC | Age: 42
End: 2022-09-01

## 2022-09-01 VITALS
HEART RATE: 80 BPM | BODY MASS INDEX: 34.32 KG/M2 | WEIGHT: 206 LBS | SYSTOLIC BLOOD PRESSURE: 118 MMHG | HEIGHT: 65 IN | OXYGEN SATURATION: 98 % | TEMPERATURE: 98.1 F | DIASTOLIC BLOOD PRESSURE: 80 MMHG

## 2022-09-01 DIAGNOSIS — I10 ESSENTIAL HYPERTENSION: ICD-10-CM

## 2022-09-01 DIAGNOSIS — G47.33 OSA (OBSTRUCTIVE SLEEP APNEA): Primary | ICD-10-CM

## 2022-09-01 DIAGNOSIS — Z72.0 TOBACCO USE: ICD-10-CM

## 2022-09-01 DIAGNOSIS — Z12.31 ENCOUNTER FOR SCREENING MAMMOGRAM FOR BREAST CANCER: ICD-10-CM

## 2022-09-01 DIAGNOSIS — E53.8 CYANOCOBALAMIN DEFICIENCY: ICD-10-CM

## 2022-09-01 DIAGNOSIS — E54 ASCORBIC ACID DEFICIENCY: ICD-10-CM

## 2022-09-01 DIAGNOSIS — E53.1 PYRIDOXINE DEFICIENCY: ICD-10-CM

## 2022-09-01 DIAGNOSIS — E51.9 THIAMINE DEFICIENCY: ICD-10-CM

## 2022-09-01 DIAGNOSIS — E55.9 VITAMIN D DEFICIENCY: ICD-10-CM

## 2022-09-01 DIAGNOSIS — E27.8 ADRENAL MASS, LEFT (HCC): ICD-10-CM

## 2022-09-01 DIAGNOSIS — R73.03 PREDIABETES: ICD-10-CM

## 2022-09-01 DIAGNOSIS — R53.82 CHRONIC FATIGUE: ICD-10-CM

## 2022-09-01 DIAGNOSIS — Z90.710 HISTORY OF HYSTERECTOMY: ICD-10-CM

## 2022-09-01 PROCEDURE — 99214 OFFICE O/P EST MOD 30 MIN: CPT | Performed by: FAMILY MEDICINE

## 2022-09-01 RX ORDER — AMLODIPINE BESYLATE 10 MG/1
10 TABLET ORAL DAILY
Qty: 90 TABLET | Refills: 1 | Status: SHIPPED | OUTPATIENT
Start: 2022-09-01 | End: 2022-11-30

## 2022-09-01 NOTE — PROGRESS NOTES
Assessment/Plan:    Will need baseline blood work checked hormone level checked for sugar thyroid TROY level was checked last time was normal   She will need HPV vaccine 3  In November along with pneumonia vaccine since she smoke  Stop Wellbutrin since it did help her with her fatigue  Continue amlodipine for now  I have spent 30 minutes with Patient  today in which greater than 50% of this time was spent in counseling/coordination of care regarding Prognosis, Risks and benefits of tx options and Intructions for management           Problem List Items Addressed This Visit        Respiratory    HIRAL (obstructive sleep apnea) - Primary       Cardiovascular and Mediastinum    Essential hypertension    Relevant Medications    amLODIPine (NORVASC) 10 mg tablet    Other Relevant Orders    Comprehensive metabolic panel    Microalbumin,Urine    UA w Reflex to Microscopic w Reflex to Culture       Other    Prediabetes    Relevant Orders    Hemoglobin A1C    Vitamin D deficiency    Relevant Orders    Vitamin D 25 hydroxy    Tobacco use    Adrenal mass, left (HCC)    Relevant Orders    Cortisol Level, AM Specimen    FSH and LH    Prolactin    ACTH    Testosterone, free, total    Chronic fatigue    Relevant Orders    CBC and differential    Comprehensive metabolic panel    Lyme Total Antibody Profile with reflex to WB    TSH, 3rd generation with Free T4 reflex    Iron Panel (Includes Ferritin, Iron Sat%, Iron, and TIBC)    Sedimentation rate, automated    C-reactive protein    Cortisol Level, AM Specimen    FSH and LH    Prolactin    Copper Level    EBV acute panel    History of hysterectomy    Relevant Orders    FSH and LH    Prolactin      Other Visit Diagnoses     Encounter for screening mammogram for breast cancer        Relevant Orders    Mammo screening bilateral w 3d & cad    Thiamine deficiency        Relevant Orders    Vitamin B1 (Thiamine), Serum/Plasma, LC/MS/MS    Cyanocobalamin deficiency        Relevant Orders Vitamin B12    Pyridoxine deficiency        Relevant Orders    Vitamin B6    Ascorbic acid deficiency        Relevant Orders    Vitamin C            Subjective:      Patient ID: Dorina Duff is a 42 y o  female  41-year-old female follow-up essential hypertension chronic fatigue  She was diagnosed sleep apnea but never get fitted for this sleeping mask  She is on amlodipine 10 mg daily blood pressure stable  She had hysterectomy partial when she was 33years old for bleeding  Since then she has been well until recent years she has been very tired  She sleeps well but during the day she find herself all sleep all the time  Her mood is cranky because she is always so tired  No exercise stress at home more than she can handle she denies being depressed  She worries that maybe her hormone that low because she had hysterectomy even though her ovaries remained  She had history of prediabetes and also vitamin-D deficiency and there is adrenal mass that was benign  She continues to smoke  She is due for mammogram       The following portions of the patient's history were reviewed and updated as appropriate:   Past Medical History:  She has a past medical history of Abnormal Pap smear of cervix, [facility] ) (1/25/2021), Arthritis, Asthma, Blurry vision (9/4/2020), Essential hypertension (9/4/2020), Headache (9/4/2020), Hypertension, Immunization deficiency (10/2/2020), Insomnia (2/22/2021), Migraine, Moderate episode of recurrent major depressive disorder ([facility] ) (9/4/2020), Obesity (BMI 30 0-34 9) (9/22/2021), Prediabetes (9/4/2020), Tobacco use (1/25/2021), and Vitamin D deficiency (10/2/2020)  ,  _______________________________________________________________________  Medical Problems:  does not have any pertinent problems on file ,  _______________________________________________________________________  Past Surgical History:   has a past surgical history that includes Tubal ligation (2007); Appendectomy;  section; Colposcopy; Knee surgery; Cervical biopsy w/ loop electrode excision; Lymph node dissection (2018); Breast surgery (Left, 2018); Breast biopsy (Left, 2017); Breast excisional biopsy (Left, 2018); Breast biopsy (Right); Hysterectomy (); and US guided thyroid biopsy (2021)  ,  _______________________________________________________________________  Family History:  family history includes Colon cancer (age of onset: 28) in her sister; Diabetes in her maternal grandmother, mother, paternal aunt, and paternal grandmother; Endometrial cancer (age of onset: 29) in her sister; Heart attack in her mother; Heart disease in her mother; Leukemia (age of onset: 25) in her maternal uncle; Lupus (age of onset: 50) in her maternal aunt; No Known Problems in her daughter, father, son, and son; Seizures in her maternal aunt  ,  _______________________________________________________________________  Social History:   reports that she has been smoking cigarettes  She has a 10 00 pack-year smoking history  She has never used smokeless tobacco  She reports current alcohol use  She reports that she does not use drugs  ,  _______________________________________________________________________  Allergies:  has No Known Allergies     _______________________________________________________________________  Current Outpatient Medications   Medication Sig Dispense Refill    albuterol (PROVENTIL HFA,VENTOLIN HFA) 90 mcg/act inhaler INHALE 2 PUFFS BY MOUTH EVERY 6 HOURS AS NEEDED FOR WHEEZE 18 g 1    amLODIPine (NORVASC) 10 mg tablet Take 1 tablet (10 mg total) by mouth daily Blood presuure 90 tablet 1    Cholecalciferol (CVS D3) 125 MCG (5000 UT) capsule Take 1 capsule (5,000 Units total) by mouth daily 90 capsule 2    cyanocobalamin (VITAMIN B-12) 2000 MCG tablet Take 1 tablet (2,000 mcg total) by mouth daily 90 tablet 2    loratadine (CLARITIN) 10 mg tablet TAKE 1 TABLET (10 MG TOTAL) BY MOUTH DAILY FOR ALLERGIES 90 tablet 1    tiotropium (SPIRIVA) 18 mcg inhalation capsule Place 18 mcg into inhaler and inhale in the morning   valACYclovir (VALTREX) 500 mg tablet TAKE 1 TABLET BY MOUTH EVERY DAY 30 tablet 3    ibuprofen (MOTRIN) 800 mg tablet Take 1 tablet (800 mg total) by mouth every 8 (eight) hours as needed for mild pain (inflamation - TAKE WITH food) for up to 10 days 30 tablet 0     No current facility-administered medications for this visit      _______________________________________________________________________  Review of Systems   Constitutional: Positive for fatigue  Negative for activity change, appetite change and unexpected weight change  HENT: Negative for ear pain, sore throat, trouble swallowing and voice change  Eyes: Negative for photophobia and visual disturbance  Respiratory: Negative for cough, chest tightness, shortness of breath and wheezing  Cardiovascular: Negative for chest pain, palpitations and leg swelling  Gastrointestinal: Negative for abdominal pain, constipation, diarrhea, nausea, rectal pain and vomiting  Endocrine: Negative for cold intolerance, polydipsia and polyuria  Genitourinary: Negative for difficulty urinating, dysuria, flank pain, menstrual problem and pelvic pain  Musculoskeletal: Negative for arthralgias, joint swelling and myalgias  Skin: Negative for color change and rash  Allergic/Immunologic: Negative for environmental allergies and immunocompromised state  Neurological: Negative for dizziness, weakness, numbness and headaches  Hematological: Negative for adenopathy  Does not bruise/bleed easily  Psychiatric/Behavioral: Negative for decreased concentration, dysphoric mood, self-injury, sleep disturbance and suicidal ideas  The patient is not nervous/anxious            Objective:  Vitals:    09/01/22 1129   BP: 118/80   BP Location: Right arm   Patient Position: Sitting   Cuff Size: Large   Pulse: 80   Temp: 98 1 °F (36 7 °C)   TempSrc: Temporal   SpO2: 98%   Weight: 93 4 kg (206 lb)   Height: 5' 5" (1 651 m)     Body mass index is 34 28 kg/m²  Physical Exam  Vitals and nursing note reviewed  Constitutional:       Appearance: Normal appearance  She is well-developed  She is obese  HENT:      Head: Normocephalic and atraumatic  Right Ear: Tympanic membrane, ear canal and external ear normal       Left Ear: Tympanic membrane, ear canal and external ear normal       Nose: Nose normal       Mouth/Throat:      Mouth: Mucous membranes are moist       Pharynx: Oropharynx is clear  No oropharyngeal exudate  Eyes:      Extraocular Movements: Extraocular movements intact  Pupils: Pupils are equal, round, and reactive to light  Neck:      Thyroid: No thyromegaly  Cardiovascular:      Rate and Rhythm: Normal rate and regular rhythm  Pulses: Normal pulses  Heart sounds: Normal heart sounds  No murmur heard  Pulmonary:      Effort: Pulmonary effort is normal  No respiratory distress  Breath sounds: Normal breath sounds  No wheezing or rales  Abdominal:      General: Bowel sounds are normal  There is no distension  Palpations: Abdomen is soft  Tenderness: There is no abdominal tenderness  There is no guarding  Genitourinary:     Vagina: Normal    Musculoskeletal:         General: No tenderness  Normal range of motion  Cervical back: Normal range of motion and neck supple  Skin:     General: Skin is warm and dry  Capillary Refill: Capillary refill takes less than 2 seconds  Findings: No rash  Neurological:      General: No focal deficit present  Mental Status: She is alert and oriented to person, place, and time  Mental status is at baseline  Psychiatric:         Mood and Affect: Mood normal          Behavior: Behavior normal          Thought Content:  Thought content normal          Judgment: Judgment normal

## 2022-09-03 ENCOUNTER — APPOINTMENT (OUTPATIENT)
Dept: LAB | Facility: HOSPITAL | Age: 42
End: 2022-09-03
Payer: COMMERCIAL

## 2022-09-03 DIAGNOSIS — E53.8 CYANOCOBALAMIN DEFICIENCY: ICD-10-CM

## 2022-09-03 DIAGNOSIS — E51.9 THIAMINE DEFICIENCY: ICD-10-CM

## 2022-09-03 DIAGNOSIS — I10 ESSENTIAL HYPERTENSION: ICD-10-CM

## 2022-09-03 DIAGNOSIS — E55.9 VITAMIN D DEFICIENCY: ICD-10-CM

## 2022-09-03 DIAGNOSIS — R53.82 CHRONIC FATIGUE: ICD-10-CM

## 2022-09-03 DIAGNOSIS — R73.03 PREDIABETES: ICD-10-CM

## 2022-09-03 DIAGNOSIS — E27.8 ADRENAL MASS, LEFT (HCC): ICD-10-CM

## 2022-09-03 DIAGNOSIS — Z90.710 HISTORY OF HYSTERECTOMY: ICD-10-CM

## 2022-09-03 DIAGNOSIS — E54 ASCORBIC ACID DEFICIENCY: ICD-10-CM

## 2022-09-03 DIAGNOSIS — E53.1 PYRIDOXINE DEFICIENCY: ICD-10-CM

## 2022-09-03 DIAGNOSIS — R59.1 LYMPHADENOPATHY: ICD-10-CM

## 2022-09-03 LAB
25(OH)D3 SERPL-MCNC: 19.8 NG/ML (ref 30–100)
ALBUMIN SERPL BCP-MCNC: 3.7 G/DL (ref 3.5–5)
ALP SERPL-CCNC: 82 U/L (ref 34–104)
ALT SERPL W P-5'-P-CCNC: 11 U/L (ref 7–52)
AMORPH URATE CRY URNS QL MICRO: NORMAL /HPF
ANION GAP SERPL CALCULATED.3IONS-SCNC: 8 MMOL/L (ref 4–13)
AST SERPL W P-5'-P-CCNC: 13 U/L (ref 13–39)
BACTERIA UR QL AUTO: NORMAL /HPF
BASOPHILS # BLD AUTO: 0.06 THOUSANDS/ΜL (ref 0–0.1)
BASOPHILS NFR BLD AUTO: 1 % (ref 0–1)
BILIRUB SERPL-MCNC: 0.55 MG/DL (ref 0.2–1)
BILIRUB UR QL STRIP: ABNORMAL
BUN SERPL-MCNC: 6 MG/DL (ref 5–25)
CALCIUM SERPL-MCNC: 8.9 MG/DL (ref 8.4–10.2)
CHLORIDE SERPL-SCNC: 104 MMOL/L (ref 96–108)
CLARITY UR: ABNORMAL
CO2 SERPL-SCNC: 25 MMOL/L (ref 21–32)
COLOR UR: YELLOW
CREAT SERPL-MCNC: 0.74 MG/DL (ref 0.6–1.3)
CREAT UR-MCNC: 329 MG/DL
CRP SERPL QL: 8.1 MG/L
EOSINOPHIL # BLD AUTO: 0.15 THOUSAND/ΜL (ref 0–0.61)
EOSINOPHIL NFR BLD AUTO: 1 % (ref 0–6)
ERYTHROCYTE [DISTWIDTH] IN BLOOD BY AUTOMATED COUNT: 14.6 % (ref 11.6–15.1)
ERYTHROCYTE [SEDIMENTATION RATE] IN BLOOD: 8 MM/HOUR (ref 0–20)
EST. AVERAGE GLUCOSE BLD GHB EST-MCNC: 126 MG/DL
FERRITIN SERPL-MCNC: 61 NG/ML (ref 8–388)
FSH SERPL-ACNC: 7.5 MIU/ML
GFR SERPL CREATININE-BSD FRML MDRD: 100 ML/MIN/1.73SQ M
GLUCOSE P FAST SERPL-MCNC: 96 MG/DL (ref 65–99)
GLUCOSE UR STRIP-MCNC: NEGATIVE MG/DL
HBA1C MFR BLD: 6 %
HCT VFR BLD AUTO: 42.3 % (ref 34.8–46.1)
HGB BLD-MCNC: 14.4 G/DL (ref 11.5–15.4)
HGB UR QL STRIP.AUTO: ABNORMAL
IMM GRANULOCYTES # BLD AUTO: 0.04 THOUSAND/UL (ref 0–0.2)
IMM GRANULOCYTES NFR BLD AUTO: 0 % (ref 0–2)
IRON SATN MFR SERPL: 33 % (ref 15–50)
IRON SERPL-MCNC: 89 UG/DL (ref 50–170)
KETONES UR STRIP-MCNC: NEGATIVE MG/DL
LEUKOCYTE ESTERASE UR QL STRIP: NEGATIVE
LH SERPL-ACNC: 4.9 MIU/ML
LYMPHOCYTES # BLD AUTO: 5.21 THOUSANDS/ΜL (ref 0.6–4.47)
LYMPHOCYTES NFR BLD AUTO: 41 % (ref 14–44)
MCH RBC QN AUTO: 29 PG (ref 26.8–34.3)
MCHC RBC AUTO-ENTMCNC: 34 G/DL (ref 31.4–37.4)
MCV RBC AUTO: 85 FL (ref 82–98)
MICROALBUMIN UR-MCNC: 31.8 MG/L (ref 0–20)
MICROALBUMIN/CREAT 24H UR: 10 MG/G CREATININE (ref 0–30)
MONOCYTES # BLD AUTO: 0.87 THOUSAND/ΜL (ref 0.17–1.22)
MONOCYTES NFR BLD AUTO: 7 % (ref 4–12)
NEUTROPHILS # BLD AUTO: 6.51 THOUSANDS/ΜL (ref 1.85–7.62)
NEUTS SEG NFR BLD AUTO: 50 % (ref 43–75)
NITRITE UR QL STRIP: NEGATIVE
NON-SQ EPI CELLS URNS QL MICRO: NORMAL /HPF
NRBC BLD AUTO-RTO: 0 /100 WBCS
PH UR STRIP.AUTO: 7 [PH]
PLATELET # BLD AUTO: 318 THOUSANDS/UL (ref 149–390)
PMV BLD AUTO: 9.1 FL (ref 8.9–12.7)
POTASSIUM SERPL-SCNC: 3.4 MMOL/L (ref 3.5–5.3)
PROLACTIN SERPL-MCNC: 8.9 NG/ML
PROT SERPL-MCNC: 6.9 G/DL (ref 6.4–8.4)
PROT UR STRIP-MCNC: NEGATIVE MG/DL
RBC # BLD AUTO: 4.96 MILLION/UL (ref 3.81–5.12)
RBC #/AREA URNS AUTO: NORMAL /HPF
SODIUM SERPL-SCNC: 137 MMOL/L (ref 135–147)
SP GR UR STRIP.AUTO: 1.02 (ref 1–1.03)
TIBC SERPL-MCNC: 271 UG/DL (ref 250–450)
TSH SERPL DL<=0.05 MIU/L-ACNC: 2.13 UIU/ML (ref 0.45–4.5)
UROBILINOGEN UR QL STRIP.AUTO: 0.2 E.U./DL
VIT B12 SERPL-MCNC: 633 PG/ML (ref 100–900)
WBC # BLD AUTO: 12.84 THOUSAND/UL (ref 4.31–10.16)
WBC #/AREA URNS AUTO: NORMAL /HPF

## 2022-09-03 PROCEDURE — 84403 ASSAY OF TOTAL TESTOSTERONE: CPT

## 2022-09-03 PROCEDURE — 85652 RBC SED RATE AUTOMATED: CPT

## 2022-09-03 PROCEDURE — 86618 LYME DISEASE ANTIBODY: CPT

## 2022-09-03 PROCEDURE — 84146 ASSAY OF PROLACTIN: CPT

## 2022-09-03 PROCEDURE — 84207 ASSAY OF VITAMIN B-6: CPT

## 2022-09-03 PROCEDURE — 84425 ASSAY OF VITAMIN B-1: CPT

## 2022-09-03 PROCEDURE — 83550 IRON BINDING TEST: CPT

## 2022-09-03 PROCEDURE — 83036 HEMOGLOBIN GLYCOSYLATED A1C: CPT

## 2022-09-03 PROCEDURE — 82728 ASSAY OF FERRITIN: CPT

## 2022-09-03 PROCEDURE — 86665 EPSTEIN-BARR CAPSID VCA: CPT

## 2022-09-03 PROCEDURE — 86663 EPSTEIN-BARR ANTIBODY: CPT

## 2022-09-03 PROCEDURE — 80053 COMPREHEN METABOLIC PANEL: CPT

## 2022-09-03 PROCEDURE — 82570 ASSAY OF URINE CREATININE: CPT

## 2022-09-03 PROCEDURE — 83001 ASSAY OF GONADOTROPIN (FSH): CPT

## 2022-09-03 PROCEDURE — 83002 ASSAY OF GONADOTROPIN (LH): CPT

## 2022-09-03 PROCEDURE — 82043 UR ALBUMIN QUANTITATIVE: CPT

## 2022-09-03 PROCEDURE — 86664 EPSTEIN-BARR NUCLEAR ANTIGEN: CPT

## 2022-09-03 PROCEDURE — 82180 ASSAY OF ASCORBIC ACID: CPT

## 2022-09-03 PROCEDURE — 83540 ASSAY OF IRON: CPT

## 2022-09-03 PROCEDURE — 36415 COLL VENOUS BLD VENIPUNCTURE: CPT

## 2022-09-03 PROCEDURE — 82306 VITAMIN D 25 HYDROXY: CPT

## 2022-09-03 PROCEDURE — 85025 COMPLETE CBC W/AUTO DIFF WBC: CPT

## 2022-09-03 PROCEDURE — 84402 ASSAY OF FREE TESTOSTERONE: CPT

## 2022-09-03 PROCEDURE — 82607 VITAMIN B-12: CPT

## 2022-09-03 PROCEDURE — 84443 ASSAY THYROID STIM HORMONE: CPT

## 2022-09-03 PROCEDURE — 86140 C-REACTIVE PROTEIN: CPT

## 2022-09-04 LAB — B BURGDOR IGG+IGM SER-ACNC: <0.2 AI

## 2022-09-06 ENCOUNTER — APPOINTMENT (OUTPATIENT)
Dept: LAB | Facility: HOSPITAL | Age: 42
End: 2022-09-06
Attending: FAMILY MEDICINE
Payer: COMMERCIAL

## 2022-09-06 ENCOUNTER — APPOINTMENT (OUTPATIENT)
Dept: RADIOLOGY | Facility: HOSPITAL | Age: 42
End: 2022-09-06
Payer: COMMERCIAL

## 2022-09-06 ENCOUNTER — HOSPITAL ENCOUNTER (EMERGENCY)
Facility: HOSPITAL | Age: 42
Discharge: HOME | End: 2022-09-06
Attending: INTERNAL MEDICINE
Payer: COMMERCIAL

## 2022-09-06 VITALS
DIASTOLIC BLOOD PRESSURE: 82 MMHG | HEART RATE: 94 BPM | SYSTOLIC BLOOD PRESSURE: 124 MMHG | OXYGEN SATURATION: 99 % | RESPIRATION RATE: 16 BRPM | TEMPERATURE: 98.4 F

## 2022-09-06 DIAGNOSIS — G93.32 CHRONIC FATIGUE SYNDROME: ICD-10-CM

## 2022-09-06 DIAGNOSIS — S63.619A FINGER SPRAIN: Primary | ICD-10-CM

## 2022-09-06 DIAGNOSIS — E27.8 ABNORMALITY OF CORTISOL-BINDING GLOBULIN (HCC): ICD-10-CM

## 2022-09-06 PROBLEM — Z23 ENCOUNTER FOR IMMUNIZATION: Status: ACTIVE | Noted: 2022-09-06

## 2022-09-06 PROBLEM — B27.90 EPSTEIN BARR VIRUS INFECTION: Status: ACTIVE | Noted: 2022-09-06

## 2022-09-06 LAB
CORTIS AM PEAK SERPL-MCNC: 13 UG/DL (ref 4.2–22.4)
EBV NA IGG SER IA-ACNC: 163 U/ML (ref 0–17.9)
EBV VCA IGG SER IA-ACNC: 176 U/ML (ref 0–17.9)
EBV VCA IGM SER IA-ACNC: <36 U/ML (ref 0–35.9)
INTERPRETATION: ABNORMAL
TESTOST FREE SERPL-MCNC: 0.8 PG/ML (ref 0–4.2)
TESTOST SERPL-MCNC: 5 NG/DL (ref 4–50)

## 2022-09-06 PROCEDURE — 99284 EMERGENCY DEPT VISIT MOD MDM: CPT | Performed by: PHYSICIAN ASSISTANT

## 2022-09-06 PROCEDURE — 73140 X-RAY EXAM OF FINGER(S): CPT

## 2022-09-06 PROCEDURE — 82024 ASSAY OF ACTH: CPT

## 2022-09-06 PROCEDURE — 82533 TOTAL CORTISOL: CPT

## 2022-09-06 PROCEDURE — 82525 ASSAY OF COPPER: CPT

## 2022-09-06 PROCEDURE — 36415 COLL VENOUS BLD VENIPUNCTURE: CPT

## 2022-09-06 PROCEDURE — 99283 EMERGENCY DEPT VISIT LOW MDM: CPT

## 2022-09-06 NOTE — ED PROVIDER NOTES
History  Chief Complaint   Patient presents with    Finger Injury     Right index finger bend backwards while moving furniture on Saturday  Took ibuprofen this morning     Past Medical History: Left Adrenal mass, Arthritis, Asthma, HTN, Insomnia, Migraine, major depressive disorder,   Past Surgical History: APPENDECTOMY, BREAST BIOPSY, CERVICAL BIOPSY  W/ LOOP ELECTRODE EXCISION,  SECTION X2, HYSTERECTOMY, KNEE SURGERY, TUBAL LIGATION  Presents to emergency department c/o constant ongoing pain, swelling to right index finger/2nd MT since she bent it backwards 3 days ago while moving furniture  PT taking NSAIDS  No other complaints          Prior to Admission Medications   Prescriptions Last Dose Informant Patient Reported? Taking? Cholecalciferol (CVS D3) 125 MCG (5000 UT) capsule   No No   Sig: Take 1 capsule (5,000 Units total) by mouth daily   albuterol (PROVENTIL HFA,VENTOLIN HFA) 90 mcg/act inhaler   No No   Sig: INHALE 2 PUFFS BY MOUTH EVERY 6 HOURS AS NEEDED FOR WHEEZE   amLODIPine (NORVASC) 10 mg tablet   No No   Sig: Take 1 tablet (10 mg total) by mouth daily Blood presuure   cyanocobalamin (VITAMIN B-12) 2000 MCG tablet   No No   Sig: Take 1 tablet (2,000 mcg total) by mouth daily   ibuprofen (MOTRIN) 800 mg tablet   No No   Sig: Take 1 tablet (800 mg total) by mouth every 8 (eight) hours as needed for mild pain (inflamation - TAKE WITH food) for up to 10 days   loratadine (CLARITIN) 10 mg tablet   No No   Sig: TAKE 1 TABLET (10 MG TOTAL) BY MOUTH DAILY FOR ALLERGIES   tiotropium (SPIRIVA) 18 mcg inhalation capsule   Yes No   Sig: Place 18 mcg into inhaler and inhale in the morning     valACYclovir (VALTREX) 500 mg tablet   No No   Sig: TAKE 1 TABLET BY MOUTH EVERY DAY      Facility-Administered Medications: None       Past Medical History:   Diagnosis Date    Abnormal Pap smear of cervix     2018 HSV 1, 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    Adrenal mass, [facility] ) 2021    1 8 x 2 1 cm on ct 2020    Arthritis     Asthma     Blurry vision 2020    Essential hypertension 2020    Headache 2020    Hypertension     Immunization deficiency 10/2/2020    Hep a nonimmune    Insomnia 2021    Migraine     Moderate episode of recurrent major depressive disorder ([facility] ) 2020    Obesity (BMI 30 0-34 9) 2021    Prediabetes 2020    Tobacco use 2021    Vitamin D deficiency 10/2/2020       Past Surgical History:   Procedure Laterality Date    APPENDECTOMY      BREAST BIOPSY Left 2017    BREAST BIOPSY Right     2 core biopsies    BREAST EXCISIONAL BIOPSY Left 2018    BREAST SURGERY Left 2018    Left breast mass excision    CERVICAL BIOPSY  W/ LOOP ELECTRODE EXCISION       SECTION      X2    COLPOSCOPY      HYSTERECTOMY      heavy bleeding, ovaries remain, also had abnormal pap    KNEE SURGERY      LYMPH NODE DISSECTION      under armpit    TUBAL LIGATION  2007   [facility] [address] THYROID BIOPSY  2021       Family History   Problem Relation Age of Onset    Lupus Maternal Aunt 50    Seizures Maternal Aunt     Leukemia Maternal Uncle 25    Diabetes Mother     Heart attack Mother     Heart disease Mother         Internal Defibrilation    Diabetes Maternal Grandmother     Diabetes Paternal Grandmother     Diabetes Paternal Aunt     No Known Problems Father     Endometrial cancer Sister 29    Colon cancer Sister 28    No Known Problems Daughter     No Known Problems Son     No Known Problems Son      I have reviewed and agree with the history as documented      E-Cigarette/Vaping    E-Cigarette Use Never User      E-Cigarette/Vaping Substances    Nicotine No     THC No     CBD No     Flavoring No     Other No     Unknown No      Social History     Tobacco Use    Smoking status: Current Every Day Smoker     Packs/day: 0 50     Years: 20 00     Pack years: 10 00     Types: Cigarettes    Smokeless tobacco: Never Used   Cindy Garner Tobacco comment: pt is down to  5 packs a day   Vaping Use    Vaping Use: Never used   Substance Use Topics    Alcohol use: Yes     Comment: occasional    Drug use: No       Review of Systems   Constitutional: Negative for fever  HENT: Negative for hearing loss and sore throat  Respiratory: Negative for shortness of breath  Cardiovascular: Negative for chest pain  Genitourinary: Negative for dysuria and frequency  Musculoskeletal: Positive for arthralgias and myalgias  Skin: Negative for wound  Neurological: Negative for weakness and numbness  All other systems reviewed and are negative  Physical Exam  Physical Exam  Vitals and nursing note reviewed  Constitutional:       General: She is in acute distress (mild)  Appearance: She is well-developed  HENT:      Head: Normocephalic and atraumatic  Right Ear: External ear normal       Left Ear: External ear normal       Nose: Nose normal       Mouth/Throat:      Mouth: Mucous membranes are moist       Pharynx: Oropharynx is clear  Eyes:      Conjunctiva/sclera: Conjunctivae normal    Cardiovascular:      Rate and Rhythm: Normal rate  Pulmonary:      Effort: Pulmonary effort is normal  No respiratory distress  Musculoskeletal:         General: Swelling and tenderness present  Normal range of motion  Cervical back: Normal range of motion  Comments: Sx noted along right index finger, 2nd MT, decreased ROM due to pain, distal NV intact, no skin changes   Skin:     General: Skin is warm and dry  Capillary Refill: Capillary refill takes less than 2 seconds  Findings: No erythema or lesion  Neurological:      General: No focal deficit present  Mental Status: She is alert        Gait: Gait normal          Vital Signs  ED Triage Vitals   Temperature Pulse Respirations Blood Pressure SpO2   09/06/22 1353 09/06/22 1353 09/06/22 1353 09/06/22 1354 09/06/22 1353   98 4 °F (36 9 °C) 94 16 124/82 99 %      Temp src Heart Rate Source Patient Position - Orthostatic VS BP Location FiO2 (%)   -- 09/06/22 1353 09/06/22 1353 09/06/22 1353 --    Monitor Sitting Left arm       Pain Score       09/06/22 1353       6           Vitals:    09/06/22 1353 09/06/22 1354   BP:  124/82   Pulse: 94    Patient Position - Orthostatic VS: Sitting          Visual Acuity      ED Medications  Medications - No data to display    Diagnostic Studies  Results Reviewed     None                 XR finger second digit-index RIGHT   ED Interpretation by Heather Carter PA-C (09/06 1429)   No fx                 Procedures  Procedures         ED Course                                             MDM  Number of Diagnoses or Management Options  Diagnosis management comments: Ace wrap placed       Amount and/or Complexity of Data Reviewed  Tests in the radiology section of CPT®: ordered and reviewed        Disposition  Final diagnoses:   Finger sprain     Time reflects when diagnosis was documented in both MDM as applicable and the Disposition within this note     Time User Action Codes Description Comment    9/6/2022  2:33 PM Kamron Payne Add [I22 [id number]] Finger sprain       ED Disposition     ED Disposition   Discharge    Condition   Stable    Date/Time   Tue Sep 6, 2022  2:32 PM    Comment   Lisset Nash discharge to home/self care  Follow-up Information     Follow up With Specialties Details Why Contact Info Additional Information    Al Acharya MD Family Medicine   [address] [address] [phone]-       [address] [facility]   [address] [address] [phone] [address] [facility] [address], [address] [place] [address] [phone]          Patient's Medications   Discharge Prescriptions    No medications on file       No discharge procedures on file      PDMP Review       Value Time User    PDMP Reviewed  Yes 12/21/2020  5:26 AM Mark Harrison MD          ED Provider  Electronically Signed by           Nicolas Peña PA-C  09/06/22 4071

## 2022-09-06 NOTE — Clinical Note
Jett Mojgan was seen and treated in our emergency department on 9/6/2022  Diagnosis:     Nel Aquino  may return to work on return date  She may return on this date: 09/07/2022         If you have any questions or concerns, please don't hesitate to call        Ed Bee PA-C    ______________________________           _______________          _______________  Hospital Representative                              Date                                Time

## 2022-09-06 NOTE — DISCHARGE INSTRUCTIONS
Use Ace wrap for next few days for comfort, taking off to bathe or sleep or until follow-up  Use Tylenol 650 mg every 4 hours or Anti-inflammatories like Advil, Motrin, Ibuprofen, Aleve every 6 hours; you can alternate the 2 medications taking something every 3 hours for pain  Follow-up with your doctor or orthopedic doctor in the next few days if no improvement in condition

## 2022-09-07 LAB — ACTH PLAS-MCNC: 19.9 PG/ML (ref 7.2–63.3)

## 2022-09-08 LAB
COPPER SERPL-MCNC: 112 UG/DL (ref 80–158)
VIT B1 BLD-SCNC: 73.5 NMOL/L (ref 66.5–200)
VIT C SERPL-MCNC: 0.6 MG/DL (ref 0.4–2)

## 2022-09-10 LAB — VIT B6 SERPL-MCNC: 1.3 UG/L (ref 3.4–65.2)

## 2022-09-13 DIAGNOSIS — B00.9 HERPES: ICD-10-CM

## 2022-09-15 RX ORDER — VALACYCLOVIR HYDROCHLORIDE 500 MG/1
TABLET, FILM COATED ORAL
Qty: 90 TABLET | Refills: 2 | Status: SHIPPED | OUTPATIENT
Start: 2022-09-15 | End: 2022-10-15

## 2022-10-07 ENCOUNTER — TELEPHONE (OUTPATIENT)
Dept: FAMILY MEDICINE CLINIC | Facility: CLINIC | Age: 42
End: 2022-10-07

## 2022-10-07 NOTE — TELEPHONE ENCOUNTER
Pt called in asking if you could write her a letter for her job stating she needs a chair due to her getting light headed and dizzy working 10hr shifts,

## 2022-10-21 ENCOUNTER — HOSPITAL ENCOUNTER (EMERGENCY)
Facility: HOSPITAL | Age: 42
Discharge: HOME | End: 2022-10-21
Attending: EMERGENCY MEDICINE
Payer: COMMERCIAL

## 2022-10-21 VITALS
OXYGEN SATURATION: 97 % | SYSTOLIC BLOOD PRESSURE: 130 MMHG | HEART RATE: 119 BPM | DIASTOLIC BLOOD PRESSURE: 81 MMHG | TEMPERATURE: 100.1 F | RESPIRATION RATE: 20 BRPM

## 2022-10-21 DIAGNOSIS — L05.01 PILONIDAL ABSCESS: Primary | ICD-10-CM

## 2022-10-21 PROCEDURE — 99282 EMERGENCY DEPT VISIT SF MDM: CPT

## 2022-10-21 PROCEDURE — 10080 I&D PILONIDAL CYST SIMPLE: CPT | Performed by: EMERGENCY MEDICINE

## 2022-10-21 PROCEDURE — 99284 EMERGENCY DEPT VISIT MOD MDM: CPT | Performed by: EMERGENCY MEDICINE

## 2022-10-21 RX ORDER — ACETAMINOPHEN 325 MG/1
650 TABLET ORAL ONCE
Status: COMPLETED | OUTPATIENT
Start: 2022-10-21 | End: 2022-10-21

## 2022-10-21 RX ORDER — LIDOCAINE HYDROCHLORIDE AND EPINEPHRINE 10; 10 MG/ML; UG/ML
1 INJECTION, SOLUTION INFILTRATION; PERINEURAL ONCE
Status: COMPLETED | OUTPATIENT
Start: 2022-10-21 | End: 2022-10-21

## 2022-10-21 RX ORDER — SULFAMETHOXAZOLE AND TRIMETHOPRIM 800; 160 MG/1; MG/1
1 TABLET ORAL 2 TIMES DAILY
Qty: 14 TABLET | Refills: 0 | Status: SHIPPED | OUTPATIENT
Start: 2022-10-21 | End: 2022-10-28

## 2022-10-21 RX ORDER — SULFAMETHOXAZOLE AND TRIMETHOPRIM 800; 160 MG/1; MG/1
1 TABLET ORAL ONCE
Status: COMPLETED | OUTPATIENT
Start: 2022-10-21 | End: 2022-10-21

## 2022-10-21 RX ADMIN — SULFAMETHOXAZOLE AND TRIMETHOPRIM 1 TABLET: 800; 160 TABLET ORAL at 21:51

## 2022-10-21 RX ADMIN — LIDOCAINE HYDROCHLORIDE,EPINEPHRINE BITARTRATE 1 ML: 10; .01 INJECTION, SOLUTION INFILTRATION; PERINEURAL at 21:51

## 2022-10-21 RX ADMIN — ACETAMINOPHEN 650 MG: 325 TABLET ORAL at 21:51

## 2022-10-22 NOTE — ED NOTES
D/c reviewed with pt  Pt verbalized understanding and has no further questions at this time  Pt ambulatory off unit with steady gait       Charity Ahn RN  10/21/22 7711

## 2022-10-22 NOTE — ED PROVIDER NOTES
History  Chief Complaint   Patient presents with   • Abscess     Pt reports "boil on right buttock" x 2 days     This is a 42year old female who presents to the ED with an abscess  She states that it began 2 days ago  It was a pimple that she popped and did not get much pus out  She denies fevers or chills  She states it is painful  She states she has no nausea or vomiting  She has no difficultly with bowel movements  Prior to Admission Medications   Prescriptions Last Dose Informant Patient Reported? Taking? Cholecalciferol ([facility] D3) 125 MCG (5000 UT) capsule   No No   Sig: Take 1 capsule (5,000 Units total) by mouth daily   albuterol (PROVENTIL HFA,VENTOLIN HFA) 90 mcg/act inhaler   No No   Sig: INHALE 2 PUFFS BY MOUTH EVERY 6 HOURS AS NEEDED FOR WHEEZE   amLODIPine (NORVASC) 10 mg tablet   No No   Sig: Take 1 tablet (10 mg total) by mouth daily Blood presuure   cyanocobalamin (VITAMIN B-12) 2000 MCG tablet   No No   Sig: Take 1 tablet (2,000 mcg total) by mouth daily   ibuprofen (MOTRIN) 800 mg tablet   No No   Sig: Take 1 tablet (800 mg total) by mouth every 8 (eight) hours as needed for mild pain (inflamation - TAKE WITH food) for up to 10 days   loratadine (CLARITIN) 10 mg tablet   No No   Sig: TAKE 1 TABLET (10 MG TOTAL) BY MOUTH DAILY FOR ALLERGIES   tiotropium (SPIRIVA) 18 mcg inhalation capsule   Yes No   Sig: Place 18 mcg into inhaler and inhale in the morning     valACYclovir (VALTREX) 500 mg tablet   No No   Sig: TAKE 1 TABLET BY MOUTH EVERY DAY      Facility-Administered Medications: None       Past Medical History:   Diagnosis Date   • Abnormal Pap smear of cervix     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, 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   • Adrenal mass, left ([address] ) 1/25/2021    1 8 x 2 1 cm on ct 12/21/2020   • Arthritis    • Asthma    • Blurry vision 9/4/2020   • Essential hypertension 9/4/2020   • Headache 9/4/2020   • Hypertension    • Immunization deficiency 10/2/2020    Hep a nonimmune   • Insomnia 2/22/2021   • Migraine • Moderate episode of recurrent major depressive disorder ([facility] [address] ) 2020   • Obesity (BMI 30 0-34 9) 2021   • Prediabetes 2020   • Tobacco use 2021   • Vitamin D deficiency 10/2/2020       Past Surgical History:   Procedure Laterality Date   • APPENDECTOMY     • BREAST BIOPSY Left 2017   • BREAST BIOPSY Right     2 core biopsies   • BREAST EXCISIONAL BIOPSY Left 2018   • BREAST SURGERY Left 2018    Left breast mass excision   • CERVICAL BIOPSY  W/ LOOP ELECTRODE EXCISION     •  SECTION      X2   • COLPOSCOPY     • HYSTERECTOMY      heavy bleeding, ovaries remain, also had abnormal pap   • KNEE SURGERY     • LYMPH NODE DISSECTION      under armpit   • TUBAL LIGATION     • [address] THYROID BIOPSY  2021       Family History   Problem Relation Age of Onset   • Lupus Maternal Aunt 50   • Seizures Maternal Aunt    • Leukemia Maternal Uncle 25   • Diabetes Mother    • Heart attack Mother    • Heart disease Mother         Internal Defibrilation   • Diabetes Maternal Grandmother    • Diabetes Paternal Grandmother    • Diabetes Paternal Aunt    • No Known Problems Father    • Endometrial cancer Sister 29   • Colon cancer Sister 28   • No Known Problems Daughter    • No Known Problems Son    • No Known Problems Son      I have reviewed and agree with the history as documented      E-Cigarette/Vaping   • E-Cigarette Use Never User      E-Cigarette/Vaping Substances   • Nicotine No    • THC No    • CBD No    • Flavoring No    • Other No    • Unknown No      Social History     Tobacco Use   • Smoking status: Current Every Day Smoker     Packs/day: 0 50     Years: 20 00     Pack years: 10 00     Types: Cigarettes   • Smokeless tobacco: Never Used   • Tobacco comment: pt is down to  5 packs a day   Vaping Use   • Vaping Use: Never used   Substance Use Topics   • Alcohol use: Yes     Comment: occasional   • Drug use: No       Review of Systems   All other systems reviewed and are negative        Physical Exam  Physical Exam  Constitutional:  Vital signs reviewed, patient appears nontoxic, no acute distress  Eyes: Pupils equal round reactive to light and accommodation, extraocular muscles intact  HEENT: trachea midline, no JVD, moist mucous membranes  Respiratory: lung sounds clear throughout, no rhonchi, no rales  Cardiovascular: regular rate rhythm, no murmurs or rubs  Abdomen: soft, nontender, nondistended, no rebound or guarding  Back: no CVA tenderness, normal inspection  Extremities: no edema, pulses equal in all 4 extremities  Neuro: awake, alert, oriented, no focal weakness  Skin: warm, dry, fluctuance to the right buttocks, mild surrounding erythema, good separation to the anus with no tenderness around the anus    Vital Signs  ED Triage Vitals [10/21/22 2140]   Temperature Pulse Respirations Blood Pressure SpO2   100 1 °F (37 8 °C) (!) 119 20 130/81 97 %      Temp Source Heart Rate Source Patient Position - Orthostatic VS BP Location FiO2 (%)   Oral Monitor Standing Right arm --      Pain Score       --           Vitals:    10/21/22 2140   BP: 130/81   Pulse: (!) 119   Patient Position - Orthostatic VS: Standing         Visual Acuity      ED Medications  Medications   lidocaine-epinephrine (XYLOCAINE/EPINEPHRINE) 1 %-1:100,000 injection 1 mL (1 mL Infiltration Given 10/21/22 2151)   sulfamethoxazole-trimethoprim (BACTRIM DS) 800-160 mg per tablet 1 tablet (1 tablet Oral Given 10/21/22 2151)   acetaminophen (TYLENOL) tablet 650 mg (650 mg Oral Given 10/21/22 2151)       Diagnostic Studies  Results Reviewed     None                 No orders to display              Procedures  Incision and drain    Date/Time: 10/21/2022 10:02 PM  Performed by: Parker Cueto DO  Authorized by: Parker Cueto DO   Universal Protocol:  Consent given by: patient      Patient location:  ED  Location:     Type:  Pilonidal cyst    Size:  5  Pre-procedure details:     Skin preparation:  Antiseptic wash  Anesthesia (see MAR for exact dosages): Anesthesia method:  Local infiltration    Local anesthetic:  Lidocaine 1% WITH epi  Procedure details:     Complexity:  Complex    Incision types:  Stab incision    Scalpel blade:  11    Approach:  Open    Wound management:  Probed and deloculated    Drainage:  Purulent    Drainage amount: Moderate    Packing materials:  None  Post-procedure details:     Patient tolerance of procedure: Tolerated well, no immediate complications             ED Course                                             MDM  Number of Diagnoses or Management Options  Pilonidal abscess  Diagnosis management comments: This is a 42year old female who presents to the ED with an absces  She had an I&D at bedside  It was consistent with a pilonidal cyst   She will be started on antibiotics and discharged follow-up to General surgery  Disposition  Final diagnoses:   Pilonidal abscess     Time reflects when diagnosis was documented in both MDM as applicable and the Disposition within this note     Time User Action Codes Description Comment    10/21/2022 10:03 PM Osvaldo Meals Add [L05 01] Pilonidal abscess       ED Disposition     ED Disposition   Discharge    Condition   Stable    Date/Time   Fri Oct 21, 2022 10:03 PM    Comment   Colon Ryan discharge to home/self care                 Follow-up Information     Follow up With Specialties Details Why Contact Info Additional Information    Al Fung MD Family Medicine Schedule an appointment as soon as possible for a visit in 2 days  [address] [facility] [address] [place] [address] [address]       Delroy Gomez MD General Surgery In 2 days  [address] Emergency Department Emergency Medicine  If symptoms worsen [address] [facility],[address] [id number]  [address] Emergency Department, [address] Discharge Medication List as of 10/21/2022 10:24 PM      START taking these medications    Details   sulfamethoxazole-trimethoprim (BACTRIM DS) 800-160 mg per tablet Take 1 tablet by mouth 2 (two) times a day for 7 days smx-tmp DS (BACTRIM) 800-160 mg tabs (1tab q12 D10), Starting Fri 10/21/2022, Until Fri 10/28/2022, Normal         CONTINUE these medications which have NOT CHANGED    Details   albuterol (PROVENTIL HFA,VENTOLIN HFA) 90 mcg/act inhaler INHALE 2 PUFFS BY MOUTH EVERY 6 HOURS AS NEEDED FOR WHEEZE, Normal      amLODIPine (NORVASC) 10 mg tablet Take 1 tablet (10 mg total) by mouth daily Blood presuure, Starting Thu 9/1/2022, Until Wed 11/30/2022, Normal      Cholecalciferol (CVS D3) 125 MCG (5000 UT) capsule Take 1 capsule (5,000 Units total) by mouth daily, Starting Mon 8/22/2022, Until Sun 11/20/2022, Normal      cyanocobalamin (VITAMIN B-12) 2000 MCG tablet Take 1 tablet (2,000 mcg total) by mouth daily, Starting Mon 8/22/2022, Until Sun 11/20/2022, Normal      ibuprofen (MOTRIN) 800 mg tablet Take 1 tablet (800 mg total) by mouth every 8 (eight) hours as needed for mild pain (inflamation - TAKE WITH food) for up to 10 days, Starting Tue 4/26/2022, Until Fri 5/6/2022 at 2359, Normal      loratadine (CLARITIN) 10 mg tablet TAKE 1 TABLET (10 MG TOTAL) BY MOUTH DAILY FOR ALLERGIES, Starting Fri 7/1/2022, Normal      tiotropium (SPIRIVA) 18 mcg inhalation capsule Place 18 mcg into inhaler and inhale in the morning , Historical Med      valACYclovir (VALTREX) 500 mg tablet TAKE 1 TABLET BY MOUTH EVERY DAY, Normal             No discharge procedures on file      PDMP Review       Value Time User    PDMP Reviewed  Yes 12/21/2020  5:26 AM Blaze Salazar MD          ED Provider  Electronically Signed by           Lenard Hull DO  10/21/22 2175

## 2022-10-23 DIAGNOSIS — J01.90 ACUTE SINUSITIS, UNSPECIFIED: ICD-10-CM

## 2022-10-24 RX ORDER — ALBUTEROL SULFATE 90 UG/1
AEROSOL, METERED RESPIRATORY (INHALATION)
Qty: 18 G | Refills: 1 | Status: SHIPPED | OUTPATIENT
Start: 2022-10-24

## 2022-10-27 ENCOUNTER — CONSULT (OUTPATIENT)
Dept: SURGERY | Facility: CLINIC | Age: 42
End: 2022-10-27
Payer: COMMERCIAL

## 2022-10-27 VITALS
DIASTOLIC BLOOD PRESSURE: 84 MMHG | OXYGEN SATURATION: 98 % | TEMPERATURE: 97.7 F | SYSTOLIC BLOOD PRESSURE: 126 MMHG | WEIGHT: 199 LBS | RESPIRATION RATE: 18 BRPM | HEIGHT: 65 IN | HEART RATE: 88 BPM | BODY MASS INDEX: 33.15 KG/M2

## 2022-10-27 DIAGNOSIS — N76.4 ABSCESS OF RIGHT GENITAL LABIA: Primary | ICD-10-CM

## 2022-10-27 PROCEDURE — 56405 I&D VULVA/PERINEAL ABSCESS: CPT | Performed by: SURGERY

## 2022-10-27 PROCEDURE — 99212 OFFICE O/P EST SF 10 MIN: CPT | Performed by: SURGERY

## 2022-10-27 NOTE — PATIENT INSTRUCTIONS
She is to continue the antibiotics  She is to take Sitz baths twice a day  She is to pull the packing out tomorrow  She should return to see me in 2 weeks    I think she should also see Dermatology because I am sure this is hidradenitis

## 2022-10-27 NOTE — LETTER
October 27, 2022     Patient: Adali Amaro  YOB: 1980  Date of Visit: 10/27/2022      To Whom it May Concern:    Adali Amaro is under my professional care  Charissa Esters was seen in my office on 10/27/2022  Charissa Esters may return to work on 10/31/2022  If you have any questions or concerns, please don't hesitate to call           Sincerely,          Kaur Cleary MD        CC: No Recipients

## 2022-10-27 NOTE — LETTER
October 27, 2022     Patient: Concetta Thompson  YOB: 1980  Date of Visit: 10/27/2022      To Whom it May Concern:    Concetta Thompson is under my professional care  Cleceasar Rape was seen in my office on 10/27/2022  Cleotha Rape may return to work on 10/28/2022  If you have any questions or concerns, please don't hesitate to call           Sincerely,          Micheline Lozano MD        CC:   No Recipients

## 2022-10-27 NOTE — PROGRESS NOTES
Assessment/Plan:  The patient had a abscess right upper medial thigh/groin  This was drained in the office here today  She will return in 2 weeks time     There are no diagnoses linked to this encounter  Subjective:     Patient ID: Severa Cheshire is a 42 y o  female  HPI  the patient is a 79-year-old female who I know from having seen last year when I drained what sounds like a groin or buttock abscess  I used that terminology because I do not have a note stating what was done  She is prone to developing abscesses and my guess is that she probably has hidradenitis suppurativa  She was seen in the emergency room a couple nights ago and had an incision and drainage of what was stated to be a pilonidal abscess  She was sent here for follow-up    Review of Systems  hypertension, asthma, HIRAL, arthritis, prediabetes, depression    Objective:     Physical Exam  this is an obese black female who is in no distress  Vital signs are fine  The area on the right buttock is indeed a buttock abscess  This is at least 5 cm in the matt cleft and I do not think represents a pilonidal cyst   There is also no fluctuance here and the induration is minimal   There is still a 5 mm opening and my guess is that this was drained correctly  She shows me an area on the right upper medial thigh right lateral to the labia  This had been drained in the past   There is about a 4 x 1 cm raised area of which the superior lateral portion is fluctuant  There is a 3 mm thin doubt raised area in the middle of this which represents a drainage site from yesterday  This area is an active abscess and needs to be drained  Incision and Drainage    Date/Time: 10/27/2022 3:40 PM  Performed by: Fermin Sykes MD  Authorized by: Fermin Sykes MD   Universal Protocol:  Consent: Written consent obtained      Patient location:  Clinic  Location:     Type:  Abscess    Location:  Anogenital    Anogenital location:  V[place]va  Pre-procedure details: Skin preparation:  Betadine  Anesthesia (see MAR for exact dosages): Anesthesia method:  Local infiltration    Local anesthetic:  Bupivacaine 0 5% WITH epi  Procedure details:     Complexity:  Intermediate    Incision types:  Elliptical    Scalpel blade:  15    Approach:  Open    Incision depth:  Subcutaneous    Wound management:  Probed and deloculated    Drainage:  Purulent    Drainage amount: Moderate    Wound treatment:  Wound left open and packing placed    Packing materials:  1/4 in gauze  Post-procedure details:     Patient tolerance of procedure: Tolerated well, no immediate complications  Comments: The patient was identified by me and placed in the supine position with her legs froglegged  The area was prepped and draped and neutralized Marcaine with epinephrine was infused as a local anesthetic  Elliptical skin incision was made about 1 3 cm long by about 6 mm wide  There was a fair amount of pus present as well as a tract going inferiorly  I opened the inferior portion a little bit more and then packed the whole area and applied a dressing    This area was 2 cm lateral to the right labia

## 2022-11-08 ENCOUNTER — HOSPITAL ENCOUNTER (OUTPATIENT)
Dept: MAMMOGRAPHY | Facility: HOSPITAL | Age: 42
Discharge: HOME | End: 2022-11-08
Attending: FAMILY MEDICINE

## 2022-11-08 VITALS — BODY MASS INDEX: 33.15 KG/M2 | WEIGHT: 199 LBS | HEIGHT: 65 IN

## 2022-11-08 DIAGNOSIS — Z12.31 ENCOUNTER FOR SCREENING MAMMOGRAM FOR BREAST CANCER: ICD-10-CM

## 2022-11-14 ENCOUNTER — OFFICE VISIT (OUTPATIENT)
Dept: SURGERY | Facility: CLINIC | Age: 42
End: 2022-11-14

## 2022-11-14 VITALS
HEART RATE: 70 BPM | TEMPERATURE: 97.7 F | HEIGHT: 65 IN | SYSTOLIC BLOOD PRESSURE: 102 MMHG | OXYGEN SATURATION: 98 % | WEIGHT: 194 LBS | DIASTOLIC BLOOD PRESSURE: 66 MMHG | BODY MASS INDEX: 32.32 KG/M2 | RESPIRATION RATE: 18 BRPM

## 2022-11-14 DIAGNOSIS — L73.2 HIDRADENITIS SUPPURATIVA: ICD-10-CM

## 2022-11-14 DIAGNOSIS — N76.4 ABSCESS OF RIGHT GENITAL LABIA: Primary | ICD-10-CM

## 2022-11-14 NOTE — PROGRESS NOTES
Postop visit after what I will call a re incision and drainage of a right labial/groin abscess    This has healed up nicely please call open area or area  I told I think she has got hidradenitis accordingly I heard assessment

## 2022-11-18 ENCOUNTER — ANNUAL EXAM (OUTPATIENT)
Dept: OBGYN CLINIC | Facility: CLINIC | Age: 42
End: 2022-11-18

## 2022-11-18 VITALS
BODY MASS INDEX: 32.69 KG/M2 | DIASTOLIC BLOOD PRESSURE: 64 MMHG | SYSTOLIC BLOOD PRESSURE: 116 MMHG | HEIGHT: 65 IN | WEIGHT: 196.2 LBS

## 2022-11-18 DIAGNOSIS — Z12.4 SCREENING FOR MALIGNANT NEOPLASM OF THE CERVIX: ICD-10-CM

## 2022-11-18 DIAGNOSIS — Z01.419 ENCOUNTER FOR GYNECOLOGICAL EXAMINATION WITHOUT ABNORMAL FINDING: Primary | ICD-10-CM

## 2022-11-18 DIAGNOSIS — Z11.3 ROUTINE SCREENING FOR STI (SEXUALLY TRANSMITTED INFECTION): ICD-10-CM

## 2022-11-18 DIAGNOSIS — Z80.0 FAMILY HISTORY OF COLON CANCER: ICD-10-CM

## 2022-11-18 DIAGNOSIS — Z12.31 SCREENING MAMMOGRAM, ENCOUNTER FOR: ICD-10-CM

## 2022-11-18 NOTE — LETTER
November 18, 2022     Patient: Iglesia Cagle  YOB: 1980  Date of Visit: 11/18/2022      To Whom it May Concern:    Iglesia Cagle is under my professional care  Victoriachandan Sifuentes was seen in my office on 11/18/2022  Victoriachandan Sifuentes may return to work on 11/18/22  If you have any questions or concerns, please don't hesitate to call           Sincerely,          Steffanie Cardozo MD        CC: No Recipients

## 2022-11-18 NOTE — PROGRESS NOTES
Subjective      Jose Burns is a 42 y o  female who presents for annual well woman exam    Patient had hysterectomy  y     Menstrual History:  OB History        5    Para   3    Term   3            AB   2    Living   3       SAB        IAB        Ectopic        Multiple        Live Births                      No LMP recorded  Patient has had a hysterectomy  The following portions of the patient's history were reviewed and updated as appropriate: allergies, current medications, past family history, past medical history, past social history, past surgical history and problem list     Review of Systems  Review of Systems   Constitutional: Negative for appetite change, chills, fatigue and fever  Respiratory: Negative for apnea, cough, chest tightness and shortness of breath  Cardiovascular: Negative for chest pain, palpitations and leg swelling  Gastrointestinal: Negative for abdominal pain, constipation, diarrhea, nausea and vomiting  Genitourinary: Negative for difficulty urinating, dysuria, flank pain, frequency, hematuria, urgency, vaginal bleeding, vaginal discharge and vaginal pain  Neurological: Negative for seizures, syncope, light-headedness, numbness and headaches  Psychiatric/Behavioral: Negative for agitation and confusion            Objective      /64 (BP Location: Left arm, Patient Position: Sitting, Cuff Size: Adult)   Ht 5' 5" (1 651 m)   Wt 89 kg (196 lb 3 2 oz)   BMI 32 65 kg/m²     Physical Exam  OBGyn Exam     General:   alert and oriented, in no acute distress, alert, appears stated age and cooperative   Heart: regular rate and rhythm, S1, S2 normal, no murmur, click, rub or gallop   Lungs: clear to auscultation bilaterally   Abdomen: soft, non-tender, without masses or organomegaly   Vulva: normal   Vagina: normal mucosa, normal discharge   Cervix: surgically absent   Uterus: surgically absent   Adnexa:  Breast Exam:  normal adnexa  breasts appear normal, no suspicious masses, no skin or nipple changes or axillary nodes  Assessment      @well woman@          42-year-old female    Annual exam  Had total laparoscopic hysterectomy secondary to pelvic pain menorrhagia at age 31  History of LEEP can not remember exact year  Smoker cessation recommended  Chronic hypertension stable on medication  Herpes taking Valtrex with outbreak  Plan  Pap/HPV vaginal cuff  GC/CT/trich patient desire STD check  Diet/exercise   Calcium/vitamin-D  Mammogram  Return to office for annual exam  Genetic referral secondary to family history of colon cancer  GI referral secondary to family history of colon cancer     All questions answered  There are no Patient Instructions on file for this visit

## 2022-11-19 LAB
C TRACH DNA SPEC QL NAA+PROBE: NEGATIVE
N GONORRHOEA DNA SPEC QL NAA+PROBE: NEGATIVE

## 2022-11-23 ENCOUNTER — TELEPHONE (OUTPATIENT)
Dept: GENETICS | Facility: CLINIC | Age: 42
End: 2022-11-23

## 2022-11-25 ENCOUNTER — TELEPHONE (OUTPATIENT)
Dept: SURGERY | Facility: CLINIC | Age: 42
End: 2022-11-25

## 2022-11-25 NOTE — TELEPHONE ENCOUNTER
Reached out to patient to obtain more information for abscess  Patient stated it is hot to touch, denies any fever, 8/10 pain  Messaged Dr Zander Lopez via [facility]  Advised pt to go to ER

## 2022-11-28 LAB
LAB AP GYN PRIMARY INTERPRETATION: NORMAL
Lab: NORMAL
PATH INTERP SPEC-IMP: NORMAL

## 2022-12-02 ENCOUNTER — PROCEDURE VISIT (OUTPATIENT)
Dept: SURGERY | Facility: CLINIC | Age: 42
End: 2022-12-02

## 2022-12-02 VITALS
TEMPERATURE: 97.6 F | BODY MASS INDEX: 33.26 KG/M2 | WEIGHT: 199.6 LBS | HEIGHT: 65 IN | OXYGEN SATURATION: 98 % | HEART RATE: 100 BPM | DIASTOLIC BLOOD PRESSURE: 68 MMHG | SYSTOLIC BLOOD PRESSURE: 100 MMHG

## 2022-12-02 DIAGNOSIS — K61.0 PERIANAL ABSCESS: Primary | ICD-10-CM

## 2022-12-02 PROBLEM — L73.2 HIDRADENITIS SUPPURATIVA: Status: ACTIVE | Noted: 2022-12-02

## 2022-12-02 RX ORDER — SULFAMETHOXAZOLE AND TRIMETHOPRIM 800; 160 MG/1; MG/1
1 TABLET ORAL EVERY 12 HOURS SCHEDULED
Qty: 14 TABLET | Refills: 0 | Status: SHIPPED | OUTPATIENT
Start: 2022-12-02 | End: 2022-12-05

## 2022-12-02 NOTE — PATIENT INSTRUCTIONS
She is to start taking Bactrim today  I told her that warm soaks are great but she would be better off with Sitz baths    She will return 1 week

## 2022-12-05 ENCOUNTER — OFFICE VISIT (OUTPATIENT)
Dept: FAMILY MEDICINE CLINIC | Facility: CLINIC | Age: 42
End: 2022-12-05

## 2022-12-05 VITALS
HEIGHT: 65 IN | DIASTOLIC BLOOD PRESSURE: 70 MMHG | BODY MASS INDEX: 32.62 KG/M2 | OXYGEN SATURATION: 98 % | TEMPERATURE: 98.7 F | RESPIRATION RATE: 16 BRPM | HEART RATE: 88 BPM | WEIGHT: 195.8 LBS | SYSTOLIC BLOOD PRESSURE: 120 MMHG

## 2022-12-05 DIAGNOSIS — Z23 NEED FOR HPV VACCINATION: ICD-10-CM

## 2022-12-05 DIAGNOSIS — E54 ASCORBIC ACID DEFICIENCY: ICD-10-CM

## 2022-12-05 DIAGNOSIS — Z90.710 HISTORY OF HYSTERECTOMY: ICD-10-CM

## 2022-12-05 DIAGNOSIS — R73.03 PREDIABETES: ICD-10-CM

## 2022-12-05 DIAGNOSIS — B37.31 VAGINAL CANDIDA: ICD-10-CM

## 2022-12-05 DIAGNOSIS — Z72.0 TOBACCO USE: ICD-10-CM

## 2022-12-05 DIAGNOSIS — Z60.4 SOCIAL ISOLATION: ICD-10-CM

## 2022-12-05 DIAGNOSIS — Z23 ENCOUNTER FOR IMMUNIZATION: Primary | ICD-10-CM

## 2022-12-05 DIAGNOSIS — N76.4 ABSCESS OF RIGHT GENITAL LABIA: ICD-10-CM

## 2022-12-05 DIAGNOSIS — I10 ESSENTIAL HYPERTENSION: ICD-10-CM

## 2022-12-05 DIAGNOSIS — K61.0 PERIANAL ABSCESS: ICD-10-CM

## 2022-12-05 DIAGNOSIS — E55.9 VITAMIN D DEFICIENCY: ICD-10-CM

## 2022-12-05 DIAGNOSIS — E53.1 PYRIDOXINE DEFICIENCY: ICD-10-CM

## 2022-12-05 PROBLEM — Z28.39 IMMUNIZATION DEFICIENCY: Status: RESOLVED | Noted: 2020-10-02 | Resolved: 2022-12-05

## 2022-12-05 RX ORDER — DOXYCYCLINE HYCLATE 100 MG
100 TABLET ORAL 2 TIMES DAILY
Qty: 60 TABLET | Refills: 1 | Status: SHIPPED | OUTPATIENT
Start: 2022-12-05 | End: 2023-01-04

## 2022-12-05 RX ORDER — MULTIVITAMIN WITH IRON
100 TABLET ORAL DAILY
Qty: 30 TABLET | Refills: 6 | Status: SHIPPED | OUTPATIENT
Start: 2022-12-05

## 2022-12-05 RX ORDER — CLINDAMYCIN PHOSPHATE 10 MG/G
GEL TOPICAL 2 TIMES DAILY
Qty: 60 G | Refills: 2 | Status: SHIPPED | OUTPATIENT
Start: 2022-12-05

## 2022-12-05 RX ORDER — FLUCONAZOLE 150 MG/1
150 TABLET ORAL ONCE
Qty: 1 TABLET | Refills: 0 | Status: SHIPPED | OUTPATIENT
Start: 2022-12-05 | End: 2022-12-05

## 2022-12-05 RX ORDER — CHLORHEXIDINE GLUCONATE 4 G/100ML
1 SOLUTION TOPICAL DAILY PRN
Qty: 946 ML | Refills: 2 | Status: SHIPPED | OUTPATIENT
Start: 2022-12-05

## 2022-12-05 RX ORDER — ASCORBIC ACID 500 MG
500 TABLET ORAL DAILY
Qty: 30 TABLET | Refills: 6 | Status: SHIPPED | OUTPATIENT
Start: 2022-12-05

## 2022-12-05 RX ORDER — METFORMIN HYDROCHLORIDE 500 MG/1
500 TABLET, EXTENDED RELEASE ORAL
Qty: 30 TABLET | Refills: 6 | Status: SHIPPED | OUTPATIENT
Start: 2022-12-05

## 2022-12-05 SDOH — SOCIAL STABILITY - SOCIAL INSECURITY: SOCIAL EXCLUSION AND REJECTION: Z60.4

## 2022-12-05 NOTE — PROGRESS NOTES
Name: Parisa Young      : 1980      MRN: [id number]  Encounter Provider: Amos Minor MD  Encounter Date: 2022   Encounter department: [facility] PRIMARY CARE [place]    Assessment & Plan       Flu vaccine/hpv given today to finish series  Recurrent abscess=not sure if pt truly has hydra adenitis supprativa=risk factor smoker/prediabetes  Start metformin  Start doxy x 30 days to infection recurrent/hibiclens wash/topical clinda to cover mrsa  There was no wound cx in the past=with next I&D would recommend to do wound cx  Replace b6/c/d  Start fluconazole for yeast  Social care refer to help w housing resources  Fu 1 m      I have spent 40 minutes with Patient  today in which greater than 50% of this time was spent in counseling/coordination of care regarding Diagnostic results, Prognosis, Risks and benefits of tx options and Intructions for management  1  Encounter for immunization  -     influenza vaccine, quadrivalent, recombinant, PF, 0 5 mL, for patients 18 yr+ (FLUBLOK)    2  Essential hypertension    3  Abscess of right genital labia  -     chlorhexidine (HIBICLENS) 4 % external liquid; Apply 1 application topically daily as needed for wound care  -     clindamycin (CLINDAGEL) 1 % gel; Apply topically 2 (two) times a day To abscess  -     doxycycline hyclate (VIBRA-TABS) 100 mg tablet; Take 1 tablet (100 mg total) by mouth 2 (two) times a day    4  Prediabetes  -     metFORMIN (GLUCOPHAGE-XR) 500 mg 24 hr tablet; Take 1 tablet (500 mg total) by mouth daily with dinner    5  Vitamin D deficiency    6  Tobacco use    7  History of hysterectomy    8  Perianal abscess    9  Need for HPV vaccination  -     HPV VACCINE 9 VALENT IM    10  Vaginal candida  -     fluconazole (DIFLUCAN) 150 mg tablet; Take 1 tablet (150 mg total) by mouth once for 1 dose    11  Pyridoxine deficiency  -     pyridoxine (VITAMIN B6) 100 mg tablet; Take 1 tablet (100 mg total) by mouth daily    12   Ascorbic acid deficiency  -     ascorbic acid (VITAMIN C) 500 MG tablet; Take 1 tablet (500 mg total) by mouth daily    13  Social isolation  -     Ambulatory Referral to Social Work Care Management Program; Future      Tobacco Cessation Counseling: Tobacco cessation counseling was provided  The patient is sincerely urged to quit consumption of tobacco  She is not ready to quit tobacco          Subjective      41 yo F fu bp=on amlodipine 10 mg daily, compliance w med  Pt has recurent abscess since 10/2022=went to ER several times for I&D abscess buttock/right vaginal wall  Every time pt was placed on bactrim, not helping  Currently have another one forming  Pt does not shave=only use [place] hair removal   Pt is sexually active, not using protection  Recent pap/gc/gonnorrhea normal  Culture grew vaginal yeast and BV  Pt admits to  High stress=single mom of 3 children, being homeless soon=her landlord had to sell the house, pt need to find housing assap, no support, working full time  Pt continue to smoke due to stress  Recent blood work show hba1c 6, was 5 9=mom has diabetes  Vitamin b6/c/d very low  Has mononucleosus=pt has been very fatigue  Mammogram normal    Review of Systems   Constitutional: Positive for fatigue  Negative for activity change, appetite change and unexpected weight change  HENT: Negative for ear pain, sore throat, trouble swallowing and voice change  Eyes: Negative for photophobia and visual disturbance  Respiratory: Negative for cough, chest tightness, shortness of breath and wheezing  Cardiovascular: Negative for chest pain, palpitations and leg swelling  Gastrointestinal: Negative for abdominal pain, constipation, diarrhea, nausea, rectal pain and vomiting  Endocrine: Negative for cold intolerance, polydipsia and polyuria  Genitourinary: Negative for difficulty urinating, dysuria, flank pain, menstrual problem and pelvic pain     Musculoskeletal: Negative for arthralgias, joint swelling and myalgias  Skin: Positive for wound  Negative for color change  Allergic/Immunologic: Negative for environmental allergies and immunocompromised state  Neurological: Negative for dizziness, weakness, numbness and headaches  Hematological: Negative for adenopathy  Does not bruise/bleed easily  Psychiatric/Behavioral: Negative for decreased concentration, dysphoric mood, self-injury, sleep disturbance and suicidal ideas  The patient is nervous/anxious  Current Outpatient Medications on File Prior to Visit   Medication Sig   • albuterol (PROVENTIL HFA,VENTOLIN HFA) 90 mcg/act inhaler INHALE 2 PUFFS BY MOUTH EVERY 6 HOURS AS NEEDED FOR WHEEZE   • amLODIPine (NORVASC) 10 mg tablet Take 1 tablet (10 mg total) by mouth daily Blood presuure   • Cholecalciferol (CVS D3) 125 MCG (5000 UT) capsule Take 1 capsule (5,000 Units total) by mouth daily   • cyanocobalamin (VITAMIN B-12) 2000 MCG tablet Take 1 tablet (2,000 mcg total) by mouth daily   • ibuprofen (MOTRIN) 800 mg tablet Take 1 tablet (800 mg total) by mouth every 8 (eight) hours as needed for mild pain (inflamation - TAKE WITH food) for up to 10 days   • loratadine (CLARITIN) 10 mg tablet TAKE 1 TABLET (10 MG TOTAL) BY MOUTH DAILY FOR ALLERGIES   • tiotropium (SPIRIVA) 18 mcg inhalation capsule Place 18 mcg into inhaler and inhale in the morning  • valACYclovir (VALTREX) 500 mg tablet TAKE 1 TABLET BY MOUTH EVERY DAY   • [DISCONTINUED] sulfamethoxazole-trimethoprim (BACTRIM DS) 800-160 mg per tablet Take 1 tablet by mouth every 12 (twelve) hours for 7 days (Patient not taking: Reported on 12/5/2022)       Objective     /70 (BP Location: Left arm, Patient Position: Sitting, Cuff Size: Large)   Pulse 88   Temp 98 7 °F (37 1 °C) (Temporal)   Resp 16   Ht 5' 5" (1 651 m)   Wt 88 8 kg (195 lb 12 8 oz)   SpO2 98%   BMI 32 58 kg/m²     Physical Exam  Vitals and nursing note reviewed  Constitutional:       Appearance: Normal appearance   She is well-developed and well-nourished  She is ill-appearing  HENT:      Head: Normocephalic and atraumatic  Right Ear: Tympanic membrane, ear canal and external ear normal       Left Ear: Tympanic membrane, ear canal and external ear normal       Nose: Nose normal       Mouth/Throat:      Mouth: Mucous membranes are moist       Pharynx: Oropharynx is clear  No oropharyngeal exudate  Eyes:      Extraocular Movements: EOM normal       Pupils: Pupils are equal, round, and reactive to light  Neck:      Thyroid: No thyromegaly  Cardiovascular:      Rate and Rhythm: Normal rate and regular rhythm  Heart sounds: Normal heart sounds  No murmur heard  Pulmonary:      Effort: Pulmonary effort is normal  No respiratory distress  Breath sounds: Normal breath sounds  No wheezing or rales  Abdominal:      General: Bowel sounds are normal  There is no distension  Palpations: Abdomen is soft  Tenderness: There is no abdominal tenderness  There is no guarding  Genitourinary:     Vagina: Normal       Uterus: Normal     Musculoskeletal:         General: No tenderness or edema  Normal range of motion  Cervical back: Normal range of motion and neck supple  Skin:     General: Skin is warm and dry  Capillary Refill: Capillary refill takes less than 2 seconds  Findings: No rash  Comments: Right labia=1x1 5 cm abscess, mild indurated, hard, tender to touch   Neurological:      General: No focal deficit present  Mental Status: She is alert and oriented to person, place, and time  Mental status is at baseline     Psychiatric:         Mood and Affect: Mood and affect and mood normal          Behavior: Behavior normal          Judgment: Judgment normal        Al Thorpe MD

## 2022-12-08 ENCOUNTER — OFFICE VISIT (OUTPATIENT)
Dept: SURGERY | Facility: CLINIC | Age: 42
End: 2022-12-08

## 2022-12-08 ENCOUNTER — PATIENT OUTREACH (OUTPATIENT)
Dept: FAMILY MEDICINE CLINIC | Facility: CLINIC | Age: 42
End: 2022-12-08

## 2022-12-08 VITALS
SYSTOLIC BLOOD PRESSURE: 118 MMHG | WEIGHT: 197 LBS | DIASTOLIC BLOOD PRESSURE: 74 MMHG | RESPIRATION RATE: 18 BRPM | TEMPERATURE: 98 F | OXYGEN SATURATION: 98 % | HEART RATE: 80 BPM | BODY MASS INDEX: 32.82 KG/M2 | HEIGHT: 65 IN

## 2022-12-08 DIAGNOSIS — K61.0 PERIANAL ABSCESS: Primary | ICD-10-CM

## 2022-12-08 RX ORDER — FLUCONAZOLE 150 MG/1
150 TABLET ORAL ONCE
COMMUNITY
Start: 2022-12-05

## 2022-12-08 NOTE — PROGRESS NOTES
ELAINE called and spoke with pt who is at risk for losing housing  ELAINE completed assessment  Pt has been renting a house for, what would have been, 3 years this month  Pt found out over a week ago she will have to be out by the end of the month  She was told not to pay rent by the landlord, because "the lady he got the house from wants the house back"  The landlord cannot help the pt in locating a place to live     Pt works full time and has been employed at this job for 3 years  She is financially stable  Pt has her mother as an emergency contact, but explained she really cannot rely on her as a means to assist with housing or live with her  Pt did at one time have section 8  She owes the housing authority money and is aware of how to get in touch with them, as she needs to anyway  Pt said she has been looking online as best she can for places  ELAINE explanted that unfortunately, right now, finding housing is very difficult as there is a long wait list  ELAINE asked pt if SW can send her resources that she may find useful, to help locate housing  However, ELAINE explained, ELAINE is unable to expedite the process; pt's best bet would be to work with housing authority  ELAINE added that, in the event pt is homeless, ELAINE can send pt information for where she can get emergency housing and help (such as [facility])  Pt said she is doing okay, ,emotionally  Declined any behavioral health resources  ELAINE encouraged pt to reach out to SW should she need any additional help, and ELAINE will reach out in the next few week to see how pt is making out  ELAINE added that there may be outreach coordinators available to assist if pt needs help with filling out applications  Email with resources sent to [email or web address]

## 2022-12-08 NOTE — PATIENT INSTRUCTIONS
Discharge from care as of today    I do want her to see dermatology because I think she has hidradenitis

## 2022-12-08 NOTE — PROGRESS NOTES
The patient returns for follow-up today  She did the sitz bath's as I told her and states she has had no pain or drainage  On examination, I can see no evidence of recurrent abscess, inflammation, fluctuance  There is simply nothing here to drain    I sent her on her way

## 2022-12-22 DIAGNOSIS — J01.90 ACUTE SINUSITIS, UNSPECIFIED: ICD-10-CM

## 2022-12-23 RX ORDER — ALBUTEROL SULFATE 90 UG/1
AEROSOL, METERED RESPIRATORY (INHALATION)
Qty: 18 G | Refills: 1 | Status: SHIPPED | OUTPATIENT
Start: 2022-12-23

## 2022-12-27 ENCOUNTER — PROCEDURE VISIT (OUTPATIENT)
Dept: SURGERY | Facility: CLINIC | Age: 42
End: 2022-12-27

## 2022-12-27 VITALS
WEIGHT: 198 LBS | DIASTOLIC BLOOD PRESSURE: 88 MMHG | OXYGEN SATURATION: 98 % | BODY MASS INDEX: 32.99 KG/M2 | HEIGHT: 65 IN | TEMPERATURE: 99 F | SYSTOLIC BLOOD PRESSURE: 146 MMHG | HEART RATE: 84 BPM

## 2022-12-27 DIAGNOSIS — R73.03 PREDIABETES: ICD-10-CM

## 2022-12-27 DIAGNOSIS — E54 ASCORBIC ACID DEFICIENCY: ICD-10-CM

## 2022-12-27 DIAGNOSIS — N76.4 ABSCESS OF RIGHT GENITAL LABIA: Primary | ICD-10-CM

## 2022-12-27 RX ORDER — SULFAMETHOXAZOLE AND TRIMETHOPRIM 800; 160 MG/1; MG/1
1 TABLET ORAL EVERY 12 HOURS SCHEDULED
Qty: 14 TABLET | Refills: 0 | Status: SHIPPED | OUTPATIENT
Start: 2022-12-27 | End: 2023-01-03

## 2022-12-27 RX ORDER — LORATADINE 10 MG
TABLET ORAL
Qty: 90 TABLET | Refills: 3 | Status: SHIPPED | OUTPATIENT
Start: 2022-12-27

## 2022-12-27 RX ORDER — METFORMIN HYDROCHLORIDE 500 MG/1
TABLET, EXTENDED RELEASE ORAL
Qty: 90 TABLET | Refills: 3 | Status: SHIPPED | OUTPATIENT
Start: 2022-12-27

## 2022-12-27 NOTE — PROGRESS NOTES
The patient is a 76-year-old black female who was prone to abscesses in her groin as well as pilonidal area  I last saw her couple weeks ago I could see no evidence of an acute infection  However yesterday she noticed pain in her right labia which was considerable  She thought that she saw a little bit of drainage  She made arrangements to come in here today  The patient is a obese black female who is in no acute distress  She has 2 swollen areas in the right labia  1 is less than a centimeter and is superior by 2-1/2 cm to the other 1 which is inferior and medial   The superior 1 is fluctuant and is an obvious abscess  The inferior 1, which she says is the area that drained, is exquisitely tender but not fluctuant  Incision and Drainage    Date/Time: 12/27/2022 3:16 PM  Performed by: Ismael Durant MD  Authorized by: Ismael Durant MD   Universal Protocol:  Consent: Verbal consent obtained  Patient identity confirmed: verbally with patient      Patient location:  Clinic  Location:     Type:  Abscess    Location:  Anogenital    Anogenital location:  [place]  Pre-procedure details:     Skin preparation:  Betadine  Anesthesia (see MAR for exact dosages): Anesthesia method:  Local infiltration    Local anesthetic:  Lidocaine 2% WITH epi  Procedure details:     Complexity:  Simple    Needle aspiration: no      Incision types:  Elliptical    Scalpel blade:  15    Approach:  Open    Incision depth:  Subcutaneous    Wound management:  Probed and deloculated    Drainage:  Purulent    Drainage amount: Moderate    Wound treatment:  Packing placed    Packing materials:  1/4 in iodoform gauze  Post-procedure details:     Patient tolerance of procedure: Tolerated well, no immediate complications  Comments: The patient was identified by me and placed in the supine position with the right leg frog-legged on an operating room table  The area was marked and then prepped and draped in a normal surgical manner    2% lidocaine with epinephrine was infused around and over both areas  The superior area was addressed first   Small elliptical skin incision is made with a 15 blade  I got 1 to 2 cc of pus out immediately  Inferiorly I made the same incision however there was only a little bit of pus present    Both cavities were now packed and a dressing applied

## 2022-12-29 ENCOUNTER — PATIENT OUTREACH (OUTPATIENT)
Dept: FAMILY MEDICINE CLINIC | Facility: CLINIC | Age: 42
End: 2022-12-29

## 2023-01-12 ENCOUNTER — OFFICE VISIT (OUTPATIENT)
Dept: FAMILY MEDICINE CLINIC | Facility: CLINIC | Age: 43
End: 2023-01-12

## 2023-01-12 VITALS
RESPIRATION RATE: 16 BRPM | TEMPERATURE: 98.7 F | DIASTOLIC BLOOD PRESSURE: 80 MMHG | OXYGEN SATURATION: 98 % | HEIGHT: 65 IN | SYSTOLIC BLOOD PRESSURE: 130 MMHG | HEART RATE: 82 BPM | BODY MASS INDEX: 33.66 KG/M2 | WEIGHT: 202 LBS

## 2023-01-12 DIAGNOSIS — I10 ESSENTIAL HYPERTENSION: ICD-10-CM

## 2023-01-12 DIAGNOSIS — G47.33 OSA (OBSTRUCTIVE SLEEP APNEA): Primary | ICD-10-CM

## 2023-01-12 DIAGNOSIS — F43.0 ACUTE STRESS DISORDER: ICD-10-CM

## 2023-01-12 DIAGNOSIS — L73.2 HIDRADENITIS SUPPURATIVA: ICD-10-CM

## 2023-01-12 DIAGNOSIS — Z72.0 TOBACCO USE: ICD-10-CM

## 2023-01-12 DIAGNOSIS — Z59.00 LACK OF HOUSING: ICD-10-CM

## 2023-01-12 RX ORDER — SPIRONOLACTONE 25 MG/1
TABLET ORAL
Qty: 60 TABLET | Refills: 5 | Status: SHIPPED | OUTPATIENT
Start: 2023-01-12

## 2023-01-12 RX ORDER — DOXYCYCLINE HYCLATE 100 MG
100 TABLET ORAL 2 TIMES DAILY
Qty: 60 TABLET | Refills: 0 | Status: SHIPPED | OUTPATIENT
Start: 2023-01-12 | End: 2023-02-11

## 2023-01-12 SDOH — ECONOMIC STABILITY - HOUSING INSECURITY: HOMELESSNESS UNSPECIFIED: Z59.00

## 2023-01-12 NOTE — PROGRESS NOTES
Name: Keyur Midway      : 1980      MRN: [id number]  Encounter Provider: Ishmael Soto MD  Encounter Date: 2023   Encounter department: [address] [place] [address]    Assessment & Plan         Recurrent abscess=pt has apt for derm=refer by gen sx=risk factor smoker/prediabetes  Continue metformin  cont doxy x 30 days to infection recurrent/hibiclens wash/topical clinda to cover mrsa  Start spironolactone 25 mg daily  On 2nd month will stop doxy, increase spironolactone 50 mg daily  Fu in 3 m=if bp better then, consider taper down and off amlodipine  Refer to Social care refer to help w housing resources  Temporary housing at [employer] for cheap rate if she works there        I have spent 40 minutes with Patient  today in which greater than 50% of this time was spent in counseling/coordination of care regarding Diagnostic results, Prognosis, Risks and benefits of tx options and Intructions for management  1  HIRAL (obstructive sleep apnea)    2  Essential hypertension  -     spironolactone (ALDACTONE) 25 mg tablet; 1 tablet (25mg) daily x 30 days then 2 tablets (50mg) daily after    3  Hidradenitis suppurativa  -     doxycycline hyclate (VIBRA-TABS) 100 mg tablet; Take 1 tablet (100 mg total) by mouth 2 (two) times a day  -     spironolactone (ALDACTONE) 25 mg tablet; 1 tablet (25mg) daily x 30 days then 2 tablets (50mg) daily after    4  Tobacco use    5  Acute stress disorder    6  Lack of housing      Tobacco Cessation Counseling: Tobacco cessation counseling was provided  The patient is sincerely urged to quit consumption of tobacco  She is not ready to quit tobacco          Subjective      43 yo F fu bp=on amlodipine 10 mg daily, compliance w med  Pt has recurent abscess since 10/2022=went to ER several times for I&D abscess buttock/right vaginal wall  Every time pt was placed on bactrim, not helping  Currently have another one forming   Pt does not shave=only use [place] hair removal   Pt is sexually active, not using protection  Recent pap/gc/gonnorrhea normal  Culture grew vaginal yeast and BV  Pt admits to  High stress=single mom of 3 children, being homeless soon=her landlord had to sell the house, pt need to find housing assap, no support, working full time  Pt continue to smoke due to stress  Recent blood work show hba1c 6, was 5 9=mom has diabetes  Vitamin b6/c/d very low  Has mononucleosus=pt has been very fatigue  Mammogram normal  Tolerate doxycycline bid well, no se/  Tolerate metformin 500 mg daily well, no s/e  Had to get I&D 12/22 by gen sx for abscess again on the labia , did better this time, no more new one coming  Has been getting more vaginal abscess after hysterectomy  Still did not find housing  Need to vacate in 1 week=has 3 children  Lost contact info from  w [facility], she thought that was very helpful    Review of Systems   Constitutional: Positive for fatigue  Negative for activity change, appetite change and unexpected weight change  HENT: Negative for ear pain, sore throat, trouble swallowing and voice change  Eyes: Negative for photophobia and visual disturbance  Respiratory: Negative for cough, chest tightness, shortness of breath and wheezing  Cardiovascular: Negative for chest pain, palpitations and leg swelling  Gastrointestinal: Negative for abdominal pain, constipation, diarrhea, nausea, rectal pain and vomiting  Endocrine: Negative for cold intolerance, polydipsia and polyuria  Genitourinary: Negative for difficulty urinating, dysuria, flank pain, menstrual problem and pelvic pain  Musculoskeletal: Negative for arthralgias, joint swelling and myalgias  Skin: Positive for wound  Negative for color change  Allergic/Immunologic: Negative for environmental allergies and immunocompromised state  Neurological: Negative for dizziness, weakness, numbness and headaches  Hematological: Negative for adenopathy   Does not bruise/bleed easily  Psychiatric/Behavioral: Negative for decreased concentration, dysphoric mood, self-injury, sleep disturbance and suicidal ideas  The patient is nervous/anxious  Current Outpatient Medications on File Prior to Visit   Medication Sig   • albuterol (PROVENTIL HFA,VENTOLIN HFA) 90 mcg/act inhaler INHALE 2 PUFFS BY MOUTH EVERY 6 HOURS AS NEEDED FOR WHEEZING   • amLODIPine (NORVASC) 10 mg tablet Take 1 tablet (10 mg total) by mouth daily Blood presuure   • chlorhexidine (HIBICLENS) 4 % external liquid Apply 1 application topically daily as needed for wound care   • Cholecalciferol (CVS D3) 125 MCG (5000 UT) capsule Take 1 capsule (5,000 Units total) by mouth daily   • clindamycin (CLINDAGEL) 1 % gel Apply topically 2 (two) times a day To abscess   • CVS Vitamin C 500 MG tablet TAKE 1 TABLET (500 MG TOTAL) BY MOUTH DAILY  • cyanocobalamin (VITAMIN B-12) 2000 MCG tablet Take 1 tablet (2,000 mcg total) by mouth daily   • fluconazole (DIFLUCAN) 150 mg tablet Take 150 mg by mouth once   • loratadine (CLARITIN) 10 mg tablet TAKE 1 TABLET (10 MG TOTAL) BY MOUTH DAILY FOR ALLERGIES   • metFORMIN (GLUCOPHAGE-XR) 500 mg 24 hr tablet TAKE 1 TABLET BY MOUTH EVERY DAY WITH DINNER   • pyridoxine (VITAMIN B6) 100 mg tablet Take 1 tablet (100 mg total) by mouth daily   • tiotropium (SPIRIVA) 18 mcg inhalation capsule Place 18 mcg into inhaler and inhale in the morning     • ibuprofen (MOTRIN) 800 mg tablet Take 1 tablet (800 mg total) by mouth every 8 (eight) hours as needed for mild pain (inflamation - TAKE WITH food) for up to 10 days (Patient not taking: Reported on 1/12/2023)   • valACYclovir (VALTREX) 500 mg tablet TAKE 1 TABLET BY MOUTH EVERY DAY       Objective     /80 (BP Location: Left arm, Patient Position: Sitting, Cuff Size: Large)   Pulse 82   Temp 98 7 °F (37 1 °C) (Temporal)   Resp 16   Ht 5' 5" (1 651 m)   Wt 91 6 kg (202 lb)   SpO2 98%   BMI 33 61 kg/m²     Physical Exam  Vitals and nursing note reviewed  Constitutional:       Appearance: Normal appearance  She is well-developed  She is ill-appearing  HENT:      Head: Normocephalic and atraumatic  Right Ear: Tympanic membrane, ear canal and external ear normal       Left Ear: Tympanic membrane, ear canal and external ear normal       Nose: Nose normal       Mouth/Throat:      Mouth: Mucous membranes are moist       Pharynx: Oropharynx is clear  No oropharyngeal exudate  Eyes:      Pupils: Pupils are equal, round, and reactive to light  Neck:      Thyroid: No thyromegaly  Cardiovascular:      Rate and Rhythm: Normal rate and regular rhythm  Heart sounds: Normal heart sounds  No murmur heard  Pulmonary:      Effort: Pulmonary effort is normal  No respiratory distress  Breath sounds: Normal breath sounds  No wheezing or rales  Abdominal:      General: Bowel sounds are normal  There is no distension  Palpations: Abdomen is soft  Tenderness: There is no abdominal tenderness  There is no guarding  Genitourinary:     Vagina: Normal    Musculoskeletal:         General: No tenderness  Normal range of motion  Cervical back: Normal range of motion and neck supple  Skin:     General: Skin is warm and dry  Capillary Refill: Capillary refill takes less than 2 seconds  Findings: No rash  Comments: Right labia=1x1 5 cm abscess, mild indurated, hard, tender to touch   Neurological:      General: No focal deficit present  Mental Status: She is alert and oriented to person, place, and time  Mental status is at baseline     Psychiatric:         Mood and Affect: Mood normal          Behavior: Behavior normal          Judgment: Judgment normal        Al Reynoso MD

## 2023-01-13 ENCOUNTER — PATIENT OUTREACH (OUTPATIENT)
Dept: FAMILY MEDICINE CLINIC | Facility: CLINIC | Age: 43
End: 2023-01-13

## 2023-01-13 NOTE — PROGRESS NOTES
SW received message from PCP, pt still in need of housing assistance  SW called and spoke with pt  SW explained that SW sent pt resources last month  Pt said she will check her email again  SW told pt SW will send email again  While on the phone, SW sent email again  Pt confirmed receipt with the housing resources on it  Pt appreciated this  Pt explained the problem is her credit score, which is prohibiting her from being able to get housing  Pt said she is working and has the money but "just has bad credit"  SW suggested pt contact [facility] [employer], and told pt she may reach out to this SW if and when needed for any additional help or concerns  Pt thanked ELAINE and said she has SW number  Note routed to Dr Gricel Tate

## 2023-01-13 NOTE — PROGRESS NOTES
Katy Staley   Do you know of any hotel with cheap rate that pt can stay at temporarily since she needs to vacate next Friday, she has 3 children

## 2023-01-19 ENCOUNTER — VBI (OUTPATIENT)
Dept: ADMINISTRATIVE | Facility: OTHER | Age: 43
End: 2023-01-19

## 2023-01-22 ENCOUNTER — HOSPITAL ENCOUNTER (EMERGENCY)
Facility: HOSPITAL | Age: 43
Discharge: HOME | End: 2023-01-22
Attending: EMERGENCY MEDICINE

## 2023-01-22 VITALS
RESPIRATION RATE: 19 BRPM | SYSTOLIC BLOOD PRESSURE: 139 MMHG | WEIGHT: 195 LBS | TEMPERATURE: 97.6 F | DIASTOLIC BLOOD PRESSURE: 86 MMHG | BODY MASS INDEX: 32.49 KG/M2 | HEART RATE: 82 BPM | HEIGHT: 65 IN | OXYGEN SATURATION: 96 %

## 2023-01-22 DIAGNOSIS — K04.7 DENTAL INFECTION: Primary | ICD-10-CM

## 2023-01-22 DIAGNOSIS — K08.89 TOOTHACHE: ICD-10-CM

## 2023-01-22 RX ORDER — OXYCODONE HYDROCHLORIDE AND ACETAMINOPHEN 5; 325 MG/1; MG/1
1 TABLET ORAL ONCE
Status: COMPLETED | OUTPATIENT
Start: 2023-01-22 | End: 2023-01-22

## 2023-01-22 RX ORDER — PENICILLIN V POTASSIUM 250 MG/1
250 TABLET ORAL 4 TIMES DAILY
Qty: 28 TABLET | Refills: 0 | Status: SHIPPED | OUTPATIENT
Start: 2023-01-22 | End: 2023-01-29

## 2023-01-22 RX ORDER — LIDOCAINE HYDROCHLORIDE 20 MG/ML
10 SOLUTION OROPHARYNGEAL ONCE
Status: COMPLETED | OUTPATIENT
Start: 2023-01-22 | End: 2023-01-22

## 2023-01-22 RX ORDER — OXYCODONE HYDROCHLORIDE AND ACETAMINOPHEN 5; 325 MG/1; MG/1
1 TABLET ORAL EVERY 6 HOURS PRN
Qty: 12 TABLET | Refills: 0 | Status: SHIPPED | OUTPATIENT
Start: 2023-01-22 | End: 2023-02-01

## 2023-01-22 RX ORDER — IBUPROFEN 600 MG/1
600 TABLET ORAL EVERY 6 HOURS PRN
Qty: 30 TABLET | Refills: 0 | Status: SHIPPED | OUTPATIENT
Start: 2023-01-22

## 2023-01-22 RX ORDER — PENICILLIN V POTASSIUM 250 MG/1
500 TABLET ORAL ONCE
Status: COMPLETED | OUTPATIENT
Start: 2023-01-22 | End: 2023-01-22

## 2023-01-22 RX ADMIN — LIDOCAINE HYDROCHLORIDE 10 ML: 20 SOLUTION ORAL; TOPICAL at 07:55

## 2023-01-22 RX ADMIN — OXYCODONE HYDROCHLORIDE AND ACETAMINOPHEN 1 TABLET: 5; 325 TABLET ORAL at 07:55

## 2023-01-22 RX ADMIN — PENICILLIN V POTASSIUM 500 MG: 250 TABLET, FILM COATED ORAL at 07:54

## 2023-01-22 NOTE — ED PROVIDER NOTES
History  Chief Complaint   Patient presents with   • Dental Pain     Pt c/o dental pain, states she had 3 teeth extracted  Pt states there is a piece of tooth on the bottom that is causing the pain     76-year-old female comes in for evaluation of dental pain  Patient states she had 3 teeth extracted and since that time she has had worsening dental pain  Patient states she feels that there is a piece of tooth that was left behind that is causing the pain  She also complains of swelling of her gums and some bleeding  Patient called her dentist who placed her on antibiotic but does not feel that her pain is getting any better  Is requesting something for pain until she can get back to the dentist on Monday       used: No    Dental Pain  Location:  Lower  Quality:  Constant, pressure-like and throbbing  Severity:  Severe  Onset quality:  Gradual  Timing:  Constant  Progression:  Worsening  Chronicity:  New  Context: recent dental surgery    Previous work-up:  Dental exam  Ineffective treatments:  Acetaminophen, NSAIDs and topical anesthetic gel  Associated symptoms: facial pain, facial swelling and gum swelling    Associated symptoms: no congestion, no fever and no headaches    Risk factors: periodontal disease        Prior to Admission Medications   Prescriptions Last Dose Informant Patient Reported? Taking? [facility] Vitamin C 500 MG tablet 1/21/2023  No Yes   Sig: TAKE 1 TABLET (500 MG TOTAL) BY MOUTH DAILY     Cholecalciferol ([facility] D3) 125 MCG (5000 UT) capsule   No No   Sig: Take 1 capsule (5,000 Units total) by mouth daily   albuterol (PROVENTIL HFA,VENTOLIN HFA) 90 mcg/act inhaler More than a month  No No   Sig: INHALE 2 PUFFS BY MOUTH EVERY 6 HOURS AS NEEDED FOR WHEEZING   amLODIPine (NORVASC) 10 mg tablet 1/21/2023  No Yes   Sig: Take 1 tablet (10 mg total) by mouth daily Blood presuure   chlorhexidine (HIBICLENS) 4 % external liquid Not Taking  No No   Sig: Apply 1 application topically daily as needed for wound care   Patient not taking: Reported on 2023   clindamycin (CLINDAGEL) 1 % gel 2023  No Yes   Sig: Apply topically 2 (two) times a day To abscess   cyanocobalamin (VITAMIN B-12) 2000 MCG tablet   No No   Sig: Take 1 tablet (2,000 mcg total) by mouth daily   doxycycline hyclate (VIBRA-TABS) 100 mg tablet 2023  No Yes   Sig: Take 1 tablet (100 mg total) by mouth 2 (two) times a day   fluconazole (DIFLUCAN) 150 mg tablet Not Taking  Yes No   Sig: Take 150 mg by mouth once   Patient not taking: Reported on 2023   ibuprofen (MOTRIN) 800 mg tablet   No No   Sig: Take 1 tablet (800 mg total) by mouth every 8 (eight) hours as needed for mild pain (inflamation - TAKE WITH food) for up to 10 days   Patient not taking: Reported on 2023   loratadine (CLARITIN) 10 mg tablet More than a month  No No   Sig: TAKE 1 TABLET (10 MG TOTAL) BY MOUTH DAILY FOR ALLERGIES   metFORMIN (GLUCOPHAGE-XR) 500 mg 24 hr tablet 2023  No Yes   Sig: TAKE 1 TABLET BY MOUTH EVERY DAY WITH DINNER   pyridoxine (VITAMIN B6) 100 mg tablet 2023  No Yes   Sig: Take 1 tablet (100 mg total) by mouth daily   spironolactone (ALDACTONE) 25 mg tablet 2023  No Yes   Si tablet (25mg) daily x 30 days then 2 tablets (50mg) daily after   tiotropium (SPIRIVA) 18 mcg inhalation capsule More than a month  Yes No   Sig: Place 18 mcg into inhaler and inhale in the morning     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   • Adrenal mass, left ([address] ) 2021    1 8 x 2 1 cm on ct 2020   • Arthritis    • Asthma    • Blurry vision 2020   • Essential hypertension 2020   • Headache 2020   • Hypertension    • Immunization deficiency 10/2/2020    Hep a nonimmune   • Insomnia 2021   • Migraine • Moderate episode of recurrent major depressive disorder ([facility] [address] ) 2020   • Obesity (BMI 30 0-34 9) 2021   • Prediabetes 2020   • Tobacco use 2021   • Vitamin D deficiency 10/2/2020       Past Surgical History:   Procedure Laterality Date   • APPENDECTOMY     • BREAST BIOPSY Left 2017   • BREAST BIOPSY Right     2 core biopsies   • BREAST EXCISIONAL BIOPSY Left 2018   • BREAST SURGERY Left 2018    Left breast mass excision   • CERVICAL BIOPSY  W/ LOOP ELECTRODE EXCISION     •  SECTION      X2   • COLPOSCOPY     • HYSTERECTOMY      heavy bleeding, ovaries remain, also had abnormal pap   • KNEE SURGERY     • LYMPH NODE DISSECTION      under armpit   • TUBAL LIGATION     • [address] THYROID BIOPSY  2021       Family History   Problem Relation Age of Onset   • Diabetes Mother    • Heart attack Mother    • Heart disease Mother         Internal Defibrilation   • No Known Problems Father    • Endometrial cancer Sister 29   • Colon cancer Sister 28   • No Known Problems Daughter    • Diabetes Maternal Grandmother    • Diabetes Paternal Grandmother    • No Known Problems Son    • No Known Problems Son    • Lupus Maternal Aunt 50   • Seizures Maternal Aunt    • Leukemia Maternal Uncle 25   • Diabetes Paternal Aunt    • No Known Problems Paternal Aunt      I have reviewed and agree with the history as documented  E-Cigarette/Vaping   • E-Cigarette Use Current Every Day User      E-Cigarette/Vaping Substances   • Nicotine Yes    • THC No    • CBD No    • Flavoring No    • Other No    • Unknown No      Social History     Tobacco Use   • Smoking status: Every Day     Packs/day: 0 50     Years: 20 00     Pack years: 10 00     Types: Cigarettes   • Smokeless tobacco: Never   • Tobacco comments:     pt is down to  5 packs a day   Vaping Use   • Vaping Use: Every day   • Substances: Nicotine   Substance Use Topics   • Alcohol use: Yes     Comment: occasional   • Drug use:  No Review of Systems   Constitutional: Negative for fatigue and fever  HENT: Positive for facial swelling  Negative for congestion and ear pain  Eyes: Negative for discharge and redness  Respiratory: Negative for apnea, cough, shortness of breath and wheezing  Cardiovascular: Negative for chest pain  Gastrointestinal: Negative for abdominal pain and diarrhea  Endocrine: Negative for cold intolerance and polydipsia  Genitourinary: Negative for difficulty urinating and hematuria  Musculoskeletal: Negative for arthralgias and back pain  Skin: Negative for color change and rash  Allergic/Immunologic: Negative for environmental allergies and immunocompromised state  Neurological: Negative for numbness and headaches  Hematological: Negative for adenopathy  Does not bruise/bleed easily  Psychiatric/Behavioral: Negative for agitation and behavioral problems  Physical Exam  Physical Exam  Vitals and nursing note reviewed  Constitutional:       General: She is in acute distress  Appearance: She is well-developed  HENT:      Head: Normocephalic and atraumatic  Mouth/Throat:     Eyes:      Conjunctiva/sclera: Conjunctivae normal    Cardiovascular:      Rate and Rhythm: Normal rate and regular rhythm  Heart sounds: No murmur heard  Pulmonary:      Effort: Pulmonary effort is normal  No respiratory distress  Breath sounds: Normal breath sounds  Abdominal:      Palpations: Abdomen is soft  Tenderness: There is no abdominal tenderness  Musculoskeletal:         General: No swelling  Cervical back: Neck supple  Skin:     General: Skin is warm and dry  Capillary Refill: Capillary refill takes less than 2 seconds  Neurological:      Mental Status: She is alert     Psychiatric:         Mood and Affect: Mood normal          Vital Signs  ED Triage Vitals [01/22/23 0733]   Temperature Pulse Respirations Blood Pressure SpO2   97 6 °F (36 4 °C) 82 19 139/86 96 %      Temp Source Heart Rate Source Patient Position - Orthostatic VS BP Location FiO2 (%)   Oral Monitor Sitting Left arm --      Pain Score       10 - Worst Possible Pain           Vitals:    01/22/23 0733   BP: 139/86   Pulse: 82   Patient Position - Orthostatic VS: Sitting         Visual Acuity      ED Medications  Medications   oxyCODONE-acetaminophen (PERCOCET) 5-325 mg per tablet 1 tablet (1 tablet Oral Given 1/22/23 0755)   penicillin V potassium (VEETID) tablet 500 mg (500 mg Oral Given 1/22/23 0754)   Lidocaine Viscous HCl (XYLOCAINE) 2 % mucosal solution 10 mL (10 mL Swish & Spit Given 1/22/23 0755)       Diagnostic Studies  Results Reviewed     None                 No orders to display              Procedures  Procedures         ED Course                                             Medical Decision Making  Differential diagnosis includes dental abscess, tooth ache, periodontal disease, postoperative pain, retained tooth    Dental infection: acute illness or injury  Toothache: acute illness or injury  Risk  Prescription drug management  Disposition  Final diagnoses:   Dental infection   Toothache     Time reflects when diagnosis was documented in both MDM as applicable and the Disposition within this note     Time User Action Codes Description Comment    1/22/2023  7:47 AM Oleg LEWIS Add [K04 7] Dental infection     1/22/2023  7:47 AM Yloa Garcia Add [K08 89] Toothache       ED Disposition     ED Disposition   Discharge    Condition   Stable    Date/Time   Sun Jan 22, 2023  7:47 AM    Comment   Kim Schofield discharge to home/self care                 Follow-up Information     Follow up With Specialties Details Why Contact Info    Al De Jesus MD Family Medicine Schedule an appointment as soon as possible for a visit   [address] [place] [address] [address] [place] [address] Raúl Powell  [phone]            Discharge Medication List as of 1/22/2023  7:50 AM      START taking these medications    Details   oxyCODONE-acetaminophen (PERCOCET) 5-325 mg per tablet Take 1 tablet by mouth every 6 (six) hours as needed for moderate pain (May use up to two tablets every 6 hours ) for up to 10 days Max Daily Amount: 4 tablets, Starting Sun 1/22/2023, Until Wed 2/1/2023 at 2359, Normal      penicillin V potassium (VEETID) 250 mg tablet Take 1 tablet (250 mg total) by mouth 4 (four) times a day for 7 days, Starting Sun 1/22/2023, Until Sun 1/29/2023, Normal         CONTINUE these medications which have CHANGED    Details   ibuprofen (MOTRIN) 600 mg tablet Take 1 tablet (600 mg total) by mouth every 6 (six) hours as needed for moderate pain or mild pain, Starting Sun 1/22/2023, Normal         CONTINUE these medications which have NOT CHANGED    Details   amLODIPine (NORVASC) 10 mg tablet Take 1 tablet (10 mg total) by mouth daily Blood presuure, Starting u 9/1/2022, Until Sun 1/22/2023, Normal      clindamycin (CLINDAGEL) 1 % gel Apply topically 2 (two) times a day To abscess, Starting Mon 12/5/2022, Normal      [facility]S Vitamin C 500 MG tablet TAKE 1 TABLET (500 MG TOTAL) BY MOUTH DAILY , Normal      doxycycline hyclate (VIBRA-TABS) 100 mg tablet Take 1 tablet (100 mg total) by mouth 2 (two) times a day, Starting Thu 1/12/2023, Until Sat 2/11/2023, Normal      metFORMIN (GLUCOPHAGE-XR) 500 mg 24 hr tablet TAKE 1 TABLET BY MOUTH EVERY DAY WITH DINNER, Normal      pyridoxine (VITAMIN B6) 100 mg tablet Take 1 tablet (100 mg total) by mouth daily, Starting Mon 12/5/2022, Normal      spironolactone (ALDACTONE) 25 mg tablet 1 tablet (25mg) daily x 30 days then 2 tablets (50mg) daily after, Normal      albuterol (PROVENTIL HFA,VENTOLIN HFA) 90 mcg/act inhaler INHALE 2 PUFFS BY MOUTH EVERY 6 HOURS AS NEEDED FOR WHEEZING, Normal      chlorhexidine (HIBICLENS) 4 % external liquid Apply 1 application topically daily as needed for wound care, Starting Mon 12/5/2022, Normal      Cholecalciferol ([facility]S D3) 125 MCG (5000 UT) capsule Take 1 capsule (5,000 Units total) by mouth daily, Starting Mon 8/22/2022, Until Thu 1/12/2023, Normal      cyanocobalamin (VITAMIN B-12) 2000 MCG tablet Take 1 tablet (2,000 mcg total) by mouth daily, Starting Mon 8/22/2022, Until Thu 1/12/2023, Normal      fluconazole (DIFLUCAN) 150 mg tablet Take 150 mg by mouth once, Starting Mon 12/5/2022, Historical Med      loratadine (CLARITIN) 10 mg tablet TAKE 1 TABLET (10 MG TOTAL) BY MOUTH DAILY FOR ALLERGIES, Starting Fri 7/1/2022, Normal      tiotropium (SPIRIVA) 18 mcg inhalation capsule Place 18 mcg into inhaler and inhale in the morning , Historical Med      valACYclovir (VALTREX) 500 mg tablet TAKE 1 TABLET BY MOUTH EVERY DAY, Normal             No discharge procedures on file      PDMP Review       Value Time User    PDMP Reviewed  Yes 12/21/2020  5:26 AM Blaise Palencia MD          ED Provider  Electronically Signed by           Shantel Ly DO  01/27/23 6822

## 2023-02-02 ENCOUNTER — OFFICE VISIT (OUTPATIENT)
Dept: OTOLARYNGOLOGY | Facility: CLINIC | Age: 43
End: 2023-02-02

## 2023-02-02 VITALS
OXYGEN SATURATION: 97 % | TEMPERATURE: 97.8 F | HEIGHT: 65 IN | BODY MASS INDEX: 32.49 KG/M2 | WEIGHT: 195 LBS | HEART RATE: 77 BPM

## 2023-02-02 DIAGNOSIS — J30.1 SEASONAL ALLERGIC RHINITIS DUE TO POLLEN: ICD-10-CM

## 2023-02-02 DIAGNOSIS — J34.2 DNS (DEVIATED NASAL SEPTUM): ICD-10-CM

## 2023-02-02 DIAGNOSIS — G47.33 OBSTRUCTIVE SLEEP APNEA: Primary | ICD-10-CM

## 2023-02-02 NOTE — PROGRESS NOTES
Otolaryngology Head and Neck Surgery History and Physical    Chief complaint    Chief Complaint   Patient presents with   • Sinus Problem   • Sleep Apnea        History of the Present Illness    Independent Historian   N      Relationship  NA    Makenzie Newsome is a 36 y o  who presents for evaluation of sleep apnea  Patient reported that she had a sleep study which was performed 3/28/2022  I reviewed the report and discussed it with the patient which showed her to have an RDI of 19 1  We discussed that the best treatment for this would be CPAP  She is scheduled to have a sleep study with CPAP at the same time  She does report that she snores a lot and usually sleeps with her mouth open  Patient reports that she has had some issues with recurrent sinus infections having perhaps 3 a year where she gets antibiotics  Reports that she gets some increased nasal congestion and yellowish mucus  She did have a CT scan done 4/28/2022 of her neck which showed the sinuses to be completely clear  She also had a CT scan 12/21/2020 of the head which showed sinus is clear  She has taken loratadine on an as-needed basis for allergies  She did try some Flonase for 3 days but could not tolerate the taste in the back of her throat            Review of Systems        Past Medical History:   Diagnosis Date   • Abnormal Pap smear of cervix     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, 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   • Adrenal mass, left ([address] ) 1/25/2021    1 8 x 2 1 cm on ct 12/21/2020   • Arthritis    • Asthma    • Blurry vision 9/4/2020   • Essential hypertension 9/4/2020   • Headache 9/4/2020   • Hypertension    • Immunization deficiency 10/2/2020    Hep a nonimmune   • Insomnia 2/22/2021   • Migraine    • Moderate episode of recurrent major depressive disorder ([address] ) 9/4/2020   • Obesity (BMI 30 0-34 9) 9/22/2021   • Prediabetes 9/4/2020   • Tobacco use 1/25/2021   • Vitamin D deficiency 10/2/2020       Past Surgical History:   Procedure Laterality Date   • APPENDECTOMY     • BREAST BIOPSY Left 2017   • BREAST BIOPSY Right     2 core biopsies   • BREAST EXCISIONAL BIOPSY Left 2018   • BREAST SURGERY Left 2018    Left breast mass excision   • CERVICAL BIOPSY  W/ LOOP ELECTRODE EXCISION     •  SECTION      X2   • COLPOSCOPY     • HYSTERECTOMY      heavy bleeding, ovaries remain, also had abnormal pap   • KNEE SURGERY     • LYMPH NODE DISSECTION  2018    under armpit   • TUBAL LIGATION     • [address] THYROID BIOPSY  2021       Social History     Socioeconomic History   • Marital status: Single     Spouse name: Not on file   • Number of children: Not on file   • Years of education: 10   • Highest education level: Not on file   Occupational History   • Not on file   Tobacco Use   • Smoking status: Every Day     Packs/day: 0 50     Years: 20 00     Pack years: 10 00     Types: Cigarettes   • Smokeless tobacco: Never   • Tobacco comments:     pt is down to  5 packs a day   Vaping Use   • Vaping Use: Every day   • Substances: Nicotine   Substance and Sexual Activity   • Alcohol use: Yes     Comment: occasional   • Drug use: No   • Sexual activity: Not Currently     Partners: Male     Birth control/protection: Female Sterilization     Comment: HYSTERECTOMY   Other Topics Concern   • Not on file   Social History Narrative    Most recent tobacco use screenin-    Do you currently or have you served in the [employer]: No    Were you activated, into active duty, as a member of the [employer] or as a Reservist: No    Occupation: unemployed    Education: 10    Marital status: Single    Sexual orientation: Heterosexual    Exercise level: Occasional    Diet: Regular    General stress level: Low    Alcohol intake: Occasional    Caffeine intake:  Moderate    Chewing tobacco: none    Illicit drugs: denies    Guns present in home: No    Seat belts used routinely: Yes    Sunscreen used routinely: No    Smoke alarm in home: Yes    Advance directive: No    Live alone or with others: with others    Are there stairs in your home: Yes    Pets: Yes    Deaf or serious difficulty hearing: No    Blind or serious difficulty seeing: Yes    Difficulty concentrating, remembering or making decisions: No    Difficulty walking or climbing stairs: No    Difficulty dressing or bathing: No    Difficulty doing errands alone: No    Passive smoke exposure:  Yes    Are you currently employed: No    Asbestos exposure: No    TB exposure: No    Environmental exposure: No    Animal exposure: Yes    cat and dog    Blood Transfusion: No    Sexually active: No    Presence of domestic violence: No    Do you feel safe at home: Yes    no cpap or nebulizer     Social Determinants of Health     Financial Resource Strain: Not on file   Food Insecurity: Not on file   Transportation Needs: Not on file   Physical Activity: Not on file   Stress: Not on file   Social Connections: Not on file   Intimate Partner Violence: Not on file   Housing Stability: Not on file       Family History   Problem Relation Age of Onset   • Diabetes Mother    • Heart attack Mother    • Heart disease Mother         Internal Defibrilation   • No Known Problems Father    • Endometrial cancer Sister 29   • Colon cancer Sister 28   • No Known Problems Daughter    • Diabetes Maternal Grandmother    • Diabetes Paternal Grandmother    • No Known Problems Son    • No Known Problems Son    • Lupus Maternal Aunt 48   • Seizures Maternal Aunt    • Leukemia Maternal Uncle 18   • Diabetes Paternal Aunt    • No Known Problems Paternal Aunt            Pulse 77   Temp 97 8 °F (36 6 °C) (Temporal)   Ht 5' 5" (1 651 m)   Wt 88 5 kg (195 lb)   SpO2 97%   BMI 32 45 kg/m²       Current Outpatient Medications:   •  albuterol (PROVENTIL HFA,VENTOLIN HFA) 90 mcg/act inhaler, INHALE 2 PUFFS BY MOUTH EVERY 6 HOURS AS NEEDED FOR WHEEZING, Disp: 18 g, Rfl: 1  •  clindamycin (CLINDAGEL) 1 % gel, Apply topically 2 (two) times a day To abscess, Disp: 60 g, Rfl: 2  •  CVS Vitamin C 500 MG tablet, TAKE 1 TABLET (500 MG TOTAL) BY MOUTH DAILY  , Disp: 90 tablet, Rfl: 3  •  doxycycline hyclate (VIBRA-TABS) 100 mg tablet, Take 1 tablet (100 mg total) by mouth 2 (two) times a day, Disp: 60 tablet, Rfl: 0  •  ibuprofen (MOTRIN) 600 mg tablet, Take 1 tablet (600 mg total) by mouth every 6 (six) hours as needed for moderate pain or mild pain, Disp: 30 tablet, Rfl: 0  •  loratadine (CLARITIN) 10 mg tablet, TAKE 1 TABLET (10 MG TOTAL) BY MOUTH DAILY FOR ALLERGIES, Disp: 90 tablet, Rfl: 1  •  metFORMIN (GLUCOPHAGE-XR) 500 mg 24 hr tablet, TAKE 1 TABLET BY MOUTH EVERY DAY WITH DINNER, Disp: 90 tablet, Rfl: 3  •  pyridoxine (VITAMIN B6) 100 mg tablet, Take 1 tablet (100 mg total) by mouth daily, Disp: 30 tablet, Rfl: 6  •  spironolactone (ALDACTONE) 25 mg tablet, 1 tablet (25mg) daily x 30 days then 2 tablets (50mg) daily after, Disp: 60 tablet, Rfl: 5  •  tiotropium (SPIRIVA) 18 mcg inhalation capsule, Place 18 mcg into inhaler and inhale in the morning , Disp: , Rfl:   •  amLODIPine (NORVASC) 10 mg tablet, Take 1 tablet (10 mg total) by mouth daily Blood presuure, Disp: 90 tablet, Rfl: 1  •  chlorhexidine (HIBICLENS) 4 % external liquid, Apply 1 application topically daily as needed for wound care (Patient not taking: Reported on 1/22/2023), Disp: 946 mL, Rfl: 2  •  Cholecalciferol (CVS D3) 125 MCG (5000 UT) capsule, Take 1 capsule (5,000 Units total) by mouth daily, Disp: 90 capsule, Rfl: 2  •  cyanocobalamin (VITAMIN B-12) 2000 MCG tablet, Take 1 tablet (2,000 mcg total) by mouth daily, Disp: 90 tablet, Rfl: 2  •  fluconazole (DIFLUCAN) 150 mg tablet, Take 150 mg by mouth once (Patient not taking: Reported on 1/22/2023), Disp: , Rfl:   •  valACYclovir (VALTREX) 500 mg tablet, TAKE 1 TABLET BY MOUTH EVERY DAY, Disp: 90 tablet, Rfl: 2     Physical Exam  Constitutional:       Appearance: Normal appearance  HENT:      Head: Normocephalic and atraumatic  Right Ear: Tympanic membrane, ear canal and external ear normal       Left Ear: Tympanic membrane, ear canal and external ear normal       Nose: Septal deviation (left) present  Mouth/Throat:      Lips: Pink  Mouth: Mucous membranes are moist       Tonsils: 2+ on the right  2+ on the left  Neurological:      Mental Status: She is alert  Procedure:          Pertinent Notes / Tests / Data reviewed  CT neck 4/28/2022  CT head 12/21/2020      Data with independent Interpretation    Sleep study 3/28/2022 interpreted results with the patient        Assessment and plan:    1  Obstructive sleep apnea        2  DNS (deviated nasal septum)        3  Seasonal allergic rhinitis due to pollen            Patient with obstructive sleep apnea with an RDI of 19  I discussed with the patient that her best treatment options would be to consider CPAP  She does not report any significant fatigue but reports her main issue is snoring which bothers other cohabitant's  We discussed as are short-lived which is why she may not be noticing we discussed that she does have some septal deviation and that if she does not tolerate CPAP we could consider nasal septal reconstruction submucous resection of turbinates and possible tonsillectomy and U P3 although there is no guarantee that that would completely eliminate her snoring  Patient with some complaints of recurrent sinus infections  We reviewed the fact that her CT scans of her sinuses were clear based on CT of the head and neck  At this point    Disclosure: Voice to text software was used in the preparation of this document and could have resulted in translational errors  Occasional wrong word or "sound a like" substitutions may have occurred due to the inherent limitations of voice recognition software  Read the chart carefully and recognize, using context, where substitutions have occurred

## 2023-02-07 DIAGNOSIS — L73.2 HIDRADENITIS SUPPURATIVA: ICD-10-CM

## 2023-02-07 DIAGNOSIS — I10 ESSENTIAL HYPERTENSION: ICD-10-CM

## 2023-02-08 RX ORDER — SPIRONOLACTONE 25 MG/1
TABLET ORAL
Qty: 90 TABLET | Refills: 4 | Status: SHIPPED | OUTPATIENT
Start: 2023-02-08

## 2023-02-28 ENCOUNTER — HOSPITAL ENCOUNTER (EMERGENCY)
Facility: HOSPITAL | Age: 43
Discharge: HOME | End: 2023-02-28
Attending: EMERGENCY MEDICINE

## 2023-02-28 VITALS
TEMPERATURE: 98.2 F | BODY MASS INDEX: 32.49 KG/M2 | SYSTOLIC BLOOD PRESSURE: 114 MMHG | RESPIRATION RATE: 20 BRPM | OXYGEN SATURATION: 100 % | HEART RATE: 99 BPM | WEIGHT: 195 LBS | HEIGHT: 65 IN | DIASTOLIC BLOOD PRESSURE: 85 MMHG

## 2023-02-28 DIAGNOSIS — L02.91 ABSCESS: Primary | ICD-10-CM

## 2023-02-28 RX ORDER — SULFAMETHOXAZOLE AND TRIMETHOPRIM 800; 160 MG/1; MG/1
1 TABLET ORAL EVERY 12 HOURS SCHEDULED
Qty: 14 TABLET | Refills: 0 | Status: SHIPPED | OUTPATIENT
Start: 2023-02-28 | End: 2023-03-01

## 2023-02-28 RX ORDER — IBUPROFEN 600 MG/1
600 TABLET ORAL ONCE
Status: COMPLETED | OUTPATIENT
Start: 2023-02-28 | End: 2023-02-28

## 2023-02-28 RX ORDER — ACETAMINOPHEN 325 MG/1
650 TABLET ORAL ONCE
Status: COMPLETED | OUTPATIENT
Start: 2023-02-28 | End: 2023-02-28

## 2023-02-28 RX ADMIN — IBUPROFEN 600 MG: 600 TABLET, FILM COATED ORAL at 11:23

## 2023-02-28 RX ADMIN — ACETAMINOPHEN 650 MG: 325 TABLET, FILM COATED ORAL at 11:23

## 2023-02-28 NOTE — Clinical Note
Freya Maria was seen and treated in our emergency department on 2/28/2023  No restrictions            Diagnosis:     Henry Loya  may return to work on return date  She may return on this date: 03/01/2023         If you have any questions or concerns, please don't hesitate to call        Eleazar Colvin MD    ______________________________           _______________          _______________  [facility] Representative                              Date                                Time

## 2023-02-28 NOTE — ED PROVIDER NOTES
History  Chief Complaint   Patient presents with   • Abscess     Pt states she has hx of H S  She woke up this morning and noticed swelling in her lower abdomen with an abscess  The patient is a 36year old female with a PMH of hidradenitis suppurativa, repeat abscesses and drainage, MDD, pre diabetes, HIRAL, presenting with an abscess of the pubic area  She states that she noticed some tenderness and swelling of the left groin last night, which worsened this morning with development of a white head and significant pain  She states that the pain is 8/10, and is worse with touching the area  She denies any fevers, chills, nausea, vomiting, abdominal pain, discharge from the painful area, vaginal discharge, diarrhea, perianal or perineal pain  She has had abcess in this area before, and she states that it improved with incision in the past  She states that she is planning to see dermatology for her hidradenitis suppurativa, but has generally been seen by surgery before for I&D  Prior to Admission Medications   Prescriptions Last Dose Informant Patient Reported? Taking? [facility] Vitamin C 500 MG tablet   No No   Sig: TAKE 1 TABLET (500 MG TOTAL) BY MOUTH DAILY     Cholecalciferol ([facility] D3) 125 MCG (5000 UT) capsule   No No   Sig: Take 1 capsule (5,000 Units total) by mouth daily   albuterol (PROVENTIL HFA,VENTOLIN HFA) 90 mcg/act inhaler   No No   Sig: INHALE 2 PUFFS BY MOUTH EVERY 6 HOURS AS NEEDED FOR WHEEZING   amLODIPine (NORVASC) 10 mg tablet   No No   Sig: Take 1 tablet (10 mg total) by mouth daily Blood presuure   chlorhexidine (HIBICLENS) 4 % external liquid   No No   Sig: Apply 1 application topically daily as needed for wound care   Patient not taking: Reported on 1/22/2023   clindamycin (CLINDAGEL) 1 % gel   No No   Sig: Apply topically 2 (two) times a day To abscess   cyanocobalamin (VITAMIN B-12) 2000 MCG tablet   No No   Sig: Take 1 tablet (2,000 mcg total) by mouth daily   fluconazole (DIFLUCAN) 150 mg tablet   Yes No   Sig: Take 150 mg by mouth once   Patient not taking: Reported on 2023   ibuprofen (MOTRIN) 600 mg tablet   No No   Sig: Take 1 tablet (600 mg total) by mouth every 6 (six) hours as needed for moderate pain or mild pain   loratadine (CLARITIN) 10 mg tablet   No No   Sig: TAKE 1 TABLET (10 MG TOTAL) BY MOUTH DAILY FOR ALLERGIES   metFORMIN (GLUCOPHAGE-XR) 500 mg 24 hr tablet   No No   Sig: TAKE 1 TABLET BY MOUTH EVERY DAY WITH DINNER   pyridoxine (VITAMIN B6) 100 mg tablet   No No   Sig: Take 1 tablet (100 mg total) by mouth daily   spironolactone (ALDACTONE) 25 mg tablet   No No   Sig: TAKE 1 TABLET (25MG) ORALLY DAILY X 30 DAYS THEN 2 TABLETS (50MG) DAILY AFTER   tiotropium (SPIRIVA) 18 mcg inhalation capsule   Yes No   Sig: Place 18 mcg into inhaler and inhale in the morning     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   • Adrenal mass, left ([facility] [address] ) 2021    1 8 x 2 1 cm on ct 2020   • Arthritis    • Asthma    • Blurry vision 2020   • Essential hypertension 2020   • Headache 2020   • Hidradenitis suppurativa    • Hypertension    • Immunization deficiency 10/02/2020    Hep a nonimmune   • Insomnia 2021   • Migraine    • Moderate episode of recurrent major depressive disorder ([facility] [address] ) 2020   • Obesity (BMI 30 0-34 9) 2021   • Prediabetes 2020   • Tobacco use 2021   • Vitamin D deficiency 10/02/2020       Past Surgical History:   Procedure Laterality Date   • APPENDECTOMY     • BREAST BIOPSY Left    • BREAST BIOPSY Right     2 core biopsies   • BREAST EXCISIONAL BIOPSY Left 2018   • BREAST SURGERY Left 2018    Left breast mass excision   • CERVICAL BIOPSY  W/ LOOP ELECTRODE EXCISION     •  SECTION      X2   • COLPOSCOPY     • HYSTERECTOMY  2012    heavy bleeding, ovaries remain, also had abnormal pap   • KNEE SURGERY     • LYMPH NODE DISSECTION  2018    under armpit   • TUBAL LIGATION  2007   • [address] THYROID BIOPSY  01/27/2021       Family History   Problem Relation Age of Onset   • Diabetes Mother    • Heart attack Mother    • Heart disease Mother         Internal Defibrilation   • No Known Problems Father    • Endometrial cancer Sister 29   • Colon cancer Sister 28   • No Known Problems Daughter    • Diabetes Maternal Grandmother    • Diabetes Paternal Grandmother    • No Known Problems Son    • No Known Problems Son    • Lupus Maternal Aunt 50   • Seizures Maternal Aunt    • Leukemia Maternal Uncle 25   • Diabetes Paternal Aunt    • No Known Problems Paternal Aunt      I have reviewed and agree with the history as documented  E-Cigarette/Vaping   • E-Cigarette Use Current Every Day User      E-Cigarette/Vaping Substances   • Nicotine Yes    • THC No    • CBD No    • Flavoring No    • Other No    • Unknown No      Social History     Tobacco Use   • Smoking status: Every Day     Packs/day: 0 50     Years: 20 00     Pack years: 10 00     Types: Cigarettes   • Smokeless tobacco: Never   • Tobacco comments:     pt is down to  5 packs a day   Vaping Use   • Vaping Use: Every day   • Substances: Nicotine   Substance Use Topics   • Alcohol use: Yes     Comment: occasional   • Drug use: No        Review of Systems   Constitutional: Negative for chills, fatigue and fever  HENT: Negative for ear pain, rhinorrhea, sore throat and trouble swallowing  Eyes: Negative for pain, discharge and visual disturbance  Respiratory: Negative for cough, chest tightness and shortness of breath  Cardiovascular: Negative for chest pain, palpitations and leg swelling  Gastrointestinal: Negative for abdominal pain, diarrhea, nausea, rectal pain and vomiting     Genitourinary: Negative for dysuria, frequency, hematuria, urgency, vaginal bleeding and vaginal pain  Musculoskeletal: Negative for arthralgias, back pain and myalgias  Skin: Negative for color change  Painful area over left pubic area, described in HPI  Neurological: Negative for syncope, weakness, light-headedness and headaches  Psychiatric/Behavioral: Negative for agitation, behavioral problems and confusion  All other systems reviewed and are negative  Physical Exam  ED Triage Vitals [02/28/23 1047]   Temperature Pulse Respirations Blood Pressure SpO2   98 2 °F (36 8 °C) 99 20 114/85 100 %      Temp Source Heart Rate Source Patient Position - Orthostatic VS BP Location FiO2 (%)   Oral Monitor Lying Right arm --      Pain Score       8             Orthostatic Vital Signs  Vitals:    02/28/23 1047   BP: 114/85   Pulse: 99   Patient Position - Orthostatic VS: Lying       Physical Exam  Vitals and nursing note reviewed  Exam conducted with a chaperone present (the patient's nurse)  Constitutional:       General: She is not in acute distress  Appearance: She is well-developed  She is not ill-appearing or toxic-appearing  HENT:      Head: Normocephalic and atraumatic  Right Ear: External ear normal       Left Ear: External ear normal       Nose: No congestion or rhinorrhea  Mouth/Throat:      Mouth: Mucous membranes are moist    Eyes:      General: No scleral icterus  Conjunctiva/sclera: Conjunctivae normal       Pupils: Pupils are equal, round, and reactive to light  Cardiovascular:      Rate and Rhythm: Normal rate and regular rhythm  Pulses: Normal pulses  Heart sounds: Normal heart sounds  No murmur heard  Pulmonary:      Effort: Pulmonary effort is normal  No respiratory distress  Breath sounds: Normal breath sounds  No wheezing  Abdominal:      General: Abdomen is flat  There is no distension  Palpations: Abdomen is soft  Tenderness: There is no abdominal tenderness  There is no guarding  Genitourinary:     Comments: No swelling, erythema, tenderness, induration or fluctuance of the labia majora  Musculoskeletal:         General: No swelling  Cervical back: Neck supple  No tenderness  Right lower leg: No edema  Left lower leg: No edema  Skin:     General: Skin is warm and dry  Capillary Refill: Capillary refill takes less than 2 seconds  Findings: Rash present  Comments: Patient has a ~4 cm diameter region of induration with central fluctuance over the left pubic region, not involving the inguinal crease  There is 1 cm white region near the center, which is tender to light touch  Neurological:      Mental Status: She is alert  Psychiatric:         Mood and Affect: Mood normal          ED Medications  Medications   acetaminophen (TYLENOL) tablet 650 mg (650 mg Oral Given 2/28/23 1123)   ibuprofen (MOTRIN) tablet 600 mg (600 mg Oral Given 2/28/23 1123)       Diagnostic Studies  Results Reviewed     None                 No orders to display         Procedures  Incision and drain    Date/Time: 2/28/2023 12:50 PM  Performed by: Kecia Knapp MD  Authorized by: Kecia Knapp MD   Universal Protocol:  Procedure performed by: (Dr Sushma Marina)  Consent: Verbal consent obtained  Written consent not obtained  Risks and benefits: risks, benefits and alternatives were discussed  Consent given by: patient  Time out: Immediately prior to procedure a "time out" was called to verify the correct patient, procedure, equipment, support staff and site/side marked as required    Timeout called at: 2/28/2023 11:30 AM   Patient understanding: patient states understanding of the procedure being performed  Required items: required blood products, implants, devices, and special equipment available  Patient identity confirmed: verbally with patient      Patient location:  ED  Location:     Type:  Abscess    Size:  4 cm    Location: Pubic area, left, superior to genitalia, media to inguinal crease  Pre-procedure details:     Procedure prep: alcohol prep   Anesthesia (see MAR for exact dosages): Anesthesia method:  Local infiltration    Local anesthetic:  Lidocaine 1% WITH epi  Procedure details:     Complexity:  Simple    Needle aspiration: no      Incision types:  Stab incision and single straight    Scalpel blade:  15    Approach:  Open    Wound management:  Probed and deloculated    Drainage:  Purulent and bloody    Drainage amount: Moderate    Wound treatment:  Wound left open    Packing materials:  None  Post-procedure details:     Patient tolerance of procedure: Tolerated well, no immediate complications          ED Course                             SBIRT 20yo+    Flowsheet Row Most Recent Value   SBIRT (23 yo +)    In order to provide better care to our patients, we are screening all of our patients for alcohol and drug use  Would it be okay to ask you these screening questions? Unable to answer at this time Filed at: 02/28/2023 1052                Medical Decision Making  The patient is a 36year old female who has a history of hidradenitis suppurativa, and a history of abscess in the pubic area, presenting with an approximately 4 cm diameter abscess  The area has significant induration and fluctuance and was exceptionally tender to light touch  She had no indication of systemic infection or cellulitis of the area, and it was not involving the genitalia  She consented to have the abscess drained under local anesthetic  Incision and drainage was performed, and the area was left open to drain  The patient was provided with directions to keep the area clean and allow it to drain  She was also prescribed TMP/SMX for 7 days, and instructed to begin treatment today  Abscess: complicated acute illness or injury  Risk  OTC drugs  Prescription drug management              Disposition  Final diagnoses:   Abscess     Time reflects when diagnosis was documented in both MDM as applicable and the Disposition within this note     Time User Action Codes Description Comment    2/28/2023 11:52 AM [facility] [L02 [id number]] Abscess       ED Disposition     ED Disposition   Discharge    Condition   Stable    Date/Time   Tue Feb 28, 2023 [place] 190 discharge to home/self care  Follow-up Information     Follow up With Specialties Details Why Contact Info Additional Information    Al Bingham MD Family Medicine Schedule an appointment as soon as possible for a visit   [address] [facility],  [address] [facility]   [address] [facility] Department Emergency Medicine Go to  If you develop fevers, chills, increasing pain or skin redness surrounding the incision area, or develop nausea and vomiting   [address] [facility],[address] [facility] Emergency Department, [address], [address] [place] [address] Rd          Discharge Medication List as of 2/28/2023 12:04 PM      START taking these medications    Details   sulfamethoxazole-trimethoprim (BACTRIM DS) 800-160 mg per tablet Take 1 tablet by mouth every 12 (twelve) hours for 7 days, Starting Tue 2/28/2023, Until Tue 3/7/2023, Normal         CONTINUE these medications which have NOT CHANGED    Details   albuterol (PROVENTIL HFA,VENTOLIN HFA) 90 mcg/act inhaler INHALE 2 PUFFS BY MOUTH EVERY 6 HOURS AS NEEDED FOR WHEEZING, Normal      amLODIPine (NORVASC) 10 mg tablet Take 1 tablet (10 mg total) by mouth daily Blood presuure, Starting Thu 9/1/2022, Until Sun 1/22/2023, Normal      chlorhexidine (HIBICLENS) 4 % external liquid Apply 1 application topically daily as needed for wound care, Starting Mon 12/5/2022, Normal      Cholecalciferol ([facility] D3) 125 MCG (5000 UT) capsule Take 1 capsule (5,000 Units total) by mouth daily, Starting Mon 8/22/2022, Until Thu 1/12/2023, Normal      clindamycin (CLINDAGEL) 1 % gel Apply topically 2 (two) times a day To abscess, Starting Mon 12/5/2022, Normal      [facility] Vitamin C 500 MG tablet TAKE 1 TABLET (500 MG TOTAL) BY MOUTH DAILY , Normal      cyanocobalamin (VITAMIN B-12) 2000 MCG tablet Take 1 tablet (2,000 mcg total) by mouth daily, Starting Mon 8/22/2022, Until Thu 1/12/2023, Normal      fluconazole (DIFLUCAN) 150 mg tablet Take 150 mg by mouth once, Starting Mon 12/5/2022, Historical Med      ibuprofen (MOTRIN) 600 mg tablet Take 1 tablet (600 mg total) by mouth every 6 (six) hours as needed for moderate pain or mild pain, Starting Sun 1/22/2023, Normal      loratadine (CLARITIN) 10 mg tablet TAKE 1 TABLET (10 MG TOTAL) BY MOUTH DAILY FOR ALLERGIES, Starting Fri 7/1/2022, Normal      metFORMIN (GLUCOPHAGE-XR) 500 mg 24 hr tablet TAKE 1 TABLET BY MOUTH EVERY DAY WITH DINNER, Normal      pyridoxine (VITAMIN B6) 100 mg tablet Take 1 tablet (100 mg total) by mouth daily, Starting Mon 12/5/2022, Normal      spironolactone (ALDACTONE) 25 mg tablet TAKE 1 TABLET (25MG) ORALLY DAILY X 30 DAYS THEN 2 TABLETS (50MG) DAILY AFTER, Normal      tiotropium (SPIRIVA) 18 mcg inhalation capsule Place 18 mcg into inhaler and inhale in the morning , Historical Med      valACYclovir (VALTREX) 500 mg tablet TAKE 1 TABLET BY MOUTH EVERY DAY, Normal           No discharge procedures on file  PDMP Review       Value Time User    PDMP Reviewed  Yes 12/21/2020  5:26 AM Donita Lewis MD           ED Provider  Attending physically available and evaluated Kim Schofield I managed the patient along with the ED Attending      Electronically Signed by         Richie Chase MD  02/28/23 9368

## 2023-03-01 ENCOUNTER — HOSPITAL ENCOUNTER (EMERGENCY)
Facility: HOSPITAL | Age: 43
Discharge: HOME | End: 2023-03-01
Attending: EMERGENCY MEDICINE

## 2023-03-01 ENCOUNTER — TELEPHONE (OUTPATIENT)
Dept: SURGERY | Facility: CLINIC | Age: 43
End: 2023-03-01

## 2023-03-01 VITALS
HEART RATE: 88 BPM | SYSTOLIC BLOOD PRESSURE: 128 MMHG | DIASTOLIC BLOOD PRESSURE: 89 MMHG | OXYGEN SATURATION: 100 % | TEMPERATURE: 98.7 F | RESPIRATION RATE: 16 BRPM

## 2023-03-01 DIAGNOSIS — Z09 ENCOUNTER FOR RECHECK OF ABSCESS FOLLOWING INCISION AND DRAINAGE: Primary | ICD-10-CM

## 2023-03-01 RX ORDER — BACITRACIN, NEOMYCIN, POLYMYXIN B 400; 3.5; 5 [USP'U]/G; MG/G; [USP'U]/G
1 OINTMENT TOPICAL ONCE
Status: COMPLETED | OUTPATIENT
Start: 2023-03-01 | End: 2023-03-01

## 2023-03-01 RX ORDER — OXYCODONE HYDROCHLORIDE AND ACETAMINOPHEN 5; 325 MG/1; MG/1
1 TABLET ORAL EVERY 6 HOURS PRN
Qty: 10 TABLET | Refills: 0 | Status: SHIPPED | OUTPATIENT
Start: 2023-03-01 | End: 2023-03-04

## 2023-03-01 RX ORDER — OXYCODONE HYDROCHLORIDE AND ACETAMINOPHEN 5; 325 MG/1; MG/1
1 TABLET ORAL ONCE
Status: COMPLETED | OUTPATIENT
Start: 2023-03-01 | End: 2023-03-01

## 2023-03-01 RX ORDER — CEPHALEXIN 500 MG/1
500 CAPSULE ORAL 3 TIMES DAILY
Qty: 21 CAPSULE | Refills: 0 | Status: SHIPPED | OUTPATIENT
Start: 2023-03-01 | End: 2023-03-08

## 2023-03-01 RX ADMIN — OXYCODONE HYDROCHLORIDE AND ACETAMINOPHEN 1 TABLET: 5; 325 TABLET ORAL at 12:25

## 2023-03-01 RX ADMIN — BACITRACIN ZINC, NEOMYCIN, POLYMYXIN B 1 LARGE APPLICATION: 400; 3.5; 5 OINTMENT TOPICAL at 12:25

## 2023-03-01 NOTE — ED PROVIDER NOTES
History  Chief Complaint   Patient presents with   • Pain     Pt presents with left groin pain  Was seen yesterday for an abscess that was drained  Past Medical History: Left Adrenal mass, Arthritis, Asthma, HTN, Hidradenitis suppurativa, Insomnia, Migraine, Moderate episode of recurrent major depressive disorder,   Past Surgical History: APPENDECTOMY, CERVICAL BIOPSY  W/ LOOP ELECTRODE EXCISION,  SECTION X2, COLPOSCOPY, HYSTERECTOMY, KNEE SURGERY, LYMPH NODE DISSECTION, TUBAL LIGATION    Pt seen here yesterday with pubic abscess, I&D performed, dc on Bactrim - which pt states she's taking  Returns with pain to area, called surgeon, Blaze Khan, who recommended she be re-eval in ED  No packing in place  She denies any fevers, chills, nausea, vomiting, abdominal pain, discharge from the painful area, vaginal discharge, diarrhea, perianal or perineal pain  She states that she is planning to see dermatology for her hidradenitis suppurativa, but has generally been seen by surgery before for I&D  Prior to Admission Medications   Prescriptions Last Dose Informant Patient Reported? Taking? [facility] Vitamin C 500 MG tablet   No No   Sig: TAKE 1 TABLET (500 MG TOTAL) BY MOUTH DAILY     Cholecalciferol ([facility] D3) 125 MCG (5000 UT) capsule   No No   Sig: Take 1 capsule (5,000 Units total) by mouth daily   albuterol (PROVENTIL HFA,VENTOLIN HFA) 90 mcg/act inhaler   No No   Sig: INHALE 2 PUFFS BY MOUTH EVERY 6 HOURS AS NEEDED FOR WHEEZING   amLODIPine (NORVASC) 10 mg tablet   No No   Sig: Take 1 tablet (10 mg total) by mouth daily Blood presuure   chlorhexidine (HIBICLENS) 4 % external liquid   No No   Sig: Apply 1 application topically daily as needed for wound care   Patient not taking: Reported on 2023   clindamycin (CLINDAGEL) 1 % gel   No No   Sig: Apply topically 2 (two) times a day To abscess   cyanocobalamin (VITAMIN B-12) 2000 MCG tablet   No No   Sig: Take 1 tablet (2,000 mcg total) by mouth daily fluconazole (DIFLUCAN) 150 mg tablet   Yes No   Sig: Take 150 mg by mouth once   Patient not taking: Reported on 1/22/2023   ibuprofen (MOTRIN) 600 mg tablet   No No   Sig: Take 1 tablet (600 mg total) by mouth every 6 (six) hours as needed for moderate pain or mild pain   loratadine (CLARITIN) 10 mg tablet   No No   Sig: TAKE 1 TABLET (10 MG TOTAL) BY MOUTH DAILY FOR ALLERGIES   metFORMIN (GLUCOPHAGE-XR) 500 mg 24 hr tablet   No No   Sig: TAKE 1 TABLET BY MOUTH EVERY DAY WITH DINNER   pyridoxine (VITAMIN B6) 100 mg tablet   No No   Sig: Take 1 tablet (100 mg total) by mouth daily   spironolactone (ALDACTONE) 25 mg tablet   No No   Sig: TAKE 1 TABLET (25MG) ORALLY DAILY X 30 DAYS THEN 2 TABLETS (50MG) DAILY AFTER   sulfamethoxazole-trimethoprim (BACTRIM DS) 800-160 mg per tablet   No No   Sig: Take 1 tablet by mouth every 12 (twelve) hours for 7 days   tiotropium (SPIRIVA) 18 mcg inhalation capsule   Yes No   Sig: Place 18 mcg into inhaler and inhale in the morning     valACYclovir (VALTREX) 500 mg tablet   No No   Sig: TAKE 1 TABLET BY MOUTH EVERY DAY      Facility-Administered Medications: None       Past Medical History:   Diagnosis Date   • Abnormal Pap smear of cervix     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, 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   • Adrenal mass, left ([facility] [address] ) 01/25/2021    1 8 x 2 1 cm on ct 12/21/2020   • Arthritis    • Asthma    • Blurry vision 09/04/2020   • Essential hypertension 09/04/2020   • Headache 09/04/2020   • Hidradenitis suppurativa    • Hypertension    • Immunization deficiency 10/02/2020    Hep a nonimmune   • Insomnia 02/22/2021   • Migraine    • Moderate episode of recurrent major depressive disorder ([facility] [address] ) 09/04/2020   • Obesity (BMI 30 0-34 9) 09/22/2021   • Prediabetes 09/04/2020   • Tobacco use 01/25/2021   • Vitamin D deficiency 10/02/2020       Past Surgical History:   Procedure Laterality Date   • APPENDECTOMY     • BREAST BIOPSY Left 2017   • BREAST BIOPSY Right     2 core biopsies   • BREAST EXCISIONAL BIOPSY Left 2018   • BREAST SURGERY Left 2018    Left breast mass excision   • CERVICAL BIOPSY  W/ LOOP ELECTRODE EXCISION     •  SECTION      X2   • COLPOSCOPY     • HYSTERECTOMY      heavy bleeding, ovaries remain, also had abnormal pap   • KNEE SURGERY     • LYMPH NODE DISSECTION  2018    under armpit   • TUBAL LIGATION     • [address] THYROID BIOPSY  2021       Family History   Problem Relation Age of Onset   • Diabetes Mother    • Heart attack Mother    • Heart disease Mother         Internal Defibrilation   • No Known Problems Father    • Endometrial cancer Sister 29   • Colon cancer Sister 28   • No Known Problems Daughter    • Diabetes Maternal Grandmother    • Diabetes Paternal Grandmother    • No Known Problems Son    • No Known Problems Son    • Lupus Maternal Aunt 50   • Seizures Maternal Aunt    • Leukemia Maternal Uncle 25   • Diabetes Paternal Aunt    • No Known Problems Paternal Aunt      I have reviewed and agree with the history as documented  E-Cigarette/Vaping   • E-Cigarette Use Current Every Day User      E-Cigarette/Vaping Substances   • Nicotine Yes    • THC No    • CBD No    • Flavoring No    • Other No    • Unknown No      Social History     Tobacco Use   • Smoking status: Every Day     Packs/day: 0 50     Years: 20 00     Pack years: 10 00     Types: Cigarettes   • Smokeless tobacco: Never   • Tobacco comments:     pt is down to  5 packs a day   Vaping Use   • Vaping Use: Every day   • Substances: Nicotine   Substance Use Topics   • Alcohol use: Yes     Comment: occasional   • Drug use: No       Review of Systems   Constitutional: Negative for fever  HENT: Negative for trouble swallowing  Gastrointestinal: Positive for abdominal pain  Negative for vomiting  Skin: Positive for wound  Neurological: Negative for headaches  All other systems reviewed and are negative  Physical Exam  Physical Exam  Vitals and nursing note reviewed     Constitutional: General: She is in acute distress (mild)  Appearance: She is well-developed  She is obese  HENT:      Head: Normocephalic and atraumatic  Right Ear: External ear normal       Left Ear: External ear normal       Nose: Nose normal       Mouth/Throat:      Mouth: Mucous membranes are moist       Pharynx: Oropharynx is clear  Eyes:      Conjunctiva/sclera: Conjunctivae normal    Cardiovascular:      Rate and Rhythm: Normal rate and regular rhythm  Pulmonary:      Effort: Pulmonary effort is normal       Breath sounds: Normal breath sounds  Abdominal:      General: Bowel sounds are normal       Palpations: Abdomen is soft  Musculoskeletal:         General: Normal range of motion  Cervical back: Normal range of motion  Comments: + mild tenderness, induration noted to left groin lymphadenopathy   Skin:     General: Skin is warm and dry  Findings: Lesion present  Comments: + healing abscess noted to left side mons pubis, in pubic hair region, mild tenderness, mild diffuse induration, no fluctuance, no dc, no surrounding erythema,    Neurological:      Mental Status: She is alert     Psychiatric:         Behavior: Behavior normal          Vital Signs  ED Triage Vitals [03/01/23 1133]   Temperature Pulse Respirations Blood Pressure SpO2   98 7 °F (37 1 °C) 88 16 128/89 100 %      Temp Source Heart Rate Source Patient Position - Orthostatic VS BP Location FiO2 (%)   Oral Monitor Lying Left arm --      Pain Score       6           Vitals:    03/01/23 1133   BP: 128/89   Pulse: 88   Patient Position - Orthostatic VS: Lying         Visual Acuity      ED Medications  Medications   oxyCODONE-acetaminophen (PERCOCET) 5-325 mg per tablet 1 tablet (1 tablet Oral Given 3/1/23 1225)   neomycin-bacitracin-polymyxin b (NEOSPORIN) ointment 1 large application (1 large application Topical Given 3/1/23 1225)       Diagnostic Studies  Results Reviewed     None                 No orders to display Procedures  Procedures         ED Course                               SBIRT 20yo+    Flowsheet Row Most Recent Value   SBIRT (25 yo +)    In order to provide better care to our patients, we are screening all of our patients for alcohol and drug use  Would it be okay to ask you these screening questions? No Filed at: 03/01/2023 1144                    Medical Decision Making  Amount and/or Complexity of Data Reviewed  Discussion of management or test interpretation with external provider(s): [facility] surgery Bloch, he's unable to eval pt in ED, recommends FU in office    Risk  OTC drugs  Prescription drug management  Disposition  Final diagnoses:   Encounter for recheck of abscess following incision and drainage     Time reflects when diagnosis was documented in both MDM as applicable and the Disposition within this note     Time User Action Codes Description Comment    3/1/2023 12:17 PM Autumn Maria Add [Z09] Encounter for recheck of abscess following incision and drainage       ED Disposition     ED Disposition   Discharge    Condition   Stable    Date/Time   Wed Mar 1, 2023 12:17 PM    Comment   Belkis Cortez discharge to home/self care                 Follow-up Information     Follow up With Specialties Details Why MD [facility]   [address] [facility] [address]  [phone]            Discharge Medication List as of 3/1/2023 12:19 PM      START taking these medications    Details   oxyCODONE-acetaminophen (PERCOCET) 5-325 mg per tablet Take 1 tablet by mouth every 6 (six) hours as needed for moderate pain for up to 3 days Max Daily Amount: 4 tablets, Starting Wed 3/1/2023, Until Sat 3/4/2023 at 2359, Normal         CONTINUE these medications which have NOT CHANGED    Details   albuterol (PROVENTIL HFA,VENTOLIN HFA) 90 mcg/act inhaler INHALE 2 PUFFS BY MOUTH EVERY 6 HOURS AS NEEDED FOR WHEEZING, Normal      amLODIPine (NORVASC) 10 mg tablet Take 1 tablet (10 mg total) by mouth daily Blood presuure, Starting Thu 9/1/2022, Until Sun 1/22/2023, Normal      chlorhexidine (HIBICLENS) 4 % external liquid Apply 1 application topically daily as needed for wound care, Starting Mon 12/5/2022, Normal      Cholecalciferol ([facility] D3) 125 MCG (5000 UT) capsule Take 1 capsule (5,000 Units total) by mouth daily, Starting Mon 8/22/2022, Until Thu 1/12/2023, Normal      clindamycin (CLINDAGEL) 1 % gel Apply topically 2 (two) times a day To abscess, Starting Mon 12/5/2022, Normal      [facility] Vitamin C 500 MG tablet TAKE 1 TABLET (500 MG TOTAL) BY MOUTH DAILY , Normal      cyanocobalamin (VITAMIN B-12) 2000 MCG tablet Take 1 tablet (2,000 mcg total) by mouth daily, Starting Mon 8/22/2022, Until Thu 1/12/2023, Normal      fluconazole (DIFLUCAN) 150 mg tablet Take 150 mg by mouth once, Starting Mon 12/5/2022, Historical Med      ibuprofen (MOTRIN) 600 mg tablet Take 1 tablet (600 mg total) by mouth every 6 (six) hours as needed for moderate pain or mild pain, Starting Sun 1/22/2023, Normal      loratadine (CLARITIN) 10 mg tablet TAKE 1 TABLET (10 MG TOTAL) BY MOUTH DAILY FOR ALLERGIES, Starting Fri 7/1/2022, Normal      metFORMIN (GLUCOPHAGE-XR) 500 mg 24 hr tablet TAKE 1 TABLET BY MOUTH EVERY DAY WITH DINNER, Normal      pyridoxine (VITAMIN B6) 100 mg tablet Take 1 tablet (100 mg total) by mouth daily, Starting Mon 12/5/2022, Normal      spironolactone (ALDACTONE) 25 mg tablet TAKE 1 TABLET (25MG) ORALLY DAILY X 30 DAYS THEN 2 TABLETS (50MG) DAILY AFTER, Normal      tiotropium (SPIRIVA) 18 mcg inhalation capsule Place 18 mcg into inhaler and inhale in the morning , Historical Med      valACYclovir (VALTREX) 500 mg tablet TAKE 1 TABLET BY MOUTH EVERY DAY, Normal      sulfamethoxazole-trimethoprim (BACTRIM DS) 800-160 mg per tablet Take 1 tablet by mouth every 12 (twelve) hours for 7 days, Starting Tue 2/28/2023, Until Tue 3/7/2023, Normal             No discharge procedures on file      PDMP Review Value Time User    PDMP Reviewed  Yes 12/21/2020  5:26 AM Emmett Quintero MD          ED Provider  Electronically Signed by           Ksenia Ball PA-C  03/01/23 8505

## 2023-03-01 NOTE — Clinical Note
Antonio Velasquez was seen and treated in our emergency department on 3/1/2023  Diagnosis:     Armond Gore  may return to work on return date  She may return on this date: 03/02/2023         If you have any questions or concerns, please don't hesitate to call        Diony Parrish PA-C    ______________________________           _______________          _______________  Hospital Representative                              Date                                Time

## 2023-03-01 NOTE — DISCHARGE INSTRUCTIONS
Use Tylenol every 4 hours or Motrin every 6 hours; you can alternate the 2 medications taking something every 3 hours for pain, if no improvement, add Percocet  Finish all oral antibiotics until done  Keep covered with antibiotic ointment and dressing  Use warm compresses to area to help promote drainage and healing      Follow-up with Dr Lexus Camara in next few days if no improvement in condition

## 2023-03-02 ENCOUNTER — OFFICE VISIT (OUTPATIENT)
Dept: SURGERY | Facility: CLINIC | Age: 43
End: 2023-03-02

## 2023-03-02 VITALS
OXYGEN SATURATION: 99 % | SYSTOLIC BLOOD PRESSURE: 126 MMHG | BODY MASS INDEX: 33.49 KG/M2 | HEIGHT: 65 IN | RESPIRATION RATE: 18 BRPM | TEMPERATURE: 97.8 F | WEIGHT: 201 LBS | HEART RATE: 100 BPM | DIASTOLIC BLOOD PRESSURE: 82 MMHG

## 2023-03-02 DIAGNOSIS — L02.214 ABSCESS OF LEFT GROIN: Primary | ICD-10-CM

## 2023-03-02 NOTE — PROGRESS NOTES
The patient who has hidradenitis suppurativa and is prone to abscess formation went to the ER twice over the past 48 to 72 hours for an abscess in the mons pubis on the left  She was drained originally but on the second day there was inflammation but nothing to drain  She comes in today for follow-up  This is a somewhat obese black female in no acute distress  She has a opening in the mons pubis approximately 1 x 8 mm but this is only down to deep dermis  Surrounding is palpable inflammation for about a centimeter  I decided to drain this  Incision and Drainage    Date/Time: 3/2/2023 8:39 AM  Performed by: Maximilian Tavarez MD  Authorized by: Maximilian Tavarez MD   [place] Protocol:  Consent: Written consent obtained  Consent given by: patient      Patient location:  Clinic  Location:     Type:  Abscess    Location:  Anogenital    Anogenital location:  [place]  Pre-procedure details:     Skin preparation:  Betadine  Anesthesia (see MAR for exact dosages): Anesthesia method:  Local infiltration    Local anesthetic:  Bupivacaine 0 5% WITH epi  Procedure details:     Complexity:  Simple    Incision types:  Elliptical    Scalpel blade:  15    Approach:  Open    Incision depth:  Subcutaneous    Wound management:  Probed and deloculated    Drainage:  Bloody    Drainage amount:  Scant    Wound treatment:  Packing placed    Packing materials:  1/2 in gauze  Post-procedure details:     Patient tolerance of procedure: Tolerated well, no immediate complications  Comments: The patient was identified by me and placed in supine position upon the operating room table  The area was prepped and draped in a normal surgical manner  Local was infiltrated all the way around as well as immediately adjacent to the skin opening  I now used a 15 blade to make an elliptical incision encompassing the skin opening    All loculations were now broken down and the wound was packed and dressed

## 2023-03-10 DIAGNOSIS — L73.2 HIDRADENITIS SUPPURATIVA: ICD-10-CM

## 2023-03-10 DIAGNOSIS — I10 ESSENTIAL HYPERTENSION: ICD-10-CM

## 2023-03-10 RX ORDER — SPIRONOLACTONE 25 MG/1
TABLET ORAL
Qty: 90 TABLET | Refills: 5 | Status: SHIPPED | OUTPATIENT
Start: 2023-03-10

## 2023-03-27 ENCOUNTER — CONSULT (OUTPATIENT)
Dept: GASTROENTEROLOGY | Facility: AMBULARY SURGERY CENTER | Age: 43
End: 2023-03-27
Attending: OBSTETRICS & GYNECOLOGY

## 2023-03-27 VITALS
OXYGEN SATURATION: 99 % | DIASTOLIC BLOOD PRESSURE: 84 MMHG | BODY MASS INDEX: 34.66 KG/M2 | HEART RATE: 84 BPM | HEIGHT: 65 IN | SYSTOLIC BLOOD PRESSURE: 132 MMHG | WEIGHT: 208 LBS

## 2023-03-27 DIAGNOSIS — Z80.0 FAMILY HISTORY OF COLON CANCER: Primary | ICD-10-CM

## 2023-03-27 RX ORDER — BISACODYL 5 MG/1
10 TABLET, DELAYED RELEASE ORAL ONCE
Qty: 2 TABLET | Refills: 0 | Status: SHIPPED | OUTPATIENT
Start: 2023-03-27 | End: 2023-03-27

## 2023-03-27 NOTE — PROGRESS NOTES
Consultation - [facility]) [facility]  Lopez Oliveira 1980 female         Chief Complaint: For colonoscopy    HPI: 69-year-old female with history of hypertension was referred for colonoscopy because of family history of colon cancer in her sister  Patient has regular bowel movements and denies any blood or mucus in the stool  Appetite is good and denies any recent weight loss  Denies any abdominal pain, nausea, or vomiting  Has no heartburn or acid reflux  Denies any difficulty swallowing  Chaperon: Ms Vanna Linda: Review of Systems   Constitutional: Negative for activity change, appetite change, chills, diaphoresis, fatigue, fever and unexpected weight change  HENT: Negative for ear discharge, ear pain, facial swelling, hearing loss, nosebleeds, sore throat, tinnitus and voice change  Eyes: Negative for pain, discharge, redness, itching and visual disturbance  Respiratory: Negative for apnea, cough, chest tightness, shortness of breath and wheezing  Cardiovascular: Negative for chest pain and palpitations  Gastrointestinal:        As noted in HPI   Endocrine: Negative for cold intolerance, heat intolerance and polyuria  Genitourinary: Negative for difficulty urinating, dysuria, flank pain, hematuria and urgency  Musculoskeletal: Negative for arthralgias, back pain, gait problem, joint swelling and myalgias  Skin: Negative for rash and wound  Neurological: Negative for dizziness, tremors, seizures, speech difficulty, light-headedness, numbness and headaches  Hematological: Negative for adenopathy  Does not bruise/bleed easily  Psychiatric/Behavioral: Negative for agitation, behavioral problems and confusion  The patient is not nervous/anxious           Past Medical History:   Diagnosis Date   • Abnormal Pap smear of cervix     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, 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   • Adrenal mass, left ([facility] [address] ) 01/25/2021    1 8 x 2 1 cm on ct 12/21/2020   • Arthritis    • Asthma    • Blurry vision 2020   • Essential hypertension 2020   • Headache 2020   • Hidradenitis suppurativa    • Hypertension    • Immunization deficiency 10/02/2020    Hep a nonimmune   • Insomnia 2021   • Migraine    • Moderate episode of recurrent major depressive disorder ([facility] [address] ) 2020   • Obesity (BMI 30 0-34 9) 2021   • Prediabetes 2020   • Tobacco use 2021   • Vitamin D deficiency 10/02/2020      Past Surgical History:   Procedure Laterality Date   • APPENDECTOMY     • BREAST BIOPSY Left 2017   • BREAST BIOPSY Right     2 core biopsies   • BREAST EXCISIONAL BIOPSY Left 2018   • BREAST SURGERY Left 2018    Left breast mass excision   • CERVICAL BIOPSY  W/ LOOP ELECTRODE EXCISION     •  SECTION      X2   • COLPOSCOPY     • HYSTERECTOMY      heavy bleeding, ovaries remain, also had abnormal pap   • KNEE SURGERY     • LYMPH NODE DISSECTION      under armpit   • TUBAL LIGATION     • [address] THYROID BIOPSY  2021     Social History     Socioeconomic History   • Marital status: Single     Spouse name: Not on file   • Number of children: Not on file   • Years of education: 10   • Highest education level: Not on file   Occupational History   • Not on file   Tobacco Use   • Smoking status: Every Day     Packs/day: 0 50     Years: 20 00     Pack years: 10 00     Types: Cigarettes   • Smokeless tobacco: Never   • Tobacco comments:     pt is down to  5 packs a day   Vaping Use   • Vaping Use: Every day   • Substances: Nicotine   Substance and Sexual Activity   • Alcohol use: Yes     Comment: occasional   • Drug use: No   • Sexual activity: Not Currently     Partners: Male     Birth control/protection: Female Sterilization     Comment: HYSTERECTOMY   Other Topics Concern   • Not on file   Social History Narrative    Most recent tobacco use screenin-    Do you currently or have you served in the [employer]: No    Were you activated, into active duty, as a member of the [employer] or as a Reservist: No    Occupation: unemployed    Education: 10    Marital status: Single    Sexual orientation: Heterosexual    Exercise level: Occasional    Diet: Regular    General stress level: Low    Alcohol intake: Occasional    Caffeine intake: Moderate    Chewing tobacco: none    Illicit drugs: denies    Guns present in home: No    Seat belts used routinely: Yes    Sunscreen used routinely: No    Smoke alarm in home: Yes    Advance directive: No    Live alone or with others: with others    Are there stairs in your home: Yes    Pets: Yes    Deaf or serious difficulty hearing: No    Blind or serious difficulty seeing: Yes    Difficulty concentrating, remembering or making decisions: No    Difficulty walking or climbing stairs: No    Difficulty dressing or bathing: No    Difficulty doing errands alone: No    Passive smoke exposure:  Yes    Are you currently employed: No    Asbestos exposure: No    TB exposure: No    Environmental exposure: No    Animal exposure: Yes    cat and dog    Blood Transfusion: No    Sexually active: No    Presence of domestic violence: No    Do you feel safe at home: Yes    no cpap or nebulizer     Social Determinants of Health     Financial Resource Strain: Not on file   Food Insecurity: Not on file   Transportation Needs: Not on file   Physical Activity: Not on file   Stress: Not on file   Social Connections: Not on file   Intimate Partner Violence: Not on file   Housing Stability: Not on file     Family History   Problem Relation Age of Onset   • Diabetes Mother    • Heart attack Mother    • Heart disease Mother         Internal Defibrilation   • No Known Problems Father    • Endometrial cancer Sister 29   • Colon cancer Sister 28   • No Known Problems Daughter    • Diabetes Maternal Grandmother    • Diabetes Paternal Grandmother    • No Known Problems Son    • No Known Problems Son    • Lupus Maternal Aunt 50   • Seizures Maternal Aunt    • Leukemia Maternal Uncle 25   • Diabetes Paternal Aunt    • No Known Problems Paternal Aunt      Patient has no known allergies  Current Outpatient Medications   Medication Sig Dispense Refill   • albuterol (PROVENTIL HFA,VENTOLIN HFA) 90 mcg/act inhaler INHALE 2 PUFFS BY MOUTH EVERY 6 HOURS AS NEEDED FOR WHEEZING 18 g 1   • amLODIPine (NORVASC) 10 mg tablet Take 1 tablet (10 mg total) by mouth daily Blood presuure 90 tablet 1   • bisacodyl (DULCOLAX) 5 mg EC tablet Take 2 tablets (10 mg total) by mouth once for 1 dose 2 tablet 0   • chlorhexidine (HIBICLENS) 4 % external liquid Apply 1 application topically daily as needed for wound care 946 mL 2   • [employer] Vitamin C 500 MG tablet TAKE 1 TABLET (500 MG TOTAL) BY MOUTH DAILY  90 tablet 3   • cyanocobalamin (VITAMIN B-12) 2000 MCG tablet Take 1 tablet (2,000 mcg total) by mouth daily 90 tablet 2   • loratadine (CLARITIN) 10 mg tablet TAKE 1 TABLET (10 MG TOTAL) BY MOUTH DAILY FOR ALLERGIES 90 tablet 1   • polyethylene glycol (GOLYTELY) 4000 mL solution Take 4,000 mL by mouth once for 1 dose 4000 mL 0   • pyridoxine (VITAMIN B6) 100 mg tablet Take 1 tablet (100 mg total) by mouth daily 30 tablet 6   • spironolactone (ALDACTONE) 25 mg tablet TAKE 1 TABLET BY MOUTH EVERY DAY FOR 30 DAYS THEN TAKE 2 TABLETS DAILY AFTER 90 tablet 5   • tiotropium (SPIRIVA) 18 mcg inhalation capsule Place 18 mcg into inhaler and inhale in the morning       • Cholecalciferol ([employer] D3) 125 MCG (5000 UT) capsule Take 1 capsule (5,000 Units total) by mouth daily 90 capsule 2   • clindamycin (CLINDAGEL) 1 % gel Apply topically 2 (two) times a day To abscess (Patient not taking: Reported on 3/27/2023) 60 g 2   • fluconazole (DIFLUCAN) 150 mg tablet Take 150 mg by mouth once (Patient not taking: Reported on 1/22/2023)     • ibuprofen (MOTRIN) 600 mg tablet Take 1 tablet (600 mg total) by mouth every 6 (six) hours as needed for moderate pain or mild pain (Patient not taking: "Reported on 3/27/2023) 30 tablet 0   • metFORMIN (GLUCOPHAGE-XR) 500 mg 24 hr tablet TAKE 1 TABLET BY MOUTH EVERY DAY WITH DINNER (Patient not taking: Reported on 3/27/2023) 90 tablet 3   • valACYclovir (VALTREX) 500 mg tablet TAKE 1 TABLET BY MOUTH EVERY DAY 90 tablet 2     No current facility-administered medications for this visit  Blood pressure 132/84, pulse 84, height 5' 5\" (1 651 m), weight 94 3 kg (208 lb), SpO2 99 %  PHYSICAL EXAM: Physical Exam  Constitutional:       Appearance: Normal appearance  She is well-developed  HENT:      Head: Normocephalic and atraumatic  Nose: Nose normal    Eyes:      Conjunctiva/sclera: Conjunctivae normal    Neck:      Thyroid: No thyromegaly  Vascular: No JVD  Trachea: No tracheal deviation  Cardiovascular:      Rate and Rhythm: Normal rate and regular rhythm  Heart sounds: Normal heart sounds  No murmur heard  No friction rub  No gallop  Pulmonary:      Effort: Pulmonary effort is normal  No respiratory distress  Breath sounds: Normal breath sounds  No wheezing or rales  Abdominal:      General: Bowel sounds are normal  There is no distension  Palpations: Abdomen is soft  There is no mass  Tenderness: There is no abdominal tenderness  There is no guarding  Hernia: No hernia is present  Musculoskeletal:         General: No tenderness or deformity  Cervical back: Neck supple  Right lower leg: No edema  Left lower leg: No edema  Lymphadenopathy:      Cervical: No cervical adenopathy  Skin:     General: Skin is warm and dry  Findings: No erythema or rash  Neurological:      Mental Status: She is alert and oriented to person, place, and time  Psychiatric:         Mood and Affect: Mood normal          Behavior: Behavior normal          Thought Content:  Thought content normal           Lab Results   Component Value Date    WBC 12 84 (H) 09/03/2022    HGB 14 4 09/03/2022    HCT 42 3 " 09/03/2022    MCV 85 09/03/2022     09/03/2022     Lab Results   Component Value Date    CALCIUM 8 9 09/03/2022    K 3 4 (L) 09/03/2022    CO2 25 09/03/2022     09/03/2022    BUN 6 09/03/2022    CREATININE 0 74 09/03/2022     Lab Results   Component Value Date    ALT 11 09/03/2022    AST 13 09/03/2022    ALKPHOS 82 09/03/2022     No results found for: INR, PROTIME    No results found  ASSESSMENT & PLAN:    Family history of colon cancer  Patient is at increased risks because of family history of colon cancer in her sister  Rule out colorectal lesions including polyps or malignancy     -Schedule for colonoscopy  -High-fiber diet     -Patient was given instructions about the colonoscopy prep     -Patient was explained about the risks and benefits of the procedure  Risks including but not limited to bleeding, infection, perforation were explained in detail  Also explained about less than 100% sensitivity with the exam and other alternatives

## 2023-03-27 NOTE — ASSESSMENT & PLAN NOTE
Patient is at increased risks because of family history of colon cancer in her sister  Rule out colorectal lesions including polyps or malignancy     -Schedule for colonoscopy  -High-fiber diet     -Patient was given instructions about the colonoscopy prep     -Patient was explained about the risks and benefits of the procedure  Risks including but not limited to bleeding, infection, perforation were explained in detail  Also explained about less than 100% sensitivity with the exam and other alternatives

## 2023-03-28 NOTE — PATIENT INSTRUCTIONS
Scheduled date of colonoscopy (as of today):6/5/2023  Physician performing colonoscopy:PAOLA  Location of colonoscopy:[facility]  Desired bowel prep reviewed with patient:GOLYTELY/SHARLA PER DR Lita Gar  Instructions reviewed with patient by:ELAINE CRONIN  Clearances:

## 2023-04-05 ENCOUNTER — TELEPHONE (OUTPATIENT)
Dept: GENETICS | Facility: CLINIC | Age: 43
End: 2023-04-05

## 2023-04-05 DIAGNOSIS — I10 ESSENTIAL HYPERTENSION: ICD-10-CM

## 2023-04-05 DIAGNOSIS — L73.2 HIDRADENITIS SUPPURATIVA: ICD-10-CM

## 2023-04-05 RX ORDER — SPIRONOLACTONE 25 MG/1
TABLET ORAL
Qty: 90 TABLET | Refills: 6 | Status: SHIPPED | OUTPATIENT
Start: 2023-04-05

## 2023-04-05 NOTE — TELEPHONE ENCOUNTER
Spoke with patient confirmed her upcoming genetics appointment on April 10 at 9:30 at [address], [address] [facility]

## 2023-04-19 ENCOUNTER — HOSPITAL ENCOUNTER (EMERGENCY)
Facility: HOSPITAL | Age: 43
Discharge: HOME | End: 2023-04-19
Attending: EMERGENCY MEDICINE

## 2023-04-19 VITALS
SYSTOLIC BLOOD PRESSURE: 119 MMHG | OXYGEN SATURATION: 100 % | TEMPERATURE: 99.3 F | HEART RATE: 89 BPM | RESPIRATION RATE: 16 BRPM | DIASTOLIC BLOOD PRESSURE: 79 MMHG

## 2023-04-19 DIAGNOSIS — S16.1XXA STRAIN OF NECK MUSCLE, INITIAL ENCOUNTER: Primary | ICD-10-CM

## 2023-04-19 RX ORDER — DIAZEPAM 5 MG/1
5 TABLET ORAL EVERY 8 HOURS PRN
Qty: 6 TABLET | Refills: 0 | Status: SHIPPED | OUTPATIENT
Start: 2023-04-19 | End: 2023-04-29

## 2023-04-19 NOTE — Clinical Note
Jalyn Hearn was seen and treated in our emergency department on 4/19/2023  Diagnosis:     Jessica    She may return on this date: 04/21/2023    Please excuse from work on 4/19/2023 and 4/20/2023     If you have any questions or concerns, please don't hesitate to call        Madaline Aschoff, MD    ______________________________           _______________          _______________  Hospital Representative                              Date                                Time

## 2023-04-19 NOTE — Clinical Note
Garrett Lantiguabubba was seen and treated in our emergency department on 4/19/2023  Diagnosis:     Jessica    She may return on this date: 04/20/2023    Excuse from work on 5/29/2023     If you have any questions or concerns, please don't hesitate to call        Alberto Gutiérrez MD    ______________________________           _______________          _______________  Hospital Representative                              Date                                Time

## 2023-04-19 NOTE — Clinical Note
Rod Bacon was seen and treated in our emergency department on 4/19/2023  Diagnosis:     Jessica    She may return on this date: 04/20/2023    Excuse from work on 5/29/2023     If you have any questions or concerns, please don't hesitate to call        Berna Tejeda MD    ______________________________           _______________          _______________  Hospital Representative                              Date                                Time

## 2023-04-19 NOTE — Clinical Note
Eliane Reid was seen and treated in our emergency department on 4/19/2023  Diagnosis:     Jessica    She may return on this date: 04/21/2023    Please excuse from work on 4/19/2023 and 4/20/2023     If you have any questions or concerns, please don't hesitate to call        Cherie Miller MD    ______________________________           _______________          _______________  Hospital Representative                              Date                                Time

## 2023-04-20 NOTE — ED PROVIDER NOTES
"History  Chief Complaint   Patient presents with   • Neck Pain     Pt states she was playing with her kids and puller a muscle in her neck on the R side  Took Ibuprofen 2 hours ago     She reports that she was \"horsing around [place] her son where they were wrestling when she pulled her neck  She immediately felt a pull but did not have severe pain until a few minutes later when her neck became more and more stiff  She now has pain when she rotates her neck right words although she does not have pain when she rotates leftward  The pain is located in the posterior aspect of her right arm  It radiates down to her right shoulder but not posterior right deltoid  She denies any tingling or numbness  She denies any weakness  She does have pain with shoulder abduction prickly above 90 degrees  No head injury  No loss of consciousness  The wrestling was playful and not malicious, and there was no significant large trauma or force involved  Prior to Admission Medications   Prescriptions Last Dose Informant Patient Reported? Taking? [facility] Vitamin C 500 MG tablet   No No   Sig: TAKE 1 TABLET (500 MG TOTAL) BY MOUTH DAILY     Cholecalciferol ([facility] D3) 125 MCG (5000 UT) capsule   No No   Sig: Take 1 capsule (5,000 Units total) by mouth daily   albuterol (PROVENTIL HFA,VENTOLIN HFA) 90 mcg/act inhaler   No No   Sig: INHALE 2 PUFFS BY MOUTH EVERY 6 HOURS AS NEEDED FOR WHEEZING   amLODIPine (NORVASC) 10 mg tablet   No No   Sig: Take 1 tablet (10 mg total) by mouth daily Blood presuure   bisacodyl (DULCOLAX) 5 mg EC tablet   No No   Sig: Take 2 tablets (10 mg total) by mouth once for 1 dose   chlorhexidine (HIBICLENS) 4 % external liquid   No No   Sig: Apply 1 application topically daily as needed for wound care   clindamycin (CLINDAGEL) 1 % gel   No No   Sig: Apply topically 2 (two) times a day To abscess   Patient not taking: Reported on 3/27/2023   cyanocobalamin (VITAMIN B-12) 2000 MCG tablet   No No   Sig: Take 1 " tablet (2,000 mcg total) by mouth daily   fluconazole (DIFLUCAN) 150 mg tablet   Yes No   Sig: Take 150 mg by mouth once   Patient not taking: Reported on 1/22/2023   ibuprofen (MOTRIN) 600 mg tablet   No No   Sig: Take 1 tablet (600 mg total) by mouth every 6 (six) hours as needed for moderate pain or mild pain   Patient not taking: Reported on 3/27/2023   loratadine (CLARITIN) 10 mg tablet   No No   Sig: TAKE 1 TABLET (10 MG TOTAL) BY MOUTH DAILY FOR ALLERGIES   metFORMIN (GLUCOPHAGE-XR) 500 mg 24 hr tablet   No No   Sig: TAKE 1 TABLET BY MOUTH EVERY DAY WITH DINNER   Patient not taking: Reported on 3/27/2023   polyethylene glycol (GOLYTELY) 4000 mL solution   No No   Sig: Take 4,000 mL by mouth once for 1 dose   pyridoxine (VITAMIN B6) 100 mg tablet   No No   Sig: Take 1 tablet (100 mg total) by mouth daily   spironolactone (ALDACTONE) 25 mg tablet   No No   Sig: TAKE 1 TABLET BY MOUTH EVERY DAY FOR 30 DAYS THEN TAKE 2 TABLETS DAILY AFTER   tiotropium (SPIRIVA) 18 mcg inhalation capsule   Yes No   Sig: Place 18 mcg into inhaler and inhale in the morning     valACYclovir (VALTREX) 500 mg tablet   No No   Sig: TAKE 1 TABLET BY MOUTH EVERY DAY      Facility-Administered Medications: None       Past Medical History:   Diagnosis Date   • Abnormal Pap smear of cervix     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, 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   • Adrenal mass, left ([facility] [address] ) 01/25/2021    1 8 x 2 1 cm on ct 12/21/2020   • Arthritis    • Asthma    • Blurry vision 09/04/2020   • Essential hypertension 09/04/2020   • Headache 09/04/2020   • Hidradenitis suppurativa    • Hypertension    • Immunization deficiency 10/02/2020    Hep a nonimmune   • Insomnia 02/22/2021   • Migraine    • Moderate episode of recurrent major depressive disorder ([facility] [address] ) 09/04/2020   • Obesity (BMI 30 0-34 9) 09/22/2021   • Prediabetes 09/04/2020   • Tobacco use 01/25/2021   • Vitamin D deficiency 10/02/2020       Past Surgical History:   Procedure Laterality Date   • APPENDECTOMY     • BREAST BIOPSY Left 2017   • BREAST BIOPSY Right     2 core biopsies   • BREAST EXCISIONAL BIOPSY Left 2018   • BREAST SURGERY Left 2018    Left breast mass excision   • CERVICAL BIOPSY  W/ LOOP ELECTRODE EXCISION     •  SECTION      X2   • COLPOSCOPY     • HYSTERECTOMY      heavy bleeding, ovaries remain, also had abnormal pap   • KNEE SURGERY     • LYMPH NODE DISSECTION  2018    under armpit   • TUBAL LIGATION     • [address] THYROID BIOPSY  2021       Family History   Problem Relation Age of Onset   • Diabetes Mother    • Heart attack Mother    • Heart disease Mother         Internal Defibrilation   • No Known Problems Father    • Endometrial cancer Sister 29   • Colon cancer Sister 28   • No Known Problems Daughter    • Diabetes Maternal Grandmother    • Diabetes Paternal Grandmother    • No Known Problems Son    • No Known Problems Son    • Lupus Maternal Aunt 50   • Seizures Maternal Aunt    • Leukemia Maternal Uncle 25   • Diabetes Paternal Aunt    • No Known Problems Paternal Aunt      I have reviewed and agree with the history as documented  E-Cigarette/Vaping   • E-Cigarette Use Current Every Day User      E-Cigarette/Vaping Substances   • Nicotine Yes    • THC No    • CBD No    • Flavoring No    • Other No    • Unknown No      Social History     Tobacco Use   • Smoking status: Every Day     Packs/day: 0 50     Years: 20 00     Pack years: 10 00     Types: Cigarettes   • Smokeless tobacco: Never   • Tobacco comments:     pt is down to  5 packs a day   Vaping Use   • Vaping Use: Every day   • Substances: Nicotine   Substance Use Topics   • Alcohol use: Yes     Comment: occasional   • Drug use: No       Review of Systems   All other systems reviewed and are negative  Physical Exam  Physical Exam  Vitals and nursing note reviewed  Constitutional:       General: She is not in acute distress  Appearance: She is well-developed  HENT:      Head: Normocephalic and atraumatic  Eyes:      Conjunctiva/sclera: Conjunctivae normal    Neck:      Comments: Left neck rotation to 45 degrees, right neck rotation to 20 degrees limited by pain  Cardiovascular:      Rate and Rhythm: Normal rate and regular rhythm  Heart sounds: No murmur heard  Pulmonary:      Effort: Pulmonary effort is normal  No respiratory distress  Breath sounds: Normal breath sounds  Abdominal:      Palpations: Abdomen is soft  Tenderness: There is no abdominal tenderness  Musculoskeletal:         General: Tenderness (Right trapezius tenderness ) present  No swelling or deformity  Cervical back: Neck supple  No tenderness (No midline tenderness)  Skin:     General: Skin is warm and dry  Capillary Refill: Capillary refill takes less than 2 seconds  Neurological:      General: No focal deficit present  Mental Status: She is alert  Comments: Normal , normal wrist extension, normal finger abduction, normal elbow flexion extension  Normal shoulder flexion but shoulder abduction limited by pain   Psychiatric:         Mood and Affect: Mood normal          Vital Signs  ED Triage Vitals [04/19/23 2117]   Temperature Pulse Respirations Blood Pressure SpO2   99 3 °F (37 4 °C) 89 16 119/79 100 %      Temp Source Heart Rate Source Patient Position - Orthostatic VS BP Location FiO2 (%)   Oral -- -- Right arm --      Pain Score       5           Vitals:    04/19/23 2117   BP: 119/79   Pulse: 89         Visual Acuity      ED Medications  Medications - No data to display    Diagnostic Studies  Results Reviewed     None                 No orders to display              Procedures  Procedures         ED Course                                             Medical Decision Making  I have the patient  History and exam not consistent with neck fracture  We will treat for muscle strain  Patient already took ibuprofen and has no ride home so will give prescription for muscle relaxer    No neck imaging indicated by Nexus criteria  Strain of neck muscle, initial encounter: acute illness or injury  Amount and/or Complexity of Data Reviewed  External Data Reviewed: notes  Details: Surgical note from 3/2/2023 reviewed in which Dr Iza Piedra redrained pelvic abscess      Risk  Prescription drug management  Disposition  Final diagnoses:   Strain of neck muscle, initial encounter     Time reflects when diagnosis was documented in both MDM as applicable and the Disposition within this note     Time User Action Codes Description Comment    4/19/2023  9:30 PM Jayne Delores Segundo [[id number]  [id number]] Strain of neck muscle, initial encounter       ED Disposition     ED Disposition   Discharge    Condition   Stable    Date/Time   Wed Apr 19, 2023  9:30 PM    Comment   Leora Foss discharge to home/self care                 Follow-up Information     Follow up With Specialties Details Why Contact Info    Al Sanchez MD Family Medicine In 2 days  [address] [place] [address]  [phone]            Discharge Medication List as of 4/19/2023  9:34 PM      START taking these medications    Details   diazepam (VALIUM) 5 mg tablet Take 1 tablet (5 mg total) by mouth every 8 (eight) hours as needed for muscle spasms for up to 10 days, Starting Wed 4/19/2023, Until Sat 4/29/2023 at 2359, Normal         CONTINUE these medications which have NOT CHANGED    Details   albuterol (PROVENTIL HFA,VENTOLIN HFA) 90 mcg/act inhaler INHALE 2 PUFFS BY MOUTH EVERY 6 HOURS AS NEEDED FOR WHEEZING, Normal      amLODIPine (NORVASC) 10 mg tablet Take 1 tablet (10 mg total) by mouth daily Blood presuure, Starting Thu 9/1/2022, Until Mon 3/27/2023, Normal      bisacodyl (DULCOLAX) 5 mg EC tablet Take 2 tablets (10 mg total) by mouth once for 1 dose, Starting Mon 3/27/2023, Normal      chlorhexidine (HIBICLENS) 4 % external liquid Apply 1 application topically daily as needed for wound care, Starting Mon 12/5/2022, Normal Cholecalciferol ([facility] D3) 125 MCG (5000 UT) capsule Take 1 capsule (5,000 Units total) by mouth daily, Starting Mon 8/22/2022, Until Thu 1/12/2023, Normal      clindamycin (CLINDAGEL) 1 % gel Apply topically 2 (two) times a day To abscess, Starting Mon 12/5/2022, Normal      [facility] Vitamin C 500 MG tablet TAKE 1 TABLET (500 MG TOTAL) BY MOUTH DAILY , Normal      cyanocobalamin (VITAMIN B-12) 2000 MCG tablet Take 1 tablet (2,000 mcg total) by mouth daily, Starting Mon 8/22/2022, Until Mon 3/27/2023, Normal      fluconazole (DIFLUCAN) 150 mg tablet Take 150 mg by mouth once, Starting Mon 12/5/2022, Historical Med      ibuprofen (MOTRIN) 600 mg tablet Take 1 tablet (600 mg total) by mouth every 6 (six) hours as needed for moderate pain or mild pain, Starting Sun 1/22/2023, Normal      loratadine (CLARITIN) 10 mg tablet TAKE 1 TABLET (10 MG TOTAL) BY MOUTH DAILY FOR ALLERGIES, Starting Fri 7/1/2022, Normal      metFORMIN (GLUCOPHAGE-XR) 500 mg 24 hr tablet TAKE 1 TABLET BY MOUTH EVERY DAY WITH DINNER, Normal      polyethylene glycol (GOLYTELY) 4000 mL solution Take 4,000 mL by mouth once for 1 dose, Starting Mon 3/27/2023, Normal      pyridoxine (VITAMIN B6) 100 mg tablet Take 1 tablet (100 mg total) by mouth daily, Starting Mon 12/5/2022, Normal      spironolactone (ALDACTONE) 25 mg tablet TAKE 1 TABLET BY MOUTH EVERY DAY FOR 30 DAYS THEN TAKE 2 TABLETS DAILY AFTER, Normal      tiotropium (SPIRIVA) 18 mcg inhalation capsule Place 18 mcg into inhaler and inhale in the morning , Historical Med      valACYclovir (VALTREX) 500 mg tablet TAKE 1 TABLET BY MOUTH EVERY DAY, Normal             No discharge procedures on file      PDMP Review       Value Time User    PDMP Reviewed  Yes 12/21/2020  5:26 AM Rochelle Hendrix MD          ED Provider  Electronically Signed by           Ila Jaimes MD  04/19/23 1309

## 2023-04-25 ENCOUNTER — EVALUATION (OUTPATIENT)
Dept: PHYSICAL THERAPY | Facility: CLINIC | Age: 43
End: 2023-04-25

## 2023-04-25 VITALS — SYSTOLIC BLOOD PRESSURE: 120 MMHG | DIASTOLIC BLOOD PRESSURE: 78 MMHG

## 2023-04-25 DIAGNOSIS — M54.2 CERVICALGIA: Primary | ICD-10-CM

## 2023-04-25 NOTE — PROGRESS NOTES
PT Evaluation     Today's date: 2023  Patient name: Ni Sanchez  : 1980  MRN: [id number]  Referring provider: Coby Felipe  Dx:   Encounter Diagnosis     ICD-10-CM    1  Cervicalgia  M54 2           Start Time: 1200  Stop Time: 7007  Total time in clinic (min): 52 minutes    Assessment  Assessment details: Ni Sanchez  is a pleasant 36 y o  female who presents with neck pain   The primary movement problem is increased cervical restrictions in the R UT and levator  resulting in decreased cervical mobility and R upper extremity mobility , which limit her ability to reach overhead, lift, turn head and perform functional mobility including ADLs and IADLS without compensation or pain   No referral is necessary at this time based on examination results  Increased guarding and decreased tolerance for mobility noted throughout  Tolerated session without adverse effects  Problem List:  1) Decreased cervical mobility / restrictions and tenderness   2) Decreased shoulder AROM/ PROM   3) postural stability deficits      Pt  will benefit from skilled PT services that includes manual therapy techniques to enhance tissue extensibility, neuromuscular re-education to facilitate motor control, therapeutic exercise to increase functional mobility, and modalities prn to reduce pain and inflammation  Access Code: [email or web address]  URL: [email or web address]  Date: 2023  Prepared by: Lavona Cowden    Exercises  - Seated Upper Trapezius Stretch  - 1 x daily - 7 x weekly - 3 sets - 5 reps - 10 hold  - Seated Cervical Retraction  - 1 x daily - 7 x weekly - 15 reps  - Seated Levator Scapulae Stretch  - 1 x daily - 7 x weekly - 5 reps - 10 hold  Impairments: abnormal or restricted ROM, pain with function, scapular dyskinesis and poor posture     Symptom irritability: moderateUnderstanding of Dx/Px/POC: good   Prognosis: good    Goals  Impairment Goals  - Pt I with initial HEP in 1-2 visits  - Improve cervical AROM to WNL throughout without increased pain  in 4-6 weeks  - Increase strength to 5/5 in all affected areas in 4-6 weeks    Functional Goals  - Increase Functional Status Measure to expected outcome by discharge   - Patient will be independent with comprehensive HEP in 6-8 weeks  - Patient will be able to reach for object from shelf at shoulder height with min reports of difficulty in 2-4 weeks  - Patient will be able to reach for object overhead with min to difficulty in 6-8 weeks  - Patient will be able to lift/carry objects without provocation of symptoms in 6-8 weeks  - Patient will be able to perform all essential duties with R arm to facilitate return to full duty at work without compensation in 6-8 weeks  Plan  Patient would benefit from: skilled physical therapy and PT eval  Referral necessary: No  Planned modality interventions: TENS, manual electrical stimulation, unattended electrical stimulation, cryotherapy and thermotherapy: hydrocollator packs  Planned therapy interventions: manual therapy, joint mobilization, postural training, patient education, neuromuscular re-education, strengthening, stretching, therapeutic activities, therapeutic exercise, home exercise program and flexibility  Frequency: 2x week  Duration in weeks: 8  Plan of Care beginning date: 4/25/2023  Plan of Care expiration date: 6/20/2023  Treatment plan discussed with: patient        Subjective Evaluation    History of Present Illness  Mechanism of injury: Bronson Yates presents with c/c of neck pain   Symptoms began 4/17/23 with mechanism of injury: picking up clothes and turning to the L noted a pop and increased stiffness in the neck  [facility]  Currently on light duty using the L arm instead of dominant R arm  Notes numbness/ tingling in the R arm but reports that has been present due to neuropathy  Denies headaches and double vision     Aggravating factors: picking up > "10#, turning head bilaterally   Relieving factors: feels stuck when not moving   24hr pain pattern: 5/10 (current), 5/10 (best), 7/10 (worst), location:R side of the neck and the top of the R shoulder , descriptors: increased stiffness   Imaging: [facility] no remarkable results   Previous treatments: none   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   Patient goals: \"to get unstiff\"           Not a recurrent problem   Quality of life: good          Objective     Concurrent Complaints  Positive for night pain, disturbed sleep and nausea/motion sickness   Negative for dizziness and headaches    Palpation     Additional Palpation Details  Increased muscle spasming throughout the R side in the SOR/ UT/ and the levator throughout  pec tenderness and SCM tenderness as well as scalene tenderness noted throughout   Increased spasming and restrictions  Elevated fist rib   Deltoid and scapular border tenderness     Increased sensitivity throughout decreased tolerance for assessment     Neurological Testing     Sensation   Cervical/Thoracic   Left   Intact: light touch    Right   Intact: light touch    Shoulder   Left Shoulder   Intact: light touch    Right Shoulder   Intact: light touch    Active Range of Motion   Cervical/Thoracic Spine       Cervical    Flexion: 5 degrees  with pain Restriction level: maximal  Extension: 15 degrees     with pain  Left lateral flexion: 10 degrees     with pain Restriction level: maximal  Right lateral flexion: 10 degrees     with pain Restriction level maximal  Left rotation: 25 degrees with pain Restriction level: maximal  Right rotation: 28 degrees    with pain Restriction level: maximal  Left Shoulder   Normal active range of motion  Flexion: with pain    Right Shoulder   Flexion: 95 degrees with pain  Abduction: 90 degrees with pain  External rotation BTH: C6 with pain  Internal rotation BTB: sacrum with " "pain    Additional Active Range of Motion Details  Decreased motion noted throughout with increased hesitancy and resistance to movement   Increased guarding and UT overuse     Passive Range of Motion   Left Shoulder   Flexion: with pain    Right Shoulder   Flexion: 120 degrees   Abduction: 105 degrees   External rotation 45°: 40 degrees with pain  Internal rotation 45°: 60 degrees with pain    Strength/Myotome Testing     Left Shoulder   Normal muscle strength    Isolated Muscles   Lower trapezius: 3-   Middle trapezius: 3-     Right Shoulder     Planes of Motion   Flexion: 3-   Abduction: 3-   External rotation at 0°: 3+   Internal rotation at 0°: 3+     Isolated Muscles   Biceps: 3+   Lower trapezius: 3-   Middle trapezius: 3-   Triceps: 3+     Tests   Cervical     Left   Negative Spurling's Test A  Right   Negative Spurling's Test A  Right Shoulder   Positive Neer's  Negative Hawkin's, ULTT1, ULTT2, ULTT3 and ULTT4     Neuro Exam:     Headaches   Patient reports headaches: No               Precautions: see med hx in chart     Manuals 4/25            SOR             Cervical MFR             Shoulder PROM                           Neuro Re-Ed 4/25            SNAGS             Chin tucks (HEP) x10            Rows/ ext              No moneys              Thoracic ext                                        Ther Ex 4/25            UT stretch (HEP) 5x10\"             Levator stretch (HEP)  5x10\"            Table slides             Supine cane flexion                                                                  Ther Activity 4/25            UBE              Patient education  KR            pulleys                                       Modalities 4/25            HP prn  x10'                            "

## 2023-04-25 NOTE — LETTER
2023    Mara Cline DO  [address] [place] [address]    Patient: Tom Robles   YOB: 1980   Date of Visit: 2023     Encounter Diagnosis     ICD-10-CM    1  Cervicalgia  M54 2           Dear Dr Saqib Haro: Thank you for your recent referral of Tom Robles  Please review the attached evaluation summary from Jessica's recent visit  Please verify that you agree with the plan of care by signing the attached order  If you have any questions or concerns, please do not hesitate to call  I sincerely appreciate the opportunity to share in the care of one of your patients and hope to have another opportunity to work with you in the near future  Sincerely,    Brea Yates, PT      Referring Provider:      I certify that I have read the below Plan of Care and certify the need for these services furnished under this plan of treatment while under my care  Mara Cline DO  [address] [place] [address]  Via Fax: [phone]          PT Evaluation     Today's date: 2023  Patient name: Tom Robles  : 1980  MRN: [id number]  Referring provider: Aleksandra Johnson  Dx:   Encounter Diagnosis     ICD-10-CM    1  Cervicalgia  M54 2           Start Time: 1200  Stop Time: 3783  Total time in clinic (min): 52 minutes    Assessment  Assessment details: Tom Robles  is a pleasant 36 y o  female who presents with neck pain   The primary movement problem is increased cervical restrictions in the R UT and levator  resulting in decreased cervical mobility and R upper extremity mobility , which limit her ability to reach overhead, lift, turn head and perform functional mobility including ADLs and IADLS without compensation or pain   No referral is necessary at this time based on examination results  Increased guarding and decreased tolerance for mobility noted throughout  Tolerated session without adverse effects      Problem List:  1) Decreased cervical mobility / restrictions and tenderness   2) Decreased shoulder AROM/ PROM   3) postural stability deficits      Pt  will benefit from skilled PT services that includes manual therapy techniques to enhance tissue extensibility, neuromuscular re-education to facilitate motor control, therapeutic exercise to increase functional mobility, and modalities prn to reduce pain and inflammation  Access Code: [email or web address]  URL: [email or web address]  Date: 04/25/2023  Prepared by: Cole Grion    Exercises  - Seated Upper Trapezius Stretch  - 1 x daily - 7 x weekly - 3 sets - 5 reps - 10 hold  - Seated Cervical Retraction  - 1 x daily - 7 x weekly - 15 reps  - Seated Levator Scapulae Stretch  - 1 x daily - 7 x weekly - 5 reps - 10 hold  Impairments: abnormal or restricted ROM, pain with function, scapular dyskinesis and poor posture     Symptom irritability: moderateUnderstanding of Dx/Px/POC: good   Prognosis: good    Goals  Impairment Goals  - Pt I with initial HEP in 1-2 visits  - Improve cervical AROM to WNL throughout without increased pain  in 4-6 weeks  - Increase strength to 5/5 in all affected areas in 4-6 weeks    Functional Goals  - Increase Functional Status Measure to expected outcome by discharge   - Patient will be independent with comprehensive HEP in 6-8 weeks  - Patient will be able to reach for object from shelf at shoulder height with min reports of difficulty in 2-4 weeks  - Patient will be able to reach for object overhead with min to difficulty in 6-8 weeks  - Patient will be able to lift/carry objects without provocation of symptoms in 6-8 weeks  - Patient will be able to perform all essential duties with R arm to facilitate return to full duty at work without compensation in 6-8 weeks       Plan  Patient would benefit from: skilled physical therapy and PT eval  Referral necessary: No  Planned modality interventions: TENS, manual electrical stimulation, "unattended electrical stimulation, cryotherapy and thermotherapy: hydrocollator packs  Planned therapy interventions: manual therapy, joint mobilization, postural training, patient education, neuromuscular re-education, strengthening, stretching, therapeutic activities, therapeutic exercise, home exercise program and flexibility  Frequency: 2x week  Duration in weeks: 8  Plan of Care beginning date: 4/25/2023  Plan of Care expiration date: 6/20/2023  Treatment plan discussed with: patient        Subjective Evaluation    History of Present Illness  Mechanism of injury: Jannie Ochoa presents with c/c of neck pain   Symptoms began 4/17/23 with mechanism of injury: picking up clothes and turning to the L noted a pop and increased stiffness in the neck  [facility]  Currently on light duty using the L arm instead of dominant R arm  Notes numbness/ tingling in the R arm but reports that has been present due to neuropathy  Denies headaches and double vision  Aggravating factors: picking up >  10#, turning head bilaterally   Relieving factors: feels stuck when not moving   24hr pain pattern: 5/10 (current), 5/10 (best), 7/10 (worst), location:R side of the neck and the top of the R shoulder , descriptors: increased stiffness   Imaging[facility] no remarkable results   Previous treatments: none   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   Patient goals: \"to get unstiff\"           Not a recurrent problem   Quality of life: good          Objective     Concurrent Complaints  Positive for night pain, disturbed sleep and nausea/motion sickness   Negative for dizziness and headaches    Palpation     Additional Palpation Details  Increased muscle spasming throughout the R side in the SOR/ UT/ and the levator throughout  pec tenderness and SCM tenderness as well as scalene tenderness noted throughout   Increased " spasming and restrictions  Elevated fist rib   Deltoid and scapular border tenderness     Increased sensitivity throughout decreased tolerance for assessment     Neurological Testing     Sensation   Cervical/Thoracic   Left   Intact: light touch    Right   Intact: light touch    Shoulder   Left Shoulder   Intact: light touch    Right Shoulder   Intact: light touch    Active Range of Motion   Cervical/Thoracic Spine       Cervical    Flexion: 5 degrees  with pain Restriction level: maximal  Extension: 15 degrees     with pain  Left lateral flexion: 10 degrees     with pain Restriction level: maximal  Right lateral flexion: 10 degrees     with pain Restriction level maximal  Left rotation: 25 degrees with pain Restriction level: maximal  Right rotation: 28 degrees    with pain Restriction level: maximal  Left Shoulder   Normal active range of motion  Flexion: with pain    Right Shoulder   Flexion: 95 degrees with pain  Abduction: 90 degrees with pain  External rotation BTH: C6 with pain  Internal rotation BTB: sacrum with pain    Additional Active Range of Motion Details  Decreased motion noted throughout with increased hesitancy and resistance to movement   Increased guarding and UT overuse     Passive Range of Motion   Left Shoulder   Flexion: with pain    Right Shoulder   Flexion: 120 degrees   Abduction: 105 degrees   External rotation 45°: 40 degrees with pain  Internal rotation 45°: 60 degrees with pain    Strength/Myotome Testing     Left Shoulder   Normal muscle strength    Isolated Muscles   Lower trapezius: 3-   Middle trapezius: 3-     Right Shoulder     Planes of Motion   Flexion: 3-   Abduction: 3-   External rotation at 0°: 3+   Internal rotation at 0°: 3+     Isolated Muscles   Biceps: 3+   Lower trapezius: 3-   Middle trapezius: 3-   Triceps: 3+     Tests   Cervical     Left   Negative Spurling's Test A  Right   Negative Spurling's Test A  Right Shoulder   Positive Neer's     Negative Hawkin's, "Liliana Erickson, SHAYY3 and SHAYY4     Neuro Exam:     Headaches   Patient reports headaches: No              Precautions: see med hx in chart     Manuals 4/25            SOR             Cervical MFR             Shoulder PROM                           Neuro Re-Ed 4/25            SNAGS             Chin tucks (HEP) x10            Rows/ ext              No moneys              Thoracic ext                                        Ther Ex 4/25            UT stretch (HEP) 5x10\"             Levator stretch (HEP)  5x10\"            Table slides             Supine cane flexion                                                                  Ther Activity 4/25            UBE              Patient education  KR            pulleys                                       Modalities 4/25            HP prn  x10'                                         "

## 2023-05-01 ENCOUNTER — OFFICE VISIT (OUTPATIENT)
Dept: PHYSICAL THERAPY | Facility: CLINIC | Age: 43
End: 2023-05-01

## 2023-05-01 DIAGNOSIS — M54.2 CERVICALGIA: Primary | ICD-10-CM

## 2023-05-01 NOTE — PROGRESS NOTES
"Daily Note     Today's date: 2023  Patient name: Abebe Sy  : 1980  MRN: [id number]  Referring provider: Laura Becerra  Dx:   Encounter Diagnosis     ICD-10-CM    1  Cervicalgia  M54 2                      Subjective: Pt feeling some improvement since beginning therapy      Objective: See treatment diary below  HEP R SB pt and Ret pt OP 1x10 4-5x/day    Assessment: Tolerated treatment well  Patient had reduced tightness/discomfort iwth R SB with pt OP and Ret with pt OP  Plan: Continue per plan of care        Precautions: see med hx in chart     Manuals            SOR             Cervical MFR             Shoulder PROM              Ret Mobs prn             Neuro Re-Ed             SNAGS             Chin tucks (HEP) x10            Rows/ ext              No moneys              Thoracic ext                                        Ther Ex             UT stretch (HEP) 5x10\"             Levator stretch (HEP)  5x10\"            Table slides             Supine cane flexion                           Ret  20           Ret pt OP  20           Ret/Ext  20           R SB  20           R  SB pt OP  20                                     Ther Activity             UBE              Patient education  KR            pulleys                                       Modalities             HP prn  x10'                              "

## 2023-05-03 ENCOUNTER — OFFICE VISIT (OUTPATIENT)
Dept: PHYSICAL THERAPY | Facility: CLINIC | Age: 43
End: 2023-05-03

## 2023-05-03 DIAGNOSIS — M54.2 CERVICALGIA: Primary | ICD-10-CM

## 2023-05-03 NOTE — PROGRESS NOTES
"Daily Note     Today's date: 5/3/2023  Patient name: Dimple Habermann  : 1980  MRN: [id number]  Referring provider: Gini Lomax  Dx:   Encounter Diagnosis     ICD-10-CM    1  Cervicalgia  M54 2                      Subjective: Not really having pain:  Neck and L arm soreness (from using arm more)      Objective: See treatment diary below  Discussed actively using R arm and only stopping activities that specifically cause pain  HEP: Ret/Ext eery 2-3 hrs (even if pain free)      Assessment: Tolerated treatment well  Patient had improved, nearly resolved stiffness post tx  Plan: Continue per plan of care        Precautions: see med hx in chart     Manuals 4/25 5/1 5/3          SOR             Cervical MFR             Shoulder PROM              Ret Mobs prn             Neuro Re-Ed             SNAGS             Chin tucks (HEP) x10            Rows/ ext              No moneys              Thoracic ext                                        Ther Ex             UT stretch (HEP) 5x10\"             Levator stretch (HEP)  5x10\"            Table slides             Supine cane flexion                           Ret  20 20          Ret pt OP  20 20          Ret/Ext  20 20          R SB  20           R  SB pt OP  20           Prone Ret   20          Prone Ret Ext   20          Ther Activity             UBE              Patient education  KR            pulleys                                       Modalities             HP prn  x10'                              "

## 2023-05-08 DIAGNOSIS — L73.2 HIDRADENITIS SUPPURATIVA: ICD-10-CM

## 2023-05-08 DIAGNOSIS — I10 ESSENTIAL HYPERTENSION: ICD-10-CM

## 2023-05-08 RX ORDER — SPIRONOLACTONE 25 MG/1
TABLET ORAL
Qty: 180 TABLET | Refills: 3 | Status: SHIPPED | OUTPATIENT
Start: 2023-05-08

## 2023-05-10 ENCOUNTER — TELEPHONE (OUTPATIENT)
Dept: GENETICS | Facility: CLINIC | Age: 43
End: 2023-05-10

## 2023-05-10 NOTE — TELEPHONE ENCOUNTER
Post-Test Genetic Counseling Consult Note    Today I spoke with Gildardo Sunshine over the phone to review the results of her genetic test for hereditary cancer  We met previously on 4/10/23 for pre-test counseling  A copy of this consult note and genetic test result will be shared with the patient  SUMMARY:    Test(s): Custom[employer]: Cancer® +RNAinsight® (59 genes): APC, MILES, AXIN2, BAP1, BARD1, BMPR1A, BRCA1, BRCA2, BRIP1, CDH1, CDK4, CDKN1B, CDKN2A, CHEK2, CTNNA1, DICER1, EGLN1, EPCAM, FH, FLCN, GREM1, HOXB13, KIF1B, MAX, MEN1, MET, MITF, MLH1, MSH2, MSH3, MSH6, MUTYH, NBN, NF1, NTHL1, PALB2, PMS2, POLD1, POLE, POT1, PTEN, RAD51C, RAD51D, RB1, RECQL, RET, SDHA, SDHAF2, SDHB, SDHC, SDHD, SMAD4, SMARCA4, STK11, BZGG357, TP53, TSC1, TSC2, VHL      Result: 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); heterozygous; uncertain significance     Assessment:  A variant of uncertain significance (VUS) means that a change was identified in a specific gene but it cannot be determined whether the variant is associated with an increased risk of cancer or is a harmless genetic change  The significance of the MET variant is currently not known and therefore this test result cannot be used to help determine Jessica cancer risks  It is possible that the variant was seen in only a handful of individuals, or there may be conflicting or incomplete information in the medical literature about the variant and its association with hereditary cancer  Risks and Testing for Family Members:  Genetic testing for this variant is not recommended for relatives who wish to determine their cancer risks for purposes of determining medical management  The presence or absence of this variant in a relative is not clinically meaningful unless the variant is reclassified in the future       The laboratory will continue to accumulate information on this variant and will reclassify it as either a positive or negative genetic test result when they are confident that they have adequate information  As updated information is obtained, we will notify Dejan Peck with the updated information  It is important to note that the majority of variants of uncertain significance are reclassified as likely benign or benign as additional information about the variant becomes available  Despite this result, Jessica's first-degree relatives may be at increased risk for the cancers based on the family history  We recommend they discuss screening and management recommendations with their healthcare providers  If Dejan Peck has any affected family members with a cancer diagnosis, especially at a young age, they may still consider genetic testing  I recommended that her maternal half-sister with endometrial and colon cancer diagnoses consider genetic testing  Relatives who wish to pursue genetic testing can reach out to the [address] [place] [address] Road ([id number]) to schedule an appointment or visit www [facility] org to identify a local genetic counselor  Risk Based on Family History:  The significance of this variant is currently not known and therefore this test result cannot be used to help determine Jessica cancer risks  Rather her personal medical history and family history of cancer are the most important factors used to estimate her risk for developing certain cancers and to direct her medical management  Additional Information:  A healthy lifestyle will improve overall health and reduce risk for illness  Eating a healthy diet and exercising for 4 hours per week is recommended  Both diet and exercise have been shown to help maintain a healthy weight  Postmenopausal women who are overweight are at higher risk for breast cancer  Moderate to heavy alcohol use can increase the risk for some cancers  Smoking cigarettes can also increase risk for breast, lung, prostate, pancreatic and other cancers        Plan:   There are no additional recommendations based on Jessica's result  she should continue cancer screening and medical management as clinically indicated and as determined appropriate by her healthcare providers  VUS Result: Louis Abraham was strongly encouraged to contact us regarding any changes in her personal or family history of cancer as these changes could alter our recommendation regarding genetic testing and/or cancer screening  Louis Abraham was also encouraged to follow up with us on an annual basis as variant classifications are subject to change

## 2023-05-11 ENCOUNTER — APPOINTMENT (OUTPATIENT)
Dept: PHYSICAL THERAPY | Facility: CLINIC | Age: 43
End: 2023-05-11
Payer: OTHER MISCELLANEOUS

## 2023-05-16 ENCOUNTER — APPOINTMENT (OUTPATIENT)
Dept: PHYSICAL THERAPY | Facility: CLINIC | Age: 43
End: 2023-05-16
Payer: OTHER MISCELLANEOUS

## 2023-05-18 ENCOUNTER — APPOINTMENT (OUTPATIENT)
Dept: PHYSICAL THERAPY | Facility: CLINIC | Age: 43
End: 2023-05-18
Payer: OTHER MISCELLANEOUS

## 2023-05-23 ENCOUNTER — APPOINTMENT (OUTPATIENT)
Dept: PHYSICAL THERAPY | Facility: CLINIC | Age: 43
End: 2023-05-23
Payer: OTHER MISCELLANEOUS

## 2023-05-25 ENCOUNTER — APPOINTMENT (OUTPATIENT)
Dept: PHYSICAL THERAPY | Facility: CLINIC | Age: 43
End: 2023-05-25
Payer: OTHER MISCELLANEOUS

## 2023-06-05 ENCOUNTER — ANESTHESIA (OUTPATIENT)
Dept: GASTROENTEROLOGY | Facility: AMBULARY SURGERY CENTER | Age: 43
End: 2023-06-05

## 2023-06-05 ENCOUNTER — ANESTHESIA EVENT (OUTPATIENT)
Dept: GASTROENTEROLOGY | Facility: AMBULARY SURGERY CENTER | Age: 43
End: 2023-06-05

## 2023-06-05 ENCOUNTER — HOSPITAL ENCOUNTER (OUTPATIENT)
Dept: GASTROENTEROLOGY | Facility: AMBULARY SURGERY CENTER | Age: 43
Setting detail: OUTPATIENT SURGERY
Discharge: HOME | End: 2023-06-05
Attending: INTERNAL MEDICINE
Payer: COMMERCIAL

## 2023-06-05 VITALS
TEMPERATURE: 97.4 F | RESPIRATION RATE: 18 BRPM | SYSTOLIC BLOOD PRESSURE: 136 MMHG | DIASTOLIC BLOOD PRESSURE: 87 MMHG | HEIGHT: 65 IN | WEIGHT: 202 LBS | OXYGEN SATURATION: 99 % | HEART RATE: 85 BPM | BODY MASS INDEX: 33.66 KG/M2

## 2023-06-05 DIAGNOSIS — Z80.0 FAMILY HISTORY OF COLON CANCER: ICD-10-CM

## 2023-06-05 PROCEDURE — 88305 TISSUE EXAM BY PATHOLOGIST: CPT | Performed by: PATHOLOGY

## 2023-06-05 RX ORDER — SODIUM CHLORIDE, SODIUM LACTATE, POTASSIUM CHLORIDE, CALCIUM CHLORIDE 600; 310; 30; 20 MG/100ML; MG/100ML; MG/100ML; MG/100ML
INJECTION, SOLUTION INTRAVENOUS CONTINUOUS PRN
Status: DISCONTINUED | OUTPATIENT
Start: 2023-06-05 | End: 2023-06-05

## 2023-06-05 RX ORDER — PROPOFOL 10 MG/ML
INJECTION, EMULSION INTRAVENOUS AS NEEDED
Status: DISCONTINUED | OUTPATIENT
Start: 2023-06-05 | End: 2023-06-05

## 2023-06-05 RX ADMIN — PROPOFOL 50 MG: 10 INJECTION, EMULSION INTRAVENOUS at 08:26

## 2023-06-05 RX ADMIN — PROPOFOL 20 MG: 10 INJECTION, EMULSION INTRAVENOUS at 08:34

## 2023-06-05 RX ADMIN — PROPOFOL 50 MG: 10 INJECTION, EMULSION INTRAVENOUS at 08:28

## 2023-06-05 RX ADMIN — SODIUM CHLORIDE, SODIUM LACTATE, POTASSIUM CHLORIDE, AND CALCIUM CHLORIDE: .6; .31; .03; .02 INJECTION, SOLUTION INTRAVENOUS at 08:10

## 2023-06-05 RX ADMIN — PROPOFOL 50 MG: 10 INJECTION, EMULSION INTRAVENOUS at 08:24

## 2023-06-05 RX ADMIN — PROPOFOL 30 MG: 10 INJECTION, EMULSION INTRAVENOUS at 08:32

## 2023-06-05 RX ADMIN — PROPOFOL 50 MG: 10 INJECTION, EMULSION INTRAVENOUS at 08:23

## 2023-06-05 RX ADMIN — PROPOFOL 40 MG: 10 INJECTION, EMULSION INTRAVENOUS at 08:30

## 2023-06-05 NOTE — H&P
History and Physical -  [facility]  Jeffry Roa 36 y o  female MRN: [id number]        HPI: 26-year-old female with family history of colon cancer in her sister  Regular bowel movements      Historical Information   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   • Adrenal mass, left ([facility] [address] ) 2021    1 8 x 2 1 cm on ct 2020   • Arthritis    • Asthma    • Blurry vision 2020   • Essential hypertension 2020   • Headache 2020   • Hidradenitis suppurativa    • Hypertension    • Immunization deficiency 10/02/2020    Hep a nonimmune   • Insomnia 2021   • Migraine    • [facility]   • Obesity (BMI 30 0-34 9) 2021   • Prediabetes 2020   • Tobacco use 2021   • Vitamin D deficiency 10/02/2020     Past Surgical History:   Procedure Laterality Date   • APPENDECTOMY     • BREAST BIOPSY Left    • BREAST BIOPSY Right     2 core biopsies   • BREAST EXCISIONAL BIOPSY Left 2018   • BREAST SURGERY Left 2018    Left breast mass excision   • CERVICAL BIOPSY  W/ LOOP ELECTRODE EXCISION     •  SECTION      X2   • COLPOSCOPY     • HYSTERECTOMY      heavy bleeding, ovaries remain, also had abnormal pap   • KNEE SURGERY     • LYMPH NODE DISSECTION  2018    under armpit   • TUBAL LIGATION     • [address] THYROID BIOPSY  2021     Social History   Social History     Substance and Sexual Activity   Alcohol Use Yes    Comment: occasional     Social History     Substance and Sexual Activity   Drug Use No     Social History     Tobacco Use   Smoking Status Every Day   • Packs/day: 0 50   • Years: 20 00   • Total pack years: 10 00   • Types: Cigarettes   Smokeless Tobacco Never   Tobacco Comments    pt is down to  5 packs a day     Family History   Problem Relation Age of Onset   • Diabetes Mother    • Heart attack Mother "  • Heart disease Mother         Internal Defibrilation   • No Known Problems Father    • Endometrial cancer Sister 29   • Colon cancer Sister 28   • No Known Problems Daughter    • Diabetes Maternal Grandmother    • Diabetes Paternal Grandmother    • No Known Problems Son    • No Known Problems Son    • Lupus Maternal Aunt 50   • Seizures Maternal Aunt    • Leukemia Maternal Uncle 25   • Diabetes Paternal Aunt    • No Known Problems Paternal Aunt        Meds/Allergies     (Not in a hospital admission)      No Known Allergies    Objective     Blood pressure 101/73, pulse 75, temperature (!) 97 4 °F (36 3 °C), temperature source Temporal, resp  rate 20, height 5' 5\" (1 651 m), weight 91 6 kg (202 lb), SpO2 100 %      PHYSICAL EXAM:    Gen: NAD  CV: S1 & S2 normal, RRR  CHEST: Clear to auscultate  ABD: soft, NT/ND, good bowel sounds  EXT: no edema    ASSESSMENT:     Family history of colon cancer    PLAN:    Colonoscopy              "

## 2023-06-05 NOTE — ANESTHESIA PREPROCEDURE EVALUATION
Procedure:  COLONOSCOPY    Relevant Problems   ANESTHESIA (within normal limits)   (-) History of anesthesia complications      CARDIO   (+) Essential hypertension   (-) Chest pain   (-) DIAZ (dyspnea on exertion)      GYN   (+) History of hysterectomy      NEURO/PSYCH   (+) Headache   (+) Moderate episode of recurrent major depressive disorder (HCC)      PULMONARY   (+) HIRAL (obstructive sleep apnea) (not using CPAP)   (-) Shortness of breath   (-) URI (upper respiratory infection)      Other   (+) Tobacco use        Physical Exam    Airway    Mallampati score: II  TM Distance: >3 FB  Neck ROM: full     Dental       Cardiovascular      Pulmonary      Other Findings        Anesthesia Plan  ASA Score- 2     Anesthesia Type- IV sedation with anesthesia with ASA Monitors  Additional Monitors:   Airway Plan:           Plan Factors-Exercise tolerance (METS): >4 METS  Chart reviewed  Existing labs reviewed  Patient summary reviewed  Induction- intravenous  Postoperative Plan-     Informed Consent- Anesthetic plan and risks discussed with patient  I personally reviewed this patient with the CRNA  Discussed and agreed on the Anesthesia Plan with the CRNA  Paticia Hammans

## 2023-06-05 NOTE — ANESTHESIA POSTPROCEDURE EVALUATION
Post-Op Assessment Note    CV Status:  Stable  Pain Score: 0    Pain management: adequate     Mental Status:  Alert and awake   Hydration Status:  Euvolemic and stable   PONV Controlled:  Controlled   Airway Patency:  Patent and adequate      Post Op Vitals Reviewed: Yes      Staff: CRNA         No notable events documented      BP  101/73    Temp     Pulse 83   Resp 16   SpO2 98%

## 2023-06-07 PROCEDURE — 88305 TISSUE EXAM BY PATHOLOGIST: CPT | Performed by: PATHOLOGY

## 2023-06-16 ENCOUNTER — HOSPITAL ENCOUNTER (EMERGENCY)
Facility: HOSPITAL | Age: 43
Discharge: HOME | End: 2023-06-16
Attending: EMERGENCY MEDICINE
Payer: COMMERCIAL

## 2023-06-16 VITALS
TEMPERATURE: 98.3 F | HEART RATE: 95 BPM | OXYGEN SATURATION: 100 % | RESPIRATION RATE: 18 BRPM | SYSTOLIC BLOOD PRESSURE: 131 MMHG | WEIGHT: 205 LBS | HEIGHT: 65 IN | DIASTOLIC BLOOD PRESSURE: 87 MMHG | BODY MASS INDEX: 34.16 KG/M2

## 2023-06-16 DIAGNOSIS — M26.629 TMJ ARTHRALGIA: Primary | ICD-10-CM

## 2023-06-16 DIAGNOSIS — H65.92 MIDDLE EAR EFFUSION, LEFT: ICD-10-CM

## 2023-06-16 PROCEDURE — 99283 EMERGENCY DEPT VISIT LOW MDM: CPT

## 2023-06-16 PROCEDURE — 96372 THER/PROPH/DIAG INJ SC/IM: CPT

## 2023-06-16 RX ORDER — KETOROLAC TROMETHAMINE 30 MG/ML
15 INJECTION, SOLUTION INTRAMUSCULAR; INTRAVENOUS ONCE
Status: COMPLETED | OUTPATIENT
Start: 2023-06-16 | End: 2023-06-16

## 2023-06-16 RX ORDER — AMOXICILLIN 500 MG/1
1000 CAPSULE ORAL EVERY 12 HOURS SCHEDULED
Qty: 28 CAPSULE | Refills: 0 | Status: SHIPPED | OUTPATIENT
Start: 2023-06-16 | End: 2023-06-23

## 2023-06-16 RX ORDER — NAPROXEN 500 MG/1
500 TABLET ORAL 2 TIMES DAILY WITH MEALS
Qty: 30 TABLET | Refills: 0 | Status: SHIPPED | OUTPATIENT
Start: 2023-06-16

## 2023-06-16 RX ORDER — AMOXICILLIN 250 MG/1
1000 CAPSULE ORAL ONCE
Status: COMPLETED | OUTPATIENT
Start: 2023-06-16 | End: 2023-06-16

## 2023-06-16 RX ORDER — ACETAMINOPHEN 325 MG/1
975 TABLET ORAL ONCE
Status: COMPLETED | OUTPATIENT
Start: 2023-06-16 | End: 2023-06-16

## 2023-06-16 RX ADMIN — KETOROLAC TROMETHAMINE 15 MG: 30 INJECTION, SOLUTION INTRAMUSCULAR at 13:55

## 2023-06-16 RX ADMIN — AMOXICILLIN 1000 MG: 250 CAPSULE ORAL at 13:53

## 2023-06-16 RX ADMIN — ACETAMINOPHEN 975 MG: 325 TABLET, FILM COATED ORAL at 13:52

## 2023-06-16 NOTE — ED PROVIDER NOTES
"History  Chief Complaint   Patient presents with   • Earache     PT \"I have been having left ear pain since about Wednesday  It goes all the way down into my jaw  My hearing is like fuzzy and there is pain in the front  \" PT denies pain with swallowing, CP, or SOB     39-year-old female previous history of hidradenitis suppurativa, adrenal mass, hypertension, headaches, tobacco use, C section, hysterectomy presents for L sided ear pain and muffled hearing in the left ear  Denies pain behind the ear  Denies fevers, problems swallowing, nausea, vomiting  Patient does note a pop in her jaw when she opens her mouth  Recent URI  No coughing  Patient smokes  Earache  Location:  Left  Behind ear:  No abnormality  Quality:  Aching  Severity:  Moderate  Onset quality:  Gradual  Timing:  Constant  Progression:  Worsening  Chronicity:  New  Relieved by:  Nothing  Worsened by:  Nothing  Ineffective treatments:  None tried      Prior to Admission Medications   Prescriptions Last Dose Informant Patient Reported? Taking? [facility] Vitamin C 500 MG tablet  Self No Yes   Sig: TAKE 1 TABLET (500 MG TOTAL) BY MOUTH DAILY     Cholecalciferol ([facility] D3) 125 MCG (5000 UT) capsule   No Yes   Sig: Take 1 capsule (5,000 Units total) by mouth daily   albuterol (PROVENTIL HFA,VENTOLIN HFA) 90 mcg/act inhaler  Self No Yes   Sig: INHALE 2 PUFFS BY MOUTH EVERY 6 HOURS AS NEEDED FOR WHEEZING   amLODIPine (NORVASC) 10 mg tablet  Self No Yes   Sig: Take 1 tablet (10 mg total) by mouth daily Blood presuure   bisacodyl (DULCOLAX) 5 mg EC tablet   No No   Sig: Take 2 tablets (10 mg total) by mouth once for 1 dose   chlorhexidine (HIBICLENS) 4 % external liquid  Self No No   Sig: Apply 1 application topically daily as needed for wound care   clindamycin (CLINDAGEL) 1 % gel  Self No No   Sig: Apply topically 2 (two) times a day To abscess   Patient not taking: Reported on 3/27/2023   cyanocobalamin (VITAMIN B-12) 2000 MCG tablet  Self No Yes " Sig: Take 1 tablet (2,000 mcg total) by mouth daily   diazepam (VALIUM) 5 mg tablet   No No   Sig: Take 1 tablet (5 mg total) by mouth every 8 (eight) hours as needed for muscle spasms for up to 10 days   fluconazole (DIFLUCAN) 150 mg tablet  Self Yes No   Sig: Take 150 mg by mouth once   Patient not taking: Reported on 1/22/2023   ibuprofen (MOTRIN) 600 mg tablet  Self No No   Sig: Take 1 tablet (600 mg total) by mouth every 6 (six) hours as needed for moderate pain or mild pain   Patient not taking: Reported on 3/27/2023   loratadine (CLARITIN) 10 mg tablet  Self No Yes   Sig: TAKE 1 TABLET (10 MG TOTAL) BY MOUTH DAILY FOR ALLERGIES   metFORMIN (GLUCOPHAGE-XR) 500 mg 24 hr tablet  Self No No   Sig: TAKE 1 TABLET BY MOUTH EVERY DAY WITH DINNER   Patient not taking: Reported on 3/27/2023   polyethylene glycol (GOLYTELY) 4000 mL solution   No No   Sig: Take 4,000 mL by mouth once for 1 dose   pyridoxine (VITAMIN B6) 100 mg tablet  Self No Yes   Sig: Take 1 tablet (100 mg total) by mouth daily   spironolactone (ALDACTONE) 25 mg tablet   No Yes   Sig: TAKE 1 TABLET BY MOUTH EVERY DAY FOR 30 DAYS THEN TAKE 2 TABLETS DAILY AFTER   tiotropium (SPIRIVA) 18 mcg inhalation capsule More than a month Self Yes No   Sig: Place 18 mcg into inhaler and inhale in the morning     valACYclovir (VALTREX) 500 mg tablet   No No   Sig: TAKE 1 TABLET BY MOUTH EVERY DAY      Facility-Administered Medications: None       Past Medical History:   Diagnosis Date   • Abnormal Pap smear of cervix     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, 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   • [facility] 01/25/2021    1 8 x 2 1 cm on ct 12/21/2020   • Arthritis    • Asthma    • Blurry vision 09/04/2020   • Essential hypertension 09/04/2020   • Headache 09/04/2020   • Hidradenitis suppurativa    • Hypertension    • Immunization deficiency 10/02/2020    Hep a nonimmune   • Insomnia 02/22/2021   • Migraine    • Moderate episode of recurrent major depressive disorder ([facility] ) 09/04/2020   • Obesity (BMI 30 0-34 9) 2021   • Prediabetes 2020   • Seasonal allergies    • Tobacco use 2021   • Vitamin D deficiency 10/02/2020       Past Surgical History:   Procedure Laterality Date   • APPENDECTOMY     • BREAST BIOPSY Left 2017   • BREAST BIOPSY Right     2 core biopsies   • BREAST EXCISIONAL BIOPSY Left 2018   • BREAST SURGERY Left 2018    Left breast mass excision   • CERVICAL BIOPSY  W/ LOOP ELECTRODE EXCISION     •  SECTION      X2   • COLPOSCOPY     • HYSTERECTOMY      heavy bleeding, ovaries remain, also had abnormal pap   • KNEE SURGERY     • LYMPH NODE DISSECTION      under armpit   • TUBAL LIGATION     • Isabel 634 THYROID BIOPSY  2021       Family History   Problem Relation Age of Onset   • Diabetes Mother    • Heart attack Mother    • Heart disease Mother         Internal Defibrilation   • No Known Problems Father    • Endometrial cancer Sister 29   • Colon cancer Sister 28   • No Known Problems Daughter    • Diabetes Maternal Grandmother    • Diabetes Paternal Grandmother    • No Known Problems Son    • No Known Problems Son    • Lupus Maternal Aunt 50   • Seizures Maternal Aunt    • Leukemia Maternal Uncle 25   • Diabetes Paternal Aunt    • No Known Problems Paternal Aunt      I have reviewed and agree with the history as documented  E-Cigarette/Vaping   • E-Cigarette Use Former User      E-Cigarette/Vaping Substances   • Nicotine Yes    • THC No    • CBD No    • Flavoring No    • Other No    • Unknown No      Social History     Tobacco Use   • Smoking status: Every Day     Packs/day: 0 50     Years: 20 00     Total pack years: 10 00     Types: Cigarettes   • Smokeless tobacco: Never   • Tobacco comments:     pt is down to  5 packs a day   Vaping Use   • Vaping Use: Former   • Substances: Nicotine   Substance Use Topics   • Alcohol use: Yes     Comment: occasional   • Drug use: No       Review of Systems   HENT: Positive for ear pain      All other systems reviewed and are negative  Physical Exam  Physical Exam  Vitals and nursing note reviewed  Constitutional:       General: She is not in acute distress  Appearance: Normal appearance  She is not ill-appearing  HENT:      Head: Normocephalic and atraumatic  Comments: Crepitance with opening and closing of the jaw  No facial fullness  No pain on palpation of the mastoid  Patient can hear from both ears  but notes muffled hearing in the left  Right Ear: External ear normal       Left Ear: External ear normal       Ears:      Comments: Left-sided middle ear effusion  Mild erythema  Nose: Nose normal       Mouth/Throat:      Mouth: Mucous membranes are moist    Eyes:      General:         Right eye: No discharge  Left eye: No discharge  Conjunctiva/sclera: Conjunctivae normal    Cardiovascular:      Rate and Rhythm: Normal rate and regular rhythm  Pulses: Normal pulses  Heart sounds: No murmur heard  Pulmonary:      Effort: Pulmonary effort is normal       Breath sounds: Normal breath sounds  Abdominal:      General: Abdomen is flat  There is no distension  Tenderness: There is no abdominal tenderness  Musculoskeletal:         General: Normal range of motion  Cervical back: Normal range of motion  Skin:     General: Skin is warm  Capillary Refill: Capillary refill takes less than 2 seconds  Findings: No rash  Neurological:      General: No focal deficit present  Mental Status: She is alert  Mental status is at baseline     Psychiatric:         Mood and Affect: Mood normal          Behavior: Behavior normal          Vital Signs  ED Triage Vitals [06/16/23 1314]   Temperature Pulse Respirations Blood Pressure SpO2   98 3 °F (36 8 °C) 95 18 131/87 100 %      Temp Source Heart Rate Source Patient Position - Orthostatic VS BP Location FiO2 (%)   Oral Monitor Sitting Left arm --      Pain Score       5           Vitals: 06/16/23 1314   BP: 131/87   Pulse: 95   Patient Position - Orthostatic VS: Sitting         Visual Acuity      ED Medications  Medications   ketorolac (TORADOL) injection 15 mg (15 mg Intramuscular Given 6/16/23 1355)   acetaminophen (TYLENOL) tablet 975 mg (975 mg Oral Given 6/16/23 1352)   amoxicillin (AMOXIL) capsule 1,000 mg (1,000 mg Oral Given 6/16/23 1353)       Diagnostic Studies  Results Reviewed     None                 No orders to display              Procedures  Procedures         ED Course                                             Medical Decision Making  Patient with left-sided ear pain  Has crepitance when opening closing the jaw  Consistent with TMJ but also has a left-sided middle ear effusion with a small amount of erythema  We will treat as possible otitis media  We will also refer her to ENT for TMJ  No signs of mastoiditis  No pain on palpation of the mastoid  Middle ear effusion, left: acute illness or injury  TMJ arthralgia: acute illness or injury  Risk  OTC drugs  Prescription drug management  Disposition  Final diagnoses:   TMJ arthralgia   Middle ear effusion, left     Time reflects when diagnosis was documented in both MDM as applicable and the Disposition within this note     Time User Action Codes Description Comment    6/16/2023  1:35 PM Paris Teague Add [I72 [id number]] TMJ arthralgia     6/16/2023  1:35 PM [place] Ho Add [[id number] [id number]] Middle ear effusion, left       ED Disposition     ED Disposition   Discharge    Condition   Stable    Date/Time   Fri Jun 16, 2023  1:35 PM    Comment   Anitha Erazo discharge to home/self care                 Follow-up Information     Follow up With Specialties Details Why Contact Info Additional Information    [facility] Schedule an appointment as soon as possible for a visit  For re-evaluation as soon as possible [address] [address]  [address] [facility] [place] [address], [facility] [phone], [place], [address] [facility], [address], [address] [facility] Emergency Department Emergency Medicine  If symptoms worsen [address] [place] [address] Emergency Department, [address], [address]          Discharge Medication List as of 6/16/2023  1:37 PM      START taking these medications    Details   amoxicillin (AMOXIL) 500 mg capsule Take 2 capsules (1,000 mg total) by mouth every 12 (twelve) hours for 7 days, Starting Fri 6/16/2023, Until Fri 6/23/2023, Normal      naproxen (Naprosyn) 500 mg tablet Take 1 tablet (500 mg total) by mouth 2 (two) times a day with meals, Starting Fri 6/16/2023, Normal         CONTINUE these medications which have NOT CHANGED    Details   albuterol (PROVENTIL HFA,VENTOLIN HFA) 90 mcg/act inhaler INHALE 2 PUFFS BY MOUTH EVERY 6 HOURS AS NEEDED FOR WHEEZING, Normal      amLODIPine (NORVASC) 10 mg tablet Take 1 tablet (10 mg total) by mouth daily Blood presuure, Starting Thu 9/1/2022, Until Fri 6/16/2023, Normal      Cholecalciferol ([facility] D3) 125 MCG (5000 UT) capsule Take 1 capsule (5,000 Units total) by mouth daily, Starting Mon 8/22/2022, Until Fri 6/16/2023, Normal      [facility] Vitamin C 500 MG tablet TAKE 1 TABLET (500 MG TOTAL) BY MOUTH DAILY , Normal      cyanocobalamin (VITAMIN B-12) 2000 MCG tablet Take 1 tablet (2,000 mcg total) by mouth daily, Starting Mon 8/22/2022, Until Fri 6/16/2023, Normal      loratadine (CLARITIN) 10 mg tablet TAKE 1 TABLET (10 MG TOTAL) BY MOUTH DAILY FOR ALLERGIES, Starting Fri 7/1/2022, Normal      pyridoxine (VITAMIN B6) 100 mg tablet Take 1 tablet (100 mg total) by mouth daily, Starting Mon 12/5/2022, Normal      spironolactone (ALDACTONE) 25 mg tablet TAKE 1 TABLET BY MOUTH EVERY DAY FOR 30 DAYS THEN TAKE 2 TABLETS DAILY AFTER, Normal      bisacodyl (DULCOLAX) 5 mg EC tablet Take 2 tablets (10 mg total) by mouth once for 1 dose, Starting Mon 3/27/2023, Normal      chlorhexidine (HIBICLENS) 4 % external liquid Apply 1 application topically daily as needed for wound care, Starting Mon 12/5/2022, Normal      clindamycin (CLINDAGEL) 1 % gel Apply topically 2 (two) times a day To abscess, Starting Mon 12/5/2022, Normal      diazepam (VALIUM) 5 mg tablet Take 1 tablet (5 mg total) by mouth every 8 (eight) hours as needed for muscle spasms for up to 10 days, Starting Wed 4/19/2023, Until Mon 6/5/2023 at 2359, Normal      fluconazole (DIFLUCAN) 150 mg tablet Take 150 mg by mouth once, Starting Mon 12/5/2022, Historical Med      ibuprofen (MOTRIN) 600 mg tablet Take 1 tablet (600 mg total) by mouth every 6 (six) hours as needed for moderate pain or mild pain, Starting Sun 1/22/2023, Normal      metFORMIN (GLUCOPHAGE-XR) 500 mg 24 hr tablet TAKE 1 TABLET BY MOUTH EVERY DAY WITH DINNER, Normal      polyethylene glycol (GOLYTELY) 4000 mL solution Take 4,000 mL by mouth once for 1 dose, Starting Mon 3/27/2023, Normal      tiotropium (SPIRIVA) 18 mcg inhalation capsule Place 18 mcg into inhaler and inhale in the morning , Historical Med      valACYclovir (VALTREX) 500 mg tablet TAKE 1 TABLET BY MOUTH EVERY DAY, Normal             No discharge procedures on file      PDMP Review       Value Time User    PDMP Reviewed  Yes 12/21/2020  5:26 AM Julianna Granados MD          ED Provider  Electronically Signed by           Hilaria Em DO  06/16/23 1400

## 2023-06-16 NOTE — Clinical Note
Jacquie Skpurnima was seen and treated in our emergency department on 6/16/2023  Diagnosis: Private    Pearletha Ok  may return to work on return date  She may return on this date: 06/17/2023         If you have any questions or concerns, please don't hesitate to call        Wilmer Barber DO    ______________________________           _______________          _______________  Hospital Representative                              Date                                Time

## 2023-06-16 NOTE — DISCHARGE INSTRUCTIONS
Follow-up with ear nose throat as soon as possible  If you are unable to get into them within the next couple weeks follow-up with your primary care provider prior to seeing your nose throat  Come back to emergency department for new or worsening symptoms  Take the amoxicillin as prescribed for the next week  Take the naproxen twice daily as needed for pain

## 2023-06-26 ENCOUNTER — TELEPHONE (OUTPATIENT)
Dept: FAMILY MEDICINE CLINIC | Facility: CLINIC | Age: 43
End: 2023-06-26

## 2023-08-14 ENCOUNTER — OFFICE VISIT (OUTPATIENT)
Dept: FAMILY MEDICINE CLINIC | Facility: CLINIC | Age: 43
End: 2023-08-14
Payer: COMMERCIAL

## 2023-08-14 VITALS
WEIGHT: 206 LBS | OXYGEN SATURATION: 98 % | TEMPERATURE: 98.3 F | SYSTOLIC BLOOD PRESSURE: 132 MMHG | HEART RATE: 80 BPM | BODY MASS INDEX: 34.32 KG/M2 | DIASTOLIC BLOOD PRESSURE: 80 MMHG | HEIGHT: 65 IN

## 2023-08-14 DIAGNOSIS — F33.1 MODERATE EPISODE OF RECURRENT MAJOR DEPRESSIVE DISORDER (HCC): ICD-10-CM

## 2023-08-14 DIAGNOSIS — E04.1 NODULE OF LEFT LOBE OF THYROID GLAND: ICD-10-CM

## 2023-08-14 DIAGNOSIS — E27.8 ADRENAL MASS, LEFT (HCC): ICD-10-CM

## 2023-08-14 DIAGNOSIS — R73.03 PREDIABETES: ICD-10-CM

## 2023-08-14 DIAGNOSIS — I10 ESSENTIAL HYPERTENSION: Primary | ICD-10-CM

## 2023-08-14 DIAGNOSIS — Z72.0 TOBACCO USE: ICD-10-CM

## 2023-08-14 DIAGNOSIS — M79.89 LEG SWELLING: ICD-10-CM

## 2023-08-14 DIAGNOSIS — M25.473 ANKLE SWELLING, UNSPECIFIED LATERALITY: ICD-10-CM

## 2023-08-14 DIAGNOSIS — E78.2 MIXED HYPERLIPIDEMIA: ICD-10-CM

## 2023-08-14 DIAGNOSIS — G47.33 OSA (OBSTRUCTIVE SLEEP APNEA): ICD-10-CM

## 2023-08-14 DIAGNOSIS — Z59.00 LACK OF HOUSING: ICD-10-CM

## 2023-08-14 PROBLEM — S70.01XA CONTUSION OF RIGHT HIP: Status: RESOLVED | Noted: 2020-12-21 | Resolved: 2023-08-14

## 2023-08-14 PROBLEM — B27.90 EPSTEIN BARR VIRUS INFECTION: Status: RESOLVED | Noted: 2022-09-06 | Resolved: 2023-08-14

## 2023-08-14 PROBLEM — U07.1 COVID-19: Status: RESOLVED | Noted: 2022-01-19 | Resolved: 2023-08-14

## 2023-08-14 PROBLEM — K61.0 PERIANAL ABSCESS: Status: RESOLVED | Noted: 2022-12-02 | Resolved: 2023-08-14

## 2023-08-14 PROBLEM — M25.461 BILATERAL KNEE EFFUSIONS: Status: RESOLVED | Noted: 2021-03-24 | Resolved: 2023-08-14

## 2023-08-14 PROBLEM — S40.011A CONTUSION OF RIGHT SHOULDER: Status: RESOLVED | Noted: 2020-12-21 | Resolved: 2023-08-14

## 2023-08-14 PROBLEM — M25.462 BILATERAL KNEE EFFUSIONS: Status: RESOLVED | Noted: 2021-03-24 | Resolved: 2023-08-14

## 2023-08-14 PROBLEM — Z23 ENCOUNTER FOR IMMUNIZATION: Status: RESOLVED | Noted: 2022-09-06 | Resolved: 2023-08-14
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
## 2023-08-14 PROBLEM — H53.8 BLURRY VISION: Status: RESOLVED | Noted: 2020-09-04 | Resolved: 2023-08-14

## 2023-08-14 PROBLEM — N76.4 ABSCESS OF RIGHT GENITAL LABIA: Status: RESOLVED | Noted: 2022-10-27 | Resolved: 2023-08-14

## 2023-08-14 PROBLEM — R22.1 NECK SWELLING: Status: RESOLVED | Noted: 2022-05-11 | Resolved: 2023-08-14

## 2023-08-14 PROBLEM — B37.31 VAGINAL CANDIDA: Status: RESOLVED | Noted: 2022-12-05 | Resolved: 2023-08-14

## 2023-08-14 PROCEDURE — 99214 OFFICE O/P EST MOD 30 MIN: CPT | Performed by: INTERNAL MEDICINE

## 2023-08-14 RX ORDER — BUPROPION HYDROCHLORIDE 150 MG/1
150 TABLET ORAL EVERY MORNING
Qty: 30 TABLET | Refills: 3 | Status: SHIPPED | OUTPATIENT
Start: 2023-08-14

## 2023-08-14 RX ORDER — AMLODIPINE BESYLATE 10 MG/1
10 TABLET ORAL DAILY
Qty: 90 TABLET | Refills: 1 | Status: SHIPPED | OUTPATIENT
Start: 2023-08-14 | End: 2023-11-12

## 2023-08-14 SDOH — ECONOMIC STABILITY - HOUSING INSECURITY: HOMELESSNESS UNSPECIFIED: Z59.00

## 2023-08-14 NOTE — PROGRESS NOTES
Name: Billi Nyhan      : 1980      MRN: [id number]  Encounter Provider: Loree Donato MD  Encounter Date: 2023   Encounter department: [facility]     1. Essential hypertension  Assessment & Plan:  BP is well controlled  Continue same meds  Discussed lifestyle modifications    Orders:  -     CBC and differential; Future  -     Comprehensive metabolic panel; Future  -     amLODIPine (NORVASC) 10 mg tablet; Take 1 tablet (10 mg total) by mouth daily Blood presuure    2. HIRAL (obstructive sleep apnea)  Assessment & Plan:  Referral to sleep med provided- had sleep study and did not end up follow up for CPAP    Orders:  -     Ambulatory Referral to Sleep Medicine; Future    3. Adrenal mass, left [facility])  Assessment & Plan:  Never had repeat imaging so ordered today    Orders:  -     CT abdomen pelvis wo contrast; Future; Expected date: 2023    4. Moderate episode of recurrent major depressive disorder [facility])  Assessment & Plan:  Stable but interested in starting Wellbutrin again  New script sent in  May also help with smoking    Orders:  -     buPROPion (WELLBUTRIN XL) 150 mg 24 hr tablet; Take 1 tablet (150 mg total) by mouth every morning    5. Prediabetes  Assessment & Plan:  Controlled on metformin  Check HbA1c prior to next visit    Orders:  -     HEMOGLOBIN A1C W/ EAG ESTIMATION; Future    6. Mixed hyperlipidemia  -     Lipid panel; Future    7. Nodule of left lobe of thyroid gland  -     US thyroid; Future; Expected date: 2023    8. BMI 34.0-34.9,adult    9. Leg swelling  -     TSH, 3rd generation with Free T4 reflex; Future    10. Lack of housing  Assessment & Plan:  Currently living with mother and looking for other housing options      11. Tobacco use  Assessment & Plan:  Counseled to quit  Starting Wellbutrin        12. Ankle swelling, unspecified laterality  Assessment & Plan:  Maybe related to amlodipine?    We can check labs including TSH first and then consider switching to something else if continuing  Discussed lifestyle modifications, encouraged activity       BMI Counseling: Body mass index is 34.28 kg/m². The BMI is above normal. Nutrition recommendations include encouraging healthy choices of fruits and vegetables, decreasing fast food intake, consuming healthier snacks, moderation in carbohydrate intake, increasing intake of lean protein and reducing intake of cholesterol. Exercise recommendations include exercising 3-5 times per week and strength training exercises. No pharmacotherapy was ordered. Rationale for BMI follow-up plan is due to patient being overweight or obese. Giana Milner is a 43-year-old female with high blood pressure who is here to establish care. She is compliant with her medications but ran out of her amlodipine 1 week ago and needs a refill. She definitely has noticed feeling different since being off the medication. States that she feels more tired. Denies any headaches or chest pain. She tries to do some walking for exercise but no other formal exercise. Admits that her diet could be better. She drinks soda relatively regularly. She also reports a history of sleep apnea. She snores loudly and occasionally has pauses in her breathing. States that she had a sleep study and was told to follow-up for CPAP but she never went for follow-up. She is trying to cut back on her smoking. She really wants to quit. States that she has used Wellbutrin in the past and that seemed to work well. This also worked well with her depression. States that she has suffered from periodic bouts of depression. Currently her mood is slightly down because of her housing situation. She had to move in with her mom. She is looking for new housing for her and her son. She also has a history of prediabetes. She is compliant with her metformin.   States that she has noticed bilateral ankle swelling. This been going worse over the past month. She is concerned that this is in of her diabetes worsening. Review of Systems   Constitutional: Negative for chills and fever. HENT: Negative for congestion and sore throat. Respiratory: Negative for cough and shortness of breath. Cardiovascular: Positive for leg swelling. Negative for chest pain. Gastrointestinal: Negative for abdominal pain, blood in stool and diarrhea. Genitourinary: Negative for dysuria and frequency. Musculoskeletal: Negative. Neurological: Negative for headaches. Psychiatric/Behavioral: Positive for dysphoric mood and sleep disturbance.        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   • Adrenal mass, left ([address]) 2021    1.8 x 2.1 cm on ct 2020   • Arthritis    • Asthma    • Blurry vision 2020   • Essential hypertension 2020   • Headache 2020   • Hidradenitis suppurativa    • Hypertension    • Immunization deficiency 10/02/2020    Hep a nonimmune   • Insomnia 2021   • Migraine    • Moderate episode of recurrent major depressive disorder ([address]) 2020   • Obesity (BMI 30.0-34.9) 2021   • Prediabetes 2020   • Seasonal allergies    • Tobacco use 2021   • Vitamin D deficiency 10/02/2020     Past Surgical History:   Procedure Laterality Date   • APPENDECTOMY     • BREAST BIOPSY Left    • BREAST BIOPSY Right     2 core biopsies   • BREAST EXCISIONAL BIOPSY Left 2018   • BREAST SURGERY Left 2018    Left breast mass excision   • CERVICAL BIOPSY  W/ LOOP ELECTRODE EXCISION     •  SECTION      X2   • COLPOSCOPY     • HYSTERECTOMY      heavy bleeding, ovaries remain, also had abnormal pap   • KNEE SURGERY     • LYMPH NODE DISSECTION      under armpit   • TUBAL LIGATION     • US GUIDED THYROID BIOPSY  2021     Family History   Problem Relation Age of Onset   • Diabetes Mother    • Heart attack Mother    • Heart disease Mother         Internal Defibrilation   • No Known Problems Father    • Endometrial cancer Sister 29   • Colon cancer Sister 28   • No Known Problems Daughter    • Diabetes Maternal Grandmother    • Diabetes Paternal Grandmother    • No Known Problems Son    • No Known Problems Son    • Lupus Maternal Aunt 50   • Seizures Maternal Aunt    • Leukemia Maternal Uncle 25   • Diabetes Paternal Aunt    • No Known Problems Paternal Aunt      Social History     Socioeconomic History   • Marital status: Single     Spouse name: None   • Number of children: None   • Years of education: 10   • Highest education level: None   Occupational History   • None   Tobacco Use   • Smoking status: Every Day     Packs/day: 0.50     Years: 20.00     Total pack years: 10.00     Types: Cigarettes   • Smokeless tobacco: Never   • Tobacco comments:     pt is down to .5 packs a day   Vaping Use   • Vaping Use: Former   • Substances: Nicotine   Substance and Sexual Activity   • Alcohol use: Yes     Comment: occasional   • Drug use: No   • Sexual activity: Not Currently     Partners: Male     Birth control/protection: Female Sterilization     Comment: HYSTERECTOMY   Other Topics Concern   • None   Social History Narrative    Most recent tobacco use screenin-    Do you currently or have you served in the [address] [place] [address] [employer]: No    Were you activated, into active duty, as a member of the [employer] or as a Reservist: No    Occupation: unemployed    Education: 10    Marital status: Single    Sexual orientation: Heterosexual    Exercise level: Occasional    Diet: Regular    General stress level: Low    Alcohol intake: Occasional    Caffeine intake:  Moderate    Chewing tobacco: none    Illicit drugs: denies    Guns present in home: No    Seat belts used routinely: Yes    Sunscreen used routinely: No    Smoke alarm in home: Yes    Advance directive: No    Live alone or with others: with others    Are there stairs in your home: Yes    Pets: Yes    Deaf or serious difficulty hearing: No    Blind or serious difficulty seeing: Yes    Difficulty concentrating, remembering or making decisions: No    Difficulty walking or climbing stairs: No    Difficulty dressing or bathing: No    Difficulty doing errands alone: No    Passive smoke exposure: Yes    Are you currently employed: No    Asbestos exposure: No    TB exposure: No    Environmental exposure: No    Animal exposure: Yes    cat and dog    Blood Transfusion: No    Sexually active: No    Presence of domestic violence: No    Do you feel safe at home: Yes    no cpap or nebulizer     Social Determinants of Health     Financial Resource Strain: Not on file   Food Insecurity: Not on file   Transportation Needs: Not on file   Physical Activity: Not on file   Stress: Not on file   Social Connections: Not on file   Intimate Partner Violence: Not on file   Housing Stability: Not on file     Current Outpatient Medications on File Prior to Visit   Medication Sig   • albuterol (PROVENTIL HFA,VENTOLIN HFA) 90 mcg/act inhaler INHALE 2 PUFFS BY MOUTH EVERY 6 HOURS AS NEEDED FOR WHEEZING   • Cholecalciferol ([facility] D3) 125 MCG (5000 UT) capsule Take 1 capsule (5,000 Units total) by mouth daily   • [facility] Vitamin C 500 MG tablet TAKE 1 TABLET (500 MG TOTAL) BY MOUTH DAILY. • cyanocobalamin (VITAMIN B-12) 2000 MCG tablet Take 1 tablet (2,000 mcg total) by mouth daily   • loratadine (CLARITIN) 10 mg tablet TAKE 1 TABLET (10 MG TOTAL) BY MOUTH DAILY FOR ALLERGIES   • metFORMIN (GLUCOPHAGE-XR) 500 mg 24 hr tablet TAKE 1 TABLET BY MOUTH EVERY DAY WITH DINNER   • spironolactone (ALDACTONE) 25 mg tablet TAKE 1 TABLET BY MOUTH EVERY DAY FOR 30 DAYS THEN TAKE 2 TABLETS DAILY AFTER   • tiotropium (SPIRIVA) 18 mcg inhalation capsule Place 18 mcg into inhaler and inhale in the morning.    • valACYclovir (VALTREX) 500 mg tablet TAKE 1 TABLET BY MOUTH EVERY DAY   • [DISCONTINUED] amLODIPine (NORVASC) 10 mg tablet Take 1 tablet (10 mg total) by mouth daily Blood presuure   • [DISCONTINUED] bisacodyl (DULCOLAX) 5 mg EC tablet Take 2 tablets (10 mg total) by mouth once for 1 dose   • [DISCONTINUED] chlorhexidine (HIBICLENS) 4 % external liquid Apply 1 application topically daily as needed for wound care   • [DISCONTINUED] clindamycin (CLINDAGEL) 1 % gel Apply topically 2 (two) times a day To abscess (Patient not taking: Reported on 3/27/2023)   • [DISCONTINUED] diazepam (VALIUM) 5 mg tablet Take 1 tablet (5 mg total) by mouth every 8 (eight) hours as needed for muscle spasms for up to 10 days   • [DISCONTINUED] fluconazole (DIFLUCAN) 150 mg tablet Take 150 mg by mouth once (Patient not taking: Reported on 1/22/2023)   • [DISCONTINUED] ibuprofen (MOTRIN) 600 mg tablet Take 1 tablet (600 mg total) by mouth every 6 (six) hours as needed for moderate pain or mild pain (Patient not taking: Reported on 3/27/2023)   • [DISCONTINUED] naproxen (Naprosyn) 500 mg tablet Take 1 tablet (500 mg total) by mouth 2 (two) times a day with meals (Patient not taking: Reported on 8/14/2023)   • [DISCONTINUED] polyethylene glycol (GOLYTELY) 4000 mL solution Take 4,000 mL by mouth once for 1 dose   • [DISCONTINUED] pyridoxine (VITAMIN B6) 100 mg tablet Take 1 tablet (100 mg total) by mouth daily (Patient not taking: Reported on 8/14/2023)     No Known Allergies  Immunization History   Administered Date(s) Administered   • HPV9 04/20/2022, 06/20/2022, 12/05/2022   • Hep A, adult 10/02/2020, 06/23/2021   • Hep B, adult 01/01/2019   • INFLUENZA 11/17/2011, 01/01/2012, 02/20/2014, 12/05/2022   • Influenza, injectable, quadrivalent, preservative free 0.5 mL 10/02/2020   • Influenza, recombinant, quadrivalent,injectable, preservative free 12/05/2022   • Pneumococcal Polysaccharide PPV23 06/23/2021   • Tdap 01/28/2019       Objective     /80 (BP Location: Left arm, Patient Position: Sitting, Cuff Size: Adult)   Pulse 80   Temp 98.3 °F (36.8 °C) (Tympanic)   Ht 5' 5" (1.651 m)   Wt 93.4 kg (206 lb)   SpO2 98%   BMI 34.28 kg/m²     Physical Exam  Vitals reviewed. Constitutional:       General: She is not in acute distress. Appearance: She is obese. HENT:      Right Ear: External ear normal.      Left Ear: External ear normal.      Nose: Nose normal.      Mouth/Throat:      Mouth: Mucous membranes are moist.   Eyes:      Conjunctiva/sclera: Conjunctivae normal.   Cardiovascular:      Rate and Rhythm: Normal rate and regular rhythm. Heart sounds: No murmur heard. Pulmonary:      Effort: Pulmonary effort is normal. No respiratory distress. Breath sounds: No wheezing. Abdominal:      General: There is no distension. Palpations: Abdomen is soft. Tenderness: There is no abdominal tenderness. Musculoskeletal:      Right lower leg: No edema. Left lower leg: No edema. Lymphadenopathy:      Cervical: No cervical adenopathy. Neurological:      Mental Status: She is alert and oriented to person, place, and time.    Psychiatric:         Mood and Affect: Mood normal.       Gabrielle Nguyễn MD

## 2023-08-14 NOTE — ASSESSMENT & PLAN NOTE
Maybe related to amlodipine?    We can check labs including TSH first and then consider switching to something else if continuing  Discussed lifestyle modifications, encouraged activity

## 2023-08-21 ENCOUNTER — APPOINTMENT (OUTPATIENT)
Dept: LAB | Facility: HOSPITAL | Age: 43
End: 2023-08-21
Attending: INTERNAL MEDICINE
Payer: COMMERCIAL

## 2023-08-21 DIAGNOSIS — M79.89 LEG SWELLING: ICD-10-CM

## 2023-08-21 DIAGNOSIS — I10 ESSENTIAL HYPERTENSION: ICD-10-CM

## 2023-08-21 DIAGNOSIS — E78.2 MIXED HYPERLIPIDEMIA: ICD-10-CM

## 2023-08-21 DIAGNOSIS — R73.03 PREDIABETES: ICD-10-CM

## 2023-08-21 LAB
ALBUMIN SERPL BCP-MCNC: 4 G/DL (ref 3.5–5)
ALP SERPL-CCNC: 80 U/L (ref 34–104)
ALT SERPL W P-5'-P-CCNC: 11 U/L (ref 7–52)
ANION GAP SERPL CALCULATED.3IONS-SCNC: 8 MMOL/L
AST SERPL W P-5'-P-CCNC: 12 U/L (ref 13–39)
BASOPHILS # BLD AUTO: 0.08 THOUSANDS/ÂΜL (ref 0–0.1)
BASOPHILS NFR BLD AUTO: 1 % (ref 0–1)
BILIRUB SERPL-MCNC: 0.32 MG/DL (ref 0.2–1)
BUN SERPL-MCNC: 11 MG/DL (ref 5–25)
CALCIUM SERPL-MCNC: 9.2 MG/DL (ref 8.4–10.2)
CHLORIDE SERPL-SCNC: 105 MMOL/L (ref 96–108)
CHOLEST SERPL-MCNC: 165 MG/DL
CO2 SERPL-SCNC: 25 MMOL/L (ref 21–32)
CREAT SERPL-MCNC: 0.86 MG/DL (ref 0.6–1.3)
EOSINOPHIL # BLD AUTO: 0.22 THOUSAND/ÂΜL (ref 0–0.61)
EOSINOPHIL NFR BLD AUTO: 2 % (ref 0–6)
ERYTHROCYTE [DISTWIDTH] IN BLOOD BY AUTOMATED COUNT: 14.5 % (ref 11.6–15.1)
EST. AVERAGE GLUCOSE BLD GHB EST-MCNC: 131 MG/DL
GFR SERPL CREATININE-BSD FRML MDRD: 83 ML/MIN/1.73SQ M
GLUCOSE P FAST SERPL-MCNC: 121 MG/DL (ref 65–99)
HBA1C MFR BLD: 6.2 %
HCT VFR BLD AUTO: 44.2 % (ref 34.8–46.1)
HDLC SERPL-MCNC: 41 MG/DL
HGB BLD-MCNC: 14.3 G/DL (ref 11.5–15.4)
IMM GRANULOCYTES # BLD AUTO: 0.02 THOUSAND/UL (ref 0–0.2)
IMM GRANULOCYTES NFR BLD AUTO: 0 % (ref 0–2)
LDLC SERPL CALC-MCNC: 100 MG/DL (ref 0–100)
LYMPHOCYTES # BLD AUTO: 4.56 THOUSANDS/ÂΜL (ref 0.6–4.47)
LYMPHOCYTES NFR BLD AUTO: 41 % (ref 14–44)
MCH RBC QN AUTO: 28.4 PG (ref 26.8–34.3)
MCHC RBC AUTO-ENTMCNC: 32.4 G/DL (ref 31.4–37.4)
MCV RBC AUTO: 88 FL (ref 82–98)
MONOCYTES # BLD AUTO: 0.73 THOUSAND/ÂΜL (ref 0.17–1.22)
MONOCYTES NFR BLD AUTO: 7 % (ref 4–12)
NEUTROPHILS # BLD AUTO: 5.54 THOUSANDS/ÂΜL (ref 1.85–7.62)
NEUTS SEG NFR BLD AUTO: 49 % (ref 43–75)
NONHDLC SERPL-MCNC: 124 MG/DL
NRBC BLD AUTO-RTO: 0 /100 WBCS
PLATELET # BLD AUTO: 349 THOUSANDS/UL (ref 149–390)
PMV BLD AUTO: 8.9 FL (ref 8.9–12.7)
POTASSIUM SERPL-SCNC: 3.9 MMOL/L (ref 3.5–5.3)
PROT SERPL-MCNC: 7.5 G/DL (ref 6.4–8.4)
RBC # BLD AUTO: 5.03 MILLION/UL (ref 3.81–5.12)
SODIUM SERPL-SCNC: 138 MMOL/L (ref 135–147)
TRIGL SERPL-MCNC: 121 MG/DL
TSH SERPL DL<=0.05 MIU/L-ACNC: 1.33 UIU/ML (ref 0.45–4.5)
WBC # BLD AUTO: 11.15 THOUSAND/UL (ref 4.31–10.16)

## 2023-08-21 PROCEDURE — 36415 COLL VENOUS BLD VENIPUNCTURE: CPT

## 2023-08-21 PROCEDURE — 80061 LIPID PANEL: CPT

## 2023-08-21 PROCEDURE — 84443 ASSAY THYROID STIM HORMONE: CPT

## 2023-08-21 PROCEDURE — 80053 COMPREHEN METABOLIC PANEL: CPT

## 2023-08-21 PROCEDURE — 83036 HEMOGLOBIN GLYCOSYLATED A1C: CPT

## 2023-08-21 PROCEDURE — 85025 COMPLETE CBC W/AUTO DIFF WBC: CPT

## 2023-08-22 ENCOUNTER — HOSPITAL ENCOUNTER (OUTPATIENT)
Dept: CT IMAGING | Facility: HOSPITAL | Age: 43
Discharge: HOME | End: 2023-08-22
Attending: INTERNAL MEDICINE
Payer: COMMERCIAL

## 2023-08-22 ENCOUNTER — HOSPITAL ENCOUNTER (OUTPATIENT)
Dept: ULTRASOUND IMAGING | Facility: HOSPITAL | Age: 43
Discharge: HOME | End: 2023-08-22
Attending: INTERNAL MEDICINE
Payer: COMMERCIAL

## 2023-08-22 DIAGNOSIS — E27.8 ADRENAL MASS, LEFT (HCC): ICD-10-CM

## 2023-08-22 DIAGNOSIS — E04.1 NODULE OF LEFT LOBE OF THYROID GLAND: ICD-10-CM

## 2023-08-22 PROCEDURE — G1004 CDSM NDSC: HCPCS

## 2023-08-22 PROCEDURE — 74176 CT ABD & PELVIS W/O CONTRAST: CPT

## 2023-08-22 PROCEDURE — 76536 US EXAM OF HEAD AND NECK: CPT

## 2023-08-23 DIAGNOSIS — D72.820 LYMPHOCYTOSIS: Primary | ICD-10-CM

## 2023-08-29 ENCOUNTER — TELEPHONE (OUTPATIENT)
Dept: FAMILY MEDICINE CLINIC | Facility: CLINIC | Age: 43
End: 2023-08-29

## 2023-08-29 DIAGNOSIS — R73.03 PREDIABETES: Primary | ICD-10-CM

## 2023-08-29 NOTE — TELEPHONE ENCOUNTER
Patient called stating at her visit you offered to increase he metformin to twice a day. She would like to know if she can and can a script be sent to the pharmacy.

## 2023-08-30 RX ORDER — METFORMIN HYDROCHLORIDE 750 MG/1
750 TABLET, EXTENDED RELEASE ORAL
Qty: 90 TABLET | Refills: 1 | Status: SHIPPED | OUTPATIENT
Start: 2023-08-30

## 2023-09-05 DIAGNOSIS — F33.1 MODERATE EPISODE OF RECURRENT MAJOR DEPRESSIVE DISORDER (HCC): ICD-10-CM

## 2023-09-05 RX ORDER — BUPROPION HYDROCHLORIDE 150 MG/1
150 TABLET ORAL EVERY MORNING
Qty: 90 TABLET | Refills: 2 | Status: SHIPPED | OUTPATIENT
Start: 2023-09-05

## 2023-09-12 ENCOUNTER — TELEPHONE (OUTPATIENT)
Dept: OTHER | Facility: HOSPITAL | Age: 43
End: 2023-09-12

## 2023-09-12 NOTE — TELEPHONE ENCOUNTER
Spoke with patient and advised patient of recommendation. Patient verbalized understanding. Advised patient to call the office back with any further questions or concerns.

## 2023-09-12 NOTE — TELEPHONE ENCOUNTER
Patient called she has a dry cough she is asking if there is anything she can take. She is on metformin and blood pressure medication so she isn't sure what he is allowed to take. Please advise.

## 2023-09-18 ENCOUNTER — OFFICE VISIT (OUTPATIENT)
Dept: SURGERY | Facility: CLINIC | Age: 43
End: 2023-09-18
Payer: COMMERCIAL

## 2023-09-18 VITALS
TEMPERATURE: 98.4 F | RESPIRATION RATE: 18 BRPM | HEIGHT: 65 IN | SYSTOLIC BLOOD PRESSURE: 124 MMHG | OXYGEN SATURATION: 98 % | HEART RATE: 94 BPM | WEIGHT: 198 LBS | DIASTOLIC BLOOD PRESSURE: 82 MMHG | BODY MASS INDEX: 32.99 KG/M2

## 2023-09-18 DIAGNOSIS — L73.2 HIDRADENITIS SUPPURATIVA: Primary | ICD-10-CM

## 2023-09-18 PROCEDURE — 99212 OFFICE O/P EST SF 10 MIN: CPT | Performed by: SURGERY

## 2023-09-18 RX ORDER — SULFAMETHOXAZOLE AND TRIMETHOPRIM 800; 160 MG/1; MG/1
1 TABLET ORAL EVERY 12 HOURS SCHEDULED
Qty: 14 TABLET | Refills: 0 | Status: SHIPPED | OUTPATIENT
Start: 2023-09-18 | End: 2023-09-25

## 2023-09-18 NOTE — PATIENT INSTRUCTIONS
Soaks a few times a day to both cheeks. Start taking the antibiotics and do not take alcohol.   Return next week

## 2023-09-18 NOTE — PROGRESS NOTES
The patient is a 43-year-old black female who I believe has hidradenitis suppurativa. She comes in stating that she has to swelling/lesions on her cheeks, 1 on each side. She states the 1 on the right side has actually been present for a year or so. Lately it is gotten a little bit larger. This is a obese black female who is in no distress. She is getting 8 to 9 mm slightly tender but not fluctuant swelling in the middle of the right cheek and a slightly smaller 1 on the other side. Since they are not fluctuant as of yet, no drainage or aspiration is going to be done.   I am putting on warm soaks and antibiotics and told her to return in a week or so

## 2023-09-20 ENCOUNTER — TELEPHONE (OUTPATIENT)
Dept: SURGERY | Facility: CLINIC | Age: 43
End: 2023-09-20

## 2023-09-20 NOTE — TELEPHONE ENCOUNTER
Patient called and left  Message that she has not started her antibiotics yet due to it interacting with her other medications.  She would like a call back to talk about

## 2023-09-20 NOTE — TELEPHONE ENCOUNTER
Please see the message from [facility] regarding the antibiotic and advise.

## 2023-09-21 DIAGNOSIS — E55.9 VITAMIN D DEFICIENCY: ICD-10-CM

## 2023-09-21 DIAGNOSIS — T78.40XA ALLERGY, INITIAL ENCOUNTER: ICD-10-CM

## 2023-09-21 NOTE — TELEPHONE ENCOUNTER
I spoke with her. I do not know what the medication problem is. I told her to call the pharmacy.   If they are not going to give her the Bactrim, she should call back and I will give her something else

## 2023-09-21 NOTE — TELEPHONE ENCOUNTER
Pharmacy also asked for    Doxycycline 100 mg BID #60  And  Clindamycin PH 1% Gel 60g apply BID    These 2 medications are not on her current list.

## 2023-09-22 RX ORDER — CHOLECALCIFEROL (VITAMIN D3) 125 MCG
5000 CAPSULE ORAL DAILY
Qty: 90 CAPSULE | Refills: 2 | Status: SHIPPED | OUTPATIENT
Start: 2023-09-22 | End: 2023-12-21

## 2023-09-22 RX ORDER — LORATADINE 10 MG/1
10 TABLET ORAL DAILY
Qty: 90 TABLET | Refills: 1 | Status: SHIPPED | OUTPATIENT
Start: 2023-09-22

## 2023-09-22 NOTE — TELEPHONE ENCOUNTER
Curtis Cheung can you make sure Dr REAL saw the msg part of this refill - she did not address it in the notes.

## 2023-09-28 ENCOUNTER — PROCEDURE VISIT (OUTPATIENT)
Dept: SURGERY | Facility: CLINIC | Age: 43
End: 2023-09-28
Payer: COMMERCIAL

## 2023-09-28 ENCOUNTER — HOSPITAL ENCOUNTER (EMERGENCY)
Facility: HOSPITAL | Age: 43
Discharge: HOME | End: 2023-09-28
Attending: EMERGENCY MEDICINE
Payer: COMMERCIAL

## 2023-09-28 ENCOUNTER — APPOINTMENT (EMERGENCY)
Dept: RADIOLOGY | Facility: HOSPITAL | Age: 43
End: 2023-09-28
Payer: COMMERCIAL

## 2023-09-28 VITALS
HEART RATE: 87 BPM | DIASTOLIC BLOOD PRESSURE: 88 MMHG | HEIGHT: 65 IN | BODY MASS INDEX: 32.99 KG/M2 | OXYGEN SATURATION: 100 % | WEIGHT: 198 LBS | RESPIRATION RATE: 18 BRPM | SYSTOLIC BLOOD PRESSURE: 138 MMHG | TEMPERATURE: 98 F

## 2023-09-28 VITALS
RESPIRATION RATE: 18 BRPM | SYSTOLIC BLOOD PRESSURE: 122 MMHG | HEIGHT: 65 IN | WEIGHT: 200 LBS | TEMPERATURE: 97.3 F | DIASTOLIC BLOOD PRESSURE: 84 MMHG | HEART RATE: 90 BPM | OXYGEN SATURATION: 98 % | BODY MASS INDEX: 33.32 KG/M2

## 2023-09-28 DIAGNOSIS — L72.0 EPIDERMOID CYST OF FACE: Primary | ICD-10-CM

## 2023-09-28 DIAGNOSIS — M25.561 ACUTE PAIN OF RIGHT KNEE: Primary | ICD-10-CM

## 2023-09-28 PROCEDURE — 99212 OFFICE O/P EST SF 10 MIN: CPT | Performed by: SURGERY

## 2023-09-28 PROCEDURE — 99284 EMERGENCY DEPT VISIT MOD MDM: CPT | Performed by: EMERGENCY MEDICINE

## 2023-09-28 PROCEDURE — 99283 EMERGENCY DEPT VISIT LOW MDM: CPT

## 2023-09-28 PROCEDURE — 73564 X-RAY EXAM KNEE 4 OR MORE: CPT

## 2023-09-28 RX ORDER — NAPROXEN 250 MG/1
500 TABLET ORAL ONCE
Status: COMPLETED | OUTPATIENT
Start: 2023-09-28 | End: 2023-09-28

## 2023-09-28 RX ORDER — ACETAMINOPHEN 325 MG/1
975 TABLET ORAL ONCE
Status: COMPLETED | OUTPATIENT
Start: 2023-09-28 | End: 2023-09-28

## 2023-09-28 RX ORDER — NAPROXEN 500 MG/1
500 TABLET ORAL EVERY 12 HOURS PRN
Qty: 12 TABLET | Refills: 0 | Status: SHIPPED | OUTPATIENT
Start: 2023-09-29 | End: 2023-10-06

## 2023-09-28 RX ADMIN — ACETAMINOPHEN 975 MG: 325 TABLET, FILM COATED ORAL at 21:51

## 2023-09-28 RX ADMIN — NAPROXEN 500 MG: 250 TABLET ORAL at 21:51

## 2023-09-28 NOTE — Clinical Note
Chiquis García was seen and treated in our emergency department on 9/28/2023. Diagnosis:     Donna Carlson  may return to work on return date. She may return on this date: 10/02/2023         If you have any questions or concerns, please don't hesitate to call.       Valeria Delgado MD    ______________________________           _______________          _______________  Hospital Representative                              Date                                Time

## 2023-09-28 NOTE — LETTER
September 28, 2023     Darcie Soulier, [address]  [address]    Patient: Filiberto Luther   YOB: 1980   Date of Visit: 9/28/2023       Dear Dr. Kohli Ply:    Thank you for referring Elsa Main to me for evaluation. Below are my notes for this consultation. If you have questions, please do not hesitate to call me. I look forward to following your patient along with you. Sincerely,        Alona Lambert MD        CC: No Recipients    Alona Lambert MD  9/28/2023  5:12 PM  Sign when Signing Visit  She has taken her antibiotics and the swelling has abated. I can now however feel a 1 cm cyst.  This should be surgically removed and I am making those arrangements. This is on the right side.   The left side has a tiny 2 to 3 mm probable cyst which I Kanchan leave alone

## 2023-09-28 NOTE — H&P (VIEW-ONLY)
She has taken her antibiotics and the swelling has abated. I can now however feel a 1 cm cyst.  This should be surgically removed and I am making those arrangements. This is on the right side.   The left side has a tiny 2 to 3 mm probable cyst which I Kanchan leave alone

## 2023-09-28 NOTE — Clinical Note
Catia Davalos was seen and treated in our emergency department on 9/28/2023. Diagnosis:     Salas Ho  may return to work on return date. She may return on this date: 10/02/2023         If you have any questions or concerns, please don't hesitate to call.       Arabella Chao MD    ______________________________           _______________          _______________  Hospital Representative                              Date                                Time

## 2023-09-29 NOTE — ED PROVIDER NOTES
History  Chief Complaint   Patient presents with   • Knee Pain     Pt reports right knee pain x 2 days, atraumatic, reports hx of surgery in same knee     Patient is a 69-year-old female seen in the emergency department with concern for right knee pain over approximately the past 2 days. Patient notes no trauma. Pain is apparently made worse with weightbearing and ambulation/bending. Patient states that she did not take any medication for symptom control this evening prior to evaluation in the emergency department. Patient notes history of right knee surgery many years ago. Patient notes no fever, chest pain, shortness of breath, abdominal pain, nausea, vomiting, weakness, numbness, tingling. Patient notes no other definite clear exacerbating or alleviating factors for her symptoms. Prior to Admission Medications   Prescriptions Last Dose Informant Patient Reported? Taking? [facility] Vitamin C 500 MG tablet  Self No No   Sig: TAKE 1 TABLET (500 MG TOTAL) BY MOUTH DAILY.    Cholecalciferol ([facility] D3) 125 MCG (5000 UT) capsule   No No   Sig: Take 1 capsule (5,000 Units total) by mouth daily   albuterol (PROVENTIL HFA,VENTOLIN HFA) 90 mcg/act inhaler  Self No No   Sig: INHALE 2 PUFFS BY MOUTH EVERY 6 HOURS AS NEEDED FOR WHEEZING   amLODIPine (NORVASC) 10 mg tablet   No No   Sig: Take 1 tablet (10 mg total) by mouth daily Blood presuure   buPROPion (WELLBUTRIN XL) 150 mg 24 hr tablet   No No   Sig: TAKE 1 TABLET BY MOUTH EVERY DAY IN THE MORNING   cyanocobalamin (VITAMIN B-12) 2000 MCG tablet  Self No No   Sig: Take 1 tablet (2,000 mcg total) by mouth daily   loratadine (CLARITIN) 10 mg tablet   No No   Sig: Take 1 tablet (10 mg total) by mouth daily For allergies   metFORMIN (GLUCOPHAGE-XR) 750 mg 24 hr tablet   No No   Sig: Take 1 tablet (750 mg total) by mouth daily with breakfast   spironolactone (ALDACTONE) 25 mg tablet   No No   Sig: TAKE 1 TABLET BY MOUTH EVERY DAY FOR 30 DAYS THEN TAKE 2 TABLETS DAILY AFTER   tiotropium (SPIRIVA) 18 mcg inhalation capsule  Self Yes No   Sig: Place 18 mcg into inhaler and inhale in the morning.    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   • Adrenal mass, left ([address]) 2021    1.8 x 2.1 cm on ct 2020   • Arthritis    • Asthma    • Blurry vision 2020   • Essential hypertension 2020   • Headache 2020   • Hidradenitis suppurativa    • Hypertension    • Immunization deficiency 10/02/2020    Hep a nonimmune   • Insomnia 2021   • Migraine    • Moderate episode of recurrent major depressive disorder ([address]) 2020   • Obesity (BMI 30.0-34.9) 2021   • Prediabetes 2020   • Seasonal allergies    • Tobacco use 2021   • Vitamin D deficiency 10/02/2020       Past Surgical History:   Procedure Laterality Date   • APPENDECTOMY     • BREAST BIOPSY Left    • BREAST BIOPSY Right     2 core biopsies   • BREAST EXCISIONAL BIOPSY Left 2018   • BREAST SURGERY Left 2018    Left breast mass excision   • CERVICAL BIOPSY  W/ LOOP ELECTRODE EXCISION     •  SECTION      X2   • COLPOSCOPY     • HYSTERECTOMY      heavy bleeding, ovaries remain, also had abnormal pap   • KNEE SURGERY     • LYMPH NODE DISSECTION      under armpit   • TUBAL LIGATION     • [address] THYROID BIOPSY  2021       Family History   Problem Relation Age of Onset   • Diabetes Mother    • Heart attack Mother    • Heart disease Mother         Internal Defibrilation   • No Known Problems Father    • Endometrial cancer Sister 29   • Colon cancer Sister 28   • No Known Problems Daughter    • Diabetes Maternal Grandmother    • Diabetes Paternal Grandmother    • No Known Problems Son    • No Known Problems Son    • Lupus Maternal Aunt 48   • Seizures Maternal Aunt    • Leukemia Maternal Uncle 25   • Diabetes Paternal Aunt    • No Known Problems Paternal Aunt      I have reviewed and agree with the history as documented. E-Cigarette/Vaping   • E-Cigarette Use Former User      E-Cigarette/Vaping Substances   • Nicotine Yes    • THC No    • CBD No    • Flavoring No    • Other No    • Unknown No      Social History     Tobacco Use   • Smoking status: Every Day     Packs/day: 0.50     Years: 20.00     Total pack years: 10.00     Types: Cigarettes   • Smokeless tobacco: Never   • Tobacco comments:     pt is down to .5 packs a day   Vaping Use   • Vaping Use: Former   • Substances: Nicotine   Substance Use Topics   • Alcohol use: Yes     Comment: occasional   • Drug use: No       Review of Systems   Constitutional: Negative for chills and fever. HENT: Negative for ear pain and sore throat. Eyes: Negative for pain and visual disturbance. Respiratory: Negative for cough and shortness of breath. Cardiovascular: Negative for chest pain and palpitations. Gastrointestinal: Negative for abdominal pain and vomiting. Musculoskeletal: Negative for back pain. Right knee pain   Skin: Negative for color change and rash. Neurological: Negative for weakness and numbness. Psychiatric/Behavioral: Negative for agitation and confusion. Physical Exam  Physical Exam  Vitals and nursing note reviewed. Constitutional:       General: She is not in acute distress. Appearance: She is well-developed. HENT:      Head: Normocephalic and atraumatic. Right Ear: External ear normal.      Left Ear: External ear normal.      Nose: Nose normal.      Mouth/Throat:      Pharynx: Oropharynx is clear. Eyes:      General: No scleral icterus. Conjunctiva/sclera: Conjunctivae normal.   Cardiovascular:      Rate and Rhythm: Normal rate. Comments: well-perfused extremities  Pulmonary:      Effort: Pulmonary effort is normal. No respiratory distress. Abdominal:      General: Abdomen is flat. There is no distension. Musculoskeletal:         General: Tenderness present. No deformity. Cervical back: Normal range of motion and neck supple. Comments: mild tenderness to palpation of anterior aspect of right knee; painful passive range of motion of right knee; normal exam of right hip/ankle; neurovascularly intact extremities   Skin:     General: Skin is warm and dry. Neurological:      General: No focal deficit present. Mental Status: She is alert. Cranial Nerves: No cranial nerve deficit. Sensory: No sensory deficit. Psychiatric:         Mood and Affect: Mood normal.         Thought Content: Thought content normal.         Vital Signs  ED Triage Vitals   Temperature Pulse Respirations Blood Pressure SpO2   09/28/23 2126 09/28/23 2123 09/28/23 2123 09/28/23 2123 09/28/23 2123   98 °F (36.7 °C) 87 18 138/88 100 %      Temp Source Heart Rate Source Patient Position - Orthostatic VS BP Location FiO2 (%)   09/28/23 2126 09/28/23 2123 09/28/23 2123 09/28/23 2123 --   Oral Monitor Lying Right arm       Pain Score       09/28/23 2123       6           Vitals:    09/28/23 2123   BP: 138/88   Pulse: 87   Patient Position - Orthostatic VS: Lying         Visual Acuity      ED Medications  Medications   naproxen (NAPROSYN) tablet 500 mg (500 mg Oral Given 9/28/23 2151)   acetaminophen (TYLENOL) tablet 975 mg (975 mg Oral Given 9/28/23 2151)       Diagnostic Studies  Results Reviewed     None                 XR knee 4+ vw right injury   ED Interpretation by Arabella Chao MD (09/28 2147)   No acute fracture or dislocation                 Procedures  Procedures         ED Course                               SBIRT 20yo+    Flowsheet Row Most Recent Value   Initial Alcohol Screen: US AUDIT-C     1. How often do you have a drink containing alcohol? 0 Filed at: 09/28/2023 2125   2.  How many drinks containing alcohol do you have on a typical day you are drinking? 0 Filed at: 09/28/2023 2125   3a. Male UNDER 65: How often do you have five or more drinks on one occasion? 0 Filed at: 09/28/2023 2125   3b. FEMALE Any Age, or MALE 65+: How often do you have 4 or more drinks on one occassion? 0 Filed at: 09/28/2023 2125   Audit-C Score 0 Filed at: 09/28/2023 2125                    Medical Decision Making  Patient is a 50-year-old female seen in the emergency department with concern for right knee pain. Patient was treated with medication for symptom control. X-ray right knee showed degenerative changes, but no acute fracture or dislocation. Evaluation is consistent with likely osteoarthritis. Evaluation is not consistent with septic arthritis or gout. Ace wrap was ordered. Plan to have patient follow up with orthopedics/PCP. Patient stable for discharge home. Discharge instructions were reviewed with patient. Acute pain of right knee: acute illness or injury  Amount and/or Complexity of Data Reviewed  Radiology: ordered and independent interpretation performed. Decision-making details documented in ED Course. Risk  OTC drugs. Prescription drug management. Disposition  Final diagnoses:   Acute pain of right knee     Time reflects when diagnosis was documented in both MDM as applicable and the Disposition within this note     Time User Action Codes Description Comment    9/28/2023  9:29 PM Emelina Gruber Add [Z45.066] Acute pain of right knee       ED Disposition     ED Disposition   Discharge    Condition   Stable    Date/Time   Thu Sep 28, 2023  9:52 PM    Comment   Guera Rom discharge to home/self care.                Follow-up Information     Follow up With Specialties Details Why Contact Info Additional Information    Latosha Red MD Internal Medicine Call in 1 day  [facility] [id number] [phone]       [address] [facility] Orthopedic Surgery Call in 1 day [facility] [address]  [address] [facility] [address] [facility] ([place]), [address] [facility] [phone]          Discharge Medication List as of 9/28/2023  9:56 PM      START taking these medications    Details   naproxen (Naprosyn) 500 mg tablet Take 1 tablet (500 mg total) by mouth every 12 (twelve) hours as needed (pain) for up to 7 days Take with food.  Do not start before September 29, 2023., Starting Fri 9/29/2023, Until Fri 10/6/2023 at 2359, Normal         CONTINUE these medications which have NOT CHANGED    Details   albuterol (PROVENTIL HFA,VENTOLIN HFA) 90 mcg/act inhaler INHALE 2 PUFFS BY MOUTH EVERY 6 HOURS AS NEEDED FOR WHEEZING, Normal      amLODIPine (NORVASC) 10 mg tablet Take 1 tablet (10 mg total) by mouth daily Blood presuure, Starting Mon 8/14/2023, Until Sun 11/12/2023, Normal      buPROPion (WELLBUTRIN XL) 150 mg 24 hr tablet TAKE 1 TABLET BY MOUTH EVERY DAY IN THE MORNING, Starting Tue 9/5/2023, Normal      Cholecalciferol ([facility] D3) 125 MCG (5000 UT) capsule Take 1 capsule (5,000 Units total) by mouth daily, Starting Fri 9/22/2023, Until Thu 12/21/2023, Normal      [facility] Vitamin C 500 MG tablet TAKE 1 TABLET (500 MG TOTAL) BY MOUTH DAILY., Normal      cyanocobalamin (VITAMIN B-12) 2000 MCG tablet Take 1 tablet (2,000 mcg total) by mouth daily, Starting Mon 8/22/2022, Until Mon 8/14/2023, Normal      loratadine (CLARITIN) 10 mg tablet Take 1 tablet (10 mg total) by mouth daily For allergies, Starting Fri 9/22/2023, Normal      metFORMIN (GLUCOPHAGE-XR) 750 mg 24 hr tablet Take 1 tablet (750 mg total) by mouth daily with breakfast, Starting Wed 8/30/2023, Normal      spironolactone (ALDACTONE) 25 mg tablet TAKE 1 TABLET BY MOUTH EVERY DAY FOR 30 DAYS THEN TAKE 2 TABLETS DAILY AFTER, Normal      tiotropium (SPIRIVA) 18 mcg inhalation capsule Place 18 mcg into inhaler and inhale in the morning., Historical Med      valACYclovir (VALTREX) 500 mg tablet TAKE 1 TABLET BY MOUTH EVERY DAY, Normal                 PDMP Review       Value Time User    PDMP Reviewed  Yes 12/21/2020  5:26 AM Lloyd Helm MD          ED Provider  Electronically Signed by           Jairon Kimball MD  09/28/23 1522

## 2023-10-03 ENCOUNTER — HOSPITAL ENCOUNTER (OUTPATIENT)
Facility: HOSPITAL | Age: 43
Setting detail: OUTPATIENT SURGERY
Discharge: HOME | End: 2023-10-03
Attending: SURGERY | Admitting: SURGERY
Payer: COMMERCIAL

## 2023-10-03 VITALS
TEMPERATURE: 97.3 F | BODY MASS INDEX: 32.71 KG/M2 | HEIGHT: 65 IN | WEIGHT: 196.3 LBS | RESPIRATION RATE: 16 BRPM | DIASTOLIC BLOOD PRESSURE: 78 MMHG | SYSTOLIC BLOOD PRESSURE: 121 MMHG | OXYGEN SATURATION: 100 % | HEART RATE: 77 BPM

## 2023-10-03 DIAGNOSIS — L72.0 EPIDERMOID CYST OF FACE: ICD-10-CM

## 2023-10-03 PROCEDURE — 11443 EXC FACE-MM B9+MARG 2.1-3 CM: CPT | Performed by: SURGERY

## 2023-10-03 PROCEDURE — 12052 INTMD RPR FACE/MM 2.6-5.0 CM: CPT | Performed by: SURGERY

## 2023-10-03 PROCEDURE — 88304 TISSUE EXAM BY PATHOLOGIST: CPT | Performed by: PATHOLOGY

## 2023-10-03 RX ORDER — LIDOCAINE HYDROCHLORIDE 10 MG/ML
INJECTION, SOLUTION EPIDURAL; INFILTRATION; INTRACAUDAL; PERINEURAL AS NEEDED
Status: DISCONTINUED | OUTPATIENT
Start: 2023-10-03 | End: 2023-10-03 | Stop reason: HOSPADM

## 2023-10-03 RX ORDER — GINSENG 100 MG
CAPSULE ORAL AS NEEDED
Status: DISCONTINUED | OUTPATIENT
Start: 2023-10-03 | End: 2023-10-03 | Stop reason: HOSPADM

## 2023-10-03 RX ORDER — DIPHENOXYLATE HYDROCHLORIDE AND ATROPINE SULFATE 2.5; .025 MG/1; MG/1
1 TABLET ORAL DAILY
COMMUNITY

## 2023-10-03 RX ORDER — OXYCODONE HYDROCHLORIDE AND ACETAMINOPHEN 5; 325 MG/1; MG/1
1 TABLET ORAL EVERY 4 HOURS PRN
Qty: 4 TABLET | Refills: 0 | Status: SHIPPED | OUTPATIENT
Start: 2023-10-03 | End: 2023-10-13

## 2023-10-03 NOTE — INTERVAL H&P NOTE
H&P reviewed. After examining the patient I find no changes in the patients condition since the H&P had been written. The area has been marked. Lungs are clear with good bilateral expansion and no rales rhonchi or wheezing.   Heart sounds are normal without a murmur    Vitals:    10/03/23 1122   BP: 122/75   Pulse: 86   Resp: 18   Temp: (!) 97.3 °F (36.3 °C)   SpO2: 96%

## 2023-10-03 NOTE — DISCHARGE INSTR - AVS FIRST PAGE
Postoperative Care Instructions      1. General: You may feel pulling sensations around the wound or funny aches and pains around the incisions. This is normal. Even minor surgery is a change in your body and this is your body's reaction to it. 2. Wound care:  Apply antibiotic ointment to incision and keep covered when sleeping and when at work. Hayley Murillo will be removed by the physician at your follow up appointment (next Monday). 3. Showering: You may shower. Please do not soak wound in standing water such as a bath, hot tub, pool, lake, etc. Do not scrub or use exfoliants on the surgical wounds. 4. Activity: You may go up and down stairs, walk as much as you are comfortable, but walk at least 3 times each day. 5. Diet: You may resume your regular diet. Please drink lots of water. 6. Medications: Resume all of your previous medications, unless told otherwise by the doctor. A good option for pain control is to start with acetaminophen (Tylenol) 650mg and ibuprofen (Advil) 600mg and alternate taking them every 3 hours. Ensure that you do not take more than 4000 mg of Tylenol per day. If needed, you may take narcotic pain medication that will be sent to your pharmacy. 7. Call the office: If you are experiencing fevers above 101.5° or if the wound develops drainage and/or excessive redness around the wound.

## 2023-10-03 NOTE — OP NOTE
OPERATIVE REPORT  PATIENT NAME: Lakia Dobson    :  1980  MRN: [id number]  Pt Location: [facility] OR ROOM 03    SURGERY DATE: 10/3/2023    Surgeon(s) and Role:     * Erin Olvera MD - Primary     * Nneka Han PA-C - Assisting    Preop Diagnosis:  Epidermoid cyst of face [L72.0]    Post-Op Diagnosis Codes:     * Epidermoid cyst of face [L72.0]    Procedure(s):  Right - EXCISION MASS RIGHT CHEEK    Specimen(s):  ID Type Source Tests Collected by Time Destination   1 : Right cheek mass Tissue Soft Tissue, Other TISSUE EXAM Erin Olvera MD 10/3/2023 12:25 PM        Estimated Blood Loss:   Minimal    Drains:  * No LDAs found *    Anesthesia Type:   Local    Operative Indications:  Epidermoid cyst of face [L72.0]      Operative Findings:  same    Complications:   None    Procedure and Technique:  The patient was identified by me and placed in supine position upon the operating room table. The area was prepped and draped in a normal surgical manner. A timeout was done. Local anesthesia was infiltrated and an elliptical skin incision is made with a knife and completed with the Bovie. The entire mass was removed. Bleeding points were Heema coagulated and then the wounds were closed with interrupted Vicryl followed by running Prolene. There was no qualified resident to assist.  Accordingly, Chase BOWER was the first assistant  Size of the mass was 2.6 x 1.7 cm. Size of the incision was 2.7 cm; intermediate closure   I was present for the entire procedure.     Patient Disposition:  PACU         SIGNATURE: Erin Olvera MD  DATE: October 3, 2023  TIME: 1:08 PM

## 2023-10-05 ENCOUNTER — TELEPHONE (OUTPATIENT)
Dept: SURGERY | Facility: CLINIC | Age: 43
End: 2023-10-05

## 2023-10-05 NOTE — TELEPHONE ENCOUNTER
Patient would like to know if you can excuse her from work today and tomorrow to return on Monday. Please advise. Her eye is still swollen.

## 2023-10-09 ENCOUNTER — APPOINTMENT (OUTPATIENT)
Dept: RADIOLOGY | Facility: AMBULARY SURGERY CENTER | Age: 43
End: 2023-10-09
Attending: ORTHOPAEDIC SURGERY
Payer: COMMERCIAL

## 2023-10-09 ENCOUNTER — OFFICE VISIT (OUTPATIENT)
Dept: SURGERY | Facility: CLINIC | Age: 43
End: 2023-10-09

## 2023-10-09 ENCOUNTER — OFFICE VISIT (OUTPATIENT)
Dept: OBGYN CLINIC | Facility: CLINIC | Age: 43
End: 2023-10-09
Payer: COMMERCIAL

## 2023-10-09 VITALS
BODY MASS INDEX: 33.15 KG/M2 | SYSTOLIC BLOOD PRESSURE: 120 MMHG | HEART RATE: 75 BPM | HEIGHT: 65 IN | DIASTOLIC BLOOD PRESSURE: 86 MMHG | WEIGHT: 199 LBS

## 2023-10-09 VITALS
BODY MASS INDEX: 33.15 KG/M2 | TEMPERATURE: 97.8 F | HEIGHT: 65 IN | RESPIRATION RATE: 18 BRPM | DIASTOLIC BLOOD PRESSURE: 82 MMHG | HEART RATE: 84 BPM | WEIGHT: 199 LBS | SYSTOLIC BLOOD PRESSURE: 126 MMHG

## 2023-10-09 DIAGNOSIS — M17.11 PRIMARY OSTEOARTHRITIS OF RIGHT KNEE: Primary | ICD-10-CM

## 2023-10-09 DIAGNOSIS — M17.11 PRIMARY OSTEOARTHRITIS OF RIGHT KNEE: ICD-10-CM

## 2023-10-09 DIAGNOSIS — M25.561 ACUTE PAIN OF RIGHT KNEE: ICD-10-CM

## 2023-10-09 DIAGNOSIS — L72.0 EPIDERMOID CYST OF FACE: Primary | ICD-10-CM

## 2023-10-09 DIAGNOSIS — Z98.890 HISTORY OF MENISCECTOMY OF RIGHT KNEE: ICD-10-CM

## 2023-10-09 DIAGNOSIS — M22.2X1 PATELLOFEMORAL DISORDER OF RIGHT KNEE: ICD-10-CM

## 2023-10-09 PROCEDURE — 20610 DRAIN/INJ JOINT/BURSA W/O US: CPT | Performed by: PHYSICIAN ASSISTANT

## 2023-10-09 PROCEDURE — 99244 OFF/OP CNSLTJ NEW/EST MOD 40: CPT | Performed by: PHYSICIAN ASSISTANT

## 2023-10-09 PROCEDURE — 88304 TISSUE EXAM BY PATHOLOGIST: CPT | Performed by: PATHOLOGY

## 2023-10-09 PROCEDURE — 99024 POSTOP FOLLOW-UP VISIT: CPT | Performed by: SURGERY

## 2023-10-09 PROCEDURE — 73562 X-RAY EXAM OF KNEE 3: CPT

## 2023-10-09 RX ORDER — LIDOCAINE HYDROCHLORIDE 10 MG/ML
1 INJECTION, SOLUTION INFILTRATION; PERINEURAL
Status: COMPLETED | OUTPATIENT
Start: 2023-10-09 | End: 2023-10-09

## 2023-10-09 RX ORDER — BUPIVACAINE HYDROCHLORIDE 2.5 MG/ML
1 INJECTION, SOLUTION INFILTRATION; PERINEURAL
Status: COMPLETED | OUTPATIENT
Start: 2023-10-09 | End: 2023-10-09

## 2023-10-09 RX ORDER — BETAMETHASONE SODIUM PHOSPHATE AND BETAMETHASONE ACETATE 3; 3 MG/ML; MG/ML
12 INJECTION, SUSPENSION INTRA-ARTICULAR; INTRALESIONAL; INTRAMUSCULAR; SOFT TISSUE
Status: COMPLETED | OUTPATIENT
Start: 2023-10-09 | End: 2023-10-09

## 2023-10-09 RX ORDER — METHYLPREDNISOLONE 4 MG/1
TABLET ORAL
Qty: 1 EACH | Refills: 0 | Status: SHIPPED | OUTPATIENT
Start: 2023-10-09

## 2023-10-09 RX ADMIN — BUPIVACAINE HYDROCHLORIDE 1 ML: 2.5 INJECTION, SOLUTION INFILTRATION; PERINEURAL at 11:30

## 2023-10-09 RX ADMIN — LIDOCAINE HYDROCHLORIDE 1 ML: 10 INJECTION, SOLUTION INFILTRATION; PERINEURAL at 11:30

## 2023-10-09 RX ADMIN — BETAMETHASONE SODIUM PHOSPHATE AND BETAMETHASONE ACETATE 12 MG: 3; 3 INJECTION, SUSPENSION INTRA-ARTICULAR; INTRALESIONAL; INTRAMUSCULAR; SOFT TISSUE at 11:30

## 2023-10-09 NOTE — PROGRESS NOTES
Assessment:  1. Primary osteoarthritis of right knee  XR knee 3 vw right non injury    methylPREDNISolone 4 MG tablet therapy pack    Large joint arthrocentesis: R knee      2. Acute pain of right knee  Ambulatory Referral to Orthopedic Surgery    Large joint arthrocentesis: R knee      3. Patellofemoral disorder of right knee  methylPREDNISolone 4 MG tablet therapy pack    Large joint arthrocentesis: R knee      4. History of meniscectomy of right knee  methylPREDNISolone 4 MG tablet therapy pack        Patient Active Problem List   Diagnosis   • Essential hypertension   • Headache   • Moderate episode of recurrent major depressive disorder (HCC)   • Prediabetes   • Vitamin D deficiency   • Nodule of left lobe of thyroid gland   • Tobacco use   • Adrenal mass, left (HCC)   • Insomnia   • Obesity (BMI 30.0-34. 9)   • Varicose veins of both lower extremities with pain   • HIARL (obstructive sleep apnea)   • Pain in right wrist   • Primary osteoarthritis of right knee   • Chronic fatigue   • History of hysterectomy   • Hidradenitis suppurativa   • Ascorbic acid deficiency   • Pyridoxine deficiency   • Lack of housing   • Acute stress disorder   • Family history of colon cancer   • Ankle swelling   • Epidermoid cyst of face       Plan:    36 y.o. female with right knee pain multifactorial primarily patellofemoral also with arthritic pain in the medial and patellofemoral compartments and likely degenerative meniscus tear    • Corticosteroid injection was offered, accepted, and administered into the right knee joint. Risk benefits and alternatives were discussed prior to injection. Patient tolerated the procedure well. • Morgan tape handout and home exercise program given  • Follow-up in 6 weeks for repeat evaluation if she is still having significant knee pain we will get an MRI arthrogram at that time    The patient was seen and examined by Dr. Linda Ovalles and myself.  The assessment and plan were formulated by Dr. Linda Ovalles and I assisted in carrying it out. Subjective:   Patient ID: Dayne Batista is a 36 y.o. female . HPI    Patient comes in today with regards to right knee pain. Patient is referred to us by Ella Vargas MD for further evaluation. The patient reports that the pain been going on for years. Patient rates their pain as 9/10. Injury or trauma prior to onset of pain: none. Pain is located in the anterior knee primarily. It is worsened with sitting down too long with a flexed knee and also getting up and also standing up for too long., and is made better with rest.  Treatments tried: Ice and ibuprofen. The pain is not radiate . Old injuries or prior surgeries: She had a prior arthroscopic meniscectomy 7 years ago [facility], she does feel that some of this pain feels like her old pain before the meniscus except she does not have the swelling that she had during that time.        The following portions of the patient's history were reviewed and updated as appropriate: allergies, current medications, past family history, past social history, past surgical history and problem list.    Social History     Socioeconomic History   • Marital status: Single     Spouse name: Not on file   • Number of children: Not on file   • Years of education: 10   • Highest education level: Not on file   Occupational History   • Not on file   Tobacco Use   • Smoking status: Every Day     Packs/day: 0.50     Years: 20.00     Total pack years: 10.00     Types: Cigarettes   • Smokeless tobacco: Never   • Tobacco comments:     pt is down to .5 packs a day   Vaping Use   • Vaping Use: Former   • Substances: Nicotine   Substance and Sexual Activity   • Alcohol use: Yes     Comment: occasional   • Drug use: No   • Sexual activity: Not Currently     Partners: Male     Birth control/protection: Female Sterilization     Comment: HYSTERECTOMY   Other Topics Concern   • Not on file   Social History Narrative    Most recent tobacco use screenin-    Do you currently or have you served in the [facility] [address]: No    Were you activated, into active duty, as a member of the [employer] or as a Reservist: No    Occupation: unemployed    Education: 10    Marital status: Single    Sexual orientation: Heterosexual    Exercise level: Occasional    Diet: Regular    General stress level: Low    Alcohol intake: Occasional    Caffeine intake: Moderate    Chewing tobacco: none    Illicit drugs: denies    Guns present in home: No    Seat belts used routinely: Yes    Sunscreen used routinely: No    Smoke alarm in home: Yes    Advance directive: No    Live alone or with others: with others    Are there stairs in your home: Yes    Pets: Yes    Deaf or serious difficulty hearing: No    Blind or serious difficulty seeing: Yes    Difficulty concentrating, remembering or making decisions: No    Difficulty walking or climbing stairs: No    Difficulty dressing or bathing: No    Difficulty doing errands alone: No    Passive smoke exposure:  Yes    Are you currently employed: No    Asbestos exposure: No    TB exposure: No    Environmental exposure: No    Animal exposure: Yes    cat and dog    Blood Transfusion: No    Sexually active: No    Presence of domestic violence: No    Do you feel safe at home: Yes    no cpap or nebulizer     Social Determinants of Health     Financial Resource Strain: Not on file   Food Insecurity: Not on file   Transportation Needs: Not on file   Physical Activity: Not on file   Stress: Not on file   Social Connections: Not on file   Intimate Partner Violence: Not on file   Housing Stability: Not on file     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, 9/10/- + Trich   • Adrenal mass, left ([address]) 2021    1.8 x 2.1 cm on ct 2020   • Arthritis    • Asthma    • Blurry vision 2020   • Diabetes mellitus ([address])    • Essential hypertension 2020   • Headache 2020   • Hidradenitis suppurativa    • Hypertension    • Immunization deficiency 10/02/2020    Hep a nonimmune   • Insomnia 2021   • Migraine    • Moderate episode of recurrent major depressive disorder ([address]) 2020   • Obesity (BMI 30.0-34.9) 2021   • Prediabetes 2020   • Seasonal allergies    • Tobacco use 2021   • Vitamin D deficiency 10/02/2020     Past Surgical History:   Procedure Laterality Date   • APPENDECTOMY     • BREAST BIOPSY Left 2017   • BREAST BIOPSY Right     2 core biopsies   • BREAST EXCISIONAL BIOPSY Left 2018   • BREAST SURGERY Left 2018    Left breast mass excision   • CERVICAL BIOPSY  W/ LOOP ELECTRODE EXCISION     •  SECTION      X2   • COLPOSCOPY     • FACIAL/NECK BIOPSY Right 10/3/2023    Procedure: EXCISION MASS RIGHT CHEEK;  Surgeon: Carol Swanson MD;  Location:  MAIN OR;  Service: General   • HYSTERECTOMY      heavy bleeding, ovaries remain, also had abnormal pap   • KNEE SURGERY     • LYMPH NODE DISSECTION      under armpit   • TUBAL LIGATION     • [address] THYROID BIOPSY  2021     No Known Allergies  Current Outpatient Medications on File Prior to Visit   Medication Sig Dispense Refill   • albuterol (PROVENTIL HFA,VENTOLIN HFA) 90 mcg/act inhaler INHALE 2 PUFFS BY MOUTH EVERY 6 HOURS AS NEEDED FOR WHEEZING 18 g 1   • amLODIPine (NORVASC) 10 mg tablet Take 1 tablet (10 mg total) by mouth daily Blood presuure 90 tablet 1   • buPROPion (WELLBUTRIN XL) 150 mg 24 hr tablet TAKE 1 TABLET BY MOUTH EVERY DAY IN THE MORNING 90 tablet 2   • Cholecalciferol ([facility] D3) 125 MCG (5000 UT) capsule Take 1 capsule (5,000 Units total) by mouth daily 90 capsule 2   • [facility]S Vitamin C 500 MG tablet TAKE 1 TABLET (500 MG TOTAL) BY MOUTH DAILY.  90 tablet 3   • cyanocobalamin (VITAMIN B-12) 2000 MCG tablet Take 1 tablet (2,000 mcg total) by mouth daily 90 tablet 2   • loratadine (CLARITIN) 10 mg tablet Take 1 tablet (10 mg total) by mouth daily For allergies 90 tablet 1   • metFORMIN (GLUCOPHAGE-XR) 750 mg 24 hr tablet Take 1 tablet (750 mg total) by mouth daily with breakfast 90 tablet 1   • multivitamin (THERAGRAN) TABS Take 1 tablet by mouth daily     • naproxen (Naprosyn) 500 mg tablet Take 1 tablet (500 mg total) by mouth every 12 (twelve) hours as needed (pain) for up to 7 days Take with food. Do not start before September 29, 2023. 12 tablet 0   • oxyCODONE-acetaminophen (Percocet) 5-325 mg per tablet Take 1 tablet by mouth every 4 (four) hours as needed for severe pain for up to 10 days Max Daily Amount: 6 tablets (Patient not taking: Reported on 10/9/2023) 4 tablet 0   • spironolactone (ALDACTONE) 25 mg tablet TAKE 1 TABLET BY MOUTH EVERY DAY FOR 30 DAYS THEN TAKE 2 TABLETS DAILY AFTER 180 tablet 3   • tiotropium (SPIRIVA) 18 mcg inhalation capsule Place 18 mcg into inhaler and inhale in the morning. • valACYclovir (VALTREX) 500 mg tablet TAKE 1 TABLET BY MOUTH EVERY DAY 90 tablet 2     No current facility-administered medications on file prior to visit. Review of Systems      Objective:    Vitals:    10/09/23 1112   BP: 120/86   Pulse: 75       Physical Exam  Musculoskeletal:      Right knee: No effusion. Instability Tests: Medial Lashaun test positive. Lateral Lashaun test negative. Right Knee Exam     Muscle Strength   The patient has normal right knee strength. Tenderness   The patient is experiencing tenderness in the medial retinaculum, patella, lateral joint line and medial joint line. Range of Motion   The patient has normal right knee ROM. Extension: normal   Flexion: normal     Tests   Lashaun:  Medial - positive Lateral - negative  Varus: negative Valgus: negative    Other   Erythema: absent  Scars: present  Sensation: normal  Right knee pulse absent: skin warm and well perfused.   Swelling: none  Effusion: no effusion present    Comments:  No ecchymosis, no deformity  positive patellar grind test  Bounce test causes anterior knee pain            I have personally reviewed pertinent films in PACS and my interpretation is X-ray of the right knee performed today demonstrates mild to moderate degenerative changes in the patellofemoral joint mild degenerative change in the medial compartment no acute fracture. Large joint arthrocentesis: R knee  Universal Protocol:  Consent given by: patient  Time out: Immediately prior to procedure a "time out" was called to verify the correct patient, procedure, equipment, support staff and site/side marked as required. Site marked: the operative site was marked  Supporting Documentation  Indications: pain   Procedure Details  Location: knee - R knee  Preparation: Patient was prepped and draped in the usual sterile fashion  Needle size: 22 G  Approach: anterolateral  Medications administered: 12 mg betamethasone acetate-betamethasone sodium phosphate 6 (3-3) mg/mL; 1 mL lidocaine 1 %; 1 mL bupivacaine 0.25 %    Patient tolerance: patient tolerated the procedure well with no immediate complications  Dressing:  Sterile dressing applied               Scribe Attestation    I,:  Caitlin Gates PA-C am acting as a scribe while in the presence of the attending physician.:       I,:  Suzette Necessary, DO personally performed the services described in this documentation    as scribed in my presence.:             Portions of the record may have been created with voice recognition software. Occasional wrong word or "sound a like" substitutions may have occurred due to the inherent limitations of voice recognition software. Read the chart carefully and recognize, using context, where substitutions have occurred.

## 2023-10-09 NOTE — PROGRESS NOTES
The patient returns for suture removal.  The wound looks great. Path report is not available as of yet.   Sutures are removed and the bond is placed

## 2023-10-09 NOTE — PATIENT INSTRUCTIONS
PATELLOFEMORAL SYNDROME-Cash TAPING TECHNIQUE    Search “Leukotape P tape” and “Cover roll stretch tape” on  [employer]. [employer]  Leukotape P is typically 1.5in x 15 yards, Cover roll stretch tape is typically 2in x 10 yards        How to apply:  Place cover roll tape over knee cap. This protects the skin. Apply Leukotape over the cover roll tape. Use the Leukotape to pull the knee cap to the middle of the body (medial side of knee) to prevent lateral (outside) tracking of the knee cap. Wear the tape for 3 days (72 hrs) straight, then take off one day (24 hrs) off, then repeat. Visit [employer] and search “Cash tape for the knee” to watch a video on how to apply tape. Video titled “Cash Taping of the knee” created by [employer] recommended. What does Cash taping technique do? Patellofemoral syndrome is when the inside quadriceps muscle, called the VMO muscle, becomes weak due to a number of factors. This causes the Patellofemoral ligament, which is the only ligament holding the patella (knee cap) in place, to become weak as well. When the ligament becomes weak, the knee cap drifts or tracks too far to the lateral side of the knee (outside knee) which causes tension on this ligament. The knee cap hits the lateral area of the femur, resulting in pain or discomfort around the front of the knee. Physical Therapy is sometimes used to strengthen the weak muscles, such as the VMO, to correct this problem. When the tape is applied correctly, it helps to realign the knee cap to the center of the knee. This helps correct for the lateral tracking of the knee cap and relieve discomfort. You can search online for exercises that can help strengthen the VMO quadriceps muscle or attend physical therapy.

## 2023-10-09 NOTE — LETTER
October 10, 2023     Wale Gamezandrez, [address] [place] [address] E y   [facility]  [facility], [place]    Patient: Gini Gruber   YOB: 1980   Date of Visit: 10/9/2023       Dear Dr. Tomas Cos:    Thank you for referring Lydia Fernando to me for evaluation. Below are my notes for this consultation. If you have questions, please do not hesitate to call me. I look forward to following your patient along with you. Sincerely,        Debora Pinto DO        CC: No Recipients    Debora Pinto DO  10/9/2023 12:15 PM  Signed  Assessment:  1. Primary osteoarthritis of right knee  XR knee 3 vw right non injury    methylPREDNISolone 4 MG tablet therapy pack    Large joint arthrocentesis: R knee      2. Acute pain of right knee  Ambulatory Referral to Orthopedic Surgery    Large joint arthrocentesis: R knee      3. Patellofemoral disorder of right knee  methylPREDNISolone 4 MG tablet therapy pack    Large joint arthrocentesis: R knee      4. History of meniscectomy of right knee  methylPREDNISolone 4 MG tablet therapy pack        Patient Active Problem List   Diagnosis   • Essential hypertension   • Headache   • Moderate episode of recurrent major depressive disorder (HCC)   • Prediabetes   • Vitamin D deficiency   • Nodule of left lobe of thyroid gland   • Tobacco use   • Adrenal mass, left (HCC)   • Insomnia   • Obesity (BMI 30.0-34. 9)   • Varicose veins of both lower extremities with pain   • HIRAL (obstructive sleep apnea)   • Pain in right wrist   • Primary osteoarthritis of right knee   • Chronic fatigue   • History of hysterectomy   • Hidradenitis suppurativa   • Ascorbic acid deficiency   • Pyridoxine deficiency   • Lack of housing   • Acute stress disorder   • Family history of colon cancer   • Ankle swelling   • Epidermoid cyst of face       Plan:    36 y.o. female with right knee pain multifactorial primarily patellofemoral also with arthritic pain in the medial and patellofemoral compartments and likely degenerative meniscus tear    Corticosteroid injection was offered, accepted, and administered into the right knee joint. Risk benefits and alternatives were discussed prior to injection. Patient tolerated the procedure well. Morgan tape handout and home exercise program given  Follow-up in 6 weeks for repeat evaluation if she is still having significant knee pain we will get an MRI arthrogram at that time    The patient was seen and examined by Dr. Katiuska Boswell and myself. The assessment and plan were formulated by Dr. Katiuska Boswell and I assisted in carrying it out. Subjective:   Patient ID: Guera Leary is a 36 y.o. female . HPI    Patient comes in today with regards to right knee pain. Patient is referred to us by Cherylene Sleeper, MD for further evaluation. The patient reports that the pain been going on for years. Patient rates their pain as 9/10. Injury or trauma prior to onset of pain: none. Pain is located in the anterior knee primarily. It is worsened with sitting down too long with a flexed knee and also getting up and also standing up for too long., and is made better with rest.  Treatments tried: Ice and ibuprofen. The pain is not radiate . Old injuries or prior surgeries: She had a prior arthroscopic meniscectomy 7 years ago [facility], she does feel that some of this pain feels like her old pain before the meniscus except she does not have the swelling that she had during that time.        The following portions of the patient's history were reviewed and updated as appropriate: allergies, current medications, past family history, past social history, past surgical history and problem list.    Social History     Socioeconomic History   • Marital status: Single     Spouse name: Not on file   • Number of children: Not on file   • Years of education: 10   • Highest education level: Not on file   Occupational History   • Not on file   Tobacco Use   • Smoking status: Every Day Packs/day: 0.50     Years: 20.00     Total pack years: 10.00     Types: Cigarettes   • Smokeless tobacco: Never   • Tobacco comments:     pt is down to .5 packs a day   Vaping Use   • Vaping Use: Former   • Substances: Nicotine   Substance and Sexual Activity   • Alcohol use: Yes     Comment: occasional   • Drug use: No   • Sexual activity: Not Currently     Partners: Male     Birth control/protection: Female Sterilization     Comment: HYSTERECTOMY   Other Topics Concern   • Not on file   Social History Narrative    Most recent tobacco use screenin-    Do you currently or have you served in the [address] [place] [address]: No    Were you activated, into active duty, as a member of the [employer] or as a Reservist: No    Occupation: unemployed    Education: 10    Marital status: Single    Sexual orientation: Heterosexual    Exercise level: Occasional    Diet: Regular    General stress level: Low    Alcohol intake: Occasional    Caffeine intake: Moderate    Chewing tobacco: none    Illicit drugs: denies    Guns present in home: No    Seat belts used routinely: Yes    Sunscreen used routinely: No    Smoke alarm in home: Yes    Advance directive: No    Live alone or with others: with others    Are there stairs in your home: Yes    Pets: Yes    Deaf or serious difficulty hearing: No    Blind or serious difficulty seeing: Yes    Difficulty concentrating, remembering or making decisions: No    Difficulty walking or climbing stairs: No    Difficulty dressing or bathing: No    Difficulty doing errands alone: No    Passive smoke exposure:  Yes    Are you currently employed: No    Asbestos exposure: No    TB exposure: No    Environmental exposure: No    Animal exposure: Yes    cat and dog    Blood Transfusion: No    Sexually active: No    Presence of domestic violence: No    Do you feel safe at home: Yes    no cpap or nebulizer     Social Determinants of Health     Financial Resource Strain: Not on file   Food Insecurity: Not on file   Transportation Needs: Not on file   Physical Activity: Not on file   Stress: Not on file   Social Connections: Not on file   Intimate Partner Violence: Not on file   Housing Stability: Not on file     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   • Adrenal mass, left ([address]) 2021    1.8 x 2.1 cm on ct 2020   • Arthritis    • Asthma    • Blurry vision 2020   • Diabetes mellitus ([address])    • Essential hypertension 2020   • Headache 2020   • Hidradenitis suppurativa    • Hypertension    • Immunization deficiency 10/02/2020    Hep a nonimmune   • Insomnia 2021   • Migraine    • Moderate episode of recurrent major depressive disorder ([address]) 2020   • Obesity (BMI 30.0-34.9) 2021   • Prediabetes 2020   • Seasonal allergies    • Tobacco use 2021   • Vitamin D deficiency 10/02/2020     Past Surgical History:   Procedure Laterality Date   • APPENDECTOMY     • BREAST BIOPSY Left    • BREAST BIOPSY Right     2 core biopsies   • BREAST EXCISIONAL BIOPSY Left 2018   • BREAST SURGERY Left 2018    Left breast mass excision   • CERVICAL BIOPSY  W/ LOOP ELECTRODE EXCISION     •  SECTION      X2   • COLPOSCOPY     • FACIAL/NECK BIOPSY Right 10/3/2023    Procedure: EXCISION MASS RIGHT CHEEK;  Surgeon: Dayan Hilton MD;  Location: [facility];  Service: General   • HYSTERECTOMY      heavy bleeding, ovaries remain, also had abnormal pap   • KNEE SURGERY     • LYMPH NODE DISSECTION      under armpit   • TUBAL LIGATION     • [address] THYROID BIOPSY  2021     No Known Allergies  Current Outpatient Medications on File Prior to Visit   Medication Sig Dispense Refill   • albuterol (PROVENTIL HFA,VENTOLIN HFA) 90 mcg/act inhaler INHALE 2 PUFFS BY MOUTH EVERY 6 HOURS AS NEEDED FOR WHEEZING 18 g 1   • amLODIPine (NORVASC) 10 mg tablet Take 1 tablet (10 mg total) by mouth daily Blood presuure 90 tablet 1   • buPROPion (WELLBUTRIN XL) 150 mg 24 hr tablet TAKE 1 TABLET BY MOUTH EVERY DAY IN THE MORNING 90 tablet 2   • Cholecalciferol (CVS D3) 125 MCG (5000 UT) capsule Take 1 capsule (5,000 Units total) by mouth daily 90 capsule 2   • CVS Vitamin C 500 MG tablet TAKE 1 TABLET (500 MG TOTAL) BY MOUTH DAILY. 90 tablet 3   • cyanocobalamin (VITAMIN B-12) 2000 MCG tablet Take 1 tablet (2,000 mcg total) by mouth daily 90 tablet 2   • loratadine (CLARITIN) 10 mg tablet Take 1 tablet (10 mg total) by mouth daily For allergies 90 tablet 1   • metFORMIN (GLUCOPHAGE-XR) 750 mg 24 hr tablet Take 1 tablet (750 mg total) by mouth daily with breakfast 90 tablet 1   • multivitamin (THERAGRAN) TABS Take 1 tablet by mouth daily     • naproxen (Naprosyn) 500 mg tablet Take 1 tablet (500 mg total) by mouth every 12 (twelve) hours as needed (pain) for up to 7 days Take with food. Do not start before September 29, 2023. 12 tablet 0   • oxyCODONE-acetaminophen (Percocet) 5-325 mg per tablet Take 1 tablet by mouth every 4 (four) hours as needed for severe pain for up to 10 days Max Daily Amount: 6 tablets (Patient not taking: Reported on 10/9/2023) 4 tablet 0   • spironolactone (ALDACTONE) 25 mg tablet TAKE 1 TABLET BY MOUTH EVERY DAY FOR 30 DAYS THEN TAKE 2 TABLETS DAILY AFTER 180 tablet 3   • tiotropium (SPIRIVA) 18 mcg inhalation capsule Place 18 mcg into inhaler and inhale in the morning. • valACYclovir (VALTREX) 500 mg tablet TAKE 1 TABLET BY MOUTH EVERY DAY 90 tablet 2     No current facility-administered medications on file prior to visit. Review of Systems      Objective:    Vitals:    10/09/23 1112   BP: 120/86   Pulse: 75       Physical Exam  Musculoskeletal:      Right knee: No effusion. Instability Tests: Medial Lashaun test positive. Lateral Lashaun test negative. Right Knee Exam     Muscle Strength   The patient has normal right knee strength.     Tenderness   The patient is experiencing tenderness in the medial retinaculum, patella, lateral joint line and medial joint line. Range of Motion   The patient has normal right knee ROM. Extension: normal   Flexion: normal     Tests   Lashaun:  Medial - positive Lateral - negative  Varus: negative Valgus: negative    Other   Erythema: absent  Scars: present  Sensation: normal  Right knee pulse absent: skin warm and well perfused. Swelling: none  Effusion: no effusion present    Comments:  No ecchymosis, no deformity  positive patellar grind test  Bounce test causes anterior knee pain            I have personally reviewed pertinent films in PACS and my interpretation is X-ray of the right knee performed today demonstrates mild to moderate degenerative changes in the patellofemoral joint mild degenerative change in the medial compartment no acute fracture. Large joint arthrocentesis: R knee  Universal Protocol:  Consent given by: patient  Time out: Immediately prior to procedure a "time out" was called to verify the correct patient, procedure, equipment, support staff and site/side marked as required.   Site marked: the operative site was marked  Supporting Documentation  Indications: pain   Procedure Details  Location: knee - R knee  Preparation: Patient was prepped and draped in the usual sterile fashion  Needle size: 22 G  Approach: anterolateral  Medications administered: 12 mg betamethasone acetate-betamethasone sodium phosphate 6 (3-3) mg/mL; 1 mL lidocaine 1 %; 1 mL bupivacaine 0.25 %    Patient tolerance: patient tolerated the procedure well with no immediate complications  Dressing:  Sterile dressing applied               Scribe Attestation      I,:  Isela Loving PA-C am acting as a scribe while in the presence of the attending physician.:       I,:  Kanika Jose DO personally performed the services described in this documentation    as scribed in my presence.:               Portions of the record may have been created with voice recognition software. Occasional wrong word or "sound a like" substitutions may have occurred due to the inherent limitations of voice recognition software. Read the chart carefully and recognize, using context, where substitutions have occurred.

## 2023-11-08 ENCOUNTER — VBI (OUTPATIENT)
Dept: ADMINISTRATIVE | Facility: OTHER | Age: 43
End: 2023-11-08

## 2023-11-20 ENCOUNTER — OFFICE VISIT (OUTPATIENT)
Dept: OBGYN CLINIC | Facility: CLINIC | Age: 43
End: 2023-11-20
Payer: COMMERCIAL

## 2023-11-20 VITALS
RESPIRATION RATE: 17 BRPM | HEIGHT: 65 IN | HEART RATE: 90 BPM | WEIGHT: 199 LBS | BODY MASS INDEX: 33.15 KG/M2 | DIASTOLIC BLOOD PRESSURE: 81 MMHG | SYSTOLIC BLOOD PRESSURE: 111 MMHG

## 2023-11-20 DIAGNOSIS — Z98.890 HISTORY OF MENISCECTOMY OF RIGHT KNEE: ICD-10-CM

## 2023-11-20 DIAGNOSIS — M23.91 INTERNAL DERANGEMENT OF RIGHT KNEE: Primary | ICD-10-CM

## 2023-11-20 PROCEDURE — 99213 OFFICE O/P EST LOW 20 MIN: CPT | Performed by: ORTHOPAEDIC SURGERY

## 2023-11-20 NOTE — PROGRESS NOTES
Assessment:  1. Internal derangement of right knee  MRI arthrogram right knee    FL injection right knee (arthrogram)      2. History of meniscectomy of right knee  MRI arthrogram right knee    FL injection right knee (arthrogram)          Patient Active Problem List   Diagnosis   • Essential hypertension   • Headache   • Moderate episode of recurrent major depressive disorder (HCC)   • Prediabetes   • Vitamin D deficiency   • Nodule of left lobe of thyroid gland   • Tobacco use   • Adrenal mass, left (HCC)   • Insomnia   • Obesity (BMI 30.0-34. 9)   • Varicose veins of both lower extremities with pain   • HIRAL (obstructive sleep apnea)   • Pain in right wrist   • Primary osteoarthritis of right knee   • Chronic fatigue   • History of hysterectomy   • Hidradenitis suppurativa   • Ascorbic acid deficiency   • Pyridoxine deficiency   • Lack of housing   • Acute stress disorder   • Family history of colon cancer   • Ankle swelling   • Epidermoid cyst of face   • Internal derangement of right knee       Plan:    36 y.o. female with right knee pain multifactorial primarily patellofemoral also with arthritic pain in the medial and patellofemoral compartments and likely degenerative meniscus tear    Given the patient's limited response to the cortisone injection into the right knee as well as her persistent symptomatology, we will proceed with MRI arthrogram of the right knee given her history of prior meniscectomy in that knee  Follow-up after MRI  Continue ibuprofen as needed  She may continue with taping and home exercises if she feels this beneficial    . The assessment and plan were formulated by Dr. Zee Perry and I assisted in carrying it out. Subjective:   Patient ID: Harpreet Daniels is a 36 y.o. female . HPI    Patient comes in today follow-up regarding right knee pain. She reports about 1 week of relief from the cortisone injection.   She continues to have pain in the anterior knee as well as medial to posterior knee.  She reports some clicking and popping in the knee but no instability recently. She has been doing a home exercise program and taping with some relief but not entire relief of her symptoms. She has been taking ibuprofen for pain relief as well. The following portions of the patient's history were reviewed and updated as appropriate: allergies, current medications, past family history, past social history, past surgical history and problem list.    Social History     Socioeconomic History   • Marital status: Single     Spouse name: Not on file   • Number of children: Not on file   • Years of education: 10   • Highest education level: Not on file   Occupational History   • Not on file   Tobacco Use   • Smoking status: Every Day     Packs/day: 0.50     Years: 20.00     Total pack years: 10.00     Types: Cigarettes   • Smokeless tobacco: Never   • Tobacco comments:     pt is down to .5 packs a day   Vaping Use   • Vaping Use: Former   • Substances: Nicotine   Substance and Sexual Activity   • Alcohol use: Yes     Comment: occasional   • Drug use: No   • Sexual activity: Not Currently     Partners: Male     Birth control/protection: Female Sterilization     Comment: HYSTERECTOMY   Other Topics Concern   • Not on file   Social History Narrative    Most recent tobacco use screenin-    Do you currently or have you served in the [address] [place] [address] [place]: No    Were you activated, into active duty, as a member of the [employer] or as a Reservist: No    Occupation: unemployed    Education: 10    Marital status: Single    Sexual orientation: Heterosexual    Exercise level: Occasional    Diet: Regular    General stress level: Low    Alcohol intake: Occasional    Caffeine intake:  Moderate    Chewing tobacco: none    Illicit drugs: denies    Guns present in home: No    Seat belts used routinely: Yes    Sunscreen used routinely: No    Smoke alarm in home: Yes    Advance directive: No    Live alone or with others: with others    Are there stairs in your home: Yes    Pets: Yes    Deaf or serious difficulty hearing: No    Blind or serious difficulty seeing: Yes    Difficulty concentrating, remembering or making decisions: No    Difficulty walking or climbing stairs: No    Difficulty dressing or bathing: No    Difficulty doing errands alone: No    Passive smoke exposure:  Yes    Are you currently employed: No    Asbestos exposure: No    TB exposure: No    Environmental exposure: No    Animal exposure: Yes    cat and dog    Blood Transfusion: No    Sexually active: No    Presence of domestic violence: No    Do you feel safe at home: Yes    no cpap or nebulizer     Social Determinants of Health     Financial Resource Strain: Not on file   Food Insecurity: Not on file   Transportation Needs: Not on file   Physical Activity: Not on file   Stress: Not on file   Social Connections: Not on file   Intimate Partner Violence: Not on file   Housing Stability: Not on file     Past Medical History:   Diagnosis Date   • Abnormal Pap smear of cervix     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, 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   • Adrenal mass, left ([address]) 01/25/2021    1.8 x 2.1 cm on ct 12/21/2020   • Arthritis    • Asthma    • Blurry vision 09/04/2020   • Diabetes mellitus ([address])    • Essential hypertension 09/04/2020   • Headache 09/04/2020   • Hidradenitis suppurativa    • Hypertension    • Immunization deficiency 10/02/2020    Hep a nonimmune   • Insomnia 02/22/2021   • Migraine    • Moderate episode of recurrent major depressive disorder ([address]) 09/04/2020   • Obesity (BMI 30.0-34.9) 09/22/2021   • Prediabetes 09/04/2020   • Seasonal allergies    • Tobacco use 01/25/2021   • Vitamin D deficiency 10/02/2020     Past Surgical History:   Procedure Laterality Date   • APPENDECTOMY     • BREAST BIOPSY Left 2017   • BREAST BIOPSY Right     2 core biopsies   • BREAST EXCISIONAL BIOPSY Left 09/01/2018   • BREAST SURGERY Left 09/2018    Left breast mass excision   • CERVICAL BIOPSY  W/ LOOP ELECTRODE EXCISION     •  SECTION      X2   • COLPOSCOPY     • FACIAL/NECK BIOPSY Right 10/3/2023    Procedure: EXCISION MASS RIGHT CHEEK;  Surgeon: Jayde Gr MD;  Location:  MAIN OR;  Service: General   • HYSTERECTOMY      heavy bleeding, ovaries remain, also had abnormal pap   • KNEE SURGERY     • LYMPH NODE DISSECTION  2018    under armpit   • TUBAL LIGATION  2007   • [address] THYROID BIOPSY  2021     No Known Allergies  Current Outpatient Medications on File Prior to Visit   Medication Sig Dispense Refill   • albuterol (PROVENTIL HFA,VENTOLIN HFA) 90 mcg/act inhaler INHALE 2 PUFFS BY MOUTH EVERY 6 HOURS AS NEEDED FOR WHEEZING 18 g 1   • buPROPion (WELLBUTRIN XL) 150 mg 24 hr tablet TAKE 1 TABLET BY MOUTH EVERY DAY IN THE MORNING 90 tablet 2   • Cholecalciferol (CVS D3) 125 MCG (5000 UT) capsule Take 1 capsule (5,000 Units total) by mouth daily 90 capsule 2   • CVS Vitamin C 500 MG tablet TAKE 1 TABLET (500 MG TOTAL) BY MOUTH DAILY. 90 tablet 3   • loratadine (CLARITIN) 10 mg tablet Take 1 tablet (10 mg total) by mouth daily For allergies 90 tablet 1   • metFORMIN (GLUCOPHAGE-XR) 750 mg 24 hr tablet Take 1 tablet (750 mg total) by mouth daily with breakfast 90 tablet 1   • methylPREDNISolone 4 MG tablet therapy pack Use as directed on package 1 each 0   • multivitamin (THERAGRAN) TABS Take 1 tablet by mouth daily     • spironolactone (ALDACTONE) 25 mg tablet TAKE 1 TABLET BY MOUTH EVERY DAY FOR 30 DAYS THEN TAKE 2 TABLETS DAILY AFTER 180 tablet 3   • tiotropium (SPIRIVA) 18 mcg inhalation capsule Place 18 mcg into inhaler and inhale in the morning.      • amLODIPine (NORVASC) 10 mg tablet Take 1 tablet (10 mg total) by mouth daily Blood presuure 90 tablet 1   • cyanocobalamin (VITAMIN B-12) 2000 MCG tablet Take 1 tablet (2,000 mcg total) by mouth daily 90 tablet 2   • naproxen (Naprosyn) 500 mg tablet Take 1 tablet (500 mg total) by mouth every 12 (twelve) hours as needed (pain) for up to 7 days Take with food. Do not start before September 29, 2023. 12 tablet 0   • valACYclovir (VALTREX) 500 mg tablet TAKE 1 TABLET BY MOUTH EVERY DAY 90 tablet 2     No current facility-administered medications on file prior to visit. Review of Systems      Objective:    Vitals:    11/20/23 1018   BP: 111/81   Pulse: 90   Resp: 17         Physical Exam  Musculoskeletal:      Right knee: No effusion. Instability Tests: Medial Lashaun test positive. Lateral Lashaun test negative. Right Knee Exam     Muscle Strength   The patient has normal right knee strength. Tenderness   The patient is experiencing tenderness in the medial retinaculum, patella, lateral joint line and medial joint line. Range of Motion   The patient has normal right knee ROM. Extension:  normal   Flexion:  normal     Tests   Lashaun:  Medial - positive Lateral - negative  Varus: negative Valgus: negative    Other   Erythema: absent  Scars: present  Sensation: normal  Right knee pulse absent: skin warm and well perfused. Swelling: mild  Effusion: no effusion present    Comments:  No ecchymosis, no deformity  Mild swelling in the Hoffa's fat pad            I have personally reviewed pertinent films in PACS. Procedures  None               Portions of the record may have been created with voice recognition software. Occasional wrong word or "sound a like" substitutions may have occurred due to the inherent limitations of voice recognition software. Read the chart carefully and recognize, using context, where substitutions have occurred.

## 2023-12-18 ENCOUNTER — OFFICE VISIT (OUTPATIENT)
Dept: FAMILY MEDICINE CLINIC | Facility: CLINIC | Age: 43
End: 2023-12-18
Payer: COMMERCIAL

## 2023-12-18 VITALS
HEART RATE: 97 BPM | BODY MASS INDEX: 33.66 KG/M2 | SYSTOLIC BLOOD PRESSURE: 102 MMHG | WEIGHT: 202 LBS | DIASTOLIC BLOOD PRESSURE: 80 MMHG | RESPIRATION RATE: 18 BRPM | TEMPERATURE: 98 F | OXYGEN SATURATION: 98 % | HEIGHT: 65 IN

## 2023-12-18 DIAGNOSIS — R73.03 PREDIABETES: ICD-10-CM

## 2023-12-18 DIAGNOSIS — F33.1 MODERATE EPISODE OF RECURRENT MAJOR DEPRESSIVE DISORDER (HCC): ICD-10-CM

## 2023-12-18 DIAGNOSIS — Z00.00 ANNUAL PHYSICAL EXAM: Primary | ICD-10-CM

## 2023-12-18 DIAGNOSIS — D72.829 LEUKOCYTOSIS, UNSPECIFIED TYPE: ICD-10-CM

## 2023-12-18 DIAGNOSIS — I10 ESSENTIAL HYPERTENSION: ICD-10-CM

## 2023-12-18 DIAGNOSIS — Z12.31 ENCOUNTER FOR SCREENING MAMMOGRAM FOR BREAST CANCER: ICD-10-CM

## 2023-12-18 PROBLEM — M25.531 PAIN IN RIGHT WRIST: Status: RESOLVED | Noted: 2022-06-07 | Resolved: 2023-12-18

## 2023-12-18 PROBLEM — F43.0 ACUTE STRESS DISORDER: Status: RESOLVED | Noted: 2023-01-12 | Resolved: 2023-12-18

## 2023-12-18 PROBLEM — M25.473 ANKLE SWELLING: Status: RESOLVED | Noted: 2023-08-14 | Resolved: 2023-12-18

## 2023-12-18 PROCEDURE — 99396 PREV VISIT EST AGE 40-64: CPT | Performed by: INTERNAL MEDICINE

## 2023-12-18 RX ORDER — AMLODIPINE BESYLATE 5 MG/1
5 TABLET ORAL DAILY
Qty: 90 TABLET | Refills: 0 | Status: SHIPPED | OUTPATIENT
Start: 2023-12-18 | End: 2024-03-17

## 2023-12-18 RX ORDER — BUPROPION HYDROCHLORIDE 300 MG/1
300 TABLET ORAL EVERY MORNING
Qty: 90 TABLET | Refills: 1 | Status: SHIPPED | OUTPATIENT
Start: 2023-12-18

## 2023-12-18 NOTE — PROGRESS NOTES
ADULT ANNUAL PHYSICAL  [facility] PRIMARY CARE    NAME: Jessica Oliver  AGE: 42 y.o. SEX: female  : 1980     DATE: 2023     Assessment and Plan:     Problem List Items Addressed This Visit       Essential hypertension     BP on the lower side  Decrease amlodipine to 5 mg daily  Continue spironolactone  Check labs yearly         Relevant Medications    amLODIPine (NORVASC) 5 mg tablet    Moderate episode of recurrent major depressive disorder (HCC)     Improving, we can increase her Wellbutrin to 300 mg daily  Recheck in 3-4 months or sooner if any issues         Relevant Medications    buPROPion (WELLBUTRIN XL) 300 mg 24 hr tablet    Prediabetes    Relevant Orders    Hemoglobin A1C    Annual physical exam - Primary     Annual physical exam completed today. Discussed health maintenance topics including immunizations. Risk and benefits of relevant cancer screenings discussed and offered. Encouraged cessation of smoking if applicable. Encouraged healthy diet and exercise 3-5 times per week as tolerated. Reviewed prior labs and ordered labs if due. Repeat annual physical in 1 year.     Declines flu shot           Other Visit Diagnoses       Encounter for screening mammogram for breast cancer        Relevant Orders    Mammo screening bilateral w 3d & cad    Leukocytosis, unspecified type        Relevant Orders    CBC and differential            Immunizations and preventive care screenings were discussed with patient today. Appropriate education was printed on patient's after visit summary.    Counseling:  Alcohol/drug use: discussed moderation in alcohol intake, the recommendations for healthy alcohol use, and avoidance of illicit drug use.  Dental Health: discussed importance of regular tooth brushing, flossing, and dental visits.  Injury prevention: discussed safety/seat belts, safety helmets, smoke detectors, carbon dioxide detectors, and smoking near  bedding or upholstery.  Exercise: the importance of regular exercise/physical activity was discussed. Recommend exercise 3-5 times per week for at least 30 minutes.          Return in about 4 months (around 4/18/2024) for Recheck.     Chief Complaint:     Chief Complaint   Patient presents with    Physical Exam     Annual physical       History of Present Illness:     Adult Annual Physical   Patient here for a comprehensive physical exam.  Patient reports continued depression though overall is feeling better with the Wellbutrin.  She is still smoking and wants to work on quitting.  She does not sleep well but this has been true for a long time.  She feels more okay with being out and social than she was before.    She reports occasional lightheadedness/dizziness.  She has been taking her medications as prescribed.  She does not check her blood pressure at home.      Diet and Physical Activity  Diet/Nutrition:  ok, could be better .   Exercise: no formal exercise.      Depression Screening  PHQ-2/9 Depression Screening    Little interest or pleasure in doing things: 3 - nearly every day  Feeling down, depressed, or hopeless: 3 - nearly every day  Trouble falling or staying asleep, or sleeping too much: 3 - nearly every day  Feeling tired or having little energy: 3 - nearly every day  Poor appetite or overeating: 3 - nearly every day  Feeling bad about yourself - or that you are a failure or have let yourself or your family down: 0 - not at all  Trouble concentrating on things, such as reading the newspaper or watching television: 1 - several days  Moving or speaking so slowly that other people could have noticed. Or the opposite - being so fidgety or restless that you have been moving around a lot more than usual: 0 - not at all  Thoughts that you would be better off dead, or of hurting yourself in some way: 0 - not at all  PHQ-9 Score: 16   PHQ-9 Interpretation: Moderately severe depression          General  Health  Sleep: sleeps poorly.   Hearing:  No issues .  Vision: no vision problems.   Dental: regular dental visits.       /GYN Health  Follows with gynecology? yes      Review of Systems:     Review of Systems   Constitutional:  Negative for chills and fever.   HENT:  Negative for congestion and sore throat.    Respiratory:  Negative for cough and shortness of breath.    Cardiovascular:  Negative for chest pain and leg swelling.   Gastrointestinal:  Negative for abdominal pain, blood in stool and diarrhea.   Genitourinary:  Negative for dysuria and frequency.   Neurological:  Negative for headaches.   Psychiatric/Behavioral:  Positive for dysphoric mood.       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    Adrenal mass, left (HCC) 2021    1.8 x 2.1 cm on ct 2020    Arthritis     Asthma     Blurry vision 2020    Diabetes mellitus (HCC)     Essential hypertension 2020    Headache 2020    Hidradenitis suppurativa     Hypertension     Immunization deficiency 10/02/2020    Hep a nonimmune    Insomnia 2021    Migraine     Moderate episode of recurrent major depressive disorder (HCC) 2020    Obesity (BMI 30.0-34.9) 2021    Prediabetes 2020    Seasonal allergies     Tobacco use 2021    Vitamin D deficiency 10/02/2020      Past Surgical History:     Past Surgical History:   Procedure Laterality Date    APPENDECTOMY      BREAST BIOPSY Left 2017    BREAST BIOPSY Right     2 core biopsies    BREAST EXCISIONAL BIOPSY Left 2018    BREAST SURGERY Left 2018    Left breast mass excision    CERVICAL BIOPSY  W/ LOOP ELECTRODE EXCISION       SECTION      X2    COLPOSCOPY      CYST REMOVAL      FACIAL/NECK BIOPSY Right 10/03/2023    Procedure: EXCISION MASS RIGHT CHEEK;  Surgeon: Robert Bloch, MD;  Location:  MAIN OR;  Service: General    HYSTERECTOMY      heavy  bleeding, ovaries remain, also had abnormal pap    KNEE SURGERY      LYMPH NODE DISSECTION  2018    under armpit    TUBAL LIGATION      US GUIDED THYROID BIOPSY  2021      Social History:     Social History     Socioeconomic History    Marital status: Single     Spouse name: None    Number of children: None    Years of education: 10    Highest education level: None   Occupational History    None   Tobacco Use    Smoking status: Every Day     Current packs/day: 0.50     Average packs/day: 0.5 packs/day for 20.0 years (10.0 ttl pk-yrs)     Types: Cigarettes    Smokeless tobacco: Never    Tobacco comments:     pt is down to .5 packs a day   Vaping Use    Vaping status: Former    Substances: Nicotine   Substance and Sexual Activity    Alcohol use: Yes     Comment: occasional    Drug use: No    Sexual activity: Not Currently     Partners: Male     Birth control/protection: Female Sterilization     Comment: HYSTERECTOMY   Other Topics Concern    None   Social History Narrative    Most recent tobacco use screenin-    Do you currently or have you served in the [employer]: No    Were you activated, into active duty, as a member of the [employer] or as a Reservist: No    Occupation: unemployed    Education: 10    Marital status: Single    Sexual orientation: Heterosexual    Exercise level: Occasional    Diet: Regular    General stress level: Low    Alcohol intake: Occasional    Caffeine intake: Moderate    Chewing tobacco: none    Illicit drugs: denies    Guns present in home: No    Seat belts used routinely: Yes    Sunscreen used routinely: No    Smoke alarm in home: Yes    Advance directive: No    Live alone or with others: with others    Are there stairs in your home: Yes    Pets: Yes    Deaf or serious difficulty hearing: No    Blind or serious difficulty seeing: Yes    Difficulty concentrating, remembering or making decisions: No    Difficulty walking or climbing stairs: No    Difficulty  dressing or bathing: No    Difficulty doing errands alone: No    Passive smoke exposure: Yes    Are you currently employed: No    Asbestos exposure: No    TB exposure: No    Environmental exposure: No    Animal exposure: Yes    cat and dog    Blood Transfusion: No    Sexually active: No    Presence of domestic violence: No    Do you feel safe at home: Yes    no cpap or nebulizer     Social Determinants of Health     Financial Resource Strain: Not on file   Food Insecurity: Not on file   Transportation Needs: Not on file   Physical Activity: Not on file   Stress: Not on file   Social Connections: Not on file   Intimate Partner Violence: Not on file   Housing Stability: Not on file      Family History:     Family History   Problem Relation Age of Onset    Diabetes Mother     Heart attack Mother     Heart disease Mother         Internal Defibrilation    No Known Problems Father     Endometrial cancer Sister 28    Colon cancer Sister 35    No Known Problems Daughter     Diabetes Maternal Grandmother     Diabetes Paternal Grandmother     No Known Problems Son     No Known Problems Son     Lupus Maternal Aunt 48    Seizures Maternal Aunt     Leukemia Maternal Uncle 18    Diabetes Paternal Aunt     No Known Problems Paternal Aunt       Current Medications:     Current Outpatient Medications   Medication Sig Dispense Refill    albuterol (PROVENTIL HFA,VENTOLIN HFA) 90 mcg/act inhaler INHALE 2 PUFFS BY MOUTH EVERY 6 HOURS AS NEEDED FOR WHEEZING 18 g 1    amLODIPine (NORVASC) 5 mg tablet Take 1 tablet (5 mg total) by mouth daily Blood presuure 90 tablet 0    buPROPion (WELLBUTRIN XL) 300 mg 24 hr tablet Take 1 tablet (300 mg total) by mouth every morning 90 tablet 1    Cholecalciferol ([employer] D3) 125 MCG (5000 UT) capsule Take 1 capsule (5,000 Units total) by mouth daily 90 capsule 2    [employer]S Vitamin C 500 MG tablet TAKE 1 TABLET (500 MG TOTAL) BY MOUTH DAILY. 90 tablet 3    cyanocobalamin (VITAMIN B-12) 2000 MCG tablet Take 1  "tablet (2,000 mcg total) by mouth daily 90 tablet 2    loratadine (CLARITIN) 10 mg tablet Take 1 tablet (10 mg total) by mouth daily For allergies 90 tablet 1    metFORMIN (GLUCOPHAGE-XR) 750 mg 24 hr tablet Take 1 tablet (750 mg total) by mouth daily with breakfast 90 tablet 1    methylPREDNISolone 4 MG tablet therapy pack Use as directed on package 1 each 0    multivitamin (THERAGRAN) TABS Take 1 tablet by mouth daily      spironolactone (ALDACTONE) 25 mg tablet TAKE 1 TABLET BY MOUTH EVERY DAY FOR 30 DAYS THEN TAKE 2 TABLETS DAILY AFTER 180 tablet 3    tiotropium (SPIRIVA) 18 mcg inhalation capsule Place 18 mcg into inhaler and inhale in the morning.      valACYclovir (VALTREX) 500 mg tablet TAKE 1 TABLET BY MOUTH EVERY DAY 90 tablet 2     No current facility-administered medications for this visit.      Allergies:     No Known Allergies   Physical Exam:     /80 (BP Location: Right arm, Patient Position: Sitting, Cuff Size: Standard)   Pulse 97   Temp 98 °F (36.7 °C) (Tympanic)   Resp 18   Ht 5' 4.96\" (1.65 m)   Wt 91.6 kg (202 lb)   SpO2 98%   BMI 33.65 kg/m²     Physical Exam  Vitals reviewed.   Constitutional:       General: She is not in acute distress.  HENT:      Right Ear: Tympanic membrane and external ear normal.      Left Ear: Tympanic membrane and external ear normal.      Nose: Nose normal.      Mouth/Throat:      Mouth: Mucous membranes are moist.   Eyes:      Extraocular Movements: Extraocular movements intact.      Conjunctiva/sclera: Conjunctivae normal.      Pupils: Pupils are equal, round, and reactive to light.   Cardiovascular:      Rate and Rhythm: Normal rate and regular rhythm.      Heart sounds: No murmur heard.  Pulmonary:      Effort: Pulmonary effort is normal.      Breath sounds: Normal breath sounds. No wheezing.   Abdominal:      General: There is no distension.      Palpations: Abdomen is soft.      Tenderness: There is no abdominal tenderness.   Musculoskeletal:      " Cervical back: Normal range of motion.      Right lower leg: No edema.      Left lower leg: No edema.   Lymphadenopathy:      Cervical: No cervical adenopathy.   Skin:     Findings: No rash.   Neurological:      Mental Status: She is alert and oriented to person, place, and time.      Cranial Nerves: No cranial nerve deficit.   Psychiatric:         Mood and Affect: Mood normal.         Behavior: Behavior normal.          ISAI YANG MD  [facility] PRIMARY CARE

## 2023-12-18 NOTE — ASSESSMENT & PLAN NOTE
Improving, we can increase her Wellbutrin to 300 mg daily  Recheck in 3-4 months or sooner if any issues

## 2023-12-18 NOTE — ASSESSMENT & PLAN NOTE
Annual physical exam completed today. Discussed health maintenance topics including immunizations. Risk and benefits of relevant cancer screenings discussed and offered. Encouraged cessation of smoking if applicable. Encouraged healthy diet and exercise 3-5 times per week as tolerated. Reviewed prior labs and ordered labs if due. Repeat annual physical in 1 year.     Declines flu shot

## 2023-12-18 NOTE — ASSESSMENT & PLAN NOTE
BP on the lower side  Decrease amlodipine to 5 mg daily  Continue spironolactone  Check labs yearly

## 2024-01-08 ENCOUNTER — TELEPHONE (OUTPATIENT)
Dept: NON INVASIVE DIAGNOSTICS | Facility: HOSPITAL | Age: 44
End: 2024-01-08

## 2024-01-08 NOTE — TELEPHONE ENCOUNTER
Call placed to patient to discuss upcoming right knee injection at [facility]. Allergies reviewed and verified patient does not currently take any anticoagulant medications. Pre procedure instructions including diet, taking own medications and need for  discussed with patient. Patient instructed that she may eat normally and take medications as usual before the procedure. Procedure and post procedure expectations and instructions reviewed with the patient. Patient verbalizes understanding and denies any questions at this time. Reminded of the location, date and time of the expected procedure. Name and contact number provided in case patient has any further questions.  Patient is a diabetic and explained that steroid medications can cause a temporary increase in blood glucose. Patient instructed to monitor blood glucose levels and contact physician if blood glucose level remains elevated.

## 2024-01-15 ENCOUNTER — HOSPITAL ENCOUNTER (OUTPATIENT)
Dept: MRI IMAGING | Facility: HOSPITAL | Age: 44
Discharge: HOME | End: 2024-01-15
Payer: COMMERCIAL

## 2024-01-15 ENCOUNTER — HOSPITAL ENCOUNTER (OUTPATIENT)
Dept: RADIOLOGY | Facility: HOSPITAL | Age: 44
Discharge: HOME | End: 2024-01-15
Admitting: RADIOLOGY
Payer: COMMERCIAL

## 2024-01-15 DIAGNOSIS — Z98.890 HISTORY OF MENISCECTOMY OF RIGHT KNEE: ICD-10-CM

## 2024-01-15 DIAGNOSIS — M23.91 INTERNAL DERANGEMENT OF RIGHT KNEE: ICD-10-CM

## 2024-01-15 PROCEDURE — A9585 GADOBUTROL INJECTION: HCPCS | Performed by: PHYSICIAN ASSISTANT

## 2024-01-15 PROCEDURE — G1004 CDSM NDSC: HCPCS

## 2024-01-15 PROCEDURE — 73722 MRI JOINT OF LWR EXTR W/DYE: CPT

## 2024-01-15 PROCEDURE — 27369 NJX CNTRST KNE ARTHG/CT/MRI: CPT

## 2024-01-15 PROCEDURE — 77002 NEEDLE LOCALIZATION BY XRAY: CPT

## 2024-01-15 RX ORDER — GADOBUTROL 604.72 MG/ML
0.2 INJECTION INTRAVENOUS
Status: COMPLETED | OUTPATIENT
Start: 2024-01-15 | End: 2024-01-15

## 2024-01-15 RX ORDER — SODIUM CHLORIDE 9 MG/ML
15 INJECTION INTRAVENOUS ONCE
Status: COMPLETED | OUTPATIENT
Start: 2024-01-15 | End: 2024-01-15

## 2024-01-15 RX ORDER — LIDOCAINE HYDROCHLORIDE 10 MG/ML
7 INJECTION, SOLUTION EPIDURAL; INFILTRATION; INTRACAUDAL; PERINEURAL ONCE
Status: COMPLETED | OUTPATIENT
Start: 2024-01-15 | End: 2024-01-15

## 2024-01-15 RX ADMIN — IOHEXOL 5 ML: 300 INJECTION, SOLUTION INTRAVENOUS at 14:20

## 2024-01-15 RX ADMIN — SODIUM CHLORIDE, PRESERVATIVE FREE 15 ML: 5 INJECTION INTRAVENOUS at 14:20

## 2024-01-15 RX ADMIN — GADOBUTROL 0.2 ML: 604.72 INJECTION INTRAVENOUS at 14:46

## 2024-01-15 RX ADMIN — LIDOCAINE HYDROCHLORIDE 7 ML: 10 INJECTION, SOLUTION EPIDURAL; INFILTRATION; INTRACAUDAL; PERINEURAL at 14:20

## 2024-01-30 ENCOUNTER — HOSPITAL ENCOUNTER (EMERGENCY)
Facility: HOSPITAL | Age: 44
Discharge: HOME | End: 2024-01-31
Attending: EMERGENCY MEDICINE
Payer: COMMERCIAL

## 2024-01-30 VITALS
SYSTOLIC BLOOD PRESSURE: 133 MMHG | OXYGEN SATURATION: 99 % | RESPIRATION RATE: 18 BRPM | BODY MASS INDEX: 33.32 KG/M2 | DIASTOLIC BLOOD PRESSURE: 83 MMHG | TEMPERATURE: 98.3 F | HEART RATE: 98 BPM | WEIGHT: 200 LBS

## 2024-01-30 DIAGNOSIS — A59.9 TRICHOMONAS INFECTION: ICD-10-CM

## 2024-01-30 DIAGNOSIS — M54.9 BACK PAIN: ICD-10-CM

## 2024-01-30 DIAGNOSIS — N39.0 ACUTE UTI: ICD-10-CM

## 2024-01-30 PROCEDURE — 99284 EMERGENCY DEPT VISIT MOD MDM: CPT | Performed by: EMERGENCY MEDICINE

## 2024-01-30 PROCEDURE — 99283 EMERGENCY DEPT VISIT LOW MDM: CPT

## 2024-01-30 NOTE — Clinical Note
Jessica Oliver was seen and treated in our emergency department on 1/30/2024.                Diagnosis:     Jessica  may return to work on return date.    She may return on this date: 02/01/2024         If you have any questions or concerns, please don't hesitate to call.      Felton Shultz MD    ______________________________           _______________          _______________  Hospital Representative                              Date                                Time

## 2024-01-31 LAB
BACTERIA UR QL AUTO: ABNORMAL /HPF
BILIRUB UR QL STRIP: NEGATIVE
CLARITY UR: ABNORMAL
COLOR UR: YELLOW
EXT PREGNANCY TEST URINE: NEGATIVE
EXT. CONTROL: NORMAL
GLUCOSE UR STRIP-MCNC: NEGATIVE MG/DL
HGB UR QL STRIP.AUTO: ABNORMAL
KETONES UR STRIP-MCNC: NEGATIVE MG/DL
LEUKOCYTE ESTERASE UR QL STRIP: ABNORMAL
MUCOUS THREADS UR QL AUTO: ABNORMAL
NITRITE UR QL STRIP: NEGATIVE
NON-SQ EPI CELLS URNS QL MICRO: ABNORMAL /HPF
OTHER STN SPEC: ABNORMAL
PH UR STRIP.AUTO: 6 [PH]
PROT UR STRIP-MCNC: NEGATIVE MG/DL
RBC #/AREA URNS AUTO: ABNORMAL /HPF
SP GR UR STRIP.AUTO: 1.01 (ref 1–1.03)
UROBILINOGEN UR QL STRIP.AUTO: 0.2 E.U./DL
WBC #/AREA URNS AUTO: ABNORMAL /HPF

## 2024-01-31 PROCEDURE — 96372 THER/PROPH/DIAG INJ SC/IM: CPT

## 2024-01-31 PROCEDURE — 81001 URINALYSIS AUTO W/SCOPE: CPT | Performed by: EMERGENCY MEDICINE

## 2024-01-31 PROCEDURE — NC001 PR NO CHARGE: Performed by: EMERGENCY MEDICINE

## 2024-01-31 PROCEDURE — 87086 URINE CULTURE/COLONY COUNT: CPT | Performed by: EMERGENCY MEDICINE

## 2024-01-31 PROCEDURE — 81025 URINE PREGNANCY TEST: CPT | Performed by: EMERGENCY MEDICINE

## 2024-01-31 RX ORDER — METRONIDAZOLE 500 MG/1
500 TABLET ORAL EVERY 12 HOURS SCHEDULED
Qty: 13 TABLET | Refills: 0 | Status: SHIPPED | OUTPATIENT
Start: 2024-01-31 | End: 2024-02-07

## 2024-01-31 RX ORDER — TRAMADOL HYDROCHLORIDE 50 MG/1
50 TABLET ORAL ONCE
Status: COMPLETED | OUTPATIENT
Start: 2024-01-31 | End: 2024-01-31

## 2024-01-31 RX ORDER — NAPROXEN 500 MG/1
500 TABLET ORAL EVERY 12 HOURS PRN
Qty: 14 TABLET | Refills: 0 | Status: SHIPPED | OUTPATIENT
Start: 2024-01-31 | End: 2024-01-31

## 2024-01-31 RX ORDER — CEFPODOXIME PROXETIL 200 MG/1
200 TABLET, FILM COATED ORAL ONCE
Status: COMPLETED | OUTPATIENT
Start: 2024-01-31 | End: 2024-01-31

## 2024-01-31 RX ORDER — ACETAMINOPHEN 325 MG/1
650 TABLET ORAL ONCE
Status: COMPLETED | OUTPATIENT
Start: 2024-01-31 | End: 2024-01-31

## 2024-01-31 RX ORDER — CEFPODOXIME PROXETIL 200 MG/1
200 TABLET, FILM COATED ORAL 2 TIMES DAILY
Qty: 19 TABLET | Refills: 0 | Status: SHIPPED | OUTPATIENT
Start: 2024-01-31 | End: 2024-02-10

## 2024-01-31 RX ORDER — METRONIDAZOLE 500 MG/1
500 TABLET ORAL ONCE
Status: COMPLETED | OUTPATIENT
Start: 2024-01-31 | End: 2024-01-31

## 2024-01-31 RX ORDER — KETOROLAC TROMETHAMINE 30 MG/ML
15 INJECTION, SOLUTION INTRAMUSCULAR; INTRAVENOUS ONCE
Status: COMPLETED | OUTPATIENT
Start: 2024-01-31 | End: 2024-01-31

## 2024-01-31 RX ADMIN — METRONIDAZOLE 500 MG: 500 TABLET ORAL at 02:21

## 2024-01-31 RX ADMIN — TRAMADOL HYDROCHLORIDE 50 MG: 50 TABLET, COATED ORAL at 02:21

## 2024-01-31 RX ADMIN — CEFPODOXIME PROXETIL 200 MG: 200 TABLET, FILM COATED ORAL at 02:20

## 2024-01-31 RX ADMIN — KETOROLAC TROMETHAMINE 15 MG: 30 INJECTION, SOLUTION INTRAMUSCULAR; INTRAVENOUS at 00:47

## 2024-01-31 RX ADMIN — ACETAMINOPHEN 650 MG: 325 TABLET, FILM COATED ORAL at 00:47

## 2024-01-31 NOTE — ED CARE HANDOFF
Emergency Department Sign Out Note        Signout and transfer of care from my colleague, Dr. Mead. See Separate Emergency Department note.     The patient, Jessica Oliver, was evaluated by the previous provider for low back pain.    Labs Reviewed   URINALYSIS WITH REFLEX TO SCOPE - Abnormal       Result Value Ref Range Status    Color, UA Yellow  Yellow Final    Clarity, UA Slightly Cloudy (*) Clear Final    Specific Gravity, UA 1.010  1.001 - 1.030 Final    pH, UA 6.0  5.0, 5.5, 6.0, 6.5, 7.0, 7.5, 8.0 Final    Leukocytes, UA 3+ (*) Negative Final    Nitrite, UA Negative  Negative Final    Protein, UA Negative  Negative, Interference- unable to analyze mg/dl Final    Glucose, UA Negative  Negative mg/dl Final    Ketones, UA Negative  Negative mg/dl Final    Urobilinogen, UA 0.2  0.2, 1.0 E.U./dl E.U./dl Final    Bilirubin, UA Negative  Negative Final    Occult Blood, UA Trace-Intact (*) Negative Final   URINE MICROSCOPIC - Abnormal    RBC, UA 0-5  None Seen, 0-1, 1-2, 2-4, 0-5 /hpf Final    WBC, UA 20-30 (*) None Seen, 0-1, 1-2, 0-5, 2-4 /hpf Final    Epithelial Cells Moderate (*) None Seen, Occasional /hpf Final    Bacteria, UA Moderate (*) None Seen, Occasional /hpf Final    OTHER OBSERVATIONS Trichomonas Organisms Present   Final    Comment: SEE OTHER OBSERVATIONS RESULTS!!!    MUCUS THREADS Occasional (*) None Seen Final   POCT PREGNANCY, URINE - Normal    EXT Preg Test, Ur Negative   Final    Control Valid   Final   URINE CULTURE       Urine pregnancy and urinalysis are pending.      Urine pregnancy test is negative.  Urinalysis remarkable for 3+ leukocytes, trace intact occult blood, 0-5 red blood cells, 20-30 white blood cells, moderate epithelial cells, moderate bacteria, trichomonas, occasional mucus threads.  Evaluation is consistent with urinary tract infection, trichomonas infection.  Plan to treat patient with course of antibiotics, and have patient follow up with PCP/outpatient providers.   Patient stable for discharge home.  Discharge instructions were reviewed with patient.                        Procedures  Medical Decision Making  Amount and/or Complexity of Data Reviewed  Labs: ordered.    Risk  OTC drugs.  Prescription drug management.            Disposition  Final diagnoses:   Acute UTI   Back pain   Trichomonas infection     Time reflects when diagnosis was documented in both MDM as applicable and the Disposition within this note       Time User Action Codes Description Comment    1/31/2024  2:05 AM Felton Shultz Add [N39.0] Acute UTI     1/31/2024  2:05 AM Felton Shultz Add [M54.9] Back pain     1/31/2024  2:05 AM Felton Shultz Add [A59.9] Trichomonas infection           ED Disposition       ED Disposition   Discharge    Condition   Stable    Date/Time   Wed Jan 31, 2024  2:05 AM    Comment   Jessica Oliver discharge to home/self care.                   Follow-up Information       Follow up With Specialties Details Why Contact Info Additional Information    Johanny Childs MD Internal Medicine Call in 1 day  [address]  [facility] [id number] [phone]       [facility] Call in 1 day  [address] [facility] [address]  [facility] [address] [phone] [facility] [address] [facility] [facility] [address], [place], [address] [phone]          Patient's Medications   Discharge Prescriptions    CEFPODOXIME (VANTIN) 200 MG TABLET    Take 1 tablet (200 mg total) by mouth 2 (two) times a day for 10 days       Start Date: 1/31/2024 End Date: 2/10/2024       Order Dose: 200 mg       Quantity: 19 tablet    Refills: 0    METRONIDAZOLE (FLAGYL) 500 MG TABLET    Take 1 tablet (500 mg total) by mouth every 12 (twelve) hours for 7 days       Start Date: 1/31/2024 End Date: 2/7/2024       Order Dose: 500 mg       Quantity: 13 tablet    Refills: 0    NAPROXEN (NAPROSYN) 500 MG TABLET     Take 1 tablet (500 mg total) by mouth every 12 (twelve) hours as needed (pain) for up to 10 days Take with food.       Start Date: 1/31/2024 End Date: 2/10/2024       Order Dose: 500 mg       Quantity: 14 tablet    Refills: 0            ED Provider  Electronically Signed by     Felton Shultz MD  01/31/24 0114       Felton Shultz MD  01/31/24 0214

## 2024-01-31 NOTE — ED NOTES
Discharge instructions reviewed with pt. Pt verbalized understanding. And has no further questions at this time. Pt ambulatory off unit with steady gait.      Faustina Kinsey RN  01/31/24 0225

## 2024-02-01 ENCOUNTER — HOSPITAL ENCOUNTER (EMERGENCY)
Facility: HOSPITAL | Age: 44
Discharge: HOME | End: 2024-02-01
Attending: EMERGENCY MEDICINE
Payer: COMMERCIAL

## 2024-02-01 ENCOUNTER — APPOINTMENT (EMERGENCY)
Dept: CT IMAGING | Facility: HOSPITAL | Age: 44
End: 2024-02-01
Payer: COMMERCIAL

## 2024-02-01 VITALS
DIASTOLIC BLOOD PRESSURE: 88 MMHG | TEMPERATURE: 97.5 F | OXYGEN SATURATION: 99 % | SYSTOLIC BLOOD PRESSURE: 139 MMHG | RESPIRATION RATE: 18 BRPM | HEART RATE: 80 BPM

## 2024-02-01 DIAGNOSIS — I44.0 1ST DEGREE AV BLOCK: ICD-10-CM

## 2024-02-01 DIAGNOSIS — R10.9 ACUTE ABDOMINAL PAIN: ICD-10-CM

## 2024-02-01 DIAGNOSIS — R03.0 ELEVATED BLOOD PRESSURE READING: ICD-10-CM

## 2024-02-01 DIAGNOSIS — M54.50 ACUTE LOW BACK PAIN: Primary | ICD-10-CM

## 2024-02-01 LAB
ALBUMIN SERPL BCP-MCNC: 4.2 G/DL (ref 3.5–5)
ALP SERPL-CCNC: 95 U/L (ref 34–104)
ALT SERPL W P-5'-P-CCNC: 13 U/L (ref 7–52)
ANION GAP SERPL CALCULATED.3IONS-SCNC: 7 MMOL/L
AST SERPL W P-5'-P-CCNC: 13 U/L (ref 13–39)
ATRIAL RATE: 71 BPM
BACTERIA UR QL AUTO: ABNORMAL /HPF
BASOPHILS # BLD AUTO: 0.07 THOUSANDS/ÂΜL (ref 0–0.1)
BASOPHILS NFR BLD AUTO: 1 % (ref 0–1)
BILIRUB SERPL-MCNC: 0.41 MG/DL (ref 0.2–1)
BILIRUB UR QL STRIP: NEGATIVE
BUN SERPL-MCNC: 10 MG/DL (ref 5–25)
CALCIUM SERPL-MCNC: 9.4 MG/DL (ref 8.4–10.2)
CHLORIDE SERPL-SCNC: 106 MMOL/L (ref 96–108)
CLARITY UR: ABNORMAL
CO2 SERPL-SCNC: 25 MMOL/L (ref 21–32)
COLOR UR: YELLOW
CREAT SERPL-MCNC: 0.81 MG/DL (ref 0.6–1.3)
EOSINOPHIL # BLD AUTO: 0.15 THOUSAND/ÂΜL (ref 0–0.61)
EOSINOPHIL NFR BLD AUTO: 1 % (ref 0–6)
ERYTHROCYTE [DISTWIDTH] IN BLOOD BY AUTOMATED COUNT: 14.5 % (ref 11.6–15.1)
EXT PREGNANCY TEST URINE: NEGATIVE
EXT. CONTROL: NORMAL
GFR SERPL CREATININE-BSD FRML MDRD: 88 ML/MIN/1.73SQ M
GLUCOSE SERPL-MCNC: 85 MG/DL (ref 65–140)
GLUCOSE UR STRIP-MCNC: NEGATIVE MG/DL
HCT VFR BLD AUTO: 43 % (ref 34.8–46.1)
HGB BLD-MCNC: 13.9 G/DL (ref 11.5–15.4)
HGB UR QL STRIP.AUTO: NEGATIVE
IMM GRANULOCYTES # BLD AUTO: 0.04 THOUSAND/UL (ref 0–0.2)
IMM GRANULOCYTES NFR BLD AUTO: 0 % (ref 0–2)
KETONES UR STRIP-MCNC: NEGATIVE MG/DL
LEUKOCYTE ESTERASE UR QL STRIP: ABNORMAL
LIPASE SERPL-CCNC: 22 U/L (ref 11–82)
LYMPHOCYTES # BLD AUTO: 3.87 THOUSANDS/ÂΜL (ref 0.6–4.47)
LYMPHOCYTES NFR BLD AUTO: 34 % (ref 14–44)
MCH RBC QN AUTO: 28.5 PG (ref 26.8–34.3)
MCHC RBC AUTO-ENTMCNC: 32.3 G/DL (ref 31.4–37.4)
MCV RBC AUTO: 88 FL (ref 82–98)
MONOCYTES # BLD AUTO: 0.97 THOUSAND/ÂΜL (ref 0.17–1.22)
MONOCYTES NFR BLD AUTO: 9 % (ref 4–12)
MUCOUS THREADS UR QL AUTO: ABNORMAL
NEUTROPHILS # BLD AUTO: 6.36 THOUSANDS/ÂΜL (ref 1.85–7.62)
NEUTS SEG NFR BLD AUTO: 55 % (ref 43–75)
NITRITE UR QL STRIP: NEGATIVE
NON-SQ EPI CELLS URNS QL MICRO: ABNORMAL /HPF
NRBC BLD AUTO-RTO: 0 /100 WBCS
P AXIS: 66 DEGREES
PH UR STRIP.AUTO: 5.5 [PH]
PLATELET # BLD AUTO: 345 THOUSANDS/UL (ref 149–390)
PMV BLD AUTO: 8.6 FL (ref 8.9–12.7)
POTASSIUM SERPL-SCNC: 3.9 MMOL/L (ref 3.5–5.3)
PR INTERVAL: 230 MS
PROT SERPL-MCNC: 7.6 G/DL (ref 6.4–8.4)
PROT UR STRIP-MCNC: NEGATIVE MG/DL
QRS AXIS: 53 DEGREES
QRSD INTERVAL: 78 MS
QT INTERVAL: 452 MS
QTC INTERVAL: 491 MS
RBC # BLD AUTO: 4.87 MILLION/UL (ref 3.81–5.12)
RBC #/AREA URNS AUTO: ABNORMAL /HPF
SODIUM SERPL-SCNC: 138 MMOL/L (ref 135–147)
SP GR UR STRIP.AUTO: 1.01 (ref 1–1.03)
T WAVE AXIS: 58 DEGREES
UROBILINOGEN UR STRIP-ACNC: <2 MG/DL
VENTRICULAR RATE: 71 BPM
WBC # BLD AUTO: 11.46 THOUSAND/UL (ref 4.31–10.16)
WBC #/AREA URNS AUTO: ABNORMAL /HPF

## 2024-02-01 PROCEDURE — 36415 COLL VENOUS BLD VENIPUNCTURE: CPT | Performed by: EMERGENCY MEDICINE

## 2024-02-01 PROCEDURE — 81025 URINE PREGNANCY TEST: CPT | Performed by: EMERGENCY MEDICINE

## 2024-02-01 PROCEDURE — 99285 EMERGENCY DEPT VISIT HI MDM: CPT | Performed by: EMERGENCY MEDICINE

## 2024-02-01 PROCEDURE — 81001 URINALYSIS AUTO W/SCOPE: CPT | Performed by: EMERGENCY MEDICINE

## 2024-02-01 PROCEDURE — 99284 EMERGENCY DEPT VISIT MOD MDM: CPT

## 2024-02-01 PROCEDURE — 83690 ASSAY OF LIPASE: CPT | Performed by: EMERGENCY MEDICINE

## 2024-02-01 PROCEDURE — 96361 HYDRATE IV INFUSION ADD-ON: CPT

## 2024-02-01 PROCEDURE — 85025 COMPLETE CBC W/AUTO DIFF WBC: CPT | Performed by: EMERGENCY MEDICINE

## 2024-02-01 PROCEDURE — 74177 CT ABD & PELVIS W/CONTRAST: CPT

## 2024-02-01 PROCEDURE — 80053 COMPREHEN METABOLIC PANEL: CPT | Performed by: EMERGENCY MEDICINE

## 2024-02-01 PROCEDURE — 96375 TX/PRO/DX INJ NEW DRUG ADDON: CPT

## 2024-02-01 PROCEDURE — 93005 ELECTROCARDIOGRAM TRACING: CPT

## 2024-02-01 PROCEDURE — G1004 CDSM NDSC: HCPCS

## 2024-02-01 PROCEDURE — 96374 THER/PROPH/DIAG INJ IV PUSH: CPT

## 2024-02-01 RX ORDER — METHOCARBAMOL 500 MG/1
500 TABLET, FILM COATED ORAL 2 TIMES DAILY
Qty: 6 TABLET | Refills: 0 | Status: SHIPPED | OUTPATIENT
Start: 2024-02-01 | End: 2024-02-04

## 2024-02-01 RX ORDER — METHOCARBAMOL 500 MG/1
500 TABLET, FILM COATED ORAL ONCE
Status: COMPLETED | OUTPATIENT
Start: 2024-02-01 | End: 2024-02-01

## 2024-02-01 RX ORDER — KETOROLAC TROMETHAMINE 30 MG/ML
15 INJECTION, SOLUTION INTRAMUSCULAR; INTRAVENOUS ONCE
Status: COMPLETED | OUTPATIENT
Start: 2024-02-01 | End: 2024-02-01

## 2024-02-01 RX ORDER — LIDOCAINE 50 MG/G
1 PATCH TOPICAL ONCE
Status: DISCONTINUED | OUTPATIENT
Start: 2024-02-01 | End: 2024-02-01 | Stop reason: HOSPADM

## 2024-02-01 RX ORDER — KETOROLAC TROMETHAMINE 30 MG/ML
15 INJECTION, SOLUTION INTRAMUSCULAR; INTRAVENOUS ONCE
Status: DISCONTINUED | OUTPATIENT
Start: 2024-02-01 | End: 2024-02-01

## 2024-02-01 RX ORDER — ACETAMINOPHEN 10 MG/ML
1000 INJECTION, SOLUTION INTRAVENOUS ONCE
Status: COMPLETED | OUTPATIENT
Start: 2024-02-01 | End: 2024-02-01

## 2024-02-01 RX ADMIN — ACETAMINOPHEN 1000 MG: 1000 INJECTION INTRAVENOUS at 09:47

## 2024-02-01 RX ADMIN — IOHEXOL 100 ML: 350 INJECTION, SOLUTION INTRAVENOUS at 10:44

## 2024-02-01 RX ADMIN — KETOROLAC TROMETHAMINE 15 MG: 30 INJECTION, SOLUTION INTRAMUSCULAR; INTRAVENOUS at 11:15

## 2024-02-01 RX ADMIN — SODIUM CHLORIDE 500 ML: 0.9 INJECTION, SOLUTION INTRAVENOUS at 09:57

## 2024-02-01 RX ADMIN — DICLOFENAC SODIUM 2 G: 10 GEL TOPICAL at 09:47

## 2024-02-01 RX ADMIN — METHOCARBAMOL TABLETS 500 MG: 500 TABLET, COATED ORAL at 09:47

## 2024-02-01 RX ADMIN — LIDOCAINE 5% 1 PATCH: 700 PATCH TOPICAL at 09:47

## 2024-02-01 NOTE — ED PROVIDER NOTES
History  Chief Complaint   Patient presents with    Back Pain     Pt c/o lower back pain that radiates into her hips for about 3 days. Denies injury or heavy lifting. Reports being seen at [facility] ED and dx of UTI. Took 800mg ibuprofen at 0600 this morning with no relief     Patient is a 43-year-old female, with a history significant for obesity, tobacco use, hysterectomy without oophorectomy, diabetes, who presents to the ED today due to a 3-day history of atraumatic, constant, tight/pinching in character, progressively worsening bilateral lower back pain that is also felt in the proximal thighs/glutes.  The discomfort does not radiate beyond this point.  Patient also describes left-sided abdominal pain.  Notably, per my review of the medical record, patient was evaluated at an outside emergency department yesterday and diagnosed with UTI and managed with cefpodoxime and Flagyl.  There is no associated fever, chills, night sweats, bowel or bladder incontinence, urinary retention, weakness or numbness, difficulty walking, trauma, history of cancer, history of IVDU, chest pain, dyspnea, rash, vaginal bleeding, vaginal discharge.  Patient has taken ibuprofen as well as use heating pads to try and remit her symptoms.  Certain positions exacerbate her discomfort.  Patient works in the [employer].  Patient is without other concerns at this time.        Prior to Admission Medications   Prescriptions Last Dose Informant Patient Reported? Taking?   [facility] Vitamin C 500 MG tablet  Self No No   Sig: TAKE 1 TABLET (500 MG TOTAL) BY MOUTH DAILY.   Cholecalciferol ([facility]S D3) 125 MCG (5000 UT) capsule  Self No No   Sig: Take 1 capsule (5,000 Units total) by mouth daily   albuterol (PROVENTIL HFA,VENTOLIN HFA) 90 mcg/act inhaler  Self No No   Sig: INHALE 2 PUFFS BY MOUTH EVERY 6 HOURS AS NEEDED FOR WHEEZING   amLODIPine (NORVASC) 5 mg tablet   No No   Sig: Take 1 tablet (5 mg total) by mouth daily Blood presuure   buPROPion (WELLBUTRIN  XL) 300 mg 24 hr tablet   No No   Sig: Take 1 tablet (300 mg total) by mouth every morning   cefpodoxime (VANTIN) 200 mg tablet   No No   Sig: Take 1 tablet (200 mg total) by mouth 2 (two) times a day for 10 days   cyanocobalamin (VITAMIN B-12) 2000 MCG tablet  Self No No   Sig: Take 1 tablet (2,000 mcg total) by mouth daily   loratadine (CLARITIN) 10 mg tablet  Self No No   Sig: Take 1 tablet (10 mg total) by mouth daily For allergies   metFORMIN (GLUCOPHAGE-XR) 750 mg 24 hr tablet  Self No No   Sig: Take 1 tablet (750 mg total) by mouth daily with breakfast   methylPREDNISolone 4 MG tablet therapy pack  Self No No   Sig: Use as directed on package   metroNIDAZOLE (FLAGYL) 500 mg tablet   No No   Sig: Take 1 tablet (500 mg total) by mouth every 12 (twelve) hours for 7 days   multivitamin (THERAGRAN) TABS  Self Yes No   Sig: Take 1 tablet by mouth daily   spironolactone (ALDACTONE) 25 mg tablet  Self No No   Sig: TAKE 1 TABLET BY MOUTH EVERY DAY FOR 30 DAYS THEN TAKE 2 TABLETS DAILY AFTER   tiotropium (SPIRIVA) 18 mcg inhalation capsule  Self Yes No   Sig: Place 18 mcg into inhaler and inhale in the morning.   valACYclovir (VALTREX) 500 mg tablet   No No   Sig: TAKE 1 TABLET BY MOUTH EVERY DAY      Facility-Administered Medications: None       Past Medical History:   Diagnosis Date    Abnormal Pap smear of cervix     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, 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    Adrenal mass, left (HCC) 01/25/2021    1.8 x 2.1 cm on ct 12/21/2020    Arthritis     Asthma     Blurry vision 09/04/2020    Diabetes mellitus (HCC)     Essential hypertension 09/04/2020    Headache 09/04/2020    Hidradenitis suppurativa     Hypertension     Immunization deficiency 10/02/2020    Hep a nonimmune    Insomnia 02/22/2021    Migraine     Moderate episode of recurrent major depressive disorder (HCC) 09/04/2020    Obesity (BMI 30.0-34.9) 09/22/2021    Prediabetes 09/04/2020    Seasonal allergies     Tobacco use 01/25/2021    Vitamin D deficiency 10/02/2020        Past Surgical History:   Procedure Laterality Date    APPENDECTOMY      BREAST BIOPSY Left 2017    BREAST BIOPSY Right     2 core biopsies    BREAST EXCISIONAL BIOPSY Left 2018    BREAST SURGERY Left 2018    Left breast mass excision    CERVICAL BIOPSY  W/ LOOP ELECTRODE EXCISION       SECTION      X2    COLPOSCOPY      CYST REMOVAL      FACIAL/NECK BIOPSY Right 10/03/2023    Procedure: EXCISION MASS RIGHT CHEEK;  Surgeon: Robert Bloch, MD;  Location: [facility];  Service: General    FL INJECTION RIGHT KNEE (ARTHROGRAM)  1/15/2024    HYSTERECTOMY      heavy bleeding, ovaries remain, also had abnormal pap    KNEE SURGERY      LYMPH NODE DISSECTION      under armpit    TUBAL LIGATION      US GUIDED THYROID BIOPSY  2021       Family History   Problem Relation Age of Onset    Diabetes Mother     Heart attack Mother     Heart disease Mother         Internal Defibrilation    No Known Problems Father     Endometrial cancer Sister 28    Colon cancer Sister 35    No Known Problems Daughter     Diabetes Maternal Grandmother     Diabetes Paternal Grandmother     No Known Problems Son     No Known Problems Son     Lupus Maternal Aunt 48    Seizures Maternal Aunt     Leukemia Maternal Uncle 18    Diabetes Paternal Aunt     No Known Problems Paternal Aunt      I have reviewed and agree with the history as documented.    E-Cigarette/Vaping    E-Cigarette Use Former User      E-Cigarette/Vaping Substances    Nicotine Yes     THC No     CBD No     Flavoring No     Other No     Unknown No      Social History     Tobacco Use    Smoking status: Every Day     Current packs/day: 0.50     Average packs/day: 0.5 packs/day for 20.0 years (10.0 ttl pk-yrs)     Types: Cigarettes    Smokeless tobacco: Never    Tobacco comments:     pt is down to .5 packs a day   Vaping Use    Vaping status: Former    Substances: Nicotine   Substance Use Topics    Alcohol use: Yes     Comment: occasional    Drug use:  No       Review of Systems   Constitutional:  Negative for chills, diaphoresis and fever.   HENT:  Negative for trouble swallowing.    Eyes:  Negative for visual disturbance.   Respiratory:  Negative for shortness of breath.    Cardiovascular:  Negative for chest pain.   Gastrointestinal:  Positive for abdominal pain. Negative for constipation.   Endocrine: Negative for polyuria.   Genitourinary:  Negative for dysuria.   Musculoskeletal:  Positive for back pain. Negative for gait problem.   Skin:  Negative for rash.   Allergic/Immunologic: Negative for environmental allergies.   Neurological:  Negative for weakness and numbness.   Hematological:  Negative for adenopathy.   Psychiatric/Behavioral:  Negative for confusion.    All other systems reviewed and are negative.      Physical Exam  Physical Exam  Vitals and nursing note reviewed.   Constitutional:       General: She is not in acute distress.     Appearance: She is obese. She is not toxic-appearing or diaphoretic.   HENT:      Head: Normocephalic and atraumatic.      Right Ear: External ear normal.      Left Ear: External ear normal.      Nose: Nose normal. No rhinorrhea.      Mouth/Throat:      Mouth: Mucous membranes are moist.      Pharynx: Oropharynx is clear. No oropharyngeal exudate or posterior oropharyngeal erythema.      Comments: Uvula midline. No oropharyngeal or submandibular mass/swelling  Eyes:      General: No scleral icterus.        Right eye: No discharge.         Left eye: No discharge.      Conjunctiva/sclera: Conjunctivae normal.      Pupils: Pupils are equal, round, and reactive to light.   Neck:      Comments: Patient is spontaneously rotating their neck to the left and right during the history and physical exam interaction without difficulty or apparent discomfort    Cardiovascular:      Rate and Rhythm: Normal rate and regular rhythm.      Pulses: Normal pulses.      Heart sounds: Normal heart sounds. No murmur heard.     No friction  rub. No gallop.      Comments: 2+ Radial  Pulmonary:      Effort: Pulmonary effort is normal. No respiratory distress.      Breath sounds: Normal breath sounds. No stridor. No wheezing, rhonchi or rales.   Abdominal:      General: Abdomen is flat. There is no distension.      Palpations: Abdomen is soft.      Tenderness: There is abdominal tenderness. There is left CVA tenderness and guarding (Left upper quadrant, left lower quadrant). There is no right CVA tenderness or rebound.   Musculoskeletal:         General: Tenderness present. No deformity.      Cervical back: Neck supple. No tenderness. No muscular tenderness.      Comments: No midline C or T spine tenderness to palpation    Diffuse tenderness to palpation over the lower back bilaterally.  No swelling/erythema/abnormal warmth    Worsening tightness in the lower back with palpation of the proximal thighs.  No calf pain with squeeze.   Lymphadenopathy:      Cervical: No cervical adenopathy.   Skin:     General: Skin is warm and dry.      Capillary Refill: Capillary refill takes less than 2 seconds.      Findings: No rash.   Neurological:      Mental Status: She is alert.      Comments: Patient is speaking clearly in complete sentences.  Patient is answering appropriately and able follow commands.  Patient is moving all four extremities spontaneously.  No facial droop.  Tongue midline.     Intact L5 and S1 motor and sensory function bilaterally.  No saddle anesthesia.  2/4 patellar and Achilles reflexes bilaterally.  Negative Babinski bilaterally.  Patient able to ambulate unassisted.   Psychiatric:         Mood and Affect: Mood normal.         Behavior: Behavior normal.         Vital Signs  ED Triage Vitals [02/01/24 0857]   Temperature Pulse Respirations Blood Pressure SpO2   97.5 °F (36.4 °C) 80 18 139/88 99 %      Temp Source Heart Rate Source Patient Position - Orthostatic VS BP Location FiO2 (%)   Oral Monitor Lying Right arm --      Pain Score       10  - Worst Possible Pain           Vitals:    02/01/24 0857   BP: 139/88   Pulse: 80   Patient Position - Orthostatic VS: Lying         Visual Acuity      ED Medications  Medications   lidocaine (LIDODERM) 5 % patch 1 patch (1 patch Topical Medication Applied 2/1/24 0947)   Diclofenac Sodium (VOLTAREN) 1 % topical gel 2 g (2 g Topical Given 2/1/24 0947)   methocarbamol (ROBAXIN) tablet 500 mg (500 mg Oral Given 2/1/24 0947)   acetaminophen (Ofirmev) injection 1,000 mg (0 mg Intravenous Stopped 2/1/24 1002)   sodium chloride 0.9 % bolus 500 mL (0 mL Intravenous Stopped 2/1/24 1111)   iohexol (OMNIPAQUE) 350 MG/ML injection (MULTI-DOSE) 100 mL (100 mL Intravenous Given 2/1/24 1044)   ketorolac (TORADOL) injection 15 mg (15 mg Intravenous Given 2/1/24 1115)       Diagnostic Studies  Results Reviewed       Procedure Component Value Units Date/Time    Urine Microscopic [[id number]]  (Abnormal) Collected: 02/01/24 0946    Lab Status: Final result Specimen: Urine, Clean Catch Updated: 02/01/24 1030     RBC, UA 2-4 /hpf      WBC, UA 4-10 /hpf      Epithelial Cells Innumerable /hpf      Bacteria, UA Occasional /hpf      MUCUS THREADS Occasional    Comprehensive metabolic panel [[id number]] Collected: 02/01/24 0946    Lab Status: Final result Specimen: Blood from Arm, Right Updated: 02/01/24 1019     Sodium 138 mmol/L      Potassium 3.9 mmol/L      Chloride 106 mmol/L      CO2 25 mmol/L      ANION GAP 7 mmol/L      BUN 10 mg/dL      Creatinine 0.81 mg/dL      Glucose 85 mg/dL      Calcium 9.4 mg/dL      AST 13 U/L      ALT 13 U/L      Alkaline Phosphatase 95 U/L      Total Protein 7.6 g/dL      Albumin 4.2 g/dL      Total Bilirubin 0.41 mg/dL      eGFR 88 ml/min/1.73sq m     Narrative:      National Kidney Disease Foundation guidelines for Chronic Kidney Disease (CKD):     Stage 1 with normal or high GFR (GFR > 90 mL/min/1.73 square meters)    Stage 2 Mild CKD (GFR = 60-89 mL/min/1.73 square meters)    Stage 3A Moderate CKD (GFR =  45-59 mL/min/1.73 square meters)    Stage 3B Moderate CKD (GFR = 30-44 mL/min/1.73 square meters)    Stage 4 Severe CKD (GFR = 15-29 mL/min/1.73 square meters)    Stage 5 End Stage CKD (GFR <15 mL/min/1.73 square meters)  Note: GFR calculation is accurate only with a steady state creatinine    Lipase [[id number]]  (Normal) Collected: 02/01/24 0946    Lab Status: Final result Specimen: Blood from Arm, Right Updated: 02/01/24 1019     Lipase 22 u/L     UA w Reflex to Microscopic w Reflex to Culture [[id number]]  (Abnormal) Collected: 02/01/24 0946    Lab Status: Final result Specimen: Urine, Clean Catch Updated: 02/01/24 1016     Color, UA Yellow     Clarity, UA Turbid     Specific Gravity, UA 1.013     pH, UA 5.5     Leukocytes, UA Small     Nitrite, UA Negative     Protein, UA Negative mg/dl      Glucose, UA Negative mg/dl      Ketones, UA Negative mg/dl      Urobilinogen, UA <2.0 mg/dl      Bilirubin, UA Negative     Occult Blood, UA Negative    POCT pregnancy, urine [[id number]]  (Normal) Resulted: 02/01/24 0958    Lab Status: Final result Updated: 02/01/24 0958     EXT Preg Test, Ur Negative     Control Valid    CBC and differential [[id number]]  (Abnormal) Collected: 02/01/24 0946    Lab Status: Final result Specimen: Blood from Arm, Right Updated: 02/01/24 0958     WBC 11.46 Thousand/uL      RBC 4.87 Million/uL      Hemoglobin 13.9 g/dL      Hematocrit 43.0 %      MCV 88 fL      MCH 28.5 pg      MCHC 32.3 g/dL      RDW 14.5 %      MPV 8.6 fL      Platelets 345 Thousands/uL      nRBC 0 /100 WBCs      Neutrophils Relative 55 %      Immat GRANS % 0 %      Lymphocytes Relative 34 %      Monocytes Relative 9 %      Eosinophils Relative 1 %      Basophils Relative 1 %      Neutrophils Absolute 6.36 Thousands/µL      Immature Grans Absolute 0.04 Thousand/uL      Lymphocytes Absolute 3.87 Thousands/µL      Monocytes Absolute 0.97 Thousand/µL      Eosinophils Absolute 0.15 Thousand/µL      Basophils Absolute 0.07  Thousands/µL                    CT abdomen pelvis with contrast   Final Result by Conrad Finney MD (02/01 1102)      No acute abnormality in the abdomen or pelvis.         Workstation performed: [id number]                    Procedures  Procedures         ED Course  ED Course as of 02/01/24 1119   Thu Feb 01, 2024   1010 PREGNANCY TEST URINE: Negative  WNL   1010 ECG per my independent interpretation: Normal rate, regular rhythm, no ectopy, normal axis, no ST elevations or depressions.  First-degree AV block.   1052 WBC, UA(!): 4-10   1052 Epithelial Cells(!): Innumerable   1052 Bacteria, UA: Occasional  Possible contaminant, patient being treated for UTI currently with broad abx    1106 Lipase: 22  WNL   1110 On reevaluation, patient reports improvement in symptoms.  Able to tolerate p.o. in the ED.  On repalpation of the abdomen, no guarding or rebound.  Strict return precautions provided.                                             Medical Decision Making  Patient is a 43-year-old female, with a history significant for obesity, tobacco use, hysterectomy without oophorectomy, diabetes, who presents to the ED today due to a 3-day history of atraumatic, constant, tight/pinching in character, progressively worsening bilateral lower back pain that is also felt in the proximal thighs/glutes.  The discomfort does not radiate beyond this point.  Patient also describes left-sided abdominal pain.  Notably, per my review of the medical record, patient was evaluated at an outside emergency department yesterday and diagnosed with UTI and managed with cefpodoxime and Flagyl.  There is no associated fever, chills, night sweats, bowel or bladder incontinence, urinary retention, weakness or numbness, difficulty walking, trauma, history of cancer, history of IVDU, chest pain, dyspnea, rash, vaginal bleeding, vaginal discharge.  Patient has taken ibuprofen as well as use heating pads to try and remit her symptoms.  Certain  positions exacerbate her discomfort.  Patient works in the [employer].  Patient is currently afebrile and hemodynamically stable.  Physical exam is notable for left CVA tenderness and tenderness with guarding on the left side of her abdomen.  Heart lungs clear to auscultation.  Diffuse lower back pain as well as worsening of this with placement of tension on proximal thighs.  This presentation is concerning for: Sprain, strain, urolithiasis, pancreatitis.  I also considered AAA, bowel obstruction, ectopic.  Patient at risk for UTI/abscess/diverticular disease.  No reason to suspect cauda equina, conus medullaris, epidural abscess at this time based on history physical exam.  Will investigate with CT abdomen pelvis, CBC, CMP, UA, pregnancy test, lipase.  Will manage with multimodal analgesia and further based on workup.    Amount and/or Complexity of Data Reviewed  Labs: ordered. Decision-making details documented in ED Course.  Radiology: ordered.    Risk  Prescription drug management.             Disposition  Final diagnoses:   Acute low back pain   Elevated blood pressure reading   Acute abdominal pain   1st degree AV block     Time reflects when diagnosis was documented in both MDM as applicable and the Disposition within this note       Time User Action Codes Description Comment    2/1/2024  9:16 AM David Phillips Add [M54.50] Acute low back pain     2/1/2024  9:16 AM David Phillips Add [R03.0] Elevated blood pressure reading     2/1/2024  9:16 AM David Phillips Add [R10.9] Acute abdominal pain     2/1/2024 10:10 AM David Phillips Add [I44.0] 1st degree AV block           ED Disposition       ED Disposition   Discharge    Condition   Stable    Date/Time   Thu Feb 1, 2024 11:14 AM    Comment   Jessica Oliver discharge to home/self care.                   Follow-up Information       Follow up With Specialties Details Why Contact Info    Johanny Childs MD Internal Medicine Schedule an  appointment as soon as possible for a visit   [address] [facility]   [address]  [place] [address] [phone]              Patient's Medications   Discharge Prescriptions    METHOCARBAMOL (ROBAXIN) 500 MG TABLET    Take 1 tablet (500 mg total) by mouth 2 (two) times a day for 3 days       Start Date: 2/1/2024  End Date: 2/4/2024       Order Dose: 500 mg       Quantity: 6 tablet    Refills: 0           PDMP Review         Value Time User    PDMP Reviewed  Yes 10/3/2023 12:42 PM Gabby Nicolas PA-C            ED Provider  Electronically Signed by             David Phillips MD  02/01/24 1961

## 2024-02-01 NOTE — DISCHARGE INSTRUCTIONS
You were evaluated in the emergency department for: back and abdominal pain. You can access your current and pending results through [facility]'s [employer]. A radiologist will take a second look at your X-Rays, if you had any, and you will be contacted with any new findings.     You should follow-up with your primary care provider, as soon as possible, for re-evaluation.  If you do not have a primary care provider, I have referred you to [facility]. You will be contacted about scheduling an appointment. Their phone number is also included on this paperwork.  You have also been referred to the [facility] spine center and you should follow-up with them as well.    You may take 650mg of tylenol every four to six hours, not exceeding 3,000mg daily, for the management of your discomfort. You may also take ibuprofen, 400mg every six to eight hours.      Your workup revealed no emergent features at this time; however, many disease processes are dynamic:    Please, return to the emergency department if you experience new or worsening symptoms, fever, chest pain, shortness of breath, difficulty breathing, dizziness, abdominal pain, persistent nausea/vomiting, syncope or passing out, blood in your urine or stool, coughing up blood, leg swelling/pain, urinary retention, bowel or bladder incontinence, numbness between your legs.    Additionally, your blood pressure was measured to be high. This is something that you should discuss with your primary care provider and have re-checked within one week.      You should not drive or operate heavy machinery while taking the Robaxin

## 2024-02-01 NOTE — Clinical Note
Jessica Oliver was seen and treated in our emergency department on 2/1/2024.    ?    ?    ?    Diagnosis: ?    Jessica  may return to work on return date.    She may return on this date: 02/05/2024    ?     If you have any questions or concerns, please don't hesitate to call.      David Phillips MD    ______________________________           _______________          _______________  Hospital Representative                              Date                                Time

## 2024-02-02 ENCOUNTER — NURSE TRIAGE (OUTPATIENT)
Dept: PHYSICAL THERAPY | Facility: OTHER | Age: 44
End: 2024-02-02

## 2024-02-02 DIAGNOSIS — M54.41 ACUTE BILATERAL LOW BACK PAIN WITH BILATERAL SCIATICA: Primary | ICD-10-CM

## 2024-02-02 DIAGNOSIS — M54.42 ACUTE BILATERAL LOW BACK PAIN WITH BILATERAL SCIATICA: Primary | ICD-10-CM

## 2024-02-02 LAB — BACTERIA UR CULT: NORMAL

## 2024-02-02 NOTE — TELEPHONE ENCOUNTER
Additional Information   Negative: Has the patient had unexplained weight loss?   Negative: Does the patient have a fever?   Negative: Is the patient experiencing urine retention?   Negative: Is the patient experiencing acute drop foot or paralysis?   Negative: Has the patient experienced major trauma? (fall from height, high speed collision, direct blow to spine) and is also experiencing nausea, light-headedness, or loss of consciousness?   Negative: Is the patient experiencing blood in sputum?   Negative: Is this a chronic condition?    Protocols used: [facility] Protocol    This RN did review in detail the [facility] and what we can provide for their back pain.  Patient is agreeable to being triaged by this RN and would like to proceed with Physical Therapy.    Referral was placed for Physical Therapy at the [place] site. Patients information was sent to the  to make evaluation appointment. Patient made aware that the PT office  will be calling to schedule the appointment.  Patient was provided with the phone number to the PT office.    No further questions and/or concerns were voiced by the patient at this time. Patient states understanding of the referral that was placed.    Referral Closed.

## 2024-02-02 NOTE — TELEPHONE ENCOUNTER
"Additional Information   Negative: Is this related to a work injury?   Negative: Is this related to an MVA?   Negative: Are you currently recieving homecare services?    Background - Initial Assessment  Clinical complaint: ED visit yesterday 02/01 due to B/L lower Back Pain. No previous hx of back pain. Hx of neck pain and Neuropathy. Patient states her pain started 3 days ago in the right side of her neck and down to right shoulder blade and lower back but now is only in her B/L LB. It radiates down her hips, and thighs. No numbness or tingling associated with this back pain. NKI. Not seeing a Dr for this pain at the moment. Patient states pain is constant. Patient described pain as sharp \"it feels if someone is sticking me with something all the time\".  Date of onset: 3 days ago  Frequency of pain: constant  Quality of pain: sharp.    Protocols used: [facility] Protocol    "

## 2024-02-02 NOTE — ED PROVIDER NOTES
History  Chief Complaint   Patient presents with    Back Pain     Pain began today, difficulty ambulating     43-year-old female presents with 1 day of lower back pain radiating to bilateral hips which began today.  No inciting injury.  No history of sciatica.  Pain does not radiate beyond her hips.  She does have discomfort with movement but also has difficulty getting comfortable.  Her pain is increased when she tries to walk but she does not have any numbness or weakness of her legs.  No incontinence.  No fevers or chills.        Prior to Admission Medications   Prescriptions Last Dose Informant Patient Reported? Taking?   [facility] Vitamin C 500 MG tablet  Self No No   Sig: TAKE 1 TABLET (500 MG TOTAL) BY MOUTH DAILY.   Cholecalciferol ([facility]S D3) 125 MCG (5000 UT) capsule  Self No No   Sig: Take 1 capsule (5,000 Units total) by mouth daily   albuterol (PROVENTIL HFA,VENTOLIN HFA) 90 mcg/act inhaler  Self No No   Sig: INHALE 2 PUFFS BY MOUTH EVERY 6 HOURS AS NEEDED FOR WHEEZING   amLODIPine (NORVASC) 5 mg tablet   No No   Sig: Take 1 tablet (5 mg total) by mouth daily Blood presuure   buPROPion (WELLBUTRIN XL) 300 mg 24 hr tablet   No No   Sig: Take 1 tablet (300 mg total) by mouth every morning   cyanocobalamin (VITAMIN B-12) 2000 MCG tablet  Self No No   Sig: Take 1 tablet (2,000 mcg total) by mouth daily   loratadine (CLARITIN) 10 mg tablet  Self No No   Sig: Take 1 tablet (10 mg total) by mouth daily For allergies   metFORMIN (GLUCOPHAGE-XR) 750 mg 24 hr tablet  Self No No   Sig: Take 1 tablet (750 mg total) by mouth daily with breakfast   methylPREDNISolone 4 MG tablet therapy pack  Self No No   Sig: Use as directed on package   multivitamin (THERAGRAN) TABS  Self Yes No   Sig: Take 1 tablet by mouth daily   spironolactone (ALDACTONE) 25 mg tablet  Self No No   Sig: TAKE 1 TABLET BY MOUTH EVERY DAY FOR 30 DAYS THEN TAKE 2 TABLETS DAILY AFTER   tiotropium (SPIRIVA) 18 mcg inhalation capsule  Self Yes No   Sig: Place  18 mcg into inhaler and inhale in the morning.   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    Adrenal mass, left (HCC) 2021    1.8 x 2.1 cm on ct 2020    Arthritis     Asthma     Blurry vision 2020    Diabetes mellitus (HCC)     Essential hypertension 2020    Headache 2020    Hidradenitis suppurativa     Hypertension     Immunization deficiency 10/02/2020    Hep a nonimmune    Insomnia 2021    Migraine     Moderate episode of recurrent major depressive disorder (HCC) 2020    Obesity (BMI 30.0-34.9) 2021    Prediabetes 2020    Seasonal allergies     Tobacco use 2021    Vitamin D deficiency 10/02/2020       Past Surgical History:   Procedure Laterality Date    APPENDECTOMY      BREAST BIOPSY Left 2017    BREAST BIOPSY Right     2 core biopsies    BREAST EXCISIONAL BIOPSY Left 2018    BREAST SURGERY Left 2018    Left breast mass excision    CERVICAL BIOPSY  W/ LOOP ELECTRODE EXCISION       SECTION      X2    COLPOSCOPY      CYST REMOVAL      FACIAL/NECK BIOPSY Right 10/03/2023    Procedure: EXCISION MASS RIGHT CHEEK;  Surgeon: Robert Bloch, MD;  Location:  MAIN OR;  Service: General    FL INJECTION RIGHT KNEE (ARTHROGRAM)  1/15/2024    HYSTERECTOMY      heavy bleeding, ovaries remain, also had abnormal pap    KNEE SURGERY      LYMPH NODE DISSECTION      under armpit    TUBAL LIGATION      US GUIDED THYROID BIOPSY  2021       Family History   Problem Relation Age of Onset    Diabetes Mother     Heart attack Mother     Heart disease Mother         Internal Defibrilation    No Known Problems Father     Endometrial cancer Sister 28    Colon cancer Sister 35    No Known Problems Daughter     Diabetes Maternal Grandmother      Diabetes Paternal Grandmother     No Known Problems Son     No Known Problems Son     Lupus Maternal Aunt 48    Seizures Maternal Aunt     Leukemia Maternal Uncle 18    Diabetes Paternal Aunt     No Known Problems Paternal Aunt      I have reviewed and agree with the history as documented.    E-Cigarette/Vaping    E-Cigarette Use Former User      E-Cigarette/Vaping Substances    Nicotine Yes     THC No     CBD No     Flavoring No     Other No     Unknown No      Social History     Tobacco Use    Smoking status: Every Day     Current packs/day: 0.50     Average packs/day: 0.5 packs/day for 20.0 years (10.0 ttl pk-yrs)     Types: Cigarettes    Smokeless tobacco: Never    Tobacco comments:     pt is down to .5 packs a day   Vaping Use    Vaping status: Former    Substances: Nicotine   Substance Use Topics    Alcohol use: Yes     Comment: occasional    Drug use: No       Review of Systems   All other systems reviewed and are negative.      Physical Exam  Physical Exam  Constitutional:       General: She is not in acute distress.     Comments: Uncomfortable appearing with movement   HENT:      Head: Normocephalic.      Mouth/Throat:      Mouth: Mucous membranes are moist.   Cardiovascular:      Rate and Rhythm: Normal rate.   Pulmonary:      Effort: Pulmonary effort is normal.   Abdominal:      Palpations: Abdomen is soft.      Tenderness: There is no abdominal tenderness.   Musculoskeletal:         General: Normal range of motion.      Comments: No midline spinal tenderness, bilateral lumbar paraspinal tenderness   Neurological:      General: No focal deficit present.      Mental Status: She is alert and oriented to person, place, and time.   Psychiatric:         Mood and Affect: Mood normal.         Behavior: Behavior normal.         Vital Signs  ED Triage Vitals [01/30/24 2320]   Temperature Pulse Respirations Blood Pressure SpO2   98.3 °F (36.8 °C) 98 18 133/83 99 %      Temp Source Heart Rate Source Patient Position  - Orthostatic VS BP Location FiO2 (%)   Oral Monitor Sitting Right arm --      Pain Score       10 - Worst Possible Pain           Vitals:    01/30/24 2320   BP: 133/83   Pulse: 98   Patient Position - Orthostatic VS: Sitting         Visual Acuity      ED Medications  Medications   acetaminophen (TYLENOL) tablet 650 mg (650 mg Oral Given 1/31/24 0047)   ketorolac (TORADOL) injection 15 mg (15 mg Intramuscular Given 1/31/24 0047)   cefpodoxime (VANTIN) tablet 200 mg (200 mg Oral Given 1/31/24 0220)   metroNIDAZOLE (FLAGYL) tablet 500 mg (500 mg Oral Given 1/31/24 0221)   traMADol (ULTRAM) tablet 50 mg (50 mg Oral Given 1/31/24 0221)       Diagnostic Studies  Results Reviewed       Procedure Component Value Units Date/Time    Urine culture [[id number]] Collected: 01/31/24 0125    Lab Status: Preliminary result Specimen: Urine, Clean Catch Updated: 02/01/24 0811     Urine Culture Culture too young- will reincubate    POCT pregnancy, urine [[id number]]  (Normal) Resulted: 01/31/24 0139    Lab Status: Final result Updated: 01/31/24 0139     EXT Preg Test, Ur Negative     Control Valid    Urine Microscopic [[id number]]  (Abnormal) Collected: 01/31/24 0104    Lab Status: Final result Specimen: Urine, Clean Catch Updated: 01/31/24 0130     RBC, UA 0-5 /hpf      WBC, UA 20-30 /hpf      Epithelial Cells Moderate /hpf      Bacteria, UA Moderate /hpf      OTHER OBSERVATIONS Trichomonas Organisms Present     MUCUS THREADS Occasional    UA (URINE) with reflex to Scope [[id number]]  (Abnormal) Collected: 01/31/24 0104    Lab Status: Final result Specimen: Urine, Clean Catch Updated: 01/31/24 0109     Color, UA Yellow     Clarity, UA Slightly Cloudy     Specific Gravity, UA 1.010     pH, UA 6.0     Leukocytes, UA 3+     Nitrite, UA Negative     Protein, UA Negative mg/dl      Glucose, UA Negative mg/dl      Ketones, UA Negative mg/dl      Urobilinogen, UA 0.2 E.U./dl      Bilirubin, UA Negative     Occult Blood, UA Trace-Intact                    No orders to display              Procedures  Procedures         ED Course             43-year-old female presenting with low back pain.  Patient's initial description of pain is worse with movement is suggestive of musculoskeletal pain however will also evaluate for urinary source.  No inciting injury so we will not obtain imaging.  Patient does have some lumbar paraspinal muscle tenderness and spasm.  Signed out to my colleague while awaiting results.                                Medical Decision Making  Amount and/or Complexity of Data Reviewed  Labs: ordered.    Risk  OTC drugs.  Prescription drug management.             Disposition  Final diagnoses:   Acute UTI   Back pain   Trichomonas infection     Time reflects when diagnosis was documented in both MDM as applicable and the Disposition within this note       Time User Action Codes Description Comment    1/31/2024  2:05 AM Felton Shultz Add [N39.0] Acute UTI     1/31/2024  2:05 AM Felton Shultz Add [M54.9] Back pain     1/31/2024  2:05 AM Felton Shultz Add [A59.9] Trichomonas infection     1/31/2024  2:16 AM Felton Shultz Modify [N39.0] Acute UTI           ED Disposition       ED Disposition   Discharge    Condition   Stable    Date/Time   Wed Jan 31, 2024  2:05 AM    Comment   Jessica Oliver discharge to home/self care.                   Follow-up Information       Follow up With Specialties Details Why Contact Info Additional Information    Johanny Childs MD Internal Medicine Call in 1 day  [address]   [address]  [place] [id number] [phone]       [facility] Call in 1 day  [address]  [facility] [id number] [phone] [facility], [address], [address], [place], [id number] [phone]            Discharge Medication List as of 1/31/2024  2:16 AM        START taking these medications     Details   cefpodoxime (VANTIN) 200 mg tablet Take 1 tablet (200 mg total) by mouth 2 (two) times a day for 10 days, Starting Wed 1/31/2024, Until Sat 2/10/2024, Normal      metroNIDAZOLE (FLAGYL) 500 mg tablet Take 1 tablet (500 mg total) by mouth every 12 (twelve) hours for 7 days, Starting Wed 1/31/2024, Until Wed 2/7/2024, Normal           CONTINUE these medications which have NOT CHANGED    Details   albuterol (PROVENTIL HFA,VENTOLIN HFA) 90 mcg/act inhaler INHALE 2 PUFFS BY MOUTH EVERY 6 HOURS AS NEEDED FOR WHEEZING, Normal      amLODIPine (NORVASC) 5 mg tablet Take 1 tablet (5 mg total) by mouth daily Blood presuure, Starting Mon 12/18/2023, Until Sun 3/17/2024, Normal      buPROPion (WELLBUTRIN XL) 300 mg 24 hr tablet Take 1 tablet (300 mg total) by mouth every morning, Starting Mon 12/18/2023, Normal      Cholecalciferol (CVS D3) 125 MCG (5000 UT) capsule Take 1 capsule (5,000 Units total) by mouth daily, Starting Fri 9/22/2023, Until Thu 12/21/2023, Normal      CVS Vitamin C 500 MG tablet TAKE 1 TABLET (500 MG TOTAL) BY MOUTH DAILY., Normal      cyanocobalamin (VITAMIN B-12) 2000 MCG tablet Take 1 tablet (2,000 mcg total) by mouth daily, Starting Mon 8/22/2022, Until Mon 12/18/2023, Normal      loratadine (CLARITIN) 10 mg tablet Take 1 tablet (10 mg total) by mouth daily For allergies, Starting Fri 9/22/2023, Normal      metFORMIN (GLUCOPHAGE-XR) 750 mg 24 hr tablet Take 1 tablet (750 mg total) by mouth daily with breakfast, Starting Wed 8/30/2023, Normal      methylPREDNISolone 4 MG tablet therapy pack Use as directed on package, Normal      multivitamin (THERAGRAN) TABS Take 1 tablet by mouth daily, Historical Med      spironolactone (ALDACTONE) 25 mg tablet TAKE 1 TABLET BY MOUTH EVERY DAY FOR 30 DAYS THEN TAKE 2 TABLETS DAILY AFTER, Normal      tiotropium (SPIRIVA) 18 mcg inhalation capsule Place 18 mcg into inhaler and inhale in the morning., Historical Med      valACYclovir (VALTREX) 500 mg tablet  TAKE 1 TABLET BY MOUTH EVERY DAY, Normal                 PDMP Review         Value Time User    PDMP Reviewed  Yes 10/3/2023 12:42 PM Gabby Nicolas PA-C            ED Provider  Electronically Signed by             Nicole Mead MD  02/02/24 0018

## 2024-02-05 ENCOUNTER — EVALUATION (OUTPATIENT)
Dept: PHYSICAL THERAPY | Facility: CLINIC | Age: 44
End: 2024-02-05
Payer: COMMERCIAL

## 2024-02-05 DIAGNOSIS — M54.41 ACUTE BILATERAL LOW BACK PAIN WITH BILATERAL SCIATICA: Primary | ICD-10-CM

## 2024-02-05 DIAGNOSIS — M54.42 ACUTE BILATERAL LOW BACK PAIN WITH BILATERAL SCIATICA: Primary | ICD-10-CM

## 2024-02-05 PROCEDURE — 97162 PT EVAL MOD COMPLEX 30 MIN: CPT | Performed by: PHYSICAL THERAPIST

## 2024-02-05 PROCEDURE — 97112 NEUROMUSCULAR REEDUCATION: CPT | Performed by: PHYSICAL THERAPIST

## 2024-02-05 PROCEDURE — 97140 MANUAL THERAPY 1/> REGIONS: CPT | Performed by: PHYSICAL THERAPIST

## 2024-02-05 NOTE — PROGRESS NOTES
PT Comprehensive Spine Initial Evaluation    Today's date: 2024  Patient name: Jessica Oliver  : 1980  MRN: [id number]  Referring provider: Johanny Childs MD  Dx:   Encounter Diagnosis     ICD-10-CM    1. Acute bilateral low back pain with bilateral sciatica  M54.42     M54.41           Start Time: 0800  Stop Time: 0855  Total time in clinic (min): 55 minutes    One of your patients, Jessica Oliver, was referred to me by the [facility] Comprehensive Spine program.  Please see the evaluation summary in the notes section of the chart review.  If care is required beyond 30 days to help your patient reach their goals, I will send you a request for signature on the plan of care.    If you have any questions or concerns, please don't hesitate to call.      Sincerely,    Galen Car, PT    Assessment  Assessment details: Jessica Oliver is a pleasant 43 y.o. female who presents with acute low back pain. Pt demonstrates extremely high pain levels, guarding, and limited motion with all lumbar and LE movements, and majority of testing was unable to be properly assessed due to inability to either assume position or allow for passive movement. She demonstrated increased pain levels with repeated movements however radiating pain never peripheralized past the proximal hip/thigh. She did not tolerate mobilizations and had no change in symptoms following. Neurological testing was all negative. She was given an HEP focused on gentle lumbar mobility and core activation as well as posture education and instructed to be aware of how she is lifting and performing ADLs. The patient's greatest concerns are the pain she is experiencing, worry over not knowing what's wrong, concern at no signs of improvement, and fear of not being able to keep active.      No further referral appears necessary at this time based upon examination results.    Primary movement impairment diagnosis of acute low back pain with mobility  restriction, possible facet dysfunction resulting in pathoanatomical symptoms of pain and limiting her ability to care for self, dress independently, drive, get out of a chair, go to work, perform household chores, sit, sleep, squat to  objects from the floor, stand, and walk.    Primary Impairments:  1) limited lumbar ROM  2) lumbar hypomobility    Etiologic factors include none recalled by the patient.    Impairments: abnormal muscle tone, abnormal or restricted ROM, activity intolerance, impaired balance, impaired physical strength, lacks appropriate home exercise program, pain with function and poor posture     Symptom irritability: highUnderstanding of Dx/Px/POC: good   Prognosis: good  Prognosis details: Positive prognostic indicators include acuity of symptoms.  Negative prognostic indicators include hypertension, high symptom irritability, lack of centralization with movement, minimal changes in pain with movement, obesity, and dependency on medications.      Goals  Short Term Goals: to be achieved by 4 weeks  1) Patient to be independent with basic HEP  2) Decrease pain to 5/10 at its worst  3) Increase lumbar spine AROM by 25% in all deficient planes   4) Increase LE strength by 1/2 MMT grade in all deficient planes    Long Term Goals: to be achieved by discharge  1) Patient to be independent with comprehensive HEP  2) Lumbar spine ROM WNL all planes to improve a/iadls  3) Increase LE strength to 5/5 MMT grade in all planes to improve a/iadls  4) Patient to report no sleep interruption secondary to pain  5) Increase tolerance for seated activities to >60 min.    Plan  Patient would benefit from: skilled physical therapy  Referral necessary: No  Planned modality interventions: Modalities PRN.  Planned therapy interventions: activity modification, manual therapy, neuromuscular re-education, patient education, therapeutic activities, therapeutic exercise, graded activity, home exercise program,  "behavior modification and self care  Frequency: 2x week  Duration in weeks: 12  Treatment plan discussed with: patient        Subjective Evaluation    History of Present Illness  Mechanism of injury: History of Current Injury: Pt reports constant pain in the lower back that radiates down into both hips that began last week. She reports pain that began in her neck but that went away and now it is in her hips. She reports she was after work she went to get into her car and had pain. She has had sciatic pain before but it feels different than this.   Pain location/Descriptors: across B/L lower back that is throbbing, constant, can be shooting; radiation into both anterolateral hips that is \"shocking\"  Aggravating factors: laying on stomach (causes shooting pain), sitting   Easing factors: pain moves from hips to back, muscle relaxer  AM/PM pattern: no pattern  Imaging: none for lumbar   Special Questions: Pt denies N/T, changes in bowel/bladder, ataxia/imbalance. 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)          Not a recurrent problem   Quality of life: good    Pain  Current pain ratin  At best pain ratin  At worst pain rating: 10    Social Support  Stairs in house: yes   Lives in: multiple-level home  Lives with: parents    Employment status: working        Objective     Neurological Testing     Sensation     Lumbar   Left   Intact: light touch    Right   Intact: light touch    Reflexes   Left   Patellar (L4): normal (2+)  Achilles (S1): normal (2+)  Babinski sign: negative    Right   Patellar (L4): normal (2+)  Achilles (S1): normal (2+)  Babinski sign: negative    Active Range of Motion   Cervical/Thoracic Spine       Thoracic    Flexion:  with pain Restriction level: moderate  Extension:  with pain Restriction " "level: maximal  Left lateral flexion:  Restriction level: maximal  Right lateral flexion:  Restriction level: maximal  Left rotation:  Restriction level: maximal  Right rotation:  Restriction level: maximal    Joint Play   Joints within functional limits: L1, L2 and L3     Hypomobile: L4, L5 and S1     Pain: L1, L2, L3, L4, L5 and S1   Mechanical Assessment    Cervical      Thoracic      Lumbar    Sitting flexion: repeated movements  Pain intensity: worse  \"I feel it going up my spine\"  Standing extension: repeated movements  Pain location: peripheralized  Pain intensity: worse  Lying extension: repeated movements  Pain location: no change    Strength/Myotome Testing     Lumbar   Left   Normal strength    Right   Normal strength    Tests     Lumbar     Left   Negative slump test.     Right   Negative slump test.     Additional Tests Details  Unable to perform other testing due to pain levels and extreme guarding  SLR produced pain in L/S however unclear due to unable to perform passively  Unable to get into appropriate position for lumbar sidelying mobilization  Hip ER 60 degrees B/L but lumbar pain    General Comments:      Lumbar Comments  Sustained prone prop             Precautions: acute B/L LBP mobility restriction       2/5            Manuals             Lumbar P-A mobilizations S1-L3 20x Gr. I-II            Sidelying lumbar mobilizations Gr. II 10x                                      Neuro Re-Ed             Pelvic tilt Anterior only 10x HEP            TrA activation 10x HEP            Posture education 3'            Bridges + TrA                                                    Ther Ex             Press ups 10x NBNW            Standing lumbar extension 3x W            RFIS 5x W            Hamstring stretch             LTR                                                    Ther Activity                                       Gait Training                                       Modalities                      "                  Assessment IE, POC, Prognosis            Education HEP, POC, prognosis

## 2024-02-08 ENCOUNTER — OFFICE VISIT (OUTPATIENT)
Dept: PHYSICAL THERAPY | Facility: CLINIC | Age: 44
End: 2024-02-08
Payer: COMMERCIAL

## 2024-02-08 DIAGNOSIS — M54.42 ACUTE BILATERAL LOW BACK PAIN WITH BILATERAL SCIATICA: Primary | ICD-10-CM

## 2024-02-08 DIAGNOSIS — M54.41 ACUTE BILATERAL LOW BACK PAIN WITH BILATERAL SCIATICA: Primary | ICD-10-CM

## 2024-02-08 PROCEDURE — 97110 THERAPEUTIC EXERCISES: CPT | Performed by: PHYSICAL THERAPIST

## 2024-02-08 PROCEDURE — 97112 NEUROMUSCULAR REEDUCATION: CPT | Performed by: PHYSICAL THERAPIST

## 2024-02-08 PROCEDURE — 97140 MANUAL THERAPY 1/> REGIONS: CPT | Performed by: PHYSICAL THERAPIST

## 2024-02-08 NOTE — PROGRESS NOTES
"Daily Note     Today's date: 2024  Patient name: Jessica Oliver  : 1980  MRN: [id number]  Referring provider: Johanny Childs MD  Dx:   Encounter Diagnosis     ICD-10-CM    1. Acute bilateral low back pain with bilateral sciatica  M54.42     M54.41           Start Time: 1450  Stop Time: 1529  Total time in clinic (min): 39 minutes    Subjective: Pt reports she is sore as she just got off work. She has been doing the exercises which provide mild short term relief but pain levels are still high.       Objective: See treatment diary below      Assessment: Pt continues to have very high pain levels with all lumbar movements. She had no real change to IASTM to lumbar paraspinals. Pt had a good improvement in lumbar extension ROM with standing extension with OP however pain was increased but potentially more localized. She consented to Gr. V lumbar mobilization and had a good response reporting less pain and improved ability to walk following. She still had pain LTR but was instructed to try and stay loose at home and see if it gets easier. Patient would benefit from continued PT      Plan: Continue per plan of care.      Precautions: acute B/L LBP mobility restriction                  Manuals             Lumbar P-A mobilizations S1-L3 20x Gr. I-II S1 14x HOLD pain           Sidelying lumbar mobilizations Gr. II 10x Gr. V B           IASTM lumbar paraspinals  3' ea side                        Neuro Re-Ed             Pelvic tilt Anterior only 10x HEP Anterior only 20x           TrA activation 10x HEP 20x3\"           Posture education 3'            Bridges + TrA             SLR + TrA  2x unable pain                                     Ther Ex             Press ups 10x NBNW            Standing lumbar extension 3x W W/ belt OP 10x, 5x           RFIS 5x W            Hamstring stretch             LTR  10x5\" ea HEP           RFIS  10x NBNW                                     Ther Activity                  "                      Gait Training                                       Modalities                                       Assessment IE, POC, Prognosis            Education HEP, POC, prognosis

## 2024-02-12 ENCOUNTER — APPOINTMENT (OUTPATIENT)
Dept: PHYSICAL THERAPY | Facility: CLINIC | Age: 44
End: 2024-02-12
Payer: COMMERCIAL

## 2024-02-13 ENCOUNTER — APPOINTMENT (OUTPATIENT)
Dept: PHYSICAL THERAPY | Facility: CLINIC | Age: 44
End: 2024-02-13
Payer: COMMERCIAL

## 2024-02-14 ENCOUNTER — OFFICE VISIT (OUTPATIENT)
Dept: PHYSICAL THERAPY | Facility: CLINIC | Age: 44
End: 2024-02-14
Payer: COMMERCIAL

## 2024-02-14 DIAGNOSIS — M54.41 ACUTE BILATERAL LOW BACK PAIN WITH BILATERAL SCIATICA: Primary | ICD-10-CM

## 2024-02-14 DIAGNOSIS — M54.42 ACUTE BILATERAL LOW BACK PAIN WITH BILATERAL SCIATICA: Primary | ICD-10-CM

## 2024-02-14 PROCEDURE — 97112 NEUROMUSCULAR REEDUCATION: CPT | Performed by: PHYSICAL THERAPIST

## 2024-02-14 PROCEDURE — 97110 THERAPEUTIC EXERCISES: CPT | Performed by: PHYSICAL THERAPIST

## 2024-02-14 NOTE — PROGRESS NOTES
"Daily Note     Today's date: 2024  Patient name: Jessica Oliver  : 1980  MRN: [id number]  Referring provider: Johanny Childs MD  Dx: No diagnosis found.               Subjective: Pt reports she is doing a little better and the exercises seem to be helping.       Objective: See treatment diary below      Assessment: Pt had about 10% improvement in lumbar ROM since the last visit. Following Gr. V mobilization her motion improved to about 75% in both flexion and extension. She was able to tolerate bridges following as well however needed increased cueing for glute activation. Pt had slight increase in \"tightness\" following repeated extension however SKTC improved motion back to near WFL. Patient demonstrated fatigue post treatment, exhibited good technique with therapeutic exercises, and would benefit from continued PT      Plan: Continue per plan of care.      Precautions: acute B/L LBP mobility restriction                 Manuals             Lumbar P-A mobilizations S1-L3 20x Gr. I-II S1 14x HOLD pain           Sidelying lumbar mobilizations Gr. II 10x Gr. V B Gr. V B          IASTM lumbar paraspinals  3' ea side                        Neuro Re-Ed             Pelvic tilt Anterior only 10x HEP Anterior only 20x           TrA activation 10x HEP 20x3\" 5x5\"          Posture education 3'            Bridges + TrA   10x HEP          SLR + TrA  2x unable pain 10x ea HEP                                    Ther Ex             Press ups 10x NBNW            Standing lumbar extension 3x W W/ belt OP 10x, 5x 10x slightly W          Hamstring stretch             LTR  10x5\" ea HEP 10x5\"          RFIS 5x W 10x NBNW 10x NBNW, 10x B          SKTC   5x ea B, 5x ea                       Ther Activity                                       Gait Training                                       Modalities                                       Assessment IE, POC, Prognosis            Education HEP, POC, prognosis  "

## 2024-02-15 ENCOUNTER — OFFICE VISIT (OUTPATIENT)
Dept: PHYSICAL THERAPY | Facility: CLINIC | Age: 44
End: 2024-02-15
Payer: COMMERCIAL

## 2024-02-15 DIAGNOSIS — M54.42 ACUTE BILATERAL LOW BACK PAIN WITH BILATERAL SCIATICA: Primary | ICD-10-CM

## 2024-02-15 DIAGNOSIS — M54.41 ACUTE BILATERAL LOW BACK PAIN WITH BILATERAL SCIATICA: Primary | ICD-10-CM

## 2024-02-15 PROCEDURE — 97112 NEUROMUSCULAR REEDUCATION: CPT | Performed by: PHYSICAL THERAPIST

## 2024-02-15 PROCEDURE — 97110 THERAPEUTIC EXERCISES: CPT | Performed by: PHYSICAL THERAPIST

## 2024-02-15 NOTE — PROGRESS NOTES
"Daily Note     Today's date: 2/15/2024  Patient name: Jessica Oliver  : 1980  MRN: [id number]  Referring provider: Johanny Childs MD  Dx:   Encounter Diagnosis     ICD-10-CM    1. Acute bilateral low back pain with bilateral sciatica  M54.42     M54.41           Start Time: 1702  Stop Time: 1730  Total time in clinic (min): 28 minutes    Subjective: Pt reports she is still feeling really good and only had a little bit of loss of motion when she woke up this morning.       Objective: See treatment diary below      Assessment: Pt did have about 5-10% loss of flexion motion compared to the previous day which was mostly regained following SKTC exercise. Lumbar extension was more significantly limited, improved following stabilization exercises, and worse with attempting repeated extension with belt over pressure. She continues to have what seems like a \"catch\" when approaching end range extension at L4-5 and will attempt lumbar P-A mobilizations next visit if still present and unresponsive to stabilization program. Patient demonstrated fatigue post treatment and would benefit from continued PT      Plan: Continue per plan of care.      Precautions: acute B/L LBP mobility restriction       2/5 2/8 2/14 2/15         Manuals             Lumbar P-A mobilizations S1-L3 20x Gr. I-II S1 14x HOLD pain  NV if extension not improved         Sidelying lumbar mobilizations Gr. II 10x Gr. V B Gr. V B          IASTM lumbar paraspinals  3' ea side                        Neuro Re-Ed             Pelvic tilt Anterior only 10x HEP Anterior only 20x           TrA activation 10x HEP 20x3\" 5x5\"          Posture education 3'            Bridges + TrA   10x HEP 3x10         SLR + TrA  2x unable pain 10x ea HEP 3x10 ea                                   Ther Ex             Press ups 10x NBNW            Standing lumbar extension 3x W W/ belt OP 10x, 5x 10x slightly W Belt OP 3x W         Hamstring stretch             LTR  10x5\" ea " "HEP 10x5\"          RFIS 5x W 10x NBNW 10x NBNW, 10x B          SKTC   5x ea B, 5x ea 10x3\" ea         Standing hip abduction    3x10         Standing hip extension                          Ther Activity                                       Gait Training                                       Modalities                                       Assessment IE, POC, Prognosis            Education HEP, POC, prognosis                     "

## 2024-02-16 ENCOUNTER — NURSE TRIAGE (OUTPATIENT)
Dept: OTHER | Facility: OTHER | Age: 44
End: 2024-02-16

## 2024-02-19 ENCOUNTER — ANNUAL EXAM (OUTPATIENT)
Dept: OBGYN CLINIC | Facility: CLINIC | Age: 44
End: 2024-02-19
Payer: COMMERCIAL

## 2024-02-19 ENCOUNTER — OFFICE VISIT (OUTPATIENT)
Dept: SURGERY | Facility: CLINIC | Age: 44
End: 2024-02-19
Payer: COMMERCIAL

## 2024-02-19 ENCOUNTER — OFFICE VISIT (OUTPATIENT)
Dept: PHYSICAL THERAPY | Facility: CLINIC | Age: 44
End: 2024-02-19
Payer: COMMERCIAL

## 2024-02-19 VITALS
HEART RATE: 91 BPM | SYSTOLIC BLOOD PRESSURE: 104 MMHG | OXYGEN SATURATION: 98 % | HEIGHT: 64 IN | BODY MASS INDEX: 35.3 KG/M2 | WEIGHT: 206.8 LBS | DIASTOLIC BLOOD PRESSURE: 80 MMHG | TEMPERATURE: 97.8 F

## 2024-02-19 VITALS
DIASTOLIC BLOOD PRESSURE: 84 MMHG | BODY MASS INDEX: 34.49 KG/M2 | HEIGHT: 64 IN | SYSTOLIC BLOOD PRESSURE: 122 MMHG | WEIGHT: 202 LBS

## 2024-02-19 DIAGNOSIS — N76.4 ABSCESS OF RIGHT GENITAL LABIA: Primary | ICD-10-CM

## 2024-02-19 DIAGNOSIS — Z01.419 WOMEN'S ANNUAL ROUTINE GYNECOLOGICAL EXAMINATION: Primary | ICD-10-CM

## 2024-02-19 DIAGNOSIS — Z11.3 SCREEN FOR STD (SEXUALLY TRANSMITTED DISEASE): ICD-10-CM

## 2024-02-19 DIAGNOSIS — Z12.31 OTHER SCREENING MAMMOGRAM: ICD-10-CM

## 2024-02-19 DIAGNOSIS — A59.9 TRICHOMONAS INFECTION: ICD-10-CM

## 2024-02-19 DIAGNOSIS — M54.42 ACUTE BILATERAL LOW BACK PAIN WITH BILATERAL SCIATICA: Primary | ICD-10-CM

## 2024-02-19 DIAGNOSIS — M54.41 ACUTE BILATERAL LOW BACK PAIN WITH BILATERAL SCIATICA: Primary | ICD-10-CM

## 2024-02-19 PROCEDURE — 97140 MANUAL THERAPY 1/> REGIONS: CPT | Performed by: PHYSICAL THERAPIST

## 2024-02-19 PROCEDURE — 87660 TRICHOMONAS VAGIN DIR PROBE: CPT | Performed by: OBSTETRICS & GYNECOLOGY

## 2024-02-19 PROCEDURE — 97112 NEUROMUSCULAR REEDUCATION: CPT | Performed by: PHYSICAL THERAPIST

## 2024-02-19 PROCEDURE — 87491 CHLMYD TRACH DNA AMP PROBE: CPT | Performed by: OBSTETRICS & GYNECOLOGY

## 2024-02-19 PROCEDURE — 97110 THERAPEUTIC EXERCISES: CPT | Performed by: PHYSICAL THERAPIST

## 2024-02-19 PROCEDURE — 99212 OFFICE O/P EST SF 10 MIN: CPT | Performed by: SURGERY

## 2024-02-19 PROCEDURE — 87510 GARDNER VAG DNA DIR PROBE: CPT | Performed by: OBSTETRICS & GYNECOLOGY

## 2024-02-19 PROCEDURE — 87591 N.GONORRHOEAE DNA AMP PROB: CPT | Performed by: OBSTETRICS & GYNECOLOGY

## 2024-02-19 PROCEDURE — 87480 CANDIDA DNA DIR PROBE: CPT | Performed by: OBSTETRICS & GYNECOLOGY

## 2024-02-19 PROCEDURE — 99396 PREV VISIT EST AGE 40-64: CPT | Performed by: OBSTETRICS & GYNECOLOGY

## 2024-02-19 RX ORDER — SULFAMETHOXAZOLE AND TRIMETHOPRIM 800; 160 MG/1; MG/1
1 TABLET ORAL EVERY 12 HOURS SCHEDULED
Qty: 14 TABLET | Refills: 0 | Status: SHIPPED | OUTPATIENT
Start: 2024-02-19 | End: 2024-02-26

## 2024-02-19 NOTE — PROGRESS NOTES
"Subjective      Jessica Oliver is a 43 y.o. female who presents for annual well woman exam.   Patient had hysterectomy     History of abnormal Pap smear: yes -     Menstrual History:  OB History          5    Para   3    Term   3            AB   2    Living   3         SAB        IAB        Ectopic        Multiple        Live Births                      No LMP recorded. Patient has had a hysterectomy.       The following portions of the patient's history were reviewed and updated as appropriate: allergies, current medications, past family history, past medical history, past social history, past surgical history, and problem list.    Review of Systems  Review of Systems   Constitutional:  Negative for activity change, appetite change, chills, fatigue and fever.   Respiratory:  Negative for apnea, cough, chest tightness and shortness of breath.    Cardiovascular:  Negative for chest pain, palpitations and leg swelling.   Gastrointestinal:  Negative for abdominal pain, constipation, diarrhea, nausea and vomiting.   Genitourinary:  Negative for difficulty urinating, dysuria, flank pain, frequency, hematuria and urgency.   Neurological:  Negative for dizziness, seizures, syncope, light-headedness, numbness and headaches.   Psychiatric/Behavioral:  Negative for agitation and confusion.           Objective      /84 (BP Location: Left arm, Patient Position: Sitting, Cuff Size: Adult)   Ht 5' 4\" (1.626 m)   Wt 91.6 kg (202 lb)   BMI 34.67 kg/m²     Physical Exam  OBGyn Exam     General:   alert and oriented, in no acute distress, alert, appears stated age, and cooperative   Heart: regular rate and rhythm, S1, S2 normal, no murmur, click, rub or gallop   Lungs: clear to auscultation bilaterally   Abdomen: soft, non-tender, without masses or organomegaly   Vulva: normal   Vagina: normal mucosa, normal discharge   Cervix: surgically absent   Uterus: surgically absent   Adnexa:  Breast Exam:  no mass, " fullness, tenderness  breasts appear normal, no suspicious masses, no skin or nipple changes or axillary nodes.                Assessment      @well woman@ .  43-year-old female  Annual exam  Had total laparoscopic hysterectomy secondary to pelvic pain menorrhagia at age 30  History of LEEP can not remember exact year  Smoker cessation recommended  Chronic hypertension stable on medication  Herpes taking Valtrex with outbreak  Genetic testing neg  Plan  Pap/HPV vaginal cuff neg 2022  GC/CT/affirm  patient desire STD check  Diet/exercise   Calcium/vitamin-D  Mammogram  Return to office for annual exam       All questions answered.     There are no Patient Instructions on file for this visit.

## 2024-02-19 NOTE — PATIENT INSTRUCTIONS
2 sitz bath's a day plus as many warm compresses as she can tolerate  Bactrim for 1 week.  Return if still a problem

## 2024-02-19 NOTE — TELEPHONE ENCOUNTER
"Reason for Disposition  • [1] Boil > 1/2 inch across (> 12 mm; larger than a marble) AND [2] center is soft or pus colored    Answer Assessment - Initial Assessment Questions  1. APPEARANCE of BOIL: \"What does the boil look like?\"       Patient reports she has a hx of Hidradenitis Suppurativa  2. LOCATION: \"Where is the boil located?\"       Right side of her vagina  3. NUMBER: \"How many boils are there?\"       one  5. ONSET: \"When did the boil start?\"      This afternoon  6. PAIN: \"Is there any pain?\" If Yes, ask: \"How bad is the pain?\"   (Scale 1-10; or mild, moderate, severe)      8.5/10  7. FEVER: \"Do you have a fever?\" If Yes, ask: \"What is it, how was it measured, and when did it start?\"       denies  8. SOURCE: \"Have you been around anyone with boils or other Staph infections?\" \"Have you ever had boils before?\"      Hidradenitis Suppurativa    Protocols used: Boil (Skin Abscess)-ADULT-AH    "
"Regarding: has HS/having an issue  ----- Message from Kala Burdick sent at 2/16/2024  5:32 PM EST -----  Pt called \"I have HS and am currently having an issue with it.\"    "
Mikayla Torrez. I have her scheduled to see Dr. Bloch this afternoon 
Reported on 10/4/2023) 1 Tube 0     No current facility-administered medications for this visit. No Known Allergies    Health Maintenance   Topic Date Due    Hepatitis B vaccine (1 of 3 - 3-dose series) Never done    COVID-19 Vaccine (1) Never done    Depression Screen  Never done    HIV screen  Never done    Hepatitis C screen  Never done    Diabetes screen  Never done    Lipids  Never done    Colorectal Cancer Screen  Never done    Flu vaccine (1) Never done    DTaP/Tdap/Td vaccine (2 - Td or Tdap) 08/15/2027    Hepatitis A vaccine  Aged Out    Hib vaccine  Aged Out    HPV vaccine  Aged Out    Meningococcal (ACWY) vaccine  Aged Out    Pneumococcal 0-64 years Vaccine  Aged Out       No results found for: \"LABA1C\"  No results found for: \"PSA\", \"PSADIA\"  No results found for: \"TSH\"]  No results found for: \"NA\", \"K\", \"CL\", \"CO2\", \"BUN\", \"CREATININE\", \"GLUCOSE\", \"CALCIUM\", \"PROT\", \"LABALBU\", \"BILITOT\", \"ALKPHOS\", \"AST\", \"ALT\", \"LABGLOM\", \"GFRAA\", \"AGRATIO\", \"GLOB\"  No results found for: \"CHOL\"  No results found for: \"TRIG\"  No results found for: \"HDL\"  No results found for: \"LDLCHOLESTEROL\", \"LDLCALC\"  No results found for: \"NA\", \"K\", \"CL\", \"CO2\", \"BUN\", \"CREATININE\", \"GLUCOSE\", \"CALCIUM\"   No results found for: \"WBC\", \"HGB\", \"HCT\", \"MCV\", \"PLT\", \"LABLYMP\", \"MID\", \"GRAN\", \"LYMPHOPCT\", \"MIDPERCENT\", \"GRANULOCYTES\", \"RBC\", \"MCH\", \"MCHC\", \"RDW\"  No results found for: \"VITD25\"     Subjective:      Review of Systems   Constitutional:  Positive for fatigue. Negative for fever and unexpected weight change. HENT:  Negative for ear discharge, ear pain, mouth sores, sore throat and trouble swallowing. Eyes:  Negative for discharge, itching and visual disturbance. Respiratory:  Negative for cough, choking, shortness of breath, wheezing and stridor. Cardiovascular:  Negative for chest pain, palpitations and leg swelling.    Gastrointestinal:  Negative for abdominal distention, abdominal pain, blood in stool,

## 2024-02-19 NOTE — PROGRESS NOTES
"Daily Note     Today's date: 2024  Patient name: Jessica Oliver  : 1980  MRN: [id number]  Referring provider: Johanny Childs MD  Dx:   Encounter Diagnosis     ICD-10-CM    1. Acute bilateral low back pain with bilateral sciatica  M54.42     M54.41           Start Time: 0800  Stop Time: 0845  Total time in clinic (min): 45 minutes    Subjective: Pt reports doing okay and is only stiff in the morning and towards the end of the day. Overall still much better.      Objective: See treatment diary below      Assessment: Pt still had similar end range restriction with extension, however responded well to lumbar mobilizations being able to achieve full range with extremely minor reported stiffness. Press ups further improved this and she was educated to add this to her HEP. Pt had improved tolerance for LSR and reported abolition of the \"popping\" in her back during the exercise. Patient demonstrated fatigue post treatment and would benefit from continued PT      Plan: Continue per plan of care.      Precautions: acute B/L LBP mobility restriction       2/5 2/8 2/14 2/15 2/19        Manuals             Lumbar P-A mobilizations S1-L3 20x Gr. I-II S1 14x HOLD pain  NV if extension not improved S1-L4 Gr. III 30x, L2-3 50x ea L2-4       Sidelying lumbar mobilizations Gr. II 10x Gr. V B Gr. V B          IASTM lumbar paraspinals  3' ea side                        Neuro Re-Ed             Pelvic tilt Anterior only 10x HEP Anterior only 20x           TrA activation 10x HEP 20x3\" 5x5\"          Posture education 3'            Bridges + TrA   10x HEP 3x10 3x10x5\"        SLR + TrA  2x unable pain 10x ea HEP 3x10 ea 3x15 ea        Pallof press                          Ther Ex             Press ups 10x NBNW    2x10 B (motion)        Standing lumbar extension 3x W W/ belt OP 10x, 5x 10x slightly W Belt OP 3x W         Hamstring stretch             LTR  10x5\" ea HEP 10x5\"          RFIS 5x W 10x NBNW 10x NBNW, 10x B        " "  SKTC   5x ea B, 5x ea 10x3\" ea 10x3\" ea        Standing hip abduction    3x10 ea RTB        Standing hip extension     3x10 ea  RTB        squats      10x        Dead lifts                          Ther Activity                                       Gait Training                                       Modalities                                       Assessment IE, POC, Prognosis            Education HEP, POC, prognosis                       "

## 2024-02-19 NOTE — PROGRESS NOTES
The patient is a 43-year-old black female who I know well from having seen her with attacks of hidradenitis suppurativa.  In fact, I sent her to Derm a while back but unfortunately she has not been seen.  She comes in stating that last week she started noticing some pain with some drainage in her right groin lateral to the vulva.  She has taken some soaks and baths but has not started on antibiotics.  This is a obese black female in no acute distress.  Vital signs are per the nurses notes.  She has got a 1 mm skin opening about a centimeter lateral to the vulva.  Superior to this there is a little bit of swelling and when I push on it a tiny bit of pus comes out the opening.  This is obviously an exacerbation of hidradenitis.  I told her to take 2 baths a day and start antibiotics and take them for a week

## 2024-02-19 NOTE — LETTER
February 19, 2024     Johanny Childs MD  [address]   [address]  [place] [address]    Patient: Jessica Oliver   YOB: 1980   Date of Visit: 2/19/2024       Dear Dr. Childs:    Thank you for referring Jessica Oliver to me for evaluation. Below are my notes for this consultation.    If you have questions, please do not hesitate to call me. I look forward to following your patient along with you.         Sincerely,        Robert Bloch, MD        CC: No Recipients    Robert Bloch, MD  2/19/2024  3:03 PM  Incomplete  The patient is a 43-year-old black female who I know well from having seen her with attacks of hidradenitis suppurativa.  In fact, I sent her to Derm a while back but unfortunately she has not been seen.  She comes in stating that last week she started noticing some pain with some drainage in her right groin lateral to the vulva.  She has taken some soaks and baths but has not started on antibiotics.  This is a obese black female in no acute distress.  Vital signs are per the nurses notes.  She has got a 1 mm skin opening about a centimeter lateral to the vulva.  Superior to this there is a little bit of swelling and when I push on it a tiny bit of pus comes out the opening.  This is obviously an exacerbation of hidradenitis.  I told her to take 2 baths a day and start antibiotics and take them for a week

## 2024-02-20 DIAGNOSIS — N76.0 BV (BACTERIAL VAGINOSIS): Primary | ICD-10-CM

## 2024-02-20 DIAGNOSIS — B96.89 BV (BACTERIAL VAGINOSIS): Primary | ICD-10-CM

## 2024-02-20 LAB
C TRACH DNA SPEC QL NAA+PROBE: NEGATIVE
CANDIDA RRNA VAG QL PROBE: NOT DETECTED
G VAGINALIS RRNA GENITAL QL PROBE: DETECTED
N GONORRHOEA DNA SPEC QL NAA+PROBE: NEGATIVE
T VAGINALIS RRNA GENITAL QL PROBE: NOT DETECTED

## 2024-02-20 RX ORDER — METRONIDAZOLE 500 MG/1
500 TABLET ORAL EVERY 12 HOURS SCHEDULED
Qty: 14 TABLET | Refills: 0 | Status: SHIPPED | OUTPATIENT
Start: 2024-02-20 | End: 2024-02-27

## 2024-02-22 ENCOUNTER — OFFICE VISIT (OUTPATIENT)
Dept: PHYSICAL THERAPY | Facility: CLINIC | Age: 44
End: 2024-02-22
Payer: COMMERCIAL

## 2024-02-22 DIAGNOSIS — M54.42 ACUTE BILATERAL LOW BACK PAIN WITH BILATERAL SCIATICA: Primary | ICD-10-CM

## 2024-02-22 DIAGNOSIS — R73.03 PREDIABETES: ICD-10-CM

## 2024-02-22 DIAGNOSIS — M54.41 ACUTE BILATERAL LOW BACK PAIN WITH BILATERAL SCIATICA: Primary | ICD-10-CM

## 2024-02-22 PROCEDURE — 97110 THERAPEUTIC EXERCISES: CPT | Performed by: PHYSICAL THERAPIST

## 2024-02-22 PROCEDURE — 97140 MANUAL THERAPY 1/> REGIONS: CPT | Performed by: PHYSICAL THERAPIST

## 2024-02-22 RX ORDER — METFORMIN HYDROCHLORIDE 750 MG/1
750 TABLET, EXTENDED RELEASE ORAL
Qty: 90 TABLET | Refills: 1 | Status: SHIPPED | OUTPATIENT
Start: 2024-02-22

## 2024-02-22 NOTE — PROGRESS NOTES
"Daily Note     Today's date: 2024  Patient name: Jessica Oliver  : 1980  MRN: [id number]  Referring provider: Johanny Childs MD  Dx:   Encounter Diagnosis     ICD-10-CM    1. Acute bilateral low back pain with bilateral sciatica  M54.42     M54.41           Start Time: 1700  Stop Time: 1744  Total time in clinic (min): 44 minutes    Subjective: Pt reports she had a long day at work and is a little stiff but doesn't have pain.       Objective: See treatment diary below      Assessment: Pt again had improved range and tolerance to extension following mobilizations and press ups. Dead lifts required cueing to prevent premature lumbar extension and after she had no discomfort. She was educated to follow up repeated lifting/flexion with extensions to avoid irritation. Patient demonstrated fatigue post treatment, exhibited good technique with therapeutic exercises, and would benefit from continued PT      Plan: Continue per plan of care.      Precautions: acute B/L LBP mobility restriction       2/5 2/8 2/14 2/15 2/19 2/22       Manuals             Lumbar P-A mobilizations S1-L3 20x Gr. I-II S1 14x HOLD pain  NV if extension not improved S1-L4 Gr. III 30x, L2-3 50x ea L3-S1 Gr. IV 50x L3-S1       Sidelying lumbar mobilizations Gr. II 10x Gr. V B Gr. V B          IASTM lumbar paraspinals  3' ea side                        Neuro Re-Ed             Pelvic tilt Anterior only 10x HEP Anterior only 20x           TrA activation 10x HEP 20x3\" 5x5\"          Posture education 3'            Bridges + TrA   10x HEP 3x10 3x10x5\" 20x10\"       SLR + TrA  2x unable pain 10x ea HEP 3x10 ea 3x15 ea 3x15 ea       Pallof press      NV                    Ther Ex             Press ups 10x NBNW    2x10 B (motion) 2x10       Standing lumbar extension 3x W W/ belt OP 10x, 5x 10x slightly W Belt OP 3x W         Hamstring stretch             LTR  10x5\" ea HEP 10x5\"          RFIS 5x W 10x NBNW 10x NBNW, 10x B          SKTC   5x ea " "B, 5x ea 10x3\" ea 10x3\" ea        Standing hip abduction    3x10 ea RTB RTB 3x10 ea       Standing hip extension     3x10 ea  RTB  RTB 3x10 ea       squats      10x        Dead lifts       10# 10x, 15# 10x       Goblet squats                                       Ther Activity             Lift training                          Gait Training                                       Modalities                                       Assessment IE, POC, Prognosis            Education HEP, POC, prognosis                         "

## 2024-02-26 ENCOUNTER — APPOINTMENT (OUTPATIENT)
Dept: PHYSICAL THERAPY | Facility: CLINIC | Age: 44
End: 2024-02-26
Payer: COMMERCIAL

## 2024-02-27 ENCOUNTER — APPOINTMENT (OUTPATIENT)
Dept: PHYSICAL THERAPY | Facility: CLINIC | Age: 44
End: 2024-02-27
Payer: COMMERCIAL

## 2024-03-14 DIAGNOSIS — I10 ESSENTIAL HYPERTENSION: ICD-10-CM

## 2024-03-14 RX ORDER — AMLODIPINE BESYLATE 5 MG/1
5 TABLET ORAL DAILY
Qty: 90 TABLET | Refills: 0 | Status: SHIPPED | OUTPATIENT
Start: 2024-03-14 | End: 2024-06-12

## 2024-03-18 DIAGNOSIS — T78.40XA ALLERGY, INITIAL ENCOUNTER: ICD-10-CM

## 2024-03-18 RX ORDER — LORATADINE 10 MG/1
10 TABLET ORAL DAILY
Qty: 90 TABLET | Refills: 1 | Status: SHIPPED | OUTPATIENT
Start: 2024-03-18

## 2024-04-22 ENCOUNTER — APPOINTMENT (OUTPATIENT)
Dept: LAB | Facility: HOSPITAL | Age: 44
End: 2024-04-22
Attending: INTERNAL MEDICINE
Payer: COMMERCIAL

## 2024-04-22 ENCOUNTER — HOSPITAL ENCOUNTER (OUTPATIENT)
Dept: MAMMOGRAPHY | Facility: HOSPITAL | Age: 44
Discharge: HOME | End: 2024-04-22
Attending: INTERNAL MEDICINE
Payer: COMMERCIAL

## 2024-04-22 ENCOUNTER — OFFICE VISIT (OUTPATIENT)
Dept: FAMILY MEDICINE CLINIC | Facility: CLINIC | Age: 44
End: 2024-04-22
Payer: COMMERCIAL

## 2024-04-22 VITALS
HEIGHT: 64 IN | SYSTOLIC BLOOD PRESSURE: 130 MMHG | WEIGHT: 206.8 LBS | BODY MASS INDEX: 35.3 KG/M2 | HEART RATE: 82 BPM | DIASTOLIC BLOOD PRESSURE: 80 MMHG | OXYGEN SATURATION: 99 % | RESPIRATION RATE: 16 BRPM | TEMPERATURE: 97.8 F

## 2024-04-22 VITALS — HEIGHT: 64 IN | BODY MASS INDEX: 35.17 KG/M2 | WEIGHT: 206 LBS

## 2024-04-22 DIAGNOSIS — D72.829 LEUKOCYTOSIS, UNSPECIFIED TYPE: ICD-10-CM

## 2024-04-22 DIAGNOSIS — E78.2 MIXED HYPERLIPIDEMIA: ICD-10-CM

## 2024-04-22 DIAGNOSIS — R73.03 PREDIABETES: ICD-10-CM

## 2024-04-22 DIAGNOSIS — I10 ESSENTIAL HYPERTENSION: Primary | ICD-10-CM

## 2024-04-22 DIAGNOSIS — D72.820 LYMPHOCYTOSIS: ICD-10-CM

## 2024-04-22 DIAGNOSIS — M17.11 PRIMARY OSTEOARTHRITIS OF RIGHT KNEE: ICD-10-CM

## 2024-04-22 DIAGNOSIS — E04.1 NODULE OF LEFT LOBE OF THYROID GLAND: ICD-10-CM

## 2024-04-22 DIAGNOSIS — F33.1 MODERATE EPISODE OF RECURRENT MAJOR DEPRESSIVE DISORDER (HCC): ICD-10-CM

## 2024-04-22 DIAGNOSIS — Z12.31 ENCOUNTER FOR SCREENING MAMMOGRAM FOR BREAST CANCER: ICD-10-CM

## 2024-04-22 DIAGNOSIS — G47.33 OSA (OBSTRUCTIVE SLEEP APNEA): ICD-10-CM

## 2024-04-22 PROBLEM — Z00.00 ANNUAL PHYSICAL EXAM: Status: RESOLVED | Noted: 2023-12-18 | Resolved: 2024-04-22

## 2024-04-22 PROBLEM — E54 ASCORBIC ACID DEFICIENCY: Status: RESOLVED | Noted: 2022-12-05 | Resolved: 2024-04-22

## 2024-04-22 PROBLEM — E27.8 ADRENAL MASS, LEFT (HCC): Status: RESOLVED | Noted: 2021-01-25 | Resolved: 2024-04-22

## 2024-04-22 PROBLEM — E53.1 PYRIDOXINE DEFICIENCY: Status: RESOLVED | Noted: 2022-12-05 | Resolved: 2024-04-22

## 2024-04-22 LAB
BASOPHILS # BLD AUTO: 0.08 THOUSANDS/ÂΜL (ref 0–0.1)
BASOPHILS NFR BLD AUTO: 1 % (ref 0–1)
EOSINOPHIL # BLD AUTO: 0.18 THOUSAND/ÂΜL (ref 0–0.61)
EOSINOPHIL NFR BLD AUTO: 2 % (ref 0–6)
ERYTHROCYTE [DISTWIDTH] IN BLOOD BY AUTOMATED COUNT: 14.3 % (ref 11.6–15.1)
EST. AVERAGE GLUCOSE BLD GHB EST-MCNC: 134 MG/DL
HBA1C MFR BLD: 6.3 %
HCT VFR BLD AUTO: 43.8 % (ref 34.8–46.1)
HGB BLD-MCNC: 14.3 G/DL (ref 11.5–15.4)
IMM GRANULOCYTES # BLD AUTO: 0.03 THOUSAND/UL (ref 0–0.2)
IMM GRANULOCYTES NFR BLD AUTO: 0 % (ref 0–2)
LYMPHOCYTES # BLD AUTO: 4.16 THOUSANDS/ÂΜL (ref 0.6–4.47)
LYMPHOCYTES NFR BLD AUTO: 40 % (ref 14–44)
MCH RBC QN AUTO: 29.1 PG (ref 26.8–34.3)
MCHC RBC AUTO-ENTMCNC: 32.6 G/DL (ref 31.4–37.4)
MCV RBC AUTO: 89 FL (ref 82–98)
MONOCYTES # BLD AUTO: 0.61 THOUSAND/ÂΜL (ref 0.17–1.22)
MONOCYTES NFR BLD AUTO: 6 % (ref 4–12)
NEUTROPHILS # BLD AUTO: 5.33 THOUSANDS/ÂΜL (ref 1.85–7.62)
NEUTS SEG NFR BLD AUTO: 51 % (ref 43–75)
NRBC BLD AUTO-RTO: 0 /100 WBCS
PLATELET # BLD AUTO: 367 THOUSANDS/UL (ref 149–390)
PMV BLD AUTO: 9 FL (ref 8.9–12.7)
RBC # BLD AUTO: 4.92 MILLION/UL (ref 3.81–5.12)
WBC # BLD AUTO: 10.39 THOUSAND/UL (ref 4.31–10.16)

## 2024-04-22 PROCEDURE — 99213 OFFICE O/P EST LOW 20 MIN: CPT | Performed by: INTERNAL MEDICINE

## 2024-04-22 PROCEDURE — 77063 BREAST TOMOSYNTHESIS BI: CPT

## 2024-04-22 PROCEDURE — 85025 COMPLETE CBC W/AUTO DIFF WBC: CPT

## 2024-04-22 PROCEDURE — 36415 COLL VENOUS BLD VENIPUNCTURE: CPT

## 2024-04-22 PROCEDURE — 77067 SCR MAMMO BI INCL CAD: CPT

## 2024-04-22 PROCEDURE — 83036 HEMOGLOBIN GLYCOSYLATED A1C: CPT

## 2024-04-22 RX ORDER — METFORMIN HYDROCHLORIDE 500 MG/1
1000 TABLET, EXTENDED RELEASE ORAL
Qty: 180 TABLET | Refills: 1 | Status: SHIPPED | OUTPATIENT
Start: 2024-04-22

## 2024-04-22 NOTE — PROGRESS NOTES
Assessment/Plan:       Problem List Items Addressed This Visit       Essential hypertension - Primary     Controlled on current regimen with less lightheadedness  Continue same   Recheck in 6 months or sooner if needed         Relevant Orders    CBC and differential    Comprehensive metabolic panel    Moderate episode of recurrent major depressive disorder (HCC)     Seems to be much better on higher dose of Wellbutrin  Continue same         Prediabetes     Labs in process- will evaluate if metformin needs to be changed based on results         Relevant Orders    Hemoglobin A1C    Nodule of left lobe of thyroid gland     Previously had a benign biopsy and repeat imaging in August 2023 shows stability  We can order repeat at next visit         HIRAL (obstructive sleep apnea)     Has a referral to sleep- she will call and make an appt         Primary osteoarthritis of right knee     Limping more- will call her ortho for an evaluation  Temporary handicap placard provided          Other Visit Diagnoses       Mixed hyperlipidemia        Relevant Orders    Lipid panel              Subjective:     Chief Complaint   Patient presents with    Follow-up     Follow up          Patient ID: Jessica Oliver is a 43 y.o. female who is here for follow-up.  She denies any issues with her medications.  We decreased her blood pressure medication at the last visit and she feels like she is less lightheaded but still occasionally has those symptoms.  She denies any chest pain.  Breathing is normal.  She denies significant headaches.  Her main issue is her knee pain which is worsening.  She has an orthopedic and will call to make an appointment.  She is wondering if she can get a handicap placard because at work she has to park very far and that makes the rest of her day very difficult when she is on her feet.        Patient's past medical history, surgical history, family history, medications, allergies and social history reviewed and  "updated    Review of Systems   Constitutional:  Negative for chills and fever.   HENT:  Negative for congestion and sore throat.    Respiratory:  Negative for cough and shortness of breath.    Cardiovascular:  Negative for chest pain and leg swelling.   Gastrointestinal:  Negative for abdominal pain, blood in stool and diarrhea.   Genitourinary:  Negative for dysuria and frequency.   Musculoskeletal:  Positive for arthralgias and gait problem.   Neurological:  Negative for headaches.         All other ROS negative.     Objective:    Vitals:    04/22/24 0955   BP: 130/80   Pulse: 82   Resp: 16   Temp: 97.8 °F (36.6 °C)   SpO2: 99%          Physical Exam  Vitals reviewed.   Constitutional:       General: She is not in acute distress.     Appearance: She is obese.   HENT:      Right Ear: External ear normal.      Left Ear: External ear normal.      Nose: Nose normal.      Mouth/Throat:      Mouth: Mucous membranes are moist.   Eyes:      Conjunctiva/sclera: Conjunctivae normal.   Cardiovascular:      Rate and Rhythm: Normal rate and regular rhythm.      Heart sounds: No murmur heard.  Pulmonary:      Effort: Pulmonary effort is normal. No respiratory distress.      Breath sounds: No wheezing.   Abdominal:      General: There is no distension.      Palpations: Abdomen is soft.      Tenderness: There is no abdominal tenderness.   Musculoskeletal:      Right lower leg: No edema.      Left lower leg: No edema.   Lymphadenopathy:      Cervical: No cervical adenopathy.   Neurological:      Mental Status: She is alert and oriented to person, place, and time.      Gait: Gait abnormal (antalgic).   Psychiatric:         Mood and Affect: Mood normal.               Portions of the record may have been created with voice recognition software.  Occasional wrong word or \"sound a like\" substitutions may have occurred due to the inherent limitations of voice recognition software.  Read the chart carefully and recognize, using context, " where substitutions have occurred.     Johanny Childs MD  Internal Medicine and Pediatrics

## 2024-04-23 ENCOUNTER — HOSPITAL ENCOUNTER (OUTPATIENT)
Dept: MAMMOGRAPHY | Facility: CLINIC | Age: 44
Discharge: HOME | End: 2024-04-23
Payer: COMMERCIAL

## 2024-04-23 VITALS — BODY MASS INDEX: 35.17 KG/M2 | HEIGHT: 64 IN | WEIGHT: 206 LBS

## 2024-04-23 DIAGNOSIS — R92.8 ABNORMAL MAMMOGRAM: ICD-10-CM

## 2024-04-23 PROCEDURE — 77066 DX MAMMO INCL CAD BI: CPT

## 2024-04-29 ENCOUNTER — OFFICE VISIT (OUTPATIENT)
Dept: OBGYN CLINIC | Facility: CLINIC | Age: 44
End: 2024-04-29
Payer: COMMERCIAL

## 2024-04-29 VITALS
SYSTOLIC BLOOD PRESSURE: 143 MMHG | DIASTOLIC BLOOD PRESSURE: 96 MMHG | WEIGHT: 206 LBS | HEART RATE: 81 BPM | BODY MASS INDEX: 35.17 KG/M2 | HEIGHT: 64 IN

## 2024-04-29 DIAGNOSIS — M22.2X1 PATELLOFEMORAL DISORDER OF RIGHT KNEE: Primary | ICD-10-CM

## 2024-04-29 PROCEDURE — 99213 OFFICE O/P EST LOW 20 MIN: CPT | Performed by: ORTHOPAEDIC SURGERY

## 2024-04-29 PROCEDURE — 20610 DRAIN/INJ JOINT/BURSA W/O US: CPT

## 2024-04-29 RX ORDER — BETAMETHASONE SODIUM PHOSPHATE AND BETAMETHASONE ACETATE 3; 3 MG/ML; MG/ML
6 INJECTION, SUSPENSION INTRA-ARTICULAR; INTRALESIONAL; INTRAMUSCULAR; SOFT TISSUE
Status: COMPLETED | OUTPATIENT
Start: 2024-04-29 | End: 2024-04-29

## 2024-04-29 RX ORDER — BUPIVACAINE HYDROCHLORIDE 2.5 MG/ML
2 INJECTION, SOLUTION INFILTRATION; PERINEURAL
Status: COMPLETED | OUTPATIENT
Start: 2024-04-29 | End: 2024-04-29

## 2024-04-29 RX ADMIN — BETAMETHASONE SODIUM PHOSPHATE AND BETAMETHASONE ACETATE 6 MG: 3; 3 INJECTION, SUSPENSION INTRA-ARTICULAR; INTRALESIONAL; INTRAMUSCULAR; SOFT TISSUE at 16:30

## 2024-04-29 RX ADMIN — BUPIVACAINE HYDROCHLORIDE 2 ML: 2.5 INJECTION, SOLUTION INFILTRATION; PERINEURAL at 16:30

## 2024-04-29 NOTE — PROGRESS NOTES
Assessment:  1. Patellofemoral disorder of right knee          Patient Active Problem List   Diagnosis    Essential hypertension    Headache    Moderate episode of recurrent major depressive disorder (HCC)    Prediabetes    Vitamin D deficiency    Nodule of left lobe of thyroid gland    Tobacco use    Insomnia    Obesity (BMI 30.0-34.9)    Varicose veins of both lower extremities with pain    HIRAL (obstructive sleep apnea)    Primary osteoarthritis of right knee    Chronic fatigue    History of hysterectomy    Hidradenitis suppurativa    Lack of housing    Family history of colon cancer    Epidermoid cyst of face    Internal derangement of right knee    Patellofemoral disorder of right knee       Plan:    43 y.o. female  with patellofemoral arthritis of the right knee    Discussed steroid injection of the right knee.  Discussed side effects and risks.  Patient agreeable.  Injection administered.  Discussed Medrol Dosepak pain and inflammation.  Discussed side effects and risks.  Patient deferred at this time.  Discussed gel injections of the right knee.  Patient notes 7 out of 10 knee pain in the office today.  Prior authorization submitted.  Patient to follow-up for course of gel injections    The assessment and plan were formulated by Dr. Steel and I assisted in carrying it out.    Subjective:   Patient ID: Jessica Oliver is a 43 y.o. female .    HPI    Patient presents to the office for follow up of right knee pain.  Patient notes persistent anterior knee pain.  Patient notes increase in pain with any sort of activities. Patient notes 7/10 right knee pain.     The following portions of the patient's history were reviewed and updated as appropriate: allergies, current medications, past family history, past social history, past surgical history and problem list.    Social History     Socioeconomic History    Marital status: Single     Spouse name: Not on file    Number of children: Not on file    Years of  education: 10    Highest education level: Not on file   Occupational History    Not on file   Tobacco Use    Smoking status: Every Day     Current packs/day: 0.50     Average packs/day: 0.5 packs/day for 20.0 years (10.0 ttl pk-yrs)     Types: Cigarettes     Passive exposure: Current    Smokeless tobacco: Never    Tobacco comments:     pt is down to .5 packs a day   Vaping Use    Vaping status: Never Used   Substance and Sexual Activity    Alcohol use: Yes     Comment: occasional    Drug use: No    Sexual activity: Not Currently     Partners: Male     Birth control/protection: Female Sterilization     Comment: HYSTERECTOMY   Other Topics Concern    Not on file   Social History Narrative    Most recent tobacco use screenin-    Do you currently or have you served in the [employer]: No    Were you activated, into active duty, as a member of the [employer] or as a Reservist: No    Occupation: unemployed    Education: 10    Marital status: Single    Sexual orientation: Heterosexual    Exercise level: Occasional    Diet: Regular    General stress level: Low    Alcohol intake: Occasional    Caffeine intake: Moderate    Chewing tobacco: none    Illicit drugs: denies    Guns present in home: No    Seat belts used routinely: Yes    Sunscreen used routinely: No    Smoke alarm in home: Yes    Advance directive: No    Live alone or with others: with others    Are there stairs in your home: Yes    Pets: Yes    Deaf or serious difficulty hearing: No    Blind or serious difficulty seeing: Yes    Difficulty concentrating, remembering or making decisions: No    Difficulty walking or climbing stairs: No    Difficulty dressing or bathing: No    Difficulty doing errands alone: No    Passive smoke exposure: Yes    Are you currently employed: No    Asbestos exposure: No    TB exposure: No    Environmental exposure: No    Animal exposure: Yes    cat and dog    Blood Transfusion: No    Sexually active: No    Presence of  domestic violence: No    Do you feel safe at home: Yes    no cpap or nebulizer     Social Determinants of Health     Financial Resource Strain: Not on file   Food Insecurity: Not on file   Transportation Needs: Not on file   Physical Activity: Not on file   Stress: Not on file   Social Connections: Not on file   Intimate Partner Violence: Not on file   Housing Stability: Not on file     Past Medical History:   Diagnosis Date    Abnormal Pap smear of cervix     2018 HSV 1, 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    Adrenal mass, left (HCC) 2021    1.8 x 2.1 cm on ct 2020    Arthritis     Asthma     Blurry vision 2020    Diabetes mellitus (HCC)     Essential hypertension 2020    Headache 2020    Hidradenitis suppurativa     Hypertension     Immunization deficiency 10/02/2020    Hep a nonimmune    Insomnia 2021    Migraine     Moderate episode of recurrent major depressive disorder (HCC) 2020    Obesity (BMI 30.0-34.9) 2021    Prediabetes 2020    Seasonal allergies     Tobacco use 2021    Vitamin D deficiency 10/02/2020     Past Surgical History:   Procedure Laterality Date    APPENDECTOMY      BREAST BIOPSY Left 2017    BREAST BIOPSY Right     2 core biopsies    BREAST EXCISIONAL BIOPSY Left 2018    BREAST SURGERY Left 2018    Left breast mass excision    CERVICAL BIOPSY  W/ LOOP ELECTRODE EXCISION       SECTION      X2    COLPOSCOPY      CYST REMOVAL      FACIAL/NECK BIOPSY Right 10/03/2023    Procedure: EXCISION MASS RIGHT CHEEK;  Surgeon: Robert Bloch, MD;  Location:  MAIN OR;  Service: General    FL INJECTION RIGHT KNEE (ARTHROGRAM)  01/15/2024    HYSTERECTOMY  2012    heavy bleeding, ovaries remain, also had abnormal pap    KNEE SURGERY      LYMPH NODE DISSECTION      under armpit    TUBAL LIGATION  2007    US GUIDED THYROID BIOPSY  2021     No Known Allergies  Current Outpatient Medications on File Prior to Visit   Medication Sig Dispense  Refill    albuterol (PROVENTIL HFA,VENTOLIN HFA) 90 mcg/act inhaler INHALE 2 PUFFS BY MOUTH EVERY 6 HOURS AS NEEDED FOR WHEEZING 18 g 1    amLODIPine (NORVASC) 5 mg tablet TAKE 1 TABLET (5 MG TOTAL) BY MOUTH DAILY BLOOD PRESUURE 90 tablet 0    buPROPion (WELLBUTRIN XL) 300 mg 24 hr tablet Take 1 tablet (300 mg total) by mouth every morning 90 tablet 1    Cholecalciferol ([employer] D3) 125 MCG (5000 UT) capsule Take 1 capsule (5,000 Units total) by mouth daily 90 capsule 2    [employer]S Vitamin C 500 MG tablet TAKE 1 TABLET (500 MG TOTAL) BY MOUTH DAILY. 90 tablet 3    cyanocobalamin (VITAMIN B-12) 2000 MCG tablet Take 1 tablet (2,000 mcg total) by mouth daily 90 tablet 2    loratadine (CLARITIN) 10 mg tablet TAKE 1 TABLET (10 MG TOTAL) BY MOUTH DAILY FOR ALLERGIES 90 tablet 1    metFORMIN (GLUCOPHAGE-XR) 500 mg 24 hr tablet Take 2 tablets (1,000 mg total) by mouth daily with breakfast 180 tablet 1    methocarbamol (ROBAXIN) 500 mg tablet Take 1 tablet (500 mg total) by mouth 2 (two) times a day for 3 days 6 tablet 0    multivitamin (THERAGRAN) TABS Take 1 tablet by mouth daily      spironolactone (ALDACTONE) 25 mg tablet TAKE 1 TABLET BY MOUTH EVERY DAY FOR 30 DAYS THEN TAKE 2 TABLETS DAILY AFTER 180 tablet 3    tiotropium (SPIRIVA) 18 mcg inhalation capsule Place 18 mcg into inhaler and inhale in the morning.      valACYclovir (VALTREX) 500 mg tablet TAKE 1 TABLET BY MOUTH EVERY DAY 90 tablet 2     No current facility-administered medications on file prior to visit.       Review of Systems   Constitutional:  Negative for chills and fever.   HENT:  Negative for ear pain and sore throat.    Eyes:  Negative for pain and visual disturbance.   Respiratory:  Negative for cough and shortness of breath.    Cardiovascular:  Negative for chest pain and palpitations.   Gastrointestinal:  Negative for abdominal pain and vomiting.   Genitourinary:  Negative for dysuria and hematuria.   Musculoskeletal:  Positive for arthralgias. Negative  for back pain.   Skin:  Negative for color change and rash.   Neurological:  Negative for seizures and syncope.   All other systems reviewed and are negative.    See HPi    Objective:    Vitals:    04/29/24 1626   BP: 143/96   Pulse: 81       Physical Exam  Vitals and nursing note reviewed.   Constitutional:       General: She is not in acute distress.     Appearance: She is well-developed.   HENT:      Head: Normocephalic and atraumatic.   Eyes:      Conjunctiva/sclera: Conjunctivae normal.   Cardiovascular:      Rate and Rhythm: Normal rate and regular rhythm.      Heart sounds: No murmur heard.  Pulmonary:      Effort: Pulmonary effort is normal. No respiratory distress.      Breath sounds: Normal breath sounds.   Abdominal:      Palpations: Abdomen is soft.      Tenderness: There is no abdominal tenderness.   Musculoskeletal:         General: No swelling.      Cervical back: Neck supple.      Right knee: No effusion.   Skin:     General: Skin is warm and dry.      Capillary Refill: Capillary refill takes less than 2 seconds.   Neurological:      Mental Status: She is alert.   Psychiatric:         Mood and Affect: Mood normal.         Right Knee Exam     Tenderness   The patient is experiencing tenderness in the medial joint line and lateral joint line.    Range of Motion   Extension:  normal   Flexion:  normal     Other   Effusion: no effusion present    Comments:  Tenderness to palpation over the patella tendon  Positive patellar grind            Large joint arthrocentesis: R knee  Universal Protocol:  Consent: Verbal consent obtained.  Risks and benefits: risks, benefits and alternatives were discussed  Consent given by: patient  Patient understanding: patient states understanding of the procedure being performed  Patient identity confirmed: verbally with patient  Supporting Documentation  Indications: pain   Procedure Details  Location: knee - R knee  Needle size: 22 G  Ultrasound guidance: no  Approach:  "anterolateral  Medications administered: 6 mg betamethasone acetate-betamethasone sodium phosphate 6 (3-3) mg/mL; 2 mL bupivacaine 0.25 %    Patient tolerance: patient tolerated the procedure well with no immediate complications  Dressing:  Sterile dressing applied                  Portions of the record may have been created with voice recognition software.  Occasional wrong word or \"sound a like\" substitutions may have occurred due to the inherent limitations of voice recognition software.  Read the chart carefully and recognize, using context, where substitutions have occurred.   "

## 2024-05-20 ENCOUNTER — PROCEDURE VISIT (OUTPATIENT)
Dept: OBGYN CLINIC | Facility: CLINIC | Age: 44
End: 2024-05-20
Payer: COMMERCIAL

## 2024-05-20 VITALS — HEIGHT: 64 IN | BODY MASS INDEX: 35.17 KG/M2 | WEIGHT: 206 LBS

## 2024-05-20 DIAGNOSIS — M22.2X1 PATELLOFEMORAL DISORDER OF RIGHT KNEE: Primary | ICD-10-CM

## 2024-05-20 DIAGNOSIS — M17.11 PRIMARY OSTEOARTHRITIS OF RIGHT KNEE: ICD-10-CM

## 2024-05-20 DIAGNOSIS — Z98.890 HISTORY OF MENISCECTOMY OF RIGHT KNEE: ICD-10-CM

## 2024-05-20 DIAGNOSIS — M23.91 INTERNAL DERANGEMENT OF RIGHT KNEE: ICD-10-CM

## 2024-05-20 PROCEDURE — 20610 DRAIN/INJ JOINT/BURSA W/O US: CPT

## 2024-05-20 RX ORDER — HYALURONATE SODIUM 10 MG/ML
20 SYRINGE (ML) INTRAARTICULAR
Status: COMPLETED | OUTPATIENT
Start: 2024-05-20 | End: 2024-05-20

## 2024-05-20 RX ADMIN — Medication 20 MG: at 10:00

## 2024-05-20 NOTE — PROGRESS NOTES
"1. Patellofemoral disorder of right knee  Large joint arthrocentesis: R knee      2. Internal derangement of right knee  Large joint arthrocentesis: R knee      3. History of meniscectomy of right knee  Large joint arthrocentesis: R knee      4. Primary osteoarthritis of right knee  Large joint arthrocentesis: R knee        Patient is here for her first injection of Euflexxa into the right knee.     Patient rates their pain as 5/10 today    Physical exam of the knee shows no effusion no ecchymosis.      Large joint arthrocentesis: R knee  Universal Protocol:  Consent: Verbal consent obtained.  Risks and benefits: risks, benefits and alternatives were discussed  Consent given by: patient  Time out: Immediately prior to procedure a \"time out\" was called to verify the correct patient, procedure, equipment, support staff and site/side marked as required.  Patient understanding: patient states understanding of the procedure being performed  Site marked: the operative site was marked  Supporting Documentation  Indications: pain   Procedure Details  Location: knee - R knee  Preparation: Patient was prepped and draped in the usual sterile fashion  Needle size: 22 G  Ultrasound guidance: no  Medications administered: 20 mg Sodium Hyaluronate (Viscosup) 20 MG/2ML  Specialty Pharmacy Supplied: received medications from pharmacy  Patient tolerance: patient tolerated the procedure well with no immediate complications  Dressing:  Sterile dressing applied          Patient tolerated procedure follow up in one week    "

## 2024-06-03 ENCOUNTER — PROCEDURE VISIT (OUTPATIENT)
Dept: OBGYN CLINIC | Facility: CLINIC | Age: 44
End: 2024-06-03
Payer: COMMERCIAL

## 2024-06-03 VITALS — WEIGHT: 206 LBS | BODY MASS INDEX: 35.17 KG/M2 | HEIGHT: 64 IN

## 2024-06-03 DIAGNOSIS — Z98.890 HISTORY OF MENISCECTOMY OF RIGHT KNEE: ICD-10-CM

## 2024-06-03 DIAGNOSIS — M17.11 PRIMARY OSTEOARTHRITIS OF RIGHT KNEE: ICD-10-CM

## 2024-06-03 DIAGNOSIS — M22.2X1 PATELLOFEMORAL DISORDER OF RIGHT KNEE: Primary | ICD-10-CM

## 2024-06-03 DIAGNOSIS — M23.91 INTERNAL DERANGEMENT OF RIGHT KNEE: ICD-10-CM

## 2024-06-03 PROCEDURE — 20610 DRAIN/INJ JOINT/BURSA W/O US: CPT | Performed by: SPECIALIST/TECHNOLOGIST

## 2024-06-03 RX ORDER — HYALURONATE SODIUM 10 MG/ML
20 SYRINGE (ML) INTRAARTICULAR
Status: COMPLETED | OUTPATIENT
Start: 2024-06-03 | End: 2024-06-03

## 2024-06-03 RX ADMIN — Medication 20 MG: at 10:45

## 2024-06-03 NOTE — PROGRESS NOTES
"1. Patellofemoral disorder of right knee  Large joint arthrocentesis: R knee      2. Internal derangement of right knee  Large joint arthrocentesis: R knee      3. History of meniscectomy of right knee  Large joint arthrocentesis: R knee      4. Primary osteoarthritis of right knee  Large joint arthrocentesis: R knee          Patient is here for her second injection of Euflexxa into the right knee.     Patient rates their pain as 5/10 today    Physical exam of the knee shows no effusion no ecchymosis.      Large joint arthrocentesis: R knee  Universal Protocol:  Consent: Verbal consent obtained.  Risks and benefits: risks, benefits and alternatives were discussed  Consent given by: patient  Time out: Immediately prior to procedure a \"time out\" was called to verify the correct patient, procedure, equipment, support staff and site/side marked as required.  Patient understanding: patient states understanding of the procedure being performed  Site marked: the operative site was marked  Supporting Documentation  Indications: pain   Procedure Details  Location: knee - R knee  Preparation: Patient was prepped and draped in the usual sterile fashion  Needle size: 22 G  Ultrasound guidance: no  Medications administered: 20 mg Sodium Hyaluronate (Viscosup) 20 MG/2ML  Specialty Pharmacy Supplied: received medications from pharmacy  Patient tolerance: patient tolerated the procedure well with no immediate complications  Dressing:  Sterile dressing applied          Patient tolerated procedure follow up in one week    "

## 2024-06-11 DIAGNOSIS — I10 ESSENTIAL HYPERTENSION: ICD-10-CM

## 2024-06-11 RX ORDER — AMLODIPINE BESYLATE 5 MG/1
5 TABLET ORAL DAILY
Qty: 90 TABLET | Refills: 1 | Status: SHIPPED | OUTPATIENT
Start: 2024-06-11 | End: 2024-12-08

## 2024-06-18 DIAGNOSIS — E55.9 VITAMIN D DEFICIENCY: ICD-10-CM

## 2024-06-18 DIAGNOSIS — F33.1 MODERATE EPISODE OF RECURRENT MAJOR DEPRESSIVE DISORDER (HCC): ICD-10-CM

## 2024-06-18 RX ORDER — BUPROPION HYDROCHLORIDE 300 MG/1
300 TABLET ORAL EVERY MORNING
Qty: 90 TABLET | Refills: 1 | Status: SHIPPED | OUTPATIENT
Start: 2024-06-18

## 2024-06-19 ENCOUNTER — TELEPHONE (OUTPATIENT)
Age: 44
End: 2024-06-19

## 2024-06-19 NOTE — TELEPHONE ENCOUNTER
Caller: Patient     Doctor: Damián    Reason for call: Patient missed her 3rd visco injection and is calling to reschedule.     Call back#: [phone]

## 2024-06-19 NOTE — TELEPHONE ENCOUNTER
Called patient and lvm to call us back and schedule her last visco apt. Please schedule at next available.

## 2024-07-01 ENCOUNTER — PROCEDURE VISIT (OUTPATIENT)
Dept: OBGYN CLINIC | Facility: CLINIC | Age: 44
End: 2024-07-01
Payer: COMMERCIAL

## 2024-07-01 VITALS — BODY MASS INDEX: 35.17 KG/M2 | WEIGHT: 206 LBS | HEIGHT: 64 IN

## 2024-07-01 DIAGNOSIS — Z98.890 HISTORY OF MENISCECTOMY OF RIGHT KNEE: ICD-10-CM

## 2024-07-01 DIAGNOSIS — M23.91 INTERNAL DERANGEMENT OF RIGHT KNEE: ICD-10-CM

## 2024-07-01 DIAGNOSIS — M17.11 PRIMARY OSTEOARTHRITIS OF RIGHT KNEE: Primary | ICD-10-CM

## 2024-07-01 DIAGNOSIS — M22.2X1 PATELLOFEMORAL DISORDER OF RIGHT KNEE: ICD-10-CM

## 2024-07-01 PROCEDURE — 20610 DRAIN/INJ JOINT/BURSA W/O US: CPT | Performed by: SPECIALIST/TECHNOLOGIST

## 2024-07-01 RX ORDER — HYALURONATE SODIUM 10 MG/ML
20 SYRINGE (ML) INTRAARTICULAR
Status: COMPLETED | OUTPATIENT
Start: 2024-07-01 | End: 2024-07-01

## 2024-07-01 RX ORDER — HYALURONATE SODIUM 20 MG/2 ML
SYRINGE (ML) INTRAARTICULAR
COMMUNITY
Start: 2024-05-03

## 2024-07-01 RX ADMIN — Medication 20 MG: at 12:00

## 2024-07-01 NOTE — PROGRESS NOTES
"1. Primary osteoarthritis of right knee  Large joint arthrocentesis      2. Patellofemoral disorder of right knee  Large joint arthrocentesis      3. Internal derangement of right knee  Large joint arthrocentesis      4. History of meniscectomy of right knee  Large joint arthrocentesis        Patient is here for her 3rd injection of Euflexxa into the right knee.     Patient rates their pain as 5/10 today    Physical exam of the knee shows no effusion no ecchymosis.      Large joint arthrocentesis: R knee  Universal Protocol:  Consent: Verbal consent obtained.  Risks and benefits: risks, benefits and alternatives were discussed  Consent given by: patient  Time out: Immediately prior to procedure a \"time out\" was called to verify the correct patient, procedure, equipment, support staff and site/side marked as required.  Patient understanding: patient states understanding of the procedure being performed  Site marked: the operative site was marked  Supporting Documentation  Indications: pain   Procedure Details  Location: knee - R knee  Preparation: Patient was prepped and draped in the usual sterile fashion  Needle size: 22 G  Ultrasound guidance: no  Medications administered: 20 mg Sodium Hyaluronate (Viscosup) 20 MG/2ML  Specialty Pharmacy Supplied: received medications from pharmacy  Patient tolerance: patient tolerated the procedure well with no immediate complications  Dressing:  Sterile dressing applied          Patient tolerated procedure follow up as needed    "

## 2024-08-27 ENCOUNTER — OFFICE VISIT (OUTPATIENT)
Dept: OBGYN CLINIC | Facility: CLINIC | Age: 44
End: 2024-08-27
Payer: COMMERCIAL

## 2024-08-27 VITALS
BODY MASS INDEX: 35.44 KG/M2 | SYSTOLIC BLOOD PRESSURE: 114 MMHG | WEIGHT: 207.6 LBS | HEIGHT: 64 IN | DIASTOLIC BLOOD PRESSURE: 62 MMHG

## 2024-08-27 DIAGNOSIS — L29.2 VULVOVAGINAL ITCHING: Primary | ICD-10-CM

## 2024-08-27 DIAGNOSIS — Z11.3 SCREEN FOR STD (SEXUALLY TRANSMITTED DISEASE): ICD-10-CM

## 2024-08-27 DIAGNOSIS — N89.8 VAGINAL DISCHARGE: ICD-10-CM

## 2024-08-27 PROCEDURE — 99213 OFFICE O/P EST LOW 20 MIN: CPT | Performed by: PHYSICIAN ASSISTANT

## 2024-08-27 PROCEDURE — 87591 N.GONORRHOEAE DNA AMP PROB: CPT | Performed by: PHYSICIAN ASSISTANT

## 2024-08-27 PROCEDURE — 87491 CHLMYD TRACH DNA AMP PROBE: CPT | Performed by: PHYSICIAN ASSISTANT

## 2024-08-27 PROCEDURE — 87480 CANDIDA DNA DIR PROBE: CPT | Performed by: PHYSICIAN ASSISTANT

## 2024-08-27 PROCEDURE — 87660 TRICHOMONAS VAGIN DIR PROBE: CPT | Performed by: PHYSICIAN ASSISTANT

## 2024-08-27 PROCEDURE — 87510 GARDNER VAG DNA DIR PROBE: CPT | Performed by: PHYSICIAN ASSISTANT

## 2024-08-27 RX ORDER — CLOTRIMAZOLE AND BETAMETHASONE DIPROPIONATE 10; .64 MG/G; MG/G
CREAM TOPICAL 2 TIMES DAILY PRN
Qty: 45 G | Refills: 0 | Status: SHIPPED | OUTPATIENT
Start: 2024-08-27

## 2024-08-27 RX ORDER — FLUCONAZOLE 150 MG/1
150 TABLET ORAL ONCE
Qty: 1 TABLET | Refills: 0 | Status: SHIPPED | OUTPATIENT
Start: 2024-08-27 | End: 2024-08-27

## 2024-08-27 NOTE — PROGRESS NOTES
Assessment/Plan:      Diagnoses and all orders for this visit:    Vulvovaginal itching  -     fluconazole (DIFLUCAN) 150 mg tablet; Take 1 tablet (150 mg total) by mouth once for 1 dose  -     clotrimazole-betamethasone (LOTRISONE) 1-0.05 % cream; Apply topically 2 (two) times a day as needed (To itchy areas of vulva externally)  -     VAGINOSIS DNA PROBE  -     Chlamydia/GC amplified DNA by PCR    Vaginal discharge  -     fluconazole (DIFLUCAN) 150 mg tablet; Take 1 tablet (150 mg total) by mouth once for 1 dose  -     clotrimazole-betamethasone (LOTRISONE) 1-0.05 % cream; Apply topically 2 (two) times a day as needed (To itchy areas of vulva externally)  -     VAGINOSIS DNA PROBE  -     Chlamydia/GC amplified DNA by PCR    Screen for STD (sexually transmitted disease)  -     VAGINOSIS DNA PROBE  -     Chlamydia/GC amplified DNA by PCR     Pleasant 43-year-old female presenting today for acute problem visit for vulvovaginal irritation.  S/p hysterectomy  Prediabetic  New sexual partner  Trial of OTC antifungal vaginal pill x 1 4 days ago without relief.    Generalized mild irritation at vaginal introitus and bilateral inner labia noted with minimal dried removable white discharge.  Thick white clumpy discharge within vagina.    Suspect possible yeast infection  Vaginosis probe obtained  Also GC/CT screening in setting of new sexual partner  Will start patient on 1 dose of oral fluconazole  Topical Lotrisone prescribed for itch relief externally  Will call patient with culture results and follow-up with her at that time  Encourage patient to work on BMI reduction, increasing exercise, healthy diet low in carbs and sugar.    RTO February 2025 for annual exam, sooner problems arise in the interim or appropriate follow-up for current issue as needed.    Chief Complaint   Patient presents with    Vaginal Itching     Pt c/o vaginal itching/ irritation X 4 days, denies any vaginal discharge or odor.          Subjective:     Patient ID: Jessica Oliver is a 43 y.o. female.    42y/o F here today for vaginal irritation.    She reports irritation in the vagina past few days as well as slightly at the vaginal opening and inner labia.  No discharge or odor.  She tried OTC vaginal pill x 1 antifungal without relief.  Denies urinary sxs, pelvic pain or vaginal bleeding.  She just recently became sexually active again.   Prediabetic        Review of Systems   Constitutional: Negative.    Gastrointestinal:  Negative for abdominal distention and abdominal pain.   Genitourinary:  Positive for dyspareunia (dry) and vaginal pain (irritation, burning). Negative for dysuria, frequency, genital sores, hematuria, pelvic pain, urgency, vaginal bleeding and vaginal discharge.         The following portions of the patient's history were reviewed and updated as appropriate: allergies, current medications, past family history, past medical history, past social history, past surgical history, and problem list.      Objective:     Physical Exam  Vitals reviewed.   Constitutional:       Appearance: Normal appearance. She is not ill-appearing.   Genitourinary:     Vagina: Vaginal discharge (thick, white, clumpy) present. No erythema, tenderness, bleeding or lesions.      Comments: Minimal dry white discharge at vaginal introitus. General mild irritation noted at introitus and B/L inner labia with mild erythema.   Neurological:      Mental Status: She is alert and oriented to person, place, and time.   Psychiatric:         Mood and Affect: Mood normal.         Behavior: Behavior normal. Behavior is cooperative.

## 2024-08-28 DIAGNOSIS — B96.89 BV (BACTERIAL VAGINOSIS): Primary | ICD-10-CM

## 2024-08-28 DIAGNOSIS — N76.0 BV (BACTERIAL VAGINOSIS): Primary | ICD-10-CM

## 2024-08-28 RX ORDER — CLINDAMYCIN PHOSPHATE 20 MG/G
1 CREAM VAGINAL
Qty: 40 G | Refills: 0 | Status: SHIPPED | OUTPATIENT
Start: 2024-08-28 | End: 2024-09-04

## 2024-09-18 DIAGNOSIS — R73.03 PREDIABETES: ICD-10-CM

## 2024-09-19 RX ORDER — METFORMIN HCL 500 MG
TABLET, EXTENDED RELEASE 24 HR ORAL
Qty: 180 TABLET | Refills: 1 | Status: SHIPPED | OUTPATIENT
Start: 2024-09-19

## 2024-09-20 DIAGNOSIS — T78.40XA ALLERGY, INITIAL ENCOUNTER: ICD-10-CM

## 2024-09-20 RX ORDER — LORATADINE 10 MG/1
10 TABLET ORAL DAILY
Qty: 90 TABLET | Refills: 1 | Status: SHIPPED | OUTPATIENT
Start: 2024-09-20

## 2024-10-15 ENCOUNTER — TELEPHONE (OUTPATIENT)
Age: 44
End: 2024-10-15

## 2024-10-15 DIAGNOSIS — F33.1 MODERATE EPISODE OF RECURRENT MAJOR DEPRESSIVE DISORDER (HCC): ICD-10-CM

## 2024-10-15 RX ORDER — BUPROPION HYDROCHLORIDE 300 MG/1
300 TABLET ORAL EVERY MORNING
Qty: 90 TABLET | Refills: 1 | Status: SHIPPED | OUTPATIENT
Start: 2024-10-15

## 2024-10-16 ENCOUNTER — HOSPITAL ENCOUNTER (EMERGENCY)
Facility: HOSPITAL | Age: 44
Discharge: HOME | End: 2024-10-17
Attending: EMERGENCY MEDICINE
Payer: COMMERCIAL

## 2024-10-16 ENCOUNTER — APPOINTMENT (EMERGENCY)
Dept: RADIOLOGY | Facility: HOSPITAL | Age: 44
End: 2024-10-16
Payer: COMMERCIAL

## 2024-10-16 DIAGNOSIS — R07.9 CHEST PAIN: Primary | ICD-10-CM

## 2024-10-16 LAB
ALBUMIN SERPL BCG-MCNC: 3.9 G/DL (ref 3.5–5)
ALP SERPL-CCNC: 78 U/L (ref 34–104)
ALT SERPL W P-5'-P-CCNC: 9 U/L (ref 7–52)
ANION GAP SERPL CALCULATED.3IONS-SCNC: 2 MMOL/L (ref 4–13)
AST SERPL W P-5'-P-CCNC: 16 U/L (ref 13–39)
BASOPHILS # BLD AUTO: 0.08 THOUSANDS/ΜL (ref 0–0.1)
BASOPHILS NFR BLD AUTO: 1 % (ref 0–1)
BILIRUB SERPL-MCNC: 0.27 MG/DL (ref 0.2–1)
BUN SERPL-MCNC: 10 MG/DL (ref 5–25)
CALCIUM SERPL-MCNC: 9.1 MG/DL (ref 8.4–10.2)
CARDIAC TROPONIN I PNL SERPL HS: 3 NG/L
CHLORIDE SERPL-SCNC: 105 MMOL/L (ref 96–108)
CK SERPL-CCNC: 138 U/L (ref 26–192)
CO2 SERPL-SCNC: 29 MMOL/L (ref 21–32)
CREAT SERPL-MCNC: 0.81 MG/DL (ref 0.6–1.3)
EOSINOPHIL # BLD AUTO: 0.23 THOUSAND/ΜL (ref 0–0.61)
EOSINOPHIL NFR BLD AUTO: 2 % (ref 0–6)
ERYTHROCYTE [DISTWIDTH] IN BLOOD BY AUTOMATED COUNT: 14.5 % (ref 11.6–15.1)
GFR SERPL CREATININE-BSD FRML MDRD: 88 ML/MIN/1.73SQ M
GLUCOSE SERPL-MCNC: 114 MG/DL (ref 65–140)
HCT VFR BLD AUTO: 40.1 % (ref 34.8–46.1)
HGB BLD-MCNC: 13.3 G/DL (ref 11.5–15.4)
IMM GRANULOCYTES # BLD AUTO: 0.06 THOUSAND/UL (ref 0–0.2)
IMM GRANULOCYTES NFR BLD AUTO: 0 % (ref 0–2)
LIPASE SERPL-CCNC: 20 U/L (ref 11–82)
LYMPHOCYTES # BLD AUTO: 5.93 THOUSANDS/ΜL (ref 0.6–4.47)
LYMPHOCYTES NFR BLD AUTO: 45 % (ref 14–44)
MCH RBC QN AUTO: 29.3 PG (ref 26.8–34.3)
MCHC RBC AUTO-ENTMCNC: 33.2 G/DL (ref 31.4–37.4)
MCV RBC AUTO: 88 FL (ref 82–98)
MONOCYTES # BLD AUTO: 1.03 THOUSAND/ΜL (ref 0.17–1.22)
MONOCYTES NFR BLD AUTO: 8 % (ref 4–12)
NEUTROPHILS # BLD AUTO: 6.01 THOUSANDS/ΜL (ref 1.85–7.62)
NEUTS SEG NFR BLD AUTO: 44 % (ref 43–75)
NRBC BLD AUTO-RTO: 0 /100 WBCS
PLATELET # BLD AUTO: 293 THOUSANDS/UL (ref 149–390)
PMV BLD AUTO: 8.9 FL (ref 8.9–12.7)
POTASSIUM SERPL-SCNC: 4.1 MMOL/L (ref 3.5–5.3)
PROT SERPL-MCNC: 7.1 G/DL (ref 6.4–8.4)
RBC # BLD AUTO: 4.54 MILLION/UL (ref 3.81–5.12)
SODIUM SERPL-SCNC: 136 MMOL/L (ref 135–147)
WBC # BLD AUTO: 13.34 THOUSAND/UL (ref 4.31–10.16)

## 2024-10-16 PROCEDURE — 93005 ELECTROCARDIOGRAM TRACING: CPT

## 2024-10-16 PROCEDURE — 84484 ASSAY OF TROPONIN QUANT: CPT

## 2024-10-16 PROCEDURE — 82550 ASSAY OF CK (CPK): CPT

## 2024-10-16 PROCEDURE — 99285 EMERGENCY DEPT VISIT HI MDM: CPT | Performed by: EMERGENCY MEDICINE

## 2024-10-16 PROCEDURE — 83690 ASSAY OF LIPASE: CPT

## 2024-10-16 PROCEDURE — 36415 COLL VENOUS BLD VENIPUNCTURE: CPT

## 2024-10-16 PROCEDURE — 80053 COMPREHEN METABOLIC PANEL: CPT

## 2024-10-16 PROCEDURE — 85025 COMPLETE CBC W/AUTO DIFF WBC: CPT

## 2024-10-16 PROCEDURE — 71045 X-RAY EXAM CHEST 1 VIEW: CPT

## 2024-10-16 PROCEDURE — 99285 EMERGENCY DEPT VISIT HI MDM: CPT

## 2024-10-16 RX ORDER — FAMOTIDINE 20 MG/1
20 TABLET, FILM COATED ORAL ONCE
Status: COMPLETED | OUTPATIENT
Start: 2024-10-16 | End: 2024-10-16

## 2024-10-16 RX ORDER — MAGNESIUM HYDROXIDE/ALUMINUM HYDROXICE/SIMETHICONE 120; 1200; 1200 MG/30ML; MG/30ML; MG/30ML
30 SUSPENSION ORAL ONCE
Status: COMPLETED | OUTPATIENT
Start: 2024-10-16 | End: 2024-10-16

## 2024-10-16 RX ADMIN — FAMOTIDINE 20 MG: 20 TABLET ORAL at 22:35

## 2024-10-16 RX ADMIN — ALUMINUM HYDROXIDE, MAGNESIUM HYDROXIDE, AND DIMETHICONE 30 ML: 200; 20; 200 SUSPENSION ORAL at 22:35

## 2024-10-17 VITALS
WEIGHT: 216.05 LBS | TEMPERATURE: 97.8 F | SYSTOLIC BLOOD PRESSURE: 135 MMHG | HEART RATE: 90 BPM | OXYGEN SATURATION: 98 % | DIASTOLIC BLOOD PRESSURE: 86 MMHG | RESPIRATION RATE: 18 BRPM | BODY MASS INDEX: 37.09 KG/M2

## 2024-10-17 LAB
2HR DELTA HS TROPONIN: 0 NG/L
ATRIAL RATE: 86 BPM
CARDIAC TROPONIN I PNL SERPL HS: 3 NG/L
P AXIS: 71 DEGREES
PR INTERVAL: 212 MS
QRS AXIS: 71 DEGREES
QRSD INTERVAL: 80 MS
QT INTERVAL: 380 MS
QTC INTERVAL: 454 MS
T WAVE AXIS: 42 DEGREES
VENTRICULAR RATE: 86 BPM

## 2024-10-17 PROCEDURE — 96374 THER/PROPH/DIAG INJ IV PUSH: CPT

## 2024-10-17 PROCEDURE — 84484 ASSAY OF TROPONIN QUANT: CPT

## 2024-10-17 PROCEDURE — 36415 COLL VENOUS BLD VENIPUNCTURE: CPT

## 2024-10-17 PROCEDURE — 93010 ELECTROCARDIOGRAM REPORT: CPT | Performed by: INTERNAL MEDICINE

## 2024-10-17 RX ORDER — KETOROLAC TROMETHAMINE 30 MG/ML
15 INJECTION, SOLUTION INTRAMUSCULAR; INTRAVENOUS ONCE
Status: COMPLETED | OUTPATIENT
Start: 2024-10-17 | End: 2024-10-17

## 2024-10-17 RX ADMIN — KETOROLAC TROMETHAMINE 15 MG: 30 INJECTION, SOLUTION INTRAMUSCULAR; INTRAVENOUS at 01:02

## 2024-10-17 NOTE — DISCHARGE INSTRUCTIONS
Continue to monitor symptoms and return to the ER if they acutely worsen. Otherwise, we did send a referral to cardiology given your family history, and you are welcome to follow with them as needed. You can also follow up with your PCP as needed.

## 2024-10-17 NOTE — ED PROVIDER NOTES
Time reflects when diagnosis was documented in both MDM as applicable and the Disposition within this note       Time User Action Codes Description Comment    10/17/2024 12:51 AM Joanne Ricci Add [R07.9] Chest pain           ED Disposition       ED Disposition   Discharge    Condition   Stable    Date/Time   u Oct 17, 2024 12:51 AM    Comment   Jessica Oliver discharge to home/self care.                   Assessment & Plan       Medical Decision Making  43-year-old female patient with history of hypertension presenting with chest pain since earlier this morning at work.  On initial evaluation, she was with stable vitals and without focal concerning physical exam findings.  Initial differential diagnosis included reflux, musculoskeletal chest pain, unstable angina, ACS such as STEMI/NSTEMI.  EKG done showed no acute ST-T wave abnormalities.  Lab workup was done and troponin at 0 and 2 hours was low risk.  She was monitored on telemetry throughout her stay and remained stable, without obvious arrhythmia or findings that would explain her current symptoms.  Diagnosis such as PE is very unlikely as she has no risk factors - PERC rule able to be applied and further testing deferred at this time.  The patient was counseled on these results and all questions answered to satisfaction.  She was ultimately able to be discharged in stable condition with referral to cardiology given significant family history and personal risk factors and was advised to return to the emergency department should symptoms acutely worsen.    Problems Addressed:  Chest pain: acute illness or injury    Amount and/or Complexity of Data Reviewed  Labs: ordered.  Radiology: ordered and independent interpretation performed.    Risk  OTC drugs.  Prescription drug management.             Medications   famotidine (PEPCID) tablet 20 mg (20 mg Oral Given 10/16/24 2039)   aluminum-magnesium hydroxide-simethicone (MAALOX) oral suspension 30 mL (30 mL  Oral Given 10/16/24 2235)   ketorolac (TORADOL) injection 15 mg (15 mg Intravenous Given 10/17/24 0102)       ED Risk Strat Scores   HEART Risk Score      Flowsheet Row Most Recent Value   Heart Score Risk Calculator    History 0 Filed at: 10/17/2024 0050   ECG 0 Filed at: 10/17/2024 0050   Age 1 Filed at: 10/17/2024 0050   Risk Factors 1 Filed at: 10/17/2024 0050   Troponin 0 Filed at: 10/17/2024 0050   HEART Score 2 Filed at: 10/17/2024 0050                           PERC Rule for PE      Flowsheet Row Most Recent Value   PERC Rule for PE    Age >=50 0 Filed at: 10/16/2024 2218   HR >=100 0 Filed at: 10/16/2024 2218   O2 Sat on room air < 95% 0 Filed at: 10/16/2024 2218   History of PE or DVT 0 Filed at: 10/16/2024 2218   Recent trauma or surgery 0 Filed at: 10/16/2024 2218   Hemoptysis 0 Filed at: 10/16/2024 2218   Exogenous estrogen 0 Filed at: 10/16/2024 2218   Unilateral leg swelling 0 Filed at: 10/16/2024 2218   PERC Rule for PE Results 0 Filed at: 10/16/2024 2218                Wells' Criteria for PE      Flowsheet Row Most Recent Value   Wells' Criteria for PE    Clinical signs and symptoms of DVT 0 Filed at: 10/16/2024 2218   PE is primary diagnosis or equally likely 0 Filed at: 10/16/2024 2218   HR >100 0 Filed at: 10/16/2024 2218   Immobilization at least 3 days or Surgery in the previous 4 weeks 0 Filed at: 10/16/2024 2218   Previous, objectively diagnosed PE or DVT 0 Filed at: 10/16/2024 2218   Hemoptysis 0 Filed at: 10/16/2024 2218   Malignancy with treatment within 6 months or palliative 0 Filed at: 10/16/2024 2218   Wells' Criteria Total 0 Filed at: 10/16/2024 2218                        History of Present Illness       Chief Complaint   Patient presents with    Chest Pain     Reports mid sternum CP since this am with a headache, denies SOB/Dizziness.        Past Medical History:   Diagnosis Date    Abnormal Pap smear of cervix     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, 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    Adrenal mass, left (HCC)  2021    1.8 x 2.1 cm on ct 2020    Arthritis     Asthma     Blurry vision 2020    Diabetes mellitus (HCC)     Essential hypertension 2020    Headache 2020    Hidradenitis suppurativa     Hypertension     Immunization deficiency 10/02/2020    Hep a nonimmune    Insomnia 2021    Migraine     Moderate episode of recurrent major depressive disorder (HCC) 2020    Obesity (BMI 30.0-34.9) 2021    Prediabetes 2020    Seasonal allergies     Tobacco use 2021    Vitamin D deficiency 10/02/2020      Past Surgical History:   Procedure Laterality Date    APPENDECTOMY      BREAST BIOPSY Left 2017    BREAST BIOPSY Right     2 core biopsies    BREAST EXCISIONAL BIOPSY Left 2018    BREAST SURGERY Left 2018    Left breast mass excision    CERVICAL BIOPSY  W/ LOOP ELECTRODE EXCISION       SECTION      X2    COLPOSCOPY      CYST REMOVAL      FACIAL/NECK BIOPSY Right 10/03/2023    Procedure: EXCISION MASS RIGHT CHEEK;  Surgeon: Robert Bloch, MD;  Location: [facility];  Service: General    FL INJECTION RIGHT KNEE (ARTHROGRAM)  01/15/2024    HYSTERECTOMY  2012    heavy bleeding, ovaries remain, also had abnormal pap    KNEE SURGERY      LYMPH NODE DISSECTION      under armpit    TUBAL LIGATION      US GUIDED THYROID BIOPSY  2021      Family History   Problem Relation Age of Onset    Diabetes Mother     Heart attack Mother     Heart disease Mother         Internal Defibrilation    No Known Problems Father     Endometrial cancer Sister 28    Colon cancer Sister 35    No Known Problems Daughter     Diabetes Maternal Grandmother     Diabetes Paternal Grandmother     No Known Problems Son     No Known Problems Son     Lupus Maternal Aunt 48    Seizures Maternal Aunt     Leukemia Maternal Uncle 18    Diabetes Paternal Aunt     No Known Problems Paternal Aunt       Social History     Tobacco Use    Smoking status: Every Day     Current packs/day: 0.50      Average packs/day: 0.5 packs/day for 20.0 years (10.0 ttl pk-yrs)     Types: Cigarettes     Passive exposure: Current    Smokeless tobacco: Never    Tobacco comments:     pt is down to .5 packs a day   Vaping Use    Vaping status: Never Used   Substance Use Topics    Alcohol use: Yes     Comment: occasional    Drug use: No      E-Cigarette/Vaping    E-Cigarette Use Never User       E-Cigarette/Vaping Substances    Nicotine Yes     THC No     CBD No     Flavoring No     Other No     Unknown No       I have reviewed and agree with the history as documented.     HPI    42 yo F presenting with chest pain that started this morning. 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 Was at work when she noticed a nagging, central chest pain that did not appear to get worse with activity. This has remained constant throughout the day. Does note some mild reflux this morning so took some antacids but this did not help. She denies feeling short of breath, diaphoretic. Has not had pain like this before. Pain does not radiate. Admits to some hypertension, but no other cardiac history.     Review of Systems   Constitutional:  Negative for chills and fever.   HENT:  Negative for ear pain and sore throat.    Eyes:  Negative for pain and visual disturbance.   Respiratory:  Negative for cough and shortness of breath.    Cardiovascular:  Positive for chest pain. Negative for palpitations.   Gastrointestinal:  Negative for abdominal pain and vomiting.        Reflux   Genitourinary:  Negative for dysuria and hematuria.   Musculoskeletal:  Negative for arthralgias and back pain.   Skin:  Negative for color change and rash.   Neurological:  Negative for seizures and syncope.   All other systems reviewed and are negative.          Objective       ED Triage Vitals   Temperature Pulse Blood Pressure Respirations SpO2 Patient Position - Orthostatic VS   10/16/24 2206 10/16/24 2206 10/16/24 2206 10/16/24  2206 10/16/24 2206 10/17/24 0015   97.8 °F (36.6 °C) 96 148/88 18 98 % Lying      Temp Source Heart Rate Source BP Location FiO2 (%) Pain Score    10/16/24 2206 10/17/24 0015 10/17/24 0015 -- --    Oral Monitor Right arm        Vitals      Date and Time Temp Pulse SpO2 Resp BP Pain Score FACES Pain Rating User   10/17/24 0015 -- 90 98 % -- 135/86 -- -- JR   10/16/24 2206 97.8 °F (36.6 °C) 96 98 % 18 148/88 -- --             Physical Exam  Vitals and nursing note reviewed.   Constitutional:       Appearance: She is well-developed.   HENT:      Head: Normocephalic and atraumatic.   Eyes:      Conjunctiva/sclera: Conjunctivae normal.   Cardiovascular:      Rate and Rhythm: Normal rate and regular rhythm.      Heart sounds: No murmur heard.  Pulmonary:      Effort: Pulmonary effort is normal. No respiratory distress.      Breath sounds: Normal breath sounds.   Abdominal:      Palpations: Abdomen is soft.      Tenderness: There is no abdominal tenderness.   Musculoskeletal:         General: No swelling.   Skin:     General: Skin is warm and dry.   Neurological:      Mental Status: She is alert.   Psychiatric:         Mood and Affect: Mood normal.         Results Reviewed       Procedure Component Value Units Date/Time    HS Troponin I 2hr [[id number]]  (Normal) Collected: 10/17/24 0007    Lab Status: Final result Specimen: Blood from Arm, Right Updated: 10/17/24 0043     hs TnI 2hr 3 ng/L      Delta 2hr hsTnI 0 ng/L     Lipase [[id number]]  (Normal) Collected: 10/16/24 2209    Lab Status: Final result Specimen: Blood from Arm, Left Updated: 10/16/24 2247     Lipase 20 u/L     CK [[id number]]  (Normal) Collected: 10/16/24 2209    Lab Status: Final result Specimen: Blood from Arm, Left Updated: 10/16/24 2247     Total  U/L     HS Troponin 0hr (reflex protocol) [[id number]]  (Normal) Collected: 10/16/24 2209    Lab Status: Final result Specimen: Blood from Arm, Left Updated: 10/16/24 2238     hs TnI 0hr 3 ng/L      Comprehensive metabolic panel [[id number]]  (Abnormal) Collected: 10/16/24 2209    Lab Status: Final result Specimen: Blood from Arm, Left Updated: 10/16/24 2234     Sodium 136 mmol/L      Potassium 4.1 mmol/L      Chloride 105 mmol/L      CO2 29 mmol/L      ANION GAP 2 mmol/L      BUN 10 mg/dL      Creatinine 0.81 mg/dL      Glucose 114 mg/dL      Calcium 9.1 mg/dL      AST 16 U/L      ALT 9 U/L      Alkaline Phosphatase 78 U/L      Total Protein 7.1 g/dL      Albumin 3.9 g/dL      Total Bilirubin 0.27 mg/dL      eGFR 88 ml/min/1.73sq m     Narrative:      National Kidney Disease Foundation guidelines for Chronic Kidney Disease (CKD):     Stage 1 with normal or high GFR (GFR > 90 mL/min/1.73 square meters)    Stage 2 Mild CKD (GFR = 60-89 mL/min/1.73 square meters)    Stage 3A Moderate CKD (GFR = 45-59 mL/min/1.73 square meters)    Stage 3B Moderate CKD (GFR = 30-44 mL/min/1.73 square meters)    Stage 4 Severe CKD (GFR = 15-29 mL/min/1.73 square meters)    Stage 5 End Stage CKD (GFR <15 mL/min/1.73 square meters)  Note: GFR calculation is accurate only with a steady state creatinine    CBC and differential [[id number]]  (Abnormal) Collected: 10/16/24 2209    Lab Status: Final result Specimen: Blood from Arm, Left Updated: 10/16/24 2216     WBC 13.34 Thousand/uL      RBC 4.54 Million/uL      Hemoglobin 13.3 g/dL      Hematocrit 40.1 %      MCV 88 fL      MCH 29.3 pg      MCHC 33.2 g/dL      RDW 14.5 %      MPV 8.9 fL      Platelets 293 Thousands/uL      nRBC 0 /100 WBCs      Segmented % 44 %      Immature Grans % 0 %      Lymphocytes % 45 %      Monocytes % 8 %      Eosinophils Relative 2 %      Basophils Relative 1 %      Absolute Neutrophils 6.01 Thousands/µL      Absolute Immature Grans 0.06 Thousand/uL      Absolute Lymphocytes 5.93 Thousands/µL      Absolute Monocytes 1.03 Thousand/µL      Eosinophils Absolute 0.23 Thousand/µL      Basophils Absolute 0.08 Thousands/µL             XR chest portable   ED  Interpretation by Joanne Ricci DO (10/16 2235)   No acute cardiopulmonary abnormality on my independent interpretation          ECG 12 Lead Documentation Only    Date/Time: 10/16/2024 10:22 PM    Performed by: Joanne Ricci DO  Authorized by: Joanne Ricci DO    Indications / Diagnosis:  Chest pain  ECG reviewed by me, the ED Provider: yes    Patient location:  ED  Previous ECG:     Previous ECG:  Compared to current    Similarity:  No change  Interpretation:     Interpretation: normal    Rate:     ECG rate:  86    ECG rate assessment: normal    Rhythm:     Rhythm: sinus rhythm    Ectopy:     Ectopy: none    QRS:     QRS axis:  Normal  Conduction:     Conduction: normal    ST segments:     ST segments:  Normal  T waves:     T waves: normal        ED Medication and Procedure Management   Prior to Admission Medications   Prescriptions Last Dose Informant Patient Reported? Taking?   [employer] Vitamin C 500 MG tablet  Self No No   Sig: TAKE 1 TABLET (500 MG TOTAL) BY MOUTH DAILY.   Cholecalciferol (Vitamin D3) 125 MCG (5000 UT) CAPS   No No   Sig: TAKE 1 CAPSULE BY MOUTH EVERY DAY   Euflexxa 20 MG/2ML SOSY   Yes No   albuterol (PROVENTIL HFA,VENTOLIN HFA) 90 mcg/act inhaler  Self No No   Sig: INHALE 2 PUFFS BY MOUTH EVERY 6 HOURS AS NEEDED FOR WHEEZING   amLODIPine (NORVASC) 5 mg tablet   No No   Sig: TAKE 1 TABLET (5 MG TOTAL) BY MOUTH DAILY BLOOD PRESUURE   buPROPion (WELLBUTRIN XL) 300 mg 24 hr tablet   No No   Sig: Take 1 tablet (300 mg total) by mouth every morning   clotrimazole-betamethasone (LOTRISONE) 1-0.05 % cream   No No   Sig: Apply topically 2 (two) times a day as needed (To itchy areas of vulva externally)   cyanocobalamin (VITAMIN B-12) 2000 MCG tablet  Self No No   Sig: Take 1 tablet (2,000 mcg total) by mouth daily   loratadine (CLARITIN) 10 mg tablet   No No   Sig: TAKE 1 TABLET (10 MG TOTAL) BY MOUTH DAILY FOR ALLERGIES   metFORMIN (GLUCOPHAGE-XR) 500  mg 24 hr tablet   No No   Sig: TAKE 2 TABLETS BY MOUTH EVERY DAY WITH BREAKFAST   methocarbamol (ROBAXIN) 500 mg tablet   No No   Sig: Take 1 tablet (500 mg total) by mouth 2 (two) times a day for 3 days   multivitamin (THERAGRAN) TABS  Self Yes No   Sig: Take 1 tablet by mouth daily   spironolactone (ALDACTONE) 25 mg tablet  Self No No   Sig: TAKE 1 TABLET BY MOUTH EVERY DAY FOR 30 DAYS THEN TAKE 2 TABLETS DAILY AFTER   tiotropium (SPIRIVA) 18 mcg inhalation capsule  Self Yes No   Sig: Place 18 mcg into inhaler and inhale in the morning.   valACYclovir (VALTREX) 500 mg tablet   No No   Sig: TAKE 1 TABLET BY MOUTH EVERY DAY      Facility-Administered Medications: None     Discharge Medication List as of 10/17/2024 12:52 AM        CONTINUE these medications which have NOT CHANGED    Details   albuterol (PROVENTIL HFA,VENTOLIN HFA) 90 mcg/act inhaler INHALE 2 PUFFS BY MOUTH EVERY 6 HOURS AS NEEDED FOR WHEEZING, Normal      amLODIPine (NORVASC) 5 mg tablet TAKE 1 TABLET (5 MG TOTAL) BY MOUTH DAILY BLOOD PRESUURE, Starting Tue 6/11/2024, Until Sun 12/8/2024, Normal      buPROPion (WELLBUTRIN XL) 300 mg 24 hr tablet Take 1 tablet (300 mg total) by mouth every morning, Starting Tue 10/15/2024, Normal      Cholecalciferol (Vitamin D3) 125 MCG (5000 UT) CAPS TAKE 1 CAPSULE BY MOUTH EVERY DAY, Starting Tue 6/18/2024, Normal      clotrimazole-betamethasone (LOTRISONE) 1-0.05 % cream Apply topically 2 (two) times a day as needed (To itchy areas of vulva externally), Starting Tue 8/27/2024, Normal      [employer] Vitamin C 500 MG tablet TAKE 1 TABLET (500 MG TOTAL) BY MOUTH DAILY., Normal      cyanocobalamin (VITAMIN B-12) 2000 MCG tablet Take 1 tablet (2,000 mcg total) by mouth daily, Starting Mon 8/22/2022, Until Mon 4/22/2024, Normal      Euflexxa 20 MG/2ML SOSY Historical Med      loratadine (CLARITIN) 10 mg tablet TAKE 1 TABLET (10 MG TOTAL) BY MOUTH DAILY FOR ALLERGIES, Starting Fri 9/20/2024, Normal      metFORMIN  (GLUCOPHAGE-XR) 500 mg 24 hr tablet TAKE 2 TABLETS BY MOUTH EVERY DAY WITH BREAKFAST, Normal      methocarbamol (ROBAXIN) 500 mg tablet Take 1 tablet (500 mg total) by mouth 2 (two) times a day for 3 days, Starting Thu 2/1/2024, Until Mon 4/22/2024, Normal      multivitamin (THERAGRAN) TABS Take 1 tablet by mouth daily, Historical Med      spironolactone (ALDACTONE) 25 mg tablet TAKE 1 TABLET BY MOUTH EVERY DAY FOR 30 DAYS THEN TAKE 2 TABLETS DAILY AFTER, Normal      tiotropium (SPIRIVA) 18 mcg inhalation capsule Place 18 mcg into inhaler and inhale in the morning., Historical Med      valACYclovir (VALTREX) 500 mg tablet TAKE 1 TABLET BY MOUTH EVERY DAY, Normal             ED SEPSIS DOCUMENTATION   Time reflects when diagnosis was documented in both MDM as applicable and the Disposition within this note       Time User Action Codes Description Comment    10/17/2024 12:51 AM Joanne Ricci Add [R07.9] Chest pain                  Joanne Ricci,   10/17/24 0249

## 2024-10-17 NOTE — ED ATTENDING ATTESTATION
10/16/2024  I, Waqar Crow MD, saw and evaluated the patient. I have discussed the patient with the resident/non-physician practitioner and agree with the resident's/non-physician practitioner's findings, Plan of Care, and MDM as documented in the resident's/non-physician practitioner's note, except where noted. All available labs and Radiology studies were reviewed.  I was present for key portions of any procedure(s) performed by the resident/non-physician practitioner and I was immediately available to provide assistance.       At this point I agree with the current assessment done in the Emergency Department.  I have conducted an independent evaluation of this patient a history and physical is as follows: Patient is a 43 year old female with constant midsternal chest pain since this AM. Had headache which has since resolved. (+) reflux sx. Took prevacid this AM. No sob. No travel. No fever. No N/V. (+) smoking. (+) mother with early MI. Has had hysterectomy. Was last seen at [facility] and [place] for vulvovaginal itching. [facility] website checked on this patient and last Rx filled was on 10/3/23 for percocet for 1 day supply. NCAT. No scleral icterus. Moist mucous membranes. Lungs clear. Heart regular without murmur. Nontender chest wall. Abdomen soft and nontender. Good bowel sounds. No edema. No rash noted. DDX including but not limited to: ACS, MI, doubt PE (PERC negative); PTX, pneumonia, doubt dissection; pleurisy, pericarditis, myocarditis, rhabdomyolysis, GI etiology.  I reviewed EKG. Will check labs and CXR.     ED Course         Critical Care Time  Procedures

## 2024-10-18 ENCOUNTER — OFFICE VISIT (OUTPATIENT)
Dept: OBGYN CLINIC | Facility: CLINIC | Age: 44
End: 2024-10-18
Payer: COMMERCIAL

## 2024-10-18 VITALS
SYSTOLIC BLOOD PRESSURE: 130 MMHG | BODY MASS INDEX: 36.5 KG/M2 | HEIGHT: 64 IN | WEIGHT: 213.8 LBS | DIASTOLIC BLOOD PRESSURE: 84 MMHG

## 2024-10-18 DIAGNOSIS — N90.89 VULVAR LESION: Primary | ICD-10-CM

## 2024-10-18 PROCEDURE — 99213 OFFICE O/P EST LOW 20 MIN: CPT | Performed by: PHYSICIAN ASSISTANT

## 2024-10-18 PROCEDURE — 87529 HSV DNA AMP PROBE: CPT | Performed by: PHYSICIAN ASSISTANT

## 2024-10-18 RX ORDER — FLUCONAZOLE 150 MG/1
150 TABLET ORAL ONCE
COMMUNITY
Start: 2024-08-27 | End: 2024-10-21

## 2024-10-18 RX ORDER — LIDOCAINE 50 MG/G
OINTMENT TOPICAL 3 TIMES DAILY PRN
Qty: 50 G | Refills: 0 | Status: SHIPPED | OUTPATIENT
Start: 2024-10-18

## 2024-10-18 NOTE — PROGRESS NOTES
Assessment/Plan:      Diagnoses and all orders for this visit:    Vulvar lesion  -     lidocaine (XYLOCAINE) 5 % ointment; Apply topically 3 (three) times a day as needed for moderate pain (vulvar pain)  -     HSV TYPE 1,2 DNA PCR [facility] SWAB ONLY    Other orders  -     fluconazole (DIFLUCAN) 150 mg tablet; Take 150 mg by mouth once (Patient not taking: Reported on 10/18/2024)     Pleasant 43-year-old female presenting today for acute visit for vulvar lesion noted recently while wiping.  History of hidradenitis suppurativa but does not feel similar.  Area is uncomfortable to sit and touch, no drainage or blood.  Hx of HSV several years ago, denies known STD exposure or risk for STD.  No vaginal symptoms.    Small ulcer-like vulvar lesion present right lower vulva/perineal area.  There is slight induration underneath the area, possible start of cyst as after using swab to collect sample for HSV culture there is a small little hole but unable to express any drainage or blood.  Area is tender for patient.  There is no associated significant erythema, blisters, vesicles, papules or pustules.    Differential includes skin cyst, less likely HSV.  HSV culture obtained just in case.  Conservative management and monitoring discussed with patient today.    Patient advised apply small amount combination triple antibiotic ointment with some hydrocortisone to the affected area 3 times a day.  Otherwise avoid excessive wiping or touching the area.  Sitz bath or warm moist compress several times a day.  Topical lidocaine prescribed she can apply to the area for pain relief.  Will call patient with results of the culture when available and follow-up with her at that time.  If symptoms persist recommend follow-up in office.    RTO annual    Chief Complaint   Patient presents with    Rash     Pt states that she has a  painful blister the size of a dime on her vagina which appeared on Wednesday.        Subjective:     Patient ID:  Jessica Oliver is a 43 y.o. female.    44y/o F here today for acute appt for vulvar lesion.    States A few days ago wiped and felt pain in right lower vulvar area.  States seems like a lump or blister, area tender, painful to sit.  Has never had anything like this before.     Hx of HS, but doesn't feel the same.  Denies vaginal sxs.  Normal urination.   No pelvic pain or vaginal bleeding  No fever/chills  Hx of HSV several years ago  Denies risk for STD        Review of Systems   Constitutional: Negative.    Genitourinary:  Positive for genital sores (right lower vulvar/perineum). Negative for difficulty urinating, dyspareunia, dysuria, frequency, hematuria, pelvic pain, urgency, vaginal bleeding, vaginal discharge and vaginal pain.         The following portions of the patient's history were reviewed and updated as appropriate: allergies, current medications, past family history, past medical history, past social history, past surgical history, and problem list.      Objective:     Physical Exam  Vitals reviewed.   Constitutional:       Appearance: Normal appearance. She is not ill-appearing.   Genitourinary:         Comments: Small ulcer-like lesion as depicted above. Slightly red. No drainage or bleeding. The area is tender to palpation, there is slight induration palpated below the lesion but no distinct abscess or cyst palpated. Swab for HSV culture obtained, after wiping the area with swab there is a small hole appearing in skin but unable to express any drainage or blood.  Lymphadenopathy:      Lower Body: No right inguinal adenopathy. No left inguinal adenopathy.   Neurological:      Mental Status: She is alert and oriented to person, place, and time.   Psychiatric:         Mood and Affect: Mood normal.         Behavior: Behavior normal. Behavior is cooperative.

## 2024-10-19 LAB
HSV1 DNA SPEC QL NAA+PROBE: NOT DETECTED
HSV1 DNA SPEC QL NAA+PROBE: NOT DETECTED

## 2024-10-29 ENCOUNTER — HOSPITAL ENCOUNTER (EMERGENCY)
Facility: HOSPITAL | Age: 44
Discharge: HOME | End: 2024-10-29
Attending: EMERGENCY MEDICINE
Payer: COMMERCIAL

## 2024-10-29 VITALS
RESPIRATION RATE: 16 BRPM | DIASTOLIC BLOOD PRESSURE: 88 MMHG | HEART RATE: 88 BPM | OXYGEN SATURATION: 100 % | SYSTOLIC BLOOD PRESSURE: 155 MMHG | TEMPERATURE: 98.4 F

## 2024-10-29 DIAGNOSIS — H60.92 LEFT OTITIS EXTERNA: Primary | ICD-10-CM

## 2024-10-29 PROCEDURE — 99284 EMERGENCY DEPT VISIT MOD MDM: CPT | Performed by: EMERGENCY MEDICINE

## 2024-10-29 PROCEDURE — 99282 EMERGENCY DEPT VISIT SF MDM: CPT

## 2024-10-29 RX ORDER — ACETAMINOPHEN 325 MG/1
975 TABLET ORAL ONCE
Status: COMPLETED | OUTPATIENT
Start: 2024-10-29 | End: 2024-10-29

## 2024-10-29 RX ORDER — CIPROFLOXACIN AND DEXAMETHASONE 3; 1 MG/ML; MG/ML
4 SUSPENSION/ DROPS AURICULAR (OTIC) 2 TIMES DAILY
Qty: 7.5 ML | Refills: 0 | Status: SHIPPED | OUTPATIENT
Start: 2024-10-29 | End: 2024-11-04

## 2024-10-29 RX ORDER — IBUPROFEN 600 MG/1
600 TABLET, FILM COATED ORAL ONCE
Status: COMPLETED | OUTPATIENT
Start: 2024-10-29 | End: 2024-10-29

## 2024-10-29 RX ADMIN — IBUPROFEN 600 MG: 600 TABLET, FILM COATED ORAL at 18:03

## 2024-10-29 RX ADMIN — ACETAMINOPHEN 975 MG: 325 TABLET, FILM COATED ORAL at 18:03

## 2024-10-29 NOTE — ED PROVIDER NOTES
Time reflects when diagnosis was documented in both MDM as applicable and the Disposition within this note       Time User Action Codes Description Comment    10/29/2024  5:56 PM David Wylie Add [H60.92] Left otitis externa           ED Disposition       ED Disposition   Discharge    Condition   Stable    Date/Time   Tue Oct 29, 2024  5:59 PM    Comment   Jessica CRONIN Oliver discharge to home/self care.                   Assessment & Plan       Medical Decision Making  43-year-old female presents with left ear pain, periauricular pain, shared decision-making was done regarding investigating for other potential causes including mastoiditis, and the patient is declining further investigation at this time.  The patient will be discharged home will be encouraged to follow-up with primary care, will be given an antibiotic eardrop for otitis externa, and careful return indications if any symptoms worsen or new symptoms occur.    Disposition: Patient stable for outpatient management. Discussed need for follow up with their primary doctor or specialist to review all results, including incidental findings as below. Patient discharged with explanation of ED workup and diagnosis, instructions on how to obtain outpatient follow up, care instructions at home, and strict return precautions if patient develops new or worsening symptoms. Patients questions answered and agreeable with discharge plan.        PLEASE NOTE:  This encounter was completed utilizing the [employer] Direct Speech Voice Recognition Software. Grammatical errors, random word insertions, pronoun errors and incomplete sentences are occasional inherent consequences of the system due to software limitations, ambient noise and hardware issues.These may be missed by proof reading prior to affixing electronic signature. Any questions or concerns about the content, text or information contained within the body of this dictation should be directly addressed to  the physician for clarification. Please do not hesitate to call me directly if you have any questions or concerns.      Risk  OTC drugs.  Prescription drug management.             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    Adrenal mass, left (HCC) 2021    1.8 x 2.1 cm on ct 2020    Arthritis     Asthma     Blurry vision 2020    Diabetes mellitus (HCC)     Essential hypertension 2020    Headache 2020    Hidradenitis suppurativa     Hypertension     Immunization deficiency 10/02/2020    Hep a nonimmune    Insomnia 2021    Migraine     Moderate episode of recurrent major depressive disorder (HCC) 2020    Obesity (BMI 30.0-34.9) 2021    Prediabetes 2020    Seasonal allergies     Tobacco use 2021    Vitamin D deficiency 10/02/2020      Past Surgical History:   Procedure Laterality Date    APPENDECTOMY      BREAST BIOPSY Left 2017    BREAST BIOPSY Right     2 core biopsies    BREAST EXCISIONAL BIOPSY Left 2018    BREAST SURGERY Left 2018    Left breast mass excision    CERVICAL BIOPSY  W/ LOOP ELECTRODE EXCISION       SECTION      X2    COLPOSCOPY      CYST REMOVAL      FACIAL/NECK BIOPSY Right 10/03/2023    Procedure: EXCISION MASS RIGHT CHEEK;  Surgeon: Robert Bloch, MD;  Location:  MAIN OR;  Service: General    FL INJECTION RIGHT KNEE (ARTHROGRAM)  01/15/2024    HYSTERECTOMY  2012    heavy bleeding, ovaries remain, also had abnormal pap    KNEE SURGERY      LYMPH NODE DISSECTION  2018    under armpit    TUBAL LIGATION  2007    US  GUIDED THYROID BIOPSY  01/27/2021      Family History   Problem Relation Age of Onset    Diabetes Mother     Heart attack Mother     Heart disease Mother         Internal Defibrilation    No Known Problems Father     Endometrial cancer Sister 28    Colon cancer Sister 35    No Known Problems Daughter     Diabetes Maternal Grandmother     Diabetes Paternal Grandmother     No Known Problems Son     No Known Problems Son     Lupus Maternal Aunt 48    Seizures Maternal Aunt     Leukemia Maternal Uncle 18    Diabetes Paternal Aunt     No Known Problems Paternal Aunt       Social History     Tobacco Use    Smoking status: Every Day     Current packs/day: 0.50     Average packs/day: 0.5 packs/day for 20.0 years (10.0 ttl pk-yrs)     Types: Cigarettes     Passive exposure: Current    Smokeless tobacco: Never    Tobacco comments:     pt is down to .5 packs a day   Vaping Use    Vaping status: Never Used   Substance Use Topics    Alcohol use: Not Currently     Comment: occasional    Drug use: No      E-Cigarette/Vaping    E-Cigarette Use Never User       E-Cigarette/Vaping Substances    Nicotine Yes     THC No     CBD No     Flavoring No     Other No     Unknown No       I have reviewed and agree with the history as documented.     43-year-old female presents with 2 days of significant pain in her left ear, with some intermittent loss of hearing in the ear, reports that she has some pain behind her ear and pain in front of her ear, but she reports no swelling in the areas, no cough, shortness of breath, dizziness, chest pain, nausea, vomiting, abdominal pain, constipation or diarrhea, no other complaint.        Review of Systems   Constitutional:  Negative for fever.   HENT:  Positive for ear pain.    Respiratory:  Negative for cough and shortness of breath.    Cardiovascular:  Negative for chest pain.   Gastrointestinal:  Negative for abdominal pain, constipation, diarrhea, nausea and vomiting.   Genitourinary:  Negative for  dysuria and hematuria.   Neurological:  Negative for light-headedness and headaches.           Objective       ED Triage Vitals [10/29/24 1748]   Temperature Pulse Blood Pressure Respirations SpO2 Patient Position - Orthostatic VS   98.4 °F (36.9 °C) 88 155/88 16 100 % --      Temp Source Heart Rate Source BP Location FiO2 (%) Pain Score    Oral Monitor Right arm -- --      Vitals      Date and Time Temp Pulse SpO2 Resp BP Pain Score FACES Pain Rating User   10/29/24 1748 98.4 °F (36.9 °C) 88 100 % 16 155/88 -- -- AH            Physical Exam  Vitals and nursing note reviewed.   Constitutional:       General: She is not in acute distress.     Appearance: Normal appearance. She is well-developed.   HENT:      Head: Normocephalic and atraumatic.      Ears:      Comments: Patient has a normal-appearing right tympanic membrane, left tympanic membrane is also unremarkable, however the patient has some edema and granulation tissue in the left external canal, with some mild pain in the postauricular space and a mass consistent with an enlarged lymph node, and some mild tenderness to palpation at the tragus without evidence of swelling, erythema, or other deformity, findings consistent with an otitis externa.  Eyes:      Extraocular Movements: Extraocular movements intact.      Conjunctiva/sclera: Conjunctivae normal.   Cardiovascular:      Rate and Rhythm: Normal rate.   Pulmonary:      Effort: Pulmonary effort is normal. No respiratory distress.   Abdominal:      General: There is no distension.   Musculoskeletal:         General: No swelling.      Cervical back: Normal range of motion.   Skin:     General: Skin is warm and dry.      Capillary Refill: Capillary refill takes less than 2 seconds.   Neurological:      General: No focal deficit present.      Mental Status: She is alert and oriented to person, place, and time.   Psychiatric:         Mood and Affect: Mood normal.         Results Reviewed       None            No  orders to display       Procedures    ED Medication and Procedure Management   Prior to Admission Medications   Prescriptions Last Dose Informant Patient Reported? Taking?   [facility] Vitamin C 500 MG tablet  Self No No   Sig: TAKE 1 TABLET (500 MG TOTAL) BY MOUTH DAILY.   Cholecalciferol (Vitamin D3) 125 MCG (5000 UT) CAPS   No No   Sig: TAKE 1 CAPSULE BY MOUTH EVERY DAY   Euflexxa 20 MG/2ML SOSY   Yes No   Patient not taking: Reported on 10/18/2024   albuterol (PROVENTIL HFA,VENTOLIN HFA) 90 mcg/act inhaler  Self No No   Sig: INHALE 2 PUFFS BY MOUTH EVERY 6 HOURS AS NEEDED FOR WHEEZING   amLODIPine (NORVASC) 5 mg tablet   No No   Sig: TAKE 1 TABLET (5 MG TOTAL) BY MOUTH DAILY BLOOD PRESUURE   buPROPion (WELLBUTRIN XL) 300 mg 24 hr tablet   No No   Sig: Take 1 tablet (300 mg total) by mouth every morning   cyanocobalamin (VITAMIN B-12) 2000 MCG tablet  Self No No   Sig: Take 1 tablet (2,000 mcg total) by mouth daily   lidocaine (XYLOCAINE) 5 % ointment   No No   Sig: Apply topically 3 (three) times a day as needed for moderate pain (vulvar pain)   loratadine (CLARITIN) 10 mg tablet   No No   Sig: TAKE 1 TABLET (10 MG TOTAL) BY MOUTH DAILY FOR ALLERGIES   metFORMIN (GLUCOPHAGE-XR) 500 mg 24 hr tablet   No No   Sig: TAKE 2 TABLETS BY MOUTH EVERY DAY WITH BREAKFAST   multivitamin (THERAGRAN) TABS  Self Yes No   Sig: Take 1 tablet by mouth daily   spironolactone (ALDACTONE) 25 mg tablet  Self No No   Sig: TAKE 1 TABLET BY MOUTH EVERY DAY FOR 30 DAYS THEN TAKE 2 TABLETS DAILY AFTER   Patient not taking: Reported on 10/18/2024   tiotropium (SPIRIVA) 18 mcg inhalation capsule  Self Yes No   Sig: Place 18 mcg into inhaler and inhale in the morning.   Patient not taking: Reported on 10/18/2024      Facility-Administered Medications: None     Patient's Medications   Discharge Prescriptions    CIPROFLOXACIN-DEXAMETHASONE (CIPRODEX) OTIC SUSPENSION    Administer 4 drops into the left ear 2 (two) times a day       Start Date:  10/29/2024End Date: --       Order Dose: 4 drops       Quantity: 7.5 mL    Refills: 0     No discharge procedures on file.  ED SEPSIS DOCUMENTATION   Time reflects when diagnosis was documented in both MDM as applicable and the Disposition within this note       Time User Action Codes Description Comment    10/29/2024  5:56 PM David Wylie Add [H60.92] Left otitis externa                  David Wylie MD  10/29/24 5066

## 2024-11-04 ENCOUNTER — OFFICE VISIT (OUTPATIENT)
Age: 44
End: 2024-11-04
Payer: COMMERCIAL

## 2024-11-04 VITALS
SYSTOLIC BLOOD PRESSURE: 118 MMHG | BODY MASS INDEX: 35.68 KG/M2 | WEIGHT: 209 LBS | HEIGHT: 64 IN | DIASTOLIC BLOOD PRESSURE: 80 MMHG | RESPIRATION RATE: 18 BRPM | OXYGEN SATURATION: 97 % | HEART RATE: 107 BPM | TEMPERATURE: 98.7 F

## 2024-11-04 DIAGNOSIS — Z23 ENCOUNTER FOR IMMUNIZATION: Primary | ICD-10-CM

## 2024-11-04 DIAGNOSIS — R73.03 PREDIABETES: ICD-10-CM

## 2024-11-04 DIAGNOSIS — I10 ESSENTIAL HYPERTENSION: ICD-10-CM

## 2024-11-04 DIAGNOSIS — H60.502 ACUTE OTITIS EXTERNA OF LEFT EAR, UNSPECIFIED TYPE: ICD-10-CM

## 2024-11-04 DIAGNOSIS — E66.01 CLASS 2 SEVERE OBESITY WITH SERIOUS COMORBIDITY AND BODY MASS INDEX (BMI) OF 35.0 TO 35.9 IN ADULT, UNSPECIFIED OBESITY TYPE (HCC): ICD-10-CM

## 2024-11-04 DIAGNOSIS — L73.2 HIDRADENITIS SUPPURATIVA: ICD-10-CM

## 2024-11-04 DIAGNOSIS — G47.33 OSA (OBSTRUCTIVE SLEEP APNEA): ICD-10-CM

## 2024-11-04 DIAGNOSIS — E66.812 CLASS 2 SEVERE OBESITY WITH SERIOUS COMORBIDITY AND BODY MASS INDEX (BMI) OF 35.0 TO 35.9 IN ADULT, UNSPECIFIED OBESITY TYPE (HCC): ICD-10-CM

## 2024-11-04 DIAGNOSIS — F33.1 MODERATE EPISODE OF RECURRENT MAJOR DEPRESSIVE DISORDER (HCC): ICD-10-CM

## 2024-11-04 DIAGNOSIS — Z72.0 TOBACCO USE: ICD-10-CM

## 2024-11-04 LAB — SL AMB POCT HEMOGLOBIN AIC: 6.5 (ref ?–6.5)

## 2024-11-04 PROCEDURE — 90471 IMMUNIZATION ADMIN: CPT | Performed by: INTERNAL MEDICINE

## 2024-11-04 PROCEDURE — 83036 HEMOGLOBIN GLYCOSYLATED A1C: CPT | Performed by: INTERNAL MEDICINE

## 2024-11-04 PROCEDURE — 90656 IIV3 VACC NO PRSV 0.5 ML IM: CPT | Performed by: INTERNAL MEDICINE

## 2024-11-04 PROCEDURE — 99214 OFFICE O/P EST MOD 30 MIN: CPT | Performed by: INTERNAL MEDICINE

## 2024-11-04 RX ORDER — OFLOXACIN 3 MG/ML
5 SOLUTION AURICULAR (OTIC) DAILY
Qty: 1.75 ML | Refills: 0 | Status: SHIPPED | OUTPATIENT
Start: 2024-11-04 | End: 2024-11-11

## 2024-11-04 RX ORDER — SPIRONOLACTONE 25 MG/1
TABLET ORAL
Qty: 90 TABLET | Refills: 1 | Status: SHIPPED | OUTPATIENT
Start: 2024-11-04

## 2024-11-04 NOTE — ASSESSMENT & PLAN NOTE
.  A1c of 6.5 today, continue metformin  Orders:    Ambulatory Referral to Sleep Medicine; Future    POCT hemoglobin A1c

## 2024-11-04 NOTE — ASSESSMENT & PLAN NOTE
Needs CPAP titration studies, referral given  Orders:    Ambulatory Referral to Sleep Medicine; Future

## 2024-11-04 NOTE — PATIENT INSTRUCTIONS
"Patient Education     Weight Loss Diet   About this topic   There are many \"trendy\" weight loss diets that are popular today. Many of these diets can end up being more harmful than helpful. The healthiest way to lose weight is to burn more calories than you eat.  A weight loss diet should help you have a healthy view of eating. It is NOT healthy to stop eating to try and lose weight. A good diet plan will help you cut down your food intake and make healthy choices.  A healthy weight loss goal is 1 to 2 pounds (0.5 to 1 kg) per week. Reducing calories in your diet, burning calories through exercise, or both can help you lose weight. Combining a healthy diet with regular physical activity can help you get the best results.  To cut calories in your diet you can:  Switch from whole milk to 1% or skim milk.  Switch from regular cheese to low-fat or fat-free cheese.  Use healthier condiment choices:  Fat-free or low-fat sour cream or salad dressings  Spray butter  Diet syrups or jellies over regular  Try frozen yogurt as a dessert rather than eating ice cream.  Skip the chips. Snack on carrots, vegetables, or fruit. If chips are a favorite of yours, try the baked style and watch portion size.  Eat grilled, roasted, boiled, broiled, or baked meats. Avoid deep-frying. Choose skinless poultry, lean red meat, lean cuts of pork, and fish for good protein sources.  Try flavored no-calorie feldman. Do not drink soda and juices that have many calories.  Choose fruit instead of sweets.  General   Eating smaller meals more often may be helpful. This will keep you from overeating at your next meal. Also, eating meals slowly helps you feel full faster.  If eating 3 meals is a part of your lifestyle, choose more lean proteins and higher fiber foods to fill you up at each meal.  Do not skip meals. Most often if you skip a meal, you eat too much at the next meal.  Eat smaller portions. Use a smaller plate or bowl for meals, and when you " are eating out, eat half and take the rest home.  Plan ahead. Plan your meals and grocery list before going to the store. Planning will keep you from getting meals from restaurants.  Do not go to the grocery store hungry. You are more likely to buy snacks that are not good for you.  Portion out snacks. When you are having a snack, instead of grabbing the whole bag, portion a small amount out to give yourself a stopping point.  Drink water before and after your meals to help fill you up without the calories.  When eating starchy foods, choose whole-grain products. These have a lot of fiber which will make you feel full. Fiber also helps lower cholesterol and helps with bowel function.  If you need a helpful start, ask your doctor to send you to a dietitian for weight loss help.         What will the results be?   Losing excess weight will make your whole body healthier. You will have more energy for your daily activities and lower your risk for health problems.  What lifestyle changes are needed?   Stay active. Eating healthy is not always enough to lose weight. Burning calories by exercising is a big part of weight loss.  What foods are good to eat?   The key is to watch your portion sizes. It is best to choose foods that are lower in fat and calories.  Choose lean meats:  Boneless, skinless chicken breast  Pork loin  90% lean beef  Lean turkey meat  Fresh fish (not fried)  Choose low-fat dairy products:  1% or skim milk  Spray butter or margarine  Low-fat or fat-free cheese  Frozen yogurt or low-calorie ice cream  Choose fresh fruits, vegetables, beans and lentils, and whole wheat products more often.  Choose water to drink more often. Drink diet or no-calorie beverages when you want something other than water. Aim to get your calories from the foods you eat.  Choose smart snacks:  Fruits  Vegetables  Low-fat or nonfat yogurt  Low-fat or no-fat cheese, such as cottage cheese  Unsalted nuts  Hard-boiled  egg  Hummus  Guacamole  Natural peanut butter  Popcorn with no butter - use pepper, garlic, or another spice to taste  Whole grain crackers  What foods should be limited or avoided?   Limit high-fat, high-sodium, and high-calorie foods like:  Fried foods  Processed meats  Whole-fat dairy products  Candy, cookies, chips, pastries  Sausage, westfall, any full-fat meats  Soda, juice  Beer, wine, and mixed drinks (alcohol)  Will there be any other care needed?   What do I do first before trying to lose weight?  Talk to your doctor and dietitian to see if you need to lose weight. Work with them to set your weight loss goals.  If you have a chronic illness, such as high blood sugar or high blood pressure, ask a doctor or dietitian what diet and exercise is right for you.  Ask your doctor about how much you are able to exercise and what type of exercise is good for you.  Helpful tips   Keep a food journal to help keep you on track.  Join a support group.  Tips for burning calories:  If your workplace is near your house, choose to walk or bike to work instead of driving.  Take 20-minute walks each day. Walk around during your lunch break. You will not only burn calories, but will raise your energy for the rest of the day.  Take the stairs over the elevator.  Join a gym or exercise class with a friend.  Try to exercise 30 minutes a day for overall health. Three 10-minute sessions work too. Aim for 60 to 90 minutes a day to lose weight.  Drink lots of water before, during, and after exercise.  Last Reviewed Date   2021-06-24  Consumer Information Use and Disclaimer   This generalized information is a limited summary of diagnosis, treatment, and/or medication information. It is not meant to be comprehensive and should be used as a tool to help the user understand and/or assess potential diagnostic and treatment options. It does NOT include all information about conditions, treatments, medications, side effects, or risks that may  "apply to a specific patient. It is not intended to be medical advice or a substitute for the medical advice, diagnosis, or treatment of a health care provider based on the health care provider's examination and assessment of a patient’s specific and unique circumstances. Patients must speak with a health care provider for complete information about their health, medical questions, and treatment options, including any risks or benefits regarding use of medications. This information does not endorse any treatments or medications as safe, effective, or approved for treating a specific patient. UpToDate, Inc. and its affiliates disclaim any warranty or liability relating to this information or the use thereof. The use of this information is governed by the Terms of Use, available at https://www.[employer].com/en/know/clinical-effectiveness-terms   Copyright   Copyright © 2024 UpToDate, Inc. and its affiliates and/or licensors. All rights reserved.    Patient Education     Understanding body mass index (BMI)   The Basics   Written by the doctors and editors at [employer]   What is body mass index? -- Body mass index, or \"BMI,\" is a calculation doctors use to help understand a person's health. For adults, your weight and height are used to calculate your BMI (table 1).  Based on this number, you fall into 1 of these categories:   Underweight - BMI under 18.5   Healthy weight - BMI between 18.5 and 24.9   Overweight - BMI between 25 and 29.9   Having obesity - BMI 30 or greater  Your doctor or nurse will often calculate your BMI as part of a routine check-up. You can also do this yourself at home using a chart or online calculator.  How is a child's BMI calculated? -- BMI can be calculated for children using their height and weight. A child's BMI is then compared with a \"growth chart.\" Growth charts have information about the typical heights and weights of children who are the same age and sex. Doctors use BMI and the " growth chart together to find out if a child is underweight, a healthy weight, overweight, or has obesity. What counts as being underweight or overweight for children is different than for adults.  If you have a question about a child's weight or BMI, talk to their doctor.  Should I see a doctor or nurse? -- If you have concerns about your weight, see your doctor or nurse. If you calculated your BMI at home, the doctor or nurse can help you understand what it means. They can also give you advice on how to maintain a healthy weight and lifestyle.  What does my BMI mean? -- Doctors use BMI to help understand the effect that your weight has on your health. In general, having obesity can increase the risk of having other health problems. These include:   Diabetes   High blood pressure   High cholesterol   Heart disease (including heart attacks)   Stroke   Sleep apnea (when you stop breathing for short periods of time while asleep)   Asthma   Cancer  You might also have trouble moving, breathing, or doing other things if you have obesity.  Being underweight can be bad for your health, too.  Remember that your weight and BMI are just pieces of your overall health. Someone with a lower BMI might not be healthy overall, and someone with a higher BMI can still be healthy. Other factors, like whether or not you smoke, also play a role.  No matter what your BMI is, try to live a healthy lifestyle. This includes not smoking, eating plenty of healthy foods like fruits and vegetables, and moving your body. Talk to your doctor about your overall health and what you can do to improve it.  What if I don't want to talk to my doctor about my BMI or weight? -- If you are not comfortable discussing your weight or BMI with your doctor or nurse, you can tell them so. You can ask them to focus on other parts of your health instead.  Remember that your doctor understands that managing weight can be difficult. If you are interested, they can  "work with you to develop a weight management plan.  All topics are updated as new evidence becomes available and our peer review process is complete.  This topic retrieved from [employer] on: Apr 03, 2024.  Topic 961871 Version 1.0  Release: 32.2.4 - C32.92  © 2024 UpToDate, Inc. and/or its affiliates. All rights reserved.  table 1: How to find your BMI     Healthy weight  Overweight  Obesity    BMI (kg/m2)  19 20 21 22 23 24 25 26 27 28 29 30 35 40   Height (inches)  Weight (pounds)    58\" 91 96 100 105 110 115 119 124 129 134 138 143 167 191   59\" 94 99 104 109 114 119 124 128 133 138 143 148 173 198   60\" 97 102 107 112 118 123 128 133 138 143 148 153 179 204   61\" 100 106 111 116 122 127 132 137 143 148 153 158 185 211   62\" 104 109 115 120 126 131 136 142 147 153 158 164 191 218   63\" 107 113 118 124 130 135 141 146 152 158 163 169 197 225   64\" 110 116 122 128 134 140 145 151 157 163 168 174 204 232   65\" 114 120 126 132 138 144 150 156 162 168 174 180 210 240   66\" 118 124 130 136 142 148 155 161 167 173 179 186 216 247   67\" 121 127 134 140 146 153 159 166 172 178 185 191 223 255   68\" 125 131 138 144 151 158 164 171 177 184 190 197 230 262   69\" 128 135 142 149 155 162 169 176 182 189 196 203 236 270   70\" 132 139 146 153 160 167 174 181 188 195 202 209 243 278   71\" 136 143 150 157 165 172 179 186 193 200 208 215 250 286   72\" 140 147 154 162 169 177 184 191 199 206 213 221 258 294   73\" 144 151 159 166 174 182 189 197 204 212 219 227 265 302   74\" 148 155 163 171 179 186 194 202 210 218 225 233 272 311   75\" 152 160 168 176 184 192 200 208 216 224 232 240 279 319   76\" 156 164 172 180 189 197 205 213 221 230 238 246 287 328   Body mass index, or \"BMI,\" is a measure of weight in relation to height. Doctors use this calculation to help understand a person's health risks. Based on the number, a person falls into 1 of 4 categories: underweight, healthy weight, overweight, or obesity.  To calculate your BMI, " find your height (in inches) on the left. Then, look at that row to find your weight (in pounds). The number at the top of that column is your BMI.  Graphic 17088 Version 3.0  Consumer Information Use and Disclaimer   Disclaimer: This generalized information is a limited summary of diagnosis, treatment, and/or medication information. It is not meant to be comprehensive and should be used as a tool to help the user understand and/or assess potential diagnostic and treatment options. It does NOT include all information about conditions, treatments, medications, side effects, or risks that may apply to a specific patient. It is not intended to be medical advice or a substitute for the medical advice, diagnosis, or treatment of a health care provider based on the health care provider's examination and assessment of a patient's specific and unique circumstances. Patients must speak with a health care provider for complete information about their health, medical questions, and treatment options, including any risks or benefits regarding use of medications. This information does not endorse any treatments or medications as safe, effective, or approved for treating a specific patient. UpToDate, Inc. and its affiliates disclaim any warranty or liability relating to this information or the use thereof.The use of this information is governed by the Terms of Use, available at https://www.[employer].com/en/know/clinical-effectiveness-terms. 2024© UpToDate, Inc. and its affiliates and/or licensors. All rights reserved.  Copyright   © 2024 UpToDate, Inc. and/or its affiliates. All rights reserved.    Patient Education     Calorie Counting Diet   About this topic   Calories are in the food that you eat and the liquids that you drink. Your body uses calories to give your body energy. Then, you can use the energy to do your daily activities. If you eat more calories than your body needs, your body will store this as body fat. Over  time, this can lead to weight gain. To lose weight, burn calories by being active and eat less calories than what your body needs. The more active you are the more calories you burn.  General   Calorie counting is when you keep track of how many calories you eat each day. This helps you not eat too many or too few calories. You need to be aware of the calorie content of the food you eat. Read food labels carefully. They will show you how many calories are in a serving. Reading the labels will help you make healthy food choices.       What will the results be?   You can increase or lower your body weight to the desired level.  What lifestyle changes are needed?   You will need to learn what foods to eat more of and what foods to limit. Your doctor or dietitian can help make a meal plan that works for you.  What changes to diet are needed?   Lower your calorie intake if you want to lose weight.  Raise your calorie intake if you want to gain weight.  Who should use this diet?   This diet is right for those who need to control their weight.  What foods are good to eat?   Eat whole grain foods and foods high in fiber.  Choose many different fruits and vegetables. Fresh or frozen is best. Otherwise, choose fruits canned in juice or water, not in syrup. Choose vegetables canned without salt.  Eat more lean meats like chicken, fish, or turkey. Eat less red meat.  Choose low-fat dairy products.  What foods should be limited or avoided?   Stay away from foods high in sugars. These include foods such as candies, ice cream, table sugars, and high-fructose corn syrup found in sodas and juice drinks.  Cut back on solid fats like butter or margarine.  Eat less fatty or processed foods like fried foods and chips. Use good fats found in fish, nuts, avocados, and oils, like olive oil and canola oil.  What problems could happen?   Too many calories can cause you to become overweight.  Too few calories can cause you to become  underweight and have poor nutrition.  When do I need to call the doctor?   Your health problem is not better or you are feeling worse  You have questions about this diet  Helpful tips   Ask your doctor or dietitian for specific types of food groups and serving sizes. Ask your doctor or dietitian how many calories per day are right for you. Be sure to talk about exercise. You may need more calories on the days that you exercise.  Last Reviewed Date   2021-03-12  Consumer Information Use and Disclaimer   This generalized information is a limited summary of diagnosis, treatment, and/or medication information. It is not meant to be comprehensive and should be used as a tool to help the user understand and/or assess potential diagnostic and treatment options. It does NOT include all information about conditions, treatments, medications, side effects, or risks that may apply to a specific patient. It is not intended to be medical advice or a substitute for the medical advice, diagnosis, or treatment of a health care provider based on the health care provider's examination and assessment of a patient’s specific and unique circumstances. Patients must speak with a health care provider for complete information about their health, medical questions, and treatment options, including any risks or benefits regarding use of medications. This information does not endorse any treatments or medications as safe, effective, or approved for treating a specific patient. UpToDate, Inc. and its affiliates disclaim any warranty or liability relating to this information or the use thereof. The use of this information is governed by the Terms of Use, available at https://www.[employer].com/en/know/clinical-effectiveness-terms   Copyright   Copyright © 2024 UpToDate, Inc. and its affiliates and/or licensors. All rights reserved.    Patient Education     Exercise and movement   The Basics   Written by the doctors and editors at [employer]    What are the benefits of movement? -- Moving your body has many benefits. It can:   Burn calories, which helps people manage their weight   Help control blood sugar levels in people with diabetes   Lower blood pressure, especially in people with high blood pressure   Lower stress, and help with depression and anxiety   Keep bones strong, so they don't get thin and break easily   Lower the chance of dying from heart disease  Adding even small amounts of physical activity to your daily routine can improve your health.  What are the main types of exercise? -- There are 3 main types of exercise:   Aerobic exercise - This raises your heart rate. Examples include walking, running, dancing, riding a bike, and swimming.   Muscle strengthening - This helps make your muscles stronger. You can do it using weights, exercise bands, or weight machines. You can also use your own body weight, as with push-ups, or by lifting items in your home, like jugs of water.   Stretching - These help your muscles and joints move more easily.  It's important to have all 3 types in your exercise program. That way, your body, muscles, and joints can be as healthy as possible.  Should I talk to my doctor or nurse before I start exercising? -- If you have not exercised before or have not exercised in a long time, talk with your doctor or nurse before you start a very active exercise program.  If you have heart disease or risk factors for heart disease (like high blood pressure or diabetes), your doctor or nurse might recommend that you have an exercise test before starting an exercise program.  When you begin an exercise program, start slowly. For example, do the exercise at a slow pace or for a few minutes only. Over time, you can exercise faster and for longer periods of time.  What should I do when I exercise? -- Each time you exercise, you should:   Warm up - This can help keep you from hurting your muscles when you exercise. To warm up, do a  light aerobic exercise (such as walking slowly) or stretch for 5 to 10 minutes.   Work out - Try to get a mix of aerobic exercise, muscle strengthening, and stretching. During an aerobic workout, you can walk fast, swim, run, or use an exercise machine, for example. Other activities, like dancing or playing tennis, are also forms of aerobic exercise. You should also take time to stretch all of your joints, including your neck, shoulders, back, hips, and knees. At least 2 times a week, you can do muscle strengthening exercises as part of your workout.   Cool down - This helps keep you from feeling dizzy after you exercise and helps prevent muscle cramps. To cool down, you can stretch or do a light aerobic exercise for 5 minutes.  Some people go to a gym or do group exercise classes. But you can exercise even without these things. Some exercises can be done even in a small space. You can also try online videos or smartphone apps to get ideas for different types of exercise.  How often should I exercise? -- Doctors recommend that people exercise at least 30 minutes a day, on 5 or more days of the week.  If you can't exercise for 30 minutes straight, try to exercise for 10 minutes at a time, 3 or 4 times a day. Even exercising for shorter amounts of time is good for you, especially if it means spending less time sitting.  When should I call my doctor or nurse? -- If you have any of the following symptoms when you exercise, stop exercising and call your doctor or nurse right away:   Pain or pressure in your chest, arms, throat, jaw, or back   Nausea or vomiting   Feeling like your heart is fluttering or racing very fast   Feeling dizzy or faint  What if I don't have time to exercise? -- Many people have very busy lives and might not think that they have time to exercise. But it's important to try to find time, even if you are tired or work a lot. Exercise can increase your energy level, which can make you feel better and  might even help you get more work done.  Even if it's hard to set aside a lot of time to exercise, you can still improve your health by moving your body more. There are many ways that you can be more active. For example, you can:   Take the stairs instead of the elevator.   Park in a parking space that is farther away from the door.   Take a longer route when you walk from one place to another.  Spending a lot of time sitting still (for example, watching TV or working on the computer) is bad for your health. Try to get up and move around whenever you can. Even small amounts of movement, like taking short walks, doing household chores, or gardening, can improve your health. Finding activities that you enjoy, or doing them with other people, can help you add more movement into your daily life.  What else should I do when I exercise? -- To exercise safely and avoid problems, it's important to:   Drink fluids during and after exercising (but avoid drinks with a lot of caffeine or sugar).   Avoid exercising outside if it is too hot or cold.   Wear layers of clothes, so that you can take them off if you get too hot.   Wear shoes that fit well and support your feet.   Be aware of your surroundings if you exercise outside.  All topics are updated as new evidence becomes available and our peer review process is complete.  This topic retrieved from [employer] on: May 18, 2024.  Topic 58339 Version 31.0  Release: 32.4.3 - C32.137  © 2024 UpToDate, Inc. and/or its affiliates. All rights reserved.  Consumer Information Use and Disclaimer   Disclaimer: This generalized information is a limited summary of diagnosis, treatment, and/or medication information. It is not meant to be comprehensive and should be used as a tool to help the user understand and/or assess potential diagnostic and treatment options. It does NOT include all information about conditions, treatments, medications, side effects, or risks that may apply to a  "specific patient. It is not intended to be medical advice or a substitute for the medical advice, diagnosis, or treatment of a health care provider based on the health care provider's examination and assessment of a patient's specific and unique circumstances. Patients must speak with a health care provider for complete information about their health, medical questions, and treatment options, including any risks or benefits regarding use of medications. This information does not endorse any treatments or medications as safe, effective, or approved for treating a specific patient. UpToDate, Inc. and its affiliates disclaim any warranty or liability relating to this information or the use thereof.The use of this information is governed by the Terms of Use, available at https://www.[employer].com/en/know/clinical-effectiveness-terms. 2024© UpToDate, Inc. and its affiliates and/or licensors. All rights reserved.  Copyright   © 2024 UpToDate, Inc. and/or its affiliates. All rights reserved.    Patient Education     Low-carbohydrate diet   The Basics   Written by the doctors and editors at [employer]   What is a low-carbohydrate diet? -- A low-carbohydrate, or \"low-carb,\" diet involves eating foods that are high in protein and moderate in healthy fats. Most low-carb diets limit foods that are high in added sugar or carbohydrates. These are foods like sweets, starches, and refined grains.  For some people, following a low-carb diet might help them:   Lose weight   Manage their blood sugar  With a low-carb diet, many people try to eat between 60 and 130 grams of carbs per day. For foods with labels, reading the label can tell you how many grams of carbs are in a serving (figure 1).  What can I eat and drink on a low-carb diet? -- While eating a low-carb diet, it's important to make sure that you are getting enough of the nutrients your body needs. Some kinds of proteins and fats are better for your health than others. The " "same is true for foods with carbs.  Meat and fish have very few carbs in them. Fats also have very few carbs in them. However, it is important to make healthy choices for fats and proteins:   Choose \"healthy\" fats - These are also called \"monounsaturated\" or \"polyunsaturated\" fats. They tend to be more liquid at room temperature. Healthy fats are found in things like olive oil, canola oil, and sesame oil. They are also found in nuts, seeds, avocados, and nut butters.   Choose lean meats, poultry, seafood, and proteins - Eat fish, chicken, pork, and beef. Eggs, seeds, nuts and nut butters, and cheese are also healthy choices.  Eat non-starchy, colorful vegetables like lettuce, spinach, broccoli, cauliflower, zucchini, and cabbage. Fruits like watermelon, peaches, and berries are also lower in carbs. Choose whole grains because they provide fiber.  What foods and drinks should I avoid on a low-carb diet? -- Avoid or limit foods that are high in added sugar. Many alcoholic drinks also contain carbs and should be limited.   Grains to limit oravoid - Grains made with refined flour. Examples include breads, chips, cookies, cakes, candy, doughnuts, granola, baked goods, muffins, pizza dough, and pie crusts that are packaged.   Fruits to limit oravoid - Fruits high in carbs. Examples include dried fruits, fruit juices, and processed fruit products.   Vegetables to limit oravoid - Starchy vegetables. Examples include white potatoes, corn, and turnips.  All topics are updated as new evidence becomes available and our peer review process is complete.  This topic retrieved from [employer] on: May 15, 2024.  Topic 020530 Version 1.0  Release: 32.4.3 - C32.134  © 2024 UpToDate, Inc. and/or its affiliates. All rights reserved.  figure 1: Food label     Graphic 523134 Version 1.0  Consumer Information Use and Disclaimer   Disclaimer: This generalized information is a limited summary of diagnosis, treatment, and/or medication " information. It is not meant to be comprehensive and should be used as a tool to help the user understand and/or assess potential diagnostic and treatment options. It does NOT include all information about conditions, treatments, medications, side effects, or risks that may apply to a specific patient. It is not intended to be medical advice or a substitute for the medical advice, diagnosis, or treatment of a health care provider based on the health care provider's examination and assessment of a patient's specific and unique circumstances. Patients must speak with a health care provider for complete information about their health, medical questions, and treatment options, including any risks or benefits regarding use of medications. This information does not endorse any treatments or medications as safe, effective, or approved for treating a specific patient. UpToDate, Inc. and its affiliates disclaim any warranty or liability relating to this information or the use thereof.The use of this information is governed by the Terms of Use, available at https://www.[employer]/en/know/clinical-effectiveness-terms. 2024© UpToDate, Inc. and its affiliates and/or licensors. All rights reserved.  Copyright   © 2024 UpToDate, Inc. and/or its affiliates. All rights reserved.    Patient Education     Mediterranean diet   The Basics   Written by the doctors and editors at [employer]   What is a Mediterranean diet? -- A Mediterranean diet is a heart-healthy way of eating. It includes foods and cooking styles from many countries around the [place], like [place] and [place]. The exact foods included vary from place to place.  A Mediterranean diet involves eating a lot of fruits, vegetables, nuts, and whole grains. It uses olive oil instead of other fats. It also includes some fish, poultry, and dairy products, but not a lot of red meat.  Wine is often thought of as part of a Mediterranean diet. It is not needed, and you  might choose not to include it. If you do drink alcohol, limit the amount to:   For females, no more than 1 drink a day   For males, no more than 2 drinks a day  What are the benefits of a Mediterranean diet? -- A Mediterranean diet can help you:   Improve your overall health, and help you lose weight   Lower your risk of stroke   Lower your risk of heart problems such as a heart attack   Manage your blood sugar if you have diabetes  What can I eat and drink on a Mediterranean diet? -- A Mediterranean diet is more of an eating pattern than a strict diet. Try to cover two-thirds of your plate with fresh fruits and vegetables. Some examples of foods that are often part of this pattern:   Grains - Whole grains like whole-grain bread and pasta, oats, couscous, brown rice, barley, and orzo.   Fruits - Many kinds and colors of fresh, frozen, dried, or canned fruits. Frozen or canned fruits with 100% fruit juice or water (without added sugar). Examples include apples, pears, berries, melons, bananas, plums, raisins, figs, and peaches.   Vegetables - Many kinds and colors of fresh, frozen, or canned vegetables. If canned, low sodium or salt free. If frozen, without added fat and sodium. Examples include avocados, peppers, tomatoes, spinach, kale, beans, carrots, peas, olives, cucumbers, hummus, soybeans, lentils, and kidney beans.   Dairy - Low-fat milk, cheese, and other dairy products. Greek yogurt, kefir, and plant-based milk alternatives like soy milk.   Lean meats, poultry, seafood, and proteins - [place], tuna, cod, and other fish. Shrimp, clams, scallops, and mussels. White meat chicken and turkey, eggs, dried beans, lentils, and tofu. Nuts such as walnuts, almonds, pecans, hazelnuts, cashews, peanuts, and nut butters. Seeds such as pumpkin, sesame, flax, and sunflower seeds.   Fats, oils, and other foods - Foods with healthy fats found in fish, nuts, and avocados. Olive oil or vegetable oils such as canola oil. Use  onions, garlic, spices, and herbs to season food.  What foods and drinks should I avoid or limit on a Mediterranean diet? -- A Mediterranean diet involves avoiding or limiting certain types of foods. Try to avoid foods with additives like artificial sweeteners. Avoid foods that are processed, refined, or preserved. These are often foods with a very long shelf life.   Grains to avoid - White bread, pasta, white rice, crackers, and biscuits.   Fruits to avoid - Fruits canned or frozen with extra sugar.   Vegetables to avoid - Commercially prepared potatoes and vegetable mixes, regular canned vegetables and juices, and vegetables frozen with sauce or pickled vegetables.   Dairy to avoid - Whole-fat dairy products like cheese, ice cream, whole milk, cream, and buttermilk.   Lean meats, poultry, seafood, and proteins to avoid - Red meat such as beef, pork, and lamb. Processed meats such as sausages, deli meats, salami, hot dogs, and [place].   Fats, oils, and other foods to avoid - Butter, margarine, lard, gravies, sauces, and salad dressing. Cookies, cakes, candy, doughnuts, muffins, and other sweets.  All topics are updated as new evidence becomes available and our peer review process is complete.  This topic retrieved from [employer] on: Apr 04, 2024.  Topic 037828 Version 2.0  Release: 32.2.4 - C32.93  © 2024 UpToDate, Inc. and/or its affiliates. All rights reserved.  Consumer Information Use and Disclaimer   Disclaimer: This generalized information is a limited summary of diagnosis, treatment, and/or medication information. It is not meant to be comprehensive and should be used as a tool to help the user understand and/or assess potential diagnostic and treatment options. It does NOT include all information about conditions, treatments, medications, side effects, or risks that may apply to a specific patient. It is not intended to be medical advice or a substitute for the medical advice, diagnosis, or treatment of a  health care provider based on the health care provider's examination and assessment of a patient's specific and unique circumstances. Patients must speak with a health care provider for complete information about their health, medical questions, and treatment options, including any risks or benefits regarding use of medications. This information does not endorse any treatments or medications as safe, effective, or approved for treating a specific patient. UpToDate, Inc. and its affiliates disclaim any warranty or liability relating to this information or the use thereof.The use of this information is governed by the Terms of Use, available at https://www.[employer].com/en/know/clinical-effectiveness-terms. 2024© UpToDate, Inc. and its affiliates and/or licensors. All rights reserved.  Copyright   © 2024 UpToDate, Inc. and/or its affiliates. All rights reserved.

## 2024-11-04 NOTE — ASSESSMENT & PLAN NOTE
Unable to quit smoking in spite of Wellbutrin although it does help with her mental health  Allergic to NRT patches, recommended to attempt nicotine gum along with a slow tapering.  Orders:    Ambulatory Referral to Sleep Medicine; Future

## 2024-11-04 NOTE — ASSESSMENT & PLAN NOTE
Has episodic breakouts especially in the waistline and groin  No active lesions currently.  Will refer to dermatology for further evaluation  Orders:    Ambulatory Referral to Dermatology; Future    spironolactone (ALDACTONE) 25 mg tablet; TAKE 1 TABLET BY MOUTH EVERY DAY    Ambulatory Referral to Sleep Medicine; Future

## 2024-11-04 NOTE — PROGRESS NOTES
Ambulatory Visit  Name: Jessica Oliver      : 1980      MRN: [id number]  Encounter Provider: Jad Johnson MD  Encounter Date: 2024   Encounter department: [facility] PRIMARY [facility]    Assessment & Plan  Encounter for immunization    Orders:    influenza vaccine preservative-free 0.5 mL IM (Fluzone, Afluria, Fluarix, Flulaval)    Ambulatory Referral to Sleep Medicine; Future    Essential hypertension  Stable, continue current medication, missed few months of Aldactone.  Refills given  Encouraged DASH diet  Orders:    spironolactone (ALDACTONE) 25 mg tablet; TAKE 1 TABLET BY MOUTH EVERY DAY    Ambulatory Referral to Sleep Medicine; Future    HIRAL (obstructive sleep apnea)  Needs CPAP titration studies, referral given  Orders:    Ambulatory Referral to Sleep Medicine; Future    Moderate episode of recurrent major depressive disorder (HCC)  Stable, continue Wellbutrin    Orders:    Ambulatory Referral to Sleep Medicine; Future    Prediabetes  .  A1c of 6.5 today, continue metformin  Orders:    Ambulatory Referral to Sleep Medicine; Future    POCT hemoglobin A1c    Hidradenitis suppurativa  Has episodic breakouts especially in the waistline and groin  No active lesions currently.  Will refer to dermatology for further evaluation  Orders:    Ambulatory Referral to Dermatology; Future    spironolactone (ALDACTONE) 25 mg tablet; TAKE 1 TABLET BY MOUTH EVERY DAY    Ambulatory Referral to Sleep Medicine; Future    Tobacco use  Unable to quit smoking in spite of Wellbutrin although it does help with her mental health  Allergic to NRT patches, recommended to attempt nicotine gum along with a slow tapering.  Orders:    Ambulatory Referral to Sleep Medicine; Future    Acute otitis externa of left ear, unspecified type  Unable to get Ciprodex due to insurance coverage  Will give antibiotic eardrops  Orders:    ofloxacin (FLOXIN) 0.3 % otic solution; Administer 5 drops into the left ear daily for 7  days    Class 2 severe obesity with serious comorbidity and body mass index (BMI) of 35.0 to 35.9 in adult, unspecified obesity type (HCC)        Patient reports that she failed lifestyle modifications which she has tried for several months now  Reports insurance will not cover for weight loss management referral  We discussed briefly about lifestyle modifications including dietary modifications, written instructions given  Handout on weight loss medications given, patient will consider, will discuss at the upcoming annual physical.            History of Present Illness     HPI  Patient comes for follow-up of multiple chronic medical conditions.  Reports mental health is stable, denies other subjective complaints other than left ear pain for which she had a recent ER visit.  Unable to get Ciprodex which was prescribed due to insurance.      History obtained from : patient  Review of Systems   Constitutional:  Negative for activity change, appetite change, chills, diaphoresis, fatigue, fever and unexpected weight change.   HENT:  Positive for ear pain. Negative for congestion, drooling, ear discharge, facial swelling, hearing loss, postnasal drip, rhinorrhea, sinus pressure, sinus pain, sneezing, sore throat, tinnitus, trouble swallowing and voice change.    Eyes:  Negative for discharge.   Respiratory:  Negative for apnea, cough, choking, chest tightness, shortness of breath, wheezing and stridor.    Cardiovascular:  Negative for chest pain, palpitations and leg swelling.   Gastrointestinal:  Negative for abdominal distention, abdominal pain, anal bleeding, blood in stool, constipation, diarrhea, nausea and vomiting.   Genitourinary:  Negative for decreased urine volume, difficulty urinating, frequency and urgency.   Musculoskeletal:  Negative for arthralgias, back pain and myalgias.   Skin:  Negative for color change.   Neurological:  Negative for dizziness, light-headedness, numbness and headaches.     Medical  History Reviewed by provider this encounter:  Tobacco  Allergies  Meds  Problems  Med Hx  Surg Hx  Fam Hx       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    Adrenal mass, left (HCC) 2021    1.8 x 2.1 cm on ct 2020    Arthritis     Asthma     Blurry vision 2020    Diabetes mellitus (HCC)     Essential hypertension 2020    Headache 2020    Hidradenitis suppurativa     Hypertension     Immunization deficiency 10/02/2020    Hep a nonimmune    Insomnia 2021    Migraine     Moderate episode of recurrent major depressive disorder (HCC) 2020    Obesity (BMI 30.0-34.9) 2021    Prediabetes 2020    Seasonal allergies     Tobacco use 2021    Vitamin D deficiency 10/02/2020     Past Surgical History:   Procedure Laterality Date    APPENDECTOMY      BREAST BIOPSY Left 2017    BREAST BIOPSY Right     2 core biopsies    BREAST EXCISIONAL BIOPSY Left 2018    BREAST SURGERY Left 2018    Left breast mass excision    CERVICAL BIOPSY  W/ LOOP ELECTRODE EXCISION       SECTION      X2    COLPOSCOPY      CYST REMOVAL      FACIAL/NECK BIOPSY Right 10/03/2023    Procedure: EXCISION MASS RIGHT CHEEK;  Surgeon: Robert Bloch, MD;  Location:  MAIN OR;  Service: General    FL INJECTION RIGHT KNEE (ARTHROGRAM)  01/15/2024    HYSTERECTOMY      heavy bleeding, ovaries remain, also had abnormal pap    KNEE SURGERY      LYMPH NODE DISSECTION      under armpit    TUBAL LIGATION      US GUIDED THYROID BIOPSY  2021     Family History   Problem Relation Age of Onset    Diabetes Mother     Heart attack Mother     Heart disease Mother         Internal Defibrilation    No Known Problems Father     Endometrial cancer Sister 28    Colon cancer Sister 35    No Known Problems Daughter     Diabetes Maternal Grandmother     Diabetes Paternal Grandmother     No Known  Problems Son     No Known Problems Son     Lupus Maternal Aunt 48    Seizures Maternal Aunt     Leukemia Maternal Uncle 18    Diabetes Paternal Aunt     No Known Problems Paternal Aunt      Current Outpatient Medications on File Prior to Visit   Medication Sig Dispense Refill    albuterol (PROVENTIL HFA,VENTOLIN HFA) 90 mcg/act inhaler INHALE 2 PUFFS BY MOUTH EVERY 6 HOURS AS NEEDED FOR WHEEZING 18 g 1    amLODIPine (NORVASC) 5 mg tablet TAKE 1 TABLET (5 MG TOTAL) BY MOUTH DAILY BLOOD PRESUURE 90 tablet 1    buPROPion (WELLBUTRIN XL) 300 mg 24 hr tablet Take 1 tablet (300 mg total) by mouth every morning 90 tablet 1    Cholecalciferol (Vitamin D3) 125 MCG (5000 UT) CAPS TAKE 1 CAPSULE BY MOUTH EVERY DAY 90 capsule 1    [facility] Vitamin C 500 MG tablet TAKE 1 TABLET (500 MG TOTAL) BY MOUTH DAILY. 90 tablet 3    cyanocobalamin (VITAMIN B-12) 2000 MCG tablet Take 1 tablet (2,000 mcg total) by mouth daily 90 tablet 2    lidocaine (XYLOCAINE) 5 % ointment Apply topically 3 (three) times a day as needed for moderate pain (vulvar pain) 50 g 0    loratadine (CLARITIN) 10 mg tablet TAKE 1 TABLET (10 MG TOTAL) BY MOUTH DAILY FOR ALLERGIES 90 tablet 1    metFORMIN (GLUCOPHAGE-XR) 500 mg 24 hr tablet TAKE 2 TABLETS BY MOUTH EVERY DAY WITH BREAKFAST 180 tablet 1    multivitamin (THERAGRAN) TABS Take 1 tablet by mouth daily      Euflexxa 20 MG/2ML SOSY  (Patient not taking: Reported on 10/18/2024)      tiotropium (SPIRIVA) 18 mcg inhalation capsule Place 18 mcg into inhaler and inhale in the morning. (Patient not taking: Reported on 10/18/2024)      [DISCONTINUED] ciprofloxacin-dexamethasone (CIPRODEX) otic suspension Administer 4 drops into the left ear 2 (two) times a day (Patient not taking: Reported on 11/4/2024) 7.5 mL 0    [DISCONTINUED] spironolactone (ALDACTONE) 25 mg tablet TAKE 1 TABLET BY MOUTH EVERY DAY FOR 30 DAYS THEN TAKE 2 TABLETS DAILY AFTER (Patient not taking: Reported on 10/18/2024) 180 tablet 3     No current  facility-administered medications on file prior to visit.   No Known Allergies   Current Outpatient Medications on File Prior to Visit   Medication Sig Dispense Refill    albuterol (PROVENTIL HFA,VENTOLIN HFA) 90 mcg/act inhaler INHALE 2 PUFFS BY MOUTH EVERY 6 HOURS AS NEEDED FOR WHEEZING 18 g 1    amLODIPine (NORVASC) 5 mg tablet TAKE 1 TABLET (5 MG TOTAL) BY MOUTH DAILY BLOOD PRESUURE 90 tablet 1    buPROPion (WELLBUTRIN XL) 300 mg 24 hr tablet Take 1 tablet (300 mg total) by mouth every morning 90 tablet 1    Cholecalciferol (Vitamin D3) 125 MCG (5000 UT) CAPS TAKE 1 CAPSULE BY MOUTH EVERY DAY 90 capsule 1    [facility] Vitamin C 500 MG tablet TAKE 1 TABLET (500 MG TOTAL) BY MOUTH DAILY. 90 tablet 3    cyanocobalamin (VITAMIN B-12) 2000 MCG tablet Take 1 tablet (2,000 mcg total) by mouth daily 90 tablet 2    lidocaine (XYLOCAINE) 5 % ointment Apply topically 3 (three) times a day as needed for moderate pain (vulvar pain) 50 g 0    loratadine (CLARITIN) 10 mg tablet TAKE 1 TABLET (10 MG TOTAL) BY MOUTH DAILY FOR ALLERGIES 90 tablet 1    metFORMIN (GLUCOPHAGE-XR) 500 mg 24 hr tablet TAKE 2 TABLETS BY MOUTH EVERY DAY WITH BREAKFAST 180 tablet 1    multivitamin (THERAGRAN) TABS Take 1 tablet by mouth daily      Euflexxa 20 MG/2ML SOSY  (Patient not taking: Reported on 10/18/2024)      tiotropium (SPIRIVA) 18 mcg inhalation capsule Place 18 mcg into inhaler and inhale in the morning. (Patient not taking: Reported on 10/18/2024)      [DISCONTINUED] ciprofloxacin-dexamethasone (CIPRODEX) otic suspension Administer 4 drops into the left ear 2 (two) times a day (Patient not taking: Reported on 11/4/2024) 7.5 mL 0    [DISCONTINUED] spironolactone (ALDACTONE) 25 mg tablet TAKE 1 TABLET BY MOUTH EVERY DAY FOR 30 DAYS THEN TAKE 2 TABLETS DAILY AFTER (Patient not taking: Reported on 10/18/2024) 180 tablet 3     No current facility-administered medications on file prior to visit.      Social History     Tobacco Use    Smoking status:  "Every Day     Current packs/day: 0.50     Average packs/day: 0.5 packs/day for 20.0 years (10.0 ttl pk-yrs)     Types: Cigarettes     Passive exposure: Current    Smokeless tobacco: Never    Tobacco comments:     pt is down to .5 packs a day   Vaping Use    Vaping status: Never Used   Substance and Sexual Activity    Alcohol use: Not Currently     Comment: occasional    Drug use: No    Sexual activity: Not Currently     Partners: Male     Birth control/protection: Female Sterilization     Comment: HYSTERECTOMY         Objective     /80 (BP Location: Left arm, Patient Position: Sitting, Cuff Size: Large)   Pulse (!) 107   Temp 98.7 °F (37.1 °C) (Tympanic)   Resp 18   Ht 5' 4\" (1.626 m)   Wt 94.8 kg (209 lb)   SpO2 97%   BMI 35.87 kg/m²     Physical Exam  Constitutional:       General: She is not in acute distress.     Appearance: Normal appearance. She is normal weight. She is not ill-appearing, toxic-appearing or diaphoretic.   HENT:      Right Ear: Tympanic membrane normal.      Left Ear: Tympanic membrane normal. Tenderness present. No swelling. Tympanic membrane is not injected or perforated.   Cardiovascular:      Rate and Rhythm: Normal rate and regular rhythm.      Pulses: Normal pulses.      Heart sounds: Normal heart sounds. No murmur heard.     No gallop.   Pulmonary:      Effort: Pulmonary effort is normal. No respiratory distress.      Breath sounds: Normal breath sounds. No stridor. No wheezing, rhonchi or rales.   Chest:      Chest wall: No tenderness.   Musculoskeletal:      Right lower leg: No edema.      Left lower leg: No edema.   Skin:     Findings: No lesion or rash.   Neurological:      Mental Status: She is alert and oriented to person, place, and time.         "

## 2024-11-04 NOTE — ASSESSMENT & PLAN NOTE
Stable, continue current medication, missed few months of Aldactone.  Refills given  Encouraged DASH diet  Orders:    spironolactone (ALDACTONE) 25 mg tablet; TAKE 1 TABLET BY MOUTH EVERY DAY    Ambulatory Referral to Sleep Medicine; Future

## 2024-11-06 ENCOUNTER — HOSPITAL ENCOUNTER (EMERGENCY)
Facility: HOSPITAL | Age: 44
Discharge: HOME | End: 2024-11-06
Attending: EMERGENCY MEDICINE
Payer: COMMERCIAL

## 2024-11-06 ENCOUNTER — APPOINTMENT (EMERGENCY)
Dept: CT IMAGING | Facility: HOSPITAL | Age: 44
End: 2024-11-06
Payer: COMMERCIAL

## 2024-11-06 VITALS
BODY MASS INDEX: 35.85 KG/M2 | WEIGHT: 210 LBS | HEART RATE: 88 BPM | TEMPERATURE: 98.4 F | DIASTOLIC BLOOD PRESSURE: 94 MMHG | OXYGEN SATURATION: 99 % | HEIGHT: 64 IN | SYSTOLIC BLOOD PRESSURE: 140 MMHG | RESPIRATION RATE: 18 BRPM

## 2024-11-06 DIAGNOSIS — R59.9 REACTIVE LYMPHADENOPATHY: Primary | ICD-10-CM

## 2024-11-06 LAB
ANION GAP SERPL CALCULATED.3IONS-SCNC: 7 MMOL/L (ref 4–13)
BASOPHILS # BLD AUTO: 0.06 THOUSANDS/ÂΜL (ref 0–0.1)
BASOPHILS NFR BLD AUTO: 1 % (ref 0–1)
BUN SERPL-MCNC: 10 MG/DL (ref 5–25)
CALCIUM SERPL-MCNC: 9.1 MG/DL (ref 8.4–10.2)
CHLORIDE SERPL-SCNC: 103 MMOL/L (ref 96–108)
CO2 SERPL-SCNC: 26 MMOL/L (ref 21–32)
CREAT SERPL-MCNC: 0.62 MG/DL (ref 0.6–1.3)
EOSINOPHIL # BLD AUTO: 0.21 THOUSAND/ÂΜL (ref 0–0.61)
EOSINOPHIL NFR BLD AUTO: 2 % (ref 0–6)
ERYTHROCYTE [DISTWIDTH] IN BLOOD BY AUTOMATED COUNT: 13.9 % (ref 11.6–15.1)
GFR SERPL CREATININE-BSD FRML MDRD: 110 ML/MIN/1.73SQ M
GLUCOSE SERPL-MCNC: 138 MG/DL (ref 65–140)
HCT VFR BLD AUTO: 38.9 % (ref 34.8–46.1)
HGB BLD-MCNC: 12.8 G/DL (ref 11.5–15.4)
IMM GRANULOCYTES # BLD AUTO: 0.02 THOUSAND/UL (ref 0–0.2)
IMM GRANULOCYTES NFR BLD AUTO: 0 % (ref 0–2)
LYMPHOCYTES # BLD AUTO: 4.81 THOUSANDS/ÂΜL (ref 0.6–4.47)
LYMPHOCYTES NFR BLD AUTO: 45 % (ref 14–44)
MCH RBC QN AUTO: 28.6 PG (ref 26.8–34.3)
MCHC RBC AUTO-ENTMCNC: 32.9 G/DL (ref 31.4–37.4)
MCV RBC AUTO: 87 FL (ref 82–98)
MONOCYTES # BLD AUTO: 0.75 THOUSAND/ÂΜL (ref 0.17–1.22)
MONOCYTES NFR BLD AUTO: 7 % (ref 4–12)
NEUTROPHILS # BLD AUTO: 4.95 THOUSANDS/ÂΜL (ref 1.85–7.62)
NEUTS SEG NFR BLD AUTO: 45 % (ref 43–75)
NRBC BLD AUTO-RTO: 0 /100 WBCS
PLATELET # BLD AUTO: 298 THOUSANDS/UL (ref 149–390)
PMV BLD AUTO: 8.7 FL (ref 8.9–12.7)
POTASSIUM SERPL-SCNC: 3.5 MMOL/L (ref 3.5–5.3)
RBC # BLD AUTO: 4.48 MILLION/UL (ref 3.81–5.12)
SODIUM SERPL-SCNC: 136 MMOL/L (ref 135–147)
WBC # BLD AUTO: 10.8 THOUSAND/UL (ref 4.31–10.16)

## 2024-11-06 PROCEDURE — 36415 COLL VENOUS BLD VENIPUNCTURE: CPT | Performed by: EMERGENCY MEDICINE

## 2024-11-06 PROCEDURE — 80048 BASIC METABOLIC PNL TOTAL CA: CPT | Performed by: EMERGENCY MEDICINE

## 2024-11-06 PROCEDURE — 99284 EMERGENCY DEPT VISIT MOD MDM: CPT | Performed by: EMERGENCY MEDICINE

## 2024-11-06 PROCEDURE — 99284 EMERGENCY DEPT VISIT MOD MDM: CPT

## 2024-11-06 PROCEDURE — 85025 COMPLETE CBC W/AUTO DIFF WBC: CPT | Performed by: EMERGENCY MEDICINE

## 2024-11-06 PROCEDURE — 96374 THER/PROPH/DIAG INJ IV PUSH: CPT

## 2024-11-06 PROCEDURE — 70491 CT SOFT TISSUE NECK W/DYE: CPT

## 2024-11-06 RX ORDER — KETOROLAC TROMETHAMINE 30 MG/ML
15 INJECTION, SOLUTION INTRAMUSCULAR; INTRAVENOUS ONCE
Status: COMPLETED | OUTPATIENT
Start: 2024-11-06 | End: 2024-11-06

## 2024-11-06 RX ADMIN — IOHEXOL 85 ML: 350 INJECTION, SOLUTION INTRAVENOUS at 20:10

## 2024-11-06 RX ADMIN — KETOROLAC TROMETHAMINE 15 MG: 30 INJECTION, SOLUTION INTRAMUSCULAR at 20:43

## 2024-11-07 NOTE — ED PROVIDER NOTES
Time reflects when diagnosis was documented in both MDM as applicable and the Disposition within this note       Time User Action Codes Description Comment    11/6/2024  8:56 PM Nicole Mead Add [R59.9] Reactive lymphadenopathy           ED Disposition       ED Disposition   Discharge    Condition   Stable    Date/Time   Wed Nov 6, 2024  8:54 PM    Comment   Jessica ROSEANNE Oliver discharge to home/self care.                   Assessment & Plan       Medical Decision Making  43-year-old female presenting with atraumatic left anterior and supraclavicular neck pain.  Patient has palpable small tender lymph nodes in the left supraclavicular area which may be at least partially the cause of her pain.  Based on exam it seems less likely that she has specifically muscular tenderness.  Left ear exam is completely normal and likely unrelated.  Patient does not have pain or tenderness in the left deltoid area although that is where she had the flu shot.  Normal-appearing mouth and oropharynx.  No obvious skin origin of infection that would cause painful reactive lymphadenopathy.  Will obtain CT soft tissue neck to evaluate for deeper infection or abnormality causing the lymphadenopathy.  Screening labs notable for very mild leukocytosis, normal BMP.    CT soft tissue neck showing stable thyroid nodule which has previously been biopsied. No other concerning findings. Suspect pt's pain is related to reactive LAD from immunization. Stable for dc.    Amount and/or Complexity of Data Reviewed  Labs: ordered.  Radiology: ordered.    Risk  Prescription drug management.             Medications   iohexol (OMNIPAQUE) 350 MG/ML injection (MULTI-DOSE) 85 mL (85 mL Intravenous Given 11/6/24 2010)   ketorolac (TORADOL) injection 15 mg (15 mg Intravenous Given 11/6/24 2043)       ED Risk Strat Scores                           SBIRT 22yo+      Flowsheet Row Most Recent Value   Initial Alcohol Screen: US AUDIT-C     1. How often do you have a  drink containing alcohol? 0 Filed at: 2024 184   Audit-C Score 0 Filed at: 2024 184   PAULIE: How many times in the past year have you...    Used an illegal drug or used a prescription medication for non-medical reasons? Never Filed at: 2024 184                            History of Present Illness       Chief Complaint   Patient presents with    Neck Pain     L sided neck pain that started 2 days ago along with mild swelling, pain radiates into L shoulder. Pt recently treated for L sided ear infection which she reports has resolved after using ear drops prescribed here. Denies any apparent injury or overuse. Pt reports she did get a flu shot this past Monday.       Past Medical History:   Diagnosis Date    Abnormal Pap smear of cervix     2018 HSV 1, 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    Adrenal mass, left (HCC) 2021    1.8 x 2.1 cm on ct 2020    Arthritis     Asthma     Blurry vision 2020    Diabetes mellitus (HCC)     Essential hypertension 2020    Headache 2020    Hidradenitis suppurativa     Hypertension     Immunization deficiency 10/02/2020    Hep a nonimmune    Insomnia 2021    Migraine     Moderate episode of recurrent major depressive disorder (HCC) 2020    Obesity (BMI 30.0-34.9) 2021    Prediabetes 2020    Seasonal allergies     Tobacco use 2021    Vitamin D deficiency 10/02/2020      Past Surgical History:   Procedure Laterality Date    APPENDECTOMY      BREAST BIOPSY Left 2017    BREAST BIOPSY Right     2 core biopsies    BREAST EXCISIONAL BIOPSY Left 2018    BREAST SURGERY Left 2018    Left breast mass excision    CERVICAL BIOPSY  W/ LOOP ELECTRODE EXCISION       SECTION      X2    COLPOSCOPY      CYST REMOVAL      FACIAL/NECK BIOPSY Right 10/03/2023    Procedure: EXCISION MASS RIGHT CHEEK;  Surgeon: Robert Bloch, MD;  Location:  MAIN OR;  Service: General    FL INJECTION RIGHT KNEE (ARTHROGRAM)  01/15/2024     HYSTERECTOMY  2012    heavy bleeding, ovaries remain, also had abnormal pap    KNEE SURGERY      LYMPH NODE DISSECTION  2018    under armpit    TUBAL LIGATION  2007    US GUIDED THYROID BIOPSY  01/27/2021      Family History   Problem Relation Age of Onset    Diabetes Mother     Heart attack Mother     Heart disease Mother         Internal Defibrilation    No Known Problems Father     Endometrial cancer Sister 28    Colon cancer Sister 35    No Known Problems Daughter     Diabetes Maternal Grandmother     Diabetes Paternal Grandmother     No Known Problems Son     No Known Problems Son     Lupus Maternal Aunt 48    Seizures Maternal Aunt     Leukemia Maternal Uncle 18    Diabetes Paternal Aunt     No Known Problems Paternal Aunt       Social History     Tobacco Use    Smoking status: Every Day     Current packs/day: 0.50     Average packs/day: 0.5 packs/day for 20.0 years (10.0 ttl pk-yrs)     Types: Cigarettes     Passive exposure: Current    Smokeless tobacco: Never    Tobacco comments:     pt is down to .5 packs a day   Vaping Use    Vaping status: Never Used   Substance Use Topics    Alcohol use: Not Currently     Comment: occasional    Drug use: No      E-Cigarette/Vaping    E-Cigarette Use Never User       E-Cigarette/Vaping Substances    Nicotine Yes     THC No     CBD No     Flavoring No     Other No     Unknown No       I have reviewed and agree with the history as documented.     43-year-old female history of diabetes hypertension recently treated for left otitis externa presents with 1 to 2 days of left neck pain and possible swelling as well as palpable painful lumps on the left side of her neck.  She denies inciting trauma.  She reports pain with moving her head and moving her left arm.  She had a flu shot in her left deltoid 4 days ago and initially was feeling okay.  She denies having any adverse reactions to immunizations in the past.  She denies having fevers.  No sore throat.  She states that her  ear symptoms have completely resolved and she does not think this is related.  No difficulty swallowing or breathing.        Review of Systems   All other systems reviewed and are negative.          Objective       ED Triage Vitals [11/06/24 1838]   Temperature Pulse Blood Pressure Respirations SpO2 Patient Position - Orthostatic VS   98.4 °F (36.9 °C) 88 140/94 18 99 % Lying      Temp Source Heart Rate Source BP Location FiO2 (%) Pain Score    Oral Monitor Right arm -- 8      Vitals      Date and Time Temp Pulse SpO2 Resp BP Pain Score FACES Pain Rating User   11/06/24 2043 -- -- -- -- -- 6 -- AF   11/06/24 1838 98.4 °F (36.9 °C) 88 99 % 18 140/94 8 -- KLB            Physical Exam  HENT:      Left Ear: Tympanic membrane, ear canal and external ear normal.      Ears:      Comments: No mastoid tenderness or swelling     Mouth/Throat:      Mouth: Mucous membranes are moist.      Pharynx: Oropharynx is clear. No posterior oropharyngeal erythema.   Eyes:      Conjunctiva/sclera: Conjunctivae normal.   Neck:      Comments: Palpable superficial lymph node less than 1 cm in the left supraclavicular area is very tender, generalized tenderness to palpation of the left anterior neck and supraclavicular area  Cardiovascular:      Rate and Rhythm: Normal rate and regular rhythm.   Pulmonary:      Effort: Pulmonary effort is normal.   Musculoskeletal:         General: Normal range of motion.      Cervical back: Normal range of motion.   Skin:     General: Skin is warm and dry.      Findings: No rash.   Neurological:      General: No focal deficit present.      Mental Status: She is alert and oriented to person, place, and time.   Psychiatric:         Mood and Affect: Mood normal.         Behavior: Behavior normal.         Results Reviewed       Procedure Component Value Units Date/Time    Basic metabolic panel [[id number]] Collected: 11/06/24 1943    Lab Status: Final result Specimen: Blood from Arm, Right Updated: 11/06/24 2002      Sodium 136 mmol/L      Potassium 3.5 mmol/L      Chloride 103 mmol/L      CO2 26 mmol/L      ANION GAP 7 mmol/L      BUN 10 mg/dL      Creatinine 0.62 mg/dL      Glucose 138 mg/dL      Calcium 9.1 mg/dL      eGFR 110 ml/min/1.73sq m     Narrative:      National Kid[employer] Foundation guidelines for Chronic Kidney Disease (CKD):     Stage 1 with normal or high GFR (GFR > 90 mL/min/1.73 square meters)    Stage 2 Mild CKD (GFR = 60-89 mL/min/1.73 square meters)    Stage 3A Moderate CKD (GFR = 45-59 mL/min/1.73 square meters)    Stage 3B Moderate CKD (GFR = 30-44 mL/min/1.73 square meters)    Stage 4 Severe CKD (GFR = 15-29 mL/min/1.73 square meters)    Stage 5 End Stage CKD (GFR <15 mL/min/1.73 square meters)  Note: GFR calculation is accurate only with a steady state creatinine    CBC and differential [[id number]]  (Abnormal) Collected: 11/06/24 1943    Lab Status: Final result Specimen: Blood from Arm, Right Updated: 11/06/24 1948     WBC 10.80 Thousand/uL      RBC 4.48 Million/uL      Hemoglobin 12.8 g/dL      Hematocrit 38.9 %      MCV 87 fL      MCH 28.6 pg      MCHC 32.9 g/dL      RDW 13.9 %      MPV 8.7 fL      Platelets 298 Thousands/uL      nRBC 0 /100 WBCs      Segmented % 45 %      Immature Grans % 0 %      Lymphocytes % 45 %      Monocytes % 7 %      Eosinophils Relative 2 %      Basophils Relative 1 %      Absolute Neutrophils 4.95 Thousands/µL      Absolute Immature Grans 0.02 Thousand/uL      Absolute Lymphocytes 4.81 Thousands/µL      Absolute Monocytes 0.75 Thousand/µL      Eosinophils Absolute 0.21 Thousand/µL      Basophils Absolute 0.06 Thousands/µL             CT soft tissue neck with contrast   Final Interpretation by Zohreh Ku MD (11/06 2034)      No cervical lymphadenopathy identified.      Focal skin thickening/subcutaneous soft tissue involving the right premaxillary soft tissues, increased when compared to the prior study. This is nonspecific in appearance, and correlation with  physical examination findings is recommended. Consider for    evaluation with biopsy.      Stable heterogeneously enhancing left thyroid nodule/mass, better evaluated on prior thyroid ultrasound. Continue management and follow-up recommendations from the thyroid ultrasound.      Stable right paratracheal enlarged lymph node in the upper mediastinum, unchanged dating back to least 2020 and for which no further imaging evaluation or follow-up is needed.      The study was marked in EPIC for immediate notification.            Workstation performed: [id number]             Procedures    ED Medication and Procedure Management   Prior to Admission Medications   Prescriptions Last Dose Informant Patient Reported? Taking?   [facility] Vitamin C 500 MG tablet  Self No No   Sig: TAKE 1 TABLET (500 MG TOTAL) BY MOUTH DAILY.   Cholecalciferol (Vitamin D3) 125 MCG (5000 UT) CAPS   No No   Sig: TAKE 1 CAPSULE BY MOUTH EVERY DAY   Euflexxa 20 MG/2ML SOSY   Yes No   Patient not taking: Reported on 10/18/2024   albuterol (PROVENTIL HFA,VENTOLIN HFA) 90 mcg/act inhaler  Self No No   Sig: INHALE 2 PUFFS BY MOUTH EVERY 6 HOURS AS NEEDED FOR WHEEZING   amLODIPine (NORVASC) 5 mg tablet   No No   Sig: TAKE 1 TABLET (5 MG TOTAL) BY MOUTH DAILY BLOOD PRESUURE   buPROPion (WELLBUTRIN XL) 300 mg 24 hr tablet   No No   Sig: Take 1 tablet (300 mg total) by mouth every morning   cyanocobalamin (VITAMIN B-12) 2000 MCG tablet  Self No No   Sig: Take 1 tablet (2,000 mcg total) by mouth daily   lidocaine (XYLOCAINE) 5 % ointment   No No   Sig: Apply topically 3 (three) times a day as needed for moderate pain (vulvar pain)   loratadine (CLARITIN) 10 mg tablet   No No   Sig: TAKE 1 TABLET (10 MG TOTAL) BY MOUTH DAILY FOR ALLERGIES   metFORMIN (GLUCOPHAGE-XR) 500 mg 24 hr tablet   No No   Sig: TAKE 2 TABLETS BY MOUTH EVERY DAY WITH BREAKFAST   multivitamin (THERAGRAN) TABS  Self Yes No   Sig: Take 1 tablet by mouth daily   ofloxacin (FLOXIN) 0.3 % otic solution    No No   Sig: Administer 5 drops into the left ear daily for 7 days   spironolactone (ALDACTONE) 25 mg tablet   No No   Sig: TAKE 1 TABLET BY MOUTH EVERY DAY   tiotropium (SPIRIVA) 18 mcg inhalation capsule  Self Yes No   Sig: Place 18 mcg into inhaler and inhale in the morning.   Patient not taking: Reported on 10/18/2024      Facility-Administered Medications: None     Discharge Medication List as of 11/6/2024  8:59 PM        CONTINUE these medications which have NOT CHANGED    Details   albuterol (PROVENTIL HFA,VENTOLIN HFA) 90 mcg/act inhaler INHALE 2 PUFFS BY MOUTH EVERY 6 HOURS AS NEEDED FOR WHEEZING, Normal      amLODIPine (NORVASC) 5 mg tablet TAKE 1 TABLET (5 MG TOTAL) BY MOUTH DAILY BLOOD PRESUURE, Starting Tue 6/11/2024, Until Sun 12/8/2024, Normal      buPROPion (WELLBUTRIN XL) 300 mg 24 hr tablet Take 1 tablet (300 mg total) by mouth every morning, Starting Tue 10/15/2024, Normal      Cholecalciferol (Vitamin D3) 125 MCG (5000 UT) CAPS TAKE 1 CAPSULE BY MOUTH EVERY DAY, Starting Tue 6/18/2024, Normal      [facility]S Vitamin C 500 MG tablet TAKE 1 TABLET (500 MG TOTAL) BY MOUTH DAILY., Normal      cyanocobalamin (VITAMIN B-12) 2000 MCG tablet Take 1 tablet (2,000 mcg total) by mouth daily, Starting Mon 8/22/2022, Until Mon 11/4/2024, Normal      Euflexxa 20 MG/2ML SOSY Historical Med      lidocaine (XYLOCAINE) 5 % ointment Apply topically 3 (three) times a day as needed for moderate pain (vulvar pain), Starting Fri 10/18/2024, Normal      loratadine (CLARITIN) 10 mg tablet TAKE 1 TABLET (10 MG TOTAL) BY MOUTH DAILY FOR ALLERGIES, Starting Fri 9/20/2024, Normal      metFORMIN (GLUCOPHAGE-XR) 500 mg 24 hr tablet TAKE 2 TABLETS BY MOUTH EVERY DAY WITH BREAKFAST, Normal      multivitamin (THERAGRAN) TABS Take 1 tablet by mouth daily, Historical Med      ofloxacin (FLOXIN) 0.3 % otic solution Administer 5 drops into the left ear daily for 7 days, Starting Mon 11/4/2024, Until Mon 11/11/2024, Normal       spironolactone (ALDACTONE) 25 mg tablet TAKE 1 TABLET BY MOUTH EVERY DAY, Normal      tiotropium (SPIRIVA) 18 mcg inhalation capsule Place 18 mcg into inhaler and inhale in the morning., Historical Med           No discharge procedures on file.  ED SEPSIS DOCUMENTATION   Time reflects when diagnosis was documented in both MDM as applicable and the Disposition within this note       Time User Action Codes Description Comment    11/6/2024  8:56 PM Nicole Mead Add [R59.9] Reactive lymphadenopathy                  Nicole Mead MD  11/10/24 9730

## 2024-11-19 ENCOUNTER — APPOINTMENT (OUTPATIENT)
Dept: LAB | Facility: HOSPITAL | Age: 44
End: 2024-11-19
Payer: COMMERCIAL

## 2024-11-19 ENCOUNTER — RESULTS FOLLOW-UP (OUTPATIENT)
Age: 44
End: 2024-11-19

## 2024-11-19 DIAGNOSIS — R73.03 PREDIABETES: ICD-10-CM

## 2024-11-19 DIAGNOSIS — E78.2 MIXED HYPERLIPIDEMIA: ICD-10-CM

## 2024-11-19 DIAGNOSIS — I10 ESSENTIAL HYPERTENSION: ICD-10-CM

## 2024-11-19 LAB
ALBUMIN SERPL BCG-MCNC: 4 G/DL (ref 3.5–5)
ALP SERPL-CCNC: 76 U/L (ref 34–104)
ALT SERPL W P-5'-P-CCNC: 9 U/L (ref 7–52)
ANION GAP SERPL CALCULATED.3IONS-SCNC: 8 MMOL/L (ref 4–13)
AST SERPL W P-5'-P-CCNC: 12 U/L (ref 13–39)
BASOPHILS # BLD MANUAL: 0.13 THOUSAND/UL (ref 0–0.1)
BASOPHILS NFR MAR MANUAL: 1 % (ref 0–1)
BILIRUB SERPL-MCNC: 0.48 MG/DL (ref 0.2–1)
BUN SERPL-MCNC: 9 MG/DL (ref 5–25)
CALCIUM SERPL-MCNC: 9.1 MG/DL (ref 8.4–10.2)
CHLORIDE SERPL-SCNC: 101 MMOL/L (ref 96–108)
CHOLEST SERPL-MCNC: 153 MG/DL (ref ?–200)
CO2 SERPL-SCNC: 28 MMOL/L (ref 21–32)
CREAT SERPL-MCNC: 0.77 MG/DL (ref 0.6–1.3)
EOSINOPHIL # BLD MANUAL: 0.39 THOUSAND/UL (ref 0–0.4)
EOSINOPHIL NFR BLD MANUAL: 3 % (ref 0–6)
ERYTHROCYTE [DISTWIDTH] IN BLOOD BY AUTOMATED COUNT: 14.5 % (ref 11.6–15.1)
EST. AVERAGE GLUCOSE BLD GHB EST-MCNC: 137 MG/DL
GFR SERPL CREATININE-BSD FRML MDRD: 94 ML/MIN/1.73SQ M
GLUCOSE P FAST SERPL-MCNC: 108 MG/DL (ref 65–99)
HBA1C MFR BLD: 6.4 %
HCT VFR BLD AUTO: 43.8 % (ref 34.8–46.1)
HDLC SERPL-MCNC: 45 MG/DL
HGB BLD-MCNC: 14.2 G/DL (ref 11.5–15.4)
LDLC SERPL CALC-MCNC: 81 MG/DL (ref 0–100)
LYMPHOCYTES # BLD AUTO: 47 % (ref 14–44)
LYMPHOCYTES # BLD AUTO: 6.16 THOUSAND/UL (ref 0.6–4.47)
MCH RBC QN AUTO: 28.6 PG (ref 26.8–34.3)
MCHC RBC AUTO-ENTMCNC: 32.4 G/DL (ref 31.4–37.4)
MCV RBC AUTO: 88 FL (ref 82–98)
MONOCYTES # BLD AUTO: 1.18 THOUSAND/UL (ref 0–1.22)
MONOCYTES NFR BLD: 9 % (ref 4–12)
NEUTROPHILS # BLD MANUAL: 5.24 THOUSAND/UL (ref 1.85–7.62)
NEUTS SEG NFR BLD AUTO: 40 % (ref 43–75)
NONHDLC SERPL-MCNC: 108 MG/DL
PLATELET # BLD AUTO: 355 THOUSANDS/UL (ref 149–390)
PLATELET BLD QL SMEAR: ABNORMAL
PLATELET CLUMP BLD QL SMEAR: PRESENT
PMV BLD AUTO: 9.1 FL (ref 8.9–12.7)
POTASSIUM SERPL-SCNC: 4.1 MMOL/L (ref 3.5–5.3)
PROT SERPL-MCNC: 7.1 G/DL (ref 6.4–8.4)
RBC # BLD AUTO: 4.97 MILLION/UL (ref 3.81–5.12)
RBC MORPH BLD: NORMAL
SODIUM SERPL-SCNC: 137 MMOL/L (ref 135–147)
TRIGL SERPL-MCNC: 133 MG/DL (ref ?–150)
WBC # BLD AUTO: 13.11 THOUSAND/UL (ref 4.31–10.16)

## 2024-11-19 PROCEDURE — 36415 COLL VENOUS BLD VENIPUNCTURE: CPT

## 2024-11-19 PROCEDURE — 85007 BL SMEAR W/DIFF WBC COUNT: CPT

## 2024-11-19 PROCEDURE — 80061 LIPID PANEL: CPT

## 2024-11-19 PROCEDURE — 80053 COMPREHEN METABOLIC PANEL: CPT

## 2024-11-19 PROCEDURE — 85027 COMPLETE CBC AUTOMATED: CPT

## 2024-11-19 PROCEDURE — 83036 HEMOGLOBIN GLYCOSYLATED A1C: CPT

## 2024-11-20 ENCOUNTER — OFFICE VISIT (OUTPATIENT)
Age: 44
End: 2024-11-20
Payer: COMMERCIAL

## 2024-11-20 VITALS
HEART RATE: 103 BPM | SYSTOLIC BLOOD PRESSURE: 124 MMHG | TEMPERATURE: 98.7 F | DIASTOLIC BLOOD PRESSURE: 88 MMHG | HEIGHT: 64 IN | WEIGHT: 212 LBS | RESPIRATION RATE: 18 BRPM | BODY MASS INDEX: 36.19 KG/M2 | OXYGEN SATURATION: 98 %

## 2024-11-20 DIAGNOSIS — M25.552 LEFT HIP PAIN: ICD-10-CM

## 2024-11-20 DIAGNOSIS — M54.50 ACUTE LEFT-SIDED LOW BACK PAIN, UNSPECIFIED WHETHER SCIATICA PRESENT: Primary | ICD-10-CM

## 2024-11-20 PROCEDURE — 99213 OFFICE O/P EST LOW 20 MIN: CPT | Performed by: INTERNAL MEDICINE

## 2024-11-20 RX ORDER — METHOCARBAMOL 500 MG/1
500 TABLET, FILM COATED ORAL 3 TIMES DAILY
Qty: 21 TABLET | Refills: 0 | Status: SHIPPED | OUTPATIENT
Start: 2024-11-20 | End: 2024-11-27

## 2024-11-20 RX ORDER — METHYLPREDNISOLONE 4 MG/1
TABLET ORAL
Qty: 21 EACH | Refills: 0 | Status: SHIPPED | OUTPATIENT
Start: 2024-11-20

## 2024-11-20 NOTE — PROGRESS NOTES
Name: Jessica Oliver      : 1980      MRN: [id number]  Encounter Provider: Jad Johnson MD  Encounter Date: 2024   Encounter department: [facility]IMARY [facility]  :  Assessment & Plan  Acute left-sided low back pain, unspecified whether sciatica present  Likely due to lumbar radiculopathy  Continue ibuprofen 800 mg up to 3 times a day, Tylenol 500 mg 4 times a day, will give muscle relaxant and Medrol Dosepak.  Do imaging as below.  Wants to defer PT now, follow-up in 1 week or earlier if needed  Orders:    XR hip/pelv 4+ vw left if performed; Future    XR spine lumbar minimum 4 views non injury; Future    methocarbamol (ROBAXIN) 500 mg tablet; Take 1 tablet (500 mg total) by mouth 3 (three) times a day for 7 days    methylPREDNISolone 4 MG tablet therapy pack; Use as directed on package    Left hip pain    Orders:    XR hip/pelv 4+ vw left if performed; Future    XR spine lumbar minimum 4 views non injury; Future    methocarbamol (ROBAXIN) 500 mg tablet; Take 1 tablet (500 mg total) by mouth 3 (three) times a day for 7 days    methylPREDNISolone 4 MG tablet therapy pack; Use as directed on package           History of Present Illness     Complains of acute onset low backache after 9 x 10 intensity which started 3 days ago, with the pain and radiating down the posterior left thigh.  With feeling of numbness and tingling in the left lower extremity.  Denies any weakness, fever, chills, nausea vomiting diarrhea or recent spinal injections.  Denies urinary incontinence or bowel incontinence      Review of Systems   Constitutional:  Negative for appetite change, chills, diaphoresis, fatigue, fever and unexpected weight change.   Respiratory:  Negative for apnea, cough, choking, chest tightness, shortness of breath, wheezing and stridor.    Cardiovascular:  Negative for chest pain, palpitations and leg swelling.   Gastrointestinal:  Negative for abdominal distention, abdominal pain,  anal bleeding, blood in stool, constipation, diarrhea, nausea and vomiting.   Genitourinary:  Negative for decreased urine volume, difficulty urinating, frequency and urgency.   Musculoskeletal:  Negative for arthralgias, back pain and myalgias.   Neurological:  Negative for dizziness, light-headedness, numbness and headaches.     Medical History Reviewed by provider this encounter:  Tobacco  Allergies  Meds  Problems  Med Hx  Surg Hx  Fam Hx     .  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    Adrenal mass, left (HCC) 2021    1.8 x 2.1 cm on ct 2020    Arthritis     Asthma     Blurry vision 2020    Diabetes mellitus (HCC)     Essential hypertension 2020    Headache 2020    Hidradenitis suppurativa     Hypertension     Immunization deficiency 10/02/2020    Hep a nonimmune    Insomnia 2021    Migraine     Moderate episode of recurrent major depressive disorder (HCC) 2020    Obesity (BMI 30.0-34.9) 2021    Prediabetes 2020    Seasonal allergies     Tobacco use 2021    Vitamin D deficiency 10/02/2020     Past Surgical History:   Procedure Laterality Date    APPENDECTOMY      BREAST BIOPSY Left 2017    BREAST BIOPSY Right     2 core biopsies    BREAST EXCISIONAL BIOPSY Left 2018    BREAST SURGERY Left 2018    Left breast mass excision    CERVICAL BIOPSY  W/ LOOP ELECTRODE EXCISION       SECTION      X2    COLPOSCOPY      CYST REMOVAL      FACIAL/NECK BIOPSY Right 10/03/2023    Procedure: EXCISION MASS RIGHT CHEEK;  Surgeon: Robert Bloch, MD;  Location:  MAIN OR;  Service: General    FL INJECTION RIGHT KNEE (ARTHROGRAM)  01/15/2024    HYSTERECTOMY  2012    heavy bleeding, ovaries remain, also had abnormal pap    KNEE SURGERY      LYMPH NODE DISSECTION  2018    under armpit    TUBAL LIGATION  2007    US GUIDED THYROID BIOPSY  2021     Family  History   Problem Relation Age of Onset    Diabetes Mother     Heart attack Mother     Heart disease Mother         Internal Defibrilation    No Known Problems Father     Endometrial cancer Sister 28    Colon cancer Sister 35    No Known Problems Daughter     Diabetes Maternal Grandmother     Diabetes Paternal Grandmother     No Known Problems Son     No Known Problems Son     Lupus Maternal Aunt 48    Seizures Maternal Aunt     Leukemia Maternal Uncle 18    Diabetes Paternal Aunt     No Known Problems Paternal Aunt       reports that she has been smoking cigarettes. She has a 10 pack-year smoking history. She has been exposed to tobacco smoke. She has never used smokeless tobacco. She reports that she does not currently use alcohol. She reports that she does not use drugs.  Current Outpatient Medications on File Prior to Visit   Medication Sig Dispense Refill    albuterol (PROVENTIL HFA,VENTOLIN HFA) 90 mcg/act inhaler INHALE 2 PUFFS BY MOUTH EVERY 6 HOURS AS NEEDED FOR WHEEZING 18 g 1    amLODIPine (NORVASC) 5 mg tablet TAKE 1 TABLET (5 MG TOTAL) BY MOUTH DAILY BLOOD PRESUURE 90 tablet 1    buPROPion (WELLBUTRIN XL) 300 mg 24 hr tablet Take 1 tablet (300 mg total) by mouth every morning 90 tablet 1    Cholecalciferol (Vitamin D3) 125 MCG (5000 UT) CAPS TAKE 1 CAPSULE BY MOUTH EVERY DAY 90 capsule 1    CVS Vitamin C 500 MG tablet TAKE 1 TABLET (500 MG TOTAL) BY MOUTH DAILY. 90 tablet 3    cyanocobalamin (VITAMIN B-12) 2000 MCG tablet Take 1 tablet (2,000 mcg total) by mouth daily 90 tablet 2    lidocaine (XYLOCAINE) 5 % ointment Apply topically 3 (three) times a day as needed for moderate pain (vulvar pain) 50 g 0    loratadine (CLARITIN) 10 mg tablet TAKE 1 TABLET (10 MG TOTAL) BY MOUTH DAILY FOR ALLERGIES 90 tablet 1    metFORMIN (GLUCOPHAGE-XR) 500 mg 24 hr tablet TAKE 2 TABLETS BY MOUTH EVERY DAY WITH BREAKFAST 180 tablet 1    multivitamin (THERAGRAN) TABS Take 1 tablet by mouth daily      spironolactone  (ALDACTONE) 25 mg tablet TAKE 1 TABLET BY MOUTH EVERY DAY 90 tablet 1    tiotropium (SPIRIVA) 18 mcg inhalation capsule Place 18 mcg into inhaler and inhale daily      Euflexxa 20 MG/2ML SOSY  (Patient not taking: Reported on 11/20/2024)       No current facility-administered medications on file prior to visit.   No Known Allergies   Current Outpatient Medications on File Prior to Visit   Medication Sig Dispense Refill    albuterol (PROVENTIL HFA,VENTOLIN HFA) 90 mcg/act inhaler INHALE 2 PUFFS BY MOUTH EVERY 6 HOURS AS NEEDED FOR WHEEZING 18 g 1    amLODIPine (NORVASC) 5 mg tablet TAKE 1 TABLET (5 MG TOTAL) BY MOUTH DAILY BLOOD PRESUURE 90 tablet 1    buPROPion (WELLBUTRIN XL) 300 mg 24 hr tablet Take 1 tablet (300 mg total) by mouth every morning 90 tablet 1    Cholecalciferol (Vitamin D3) 125 MCG (5000 UT) CAPS TAKE 1 CAPSULE BY MOUTH EVERY DAY 90 capsule 1    CVS Vitamin C 500 MG tablet TAKE 1 TABLET (500 MG TOTAL) BY MOUTH DAILY. 90 tablet 3    cyanocobalamin (VITAMIN B-12) 2000 MCG tablet Take 1 tablet (2,000 mcg total) by mouth daily 90 tablet 2    lidocaine (XYLOCAINE) 5 % ointment Apply topically 3 (three) times a day as needed for moderate pain (vulvar pain) 50 g 0    loratadine (CLARITIN) 10 mg tablet TAKE 1 TABLET (10 MG TOTAL) BY MOUTH DAILY FOR ALLERGIES 90 tablet 1    metFORMIN (GLUCOPHAGE-XR) 500 mg 24 hr tablet TAKE 2 TABLETS BY MOUTH EVERY DAY WITH BREAKFAST 180 tablet 1    multivitamin (THERAGRAN) TABS Take 1 tablet by mouth daily      spironolactone (ALDACTONE) 25 mg tablet TAKE 1 TABLET BY MOUTH EVERY DAY 90 tablet 1    tiotropium (SPIRIVA) 18 mcg inhalation capsule Place 18 mcg into inhaler and inhale daily      Euflexxa 20 MG/2ML SOSY  (Patient not taking: Reported on 11/20/2024)       No current facility-administered medications on file prior to visit.      Social History     Tobacco Use    Smoking status: Every Day     Current packs/day: 0.50     Average packs/day: 0.5 packs/day for 20.0  "years (10.0 ttl pk-yrs)     Types: Cigarettes     Passive exposure: Current    Smokeless tobacco: Never    Tobacco comments:     pt is down to .5 packs a day   Vaping Use    Vaping status: Never Used   Substance and Sexual Activity    Alcohol use: Not Currently     Comment: occasional    Drug use: No    Sexual activity: Not Currently     Partners: Male     Birth control/protection: Female Sterilization     Comment: HYSTERECTOMY        Objective   /88 (BP Location: Left arm, Patient Position: Sitting, Cuff Size: Large)   Pulse 103   Temp 98.7 °F (37.1 °C) (Temporal)   Resp 18   Ht 5' 4\" (1.626 m)   Wt 96.2 kg (212 lb)   SpO2 98%   BMI 36.39 kg/m²      Physical Exam  Constitutional:       General: She is not in acute distress.     Appearance: Normal appearance. She is normal weight. She is not ill-appearing, toxic-appearing or diaphoretic.   Cardiovascular:      Rate and Rhythm: Normal rate and regular rhythm.      Pulses: Normal pulses.      Heart sounds: Normal heart sounds. No murmur heard.     No gallop.   Pulmonary:      Effort: Pulmonary effort is normal. No respiratory distress.      Breath sounds: Normal breath sounds. No stridor. No wheezing, rhonchi or rales.   Chest:      Chest wall: No tenderness.   Musculoskeletal:      Lumbar back: Spasms present. No swelling, deformity or lacerations. Decreased range of motion. Positive left straight leg raise test. Negative right straight leg raise test.      Right lower leg: No edema.      Left lower leg: No edema.   Skin:     General: Skin is warm and dry.   Neurological:      Mental Status: She is alert and oriented to person, place, and time.         "

## 2024-11-21 ENCOUNTER — HOSPITAL ENCOUNTER (OUTPATIENT)
Dept: RADIOLOGY | Facility: HOSPITAL | Age: 44
End: 2024-11-21
Payer: COMMERCIAL

## 2024-11-21 ENCOUNTER — HOSPITAL ENCOUNTER (EMERGENCY)
Facility: HOSPITAL | Age: 44
Discharge: HOME | End: 2024-11-21
Attending: EMERGENCY MEDICINE
Payer: COMMERCIAL

## 2024-11-21 ENCOUNTER — TELEPHONE (OUTPATIENT)
Dept: PHYSICAL THERAPY | Facility: OTHER | Age: 44
End: 2024-11-21

## 2024-11-21 VITALS
WEIGHT: 212 LBS | HEART RATE: 82 BPM | SYSTOLIC BLOOD PRESSURE: 142 MMHG | OXYGEN SATURATION: 100 % | HEIGHT: 64 IN | TEMPERATURE: 98.6 F | BODY MASS INDEX: 36.19 KG/M2 | RESPIRATION RATE: 18 BRPM | DIASTOLIC BLOOD PRESSURE: 95 MMHG

## 2024-11-21 DIAGNOSIS — M54.42 ACUTE LEFT-SIDED LOW BACK PAIN WITH LEFT-SIDED SCIATICA: Primary | ICD-10-CM

## 2024-11-21 DIAGNOSIS — M25.552 LEFT HIP PAIN: ICD-10-CM

## 2024-11-21 DIAGNOSIS — M54.50 ACUTE LEFT-SIDED LOW BACK PAIN, UNSPECIFIED WHETHER SCIATICA PRESENT: ICD-10-CM

## 2024-11-21 PROCEDURE — 99284 EMERGENCY DEPT VISIT MOD MDM: CPT | Performed by: EMERGENCY MEDICINE

## 2024-11-21 PROCEDURE — 99283 EMERGENCY DEPT VISIT LOW MDM: CPT

## 2024-11-21 PROCEDURE — 96372 THER/PROPH/DIAG INJ SC/IM: CPT

## 2024-11-21 PROCEDURE — 73502 X-RAY EXAM HIP UNI 2-3 VIEWS: CPT

## 2024-11-21 PROCEDURE — 72110 X-RAY EXAM L-2 SPINE 4/>VWS: CPT

## 2024-11-21 RX ORDER — KETOROLAC TROMETHAMINE 30 MG/ML
15 INJECTION, SOLUTION INTRAMUSCULAR; INTRAVENOUS ONCE
Status: COMPLETED | OUTPATIENT
Start: 2024-11-21 | End: 2024-11-21

## 2024-11-21 RX ORDER — LIDOCAINE 50 MG/G
1 PATCH TOPICAL ONCE
Status: DISCONTINUED | OUTPATIENT
Start: 2024-11-21 | End: 2024-11-21 | Stop reason: HOSPADM

## 2024-11-21 RX ADMIN — KETOROLAC TROMETHAMINE 15 MG: 30 INJECTION, SOLUTION INTRAMUSCULAR; INTRAVENOUS at 09:09

## 2024-11-21 RX ADMIN — LIDOCAINE 1 PATCH: 50 PATCH CUTANEOUS at 09:09

## 2024-11-21 NOTE — ED PROVIDER NOTES
Time reflects when diagnosis was documented in both MDM as applicable and the Disposition within this note       Time User Action Codes Description Comment    11/21/2024  8:05 AM Royce Forrester Add [M54.42] Acute left-sided low back pain with left-sided sciatica           ED Disposition       ED Disposition   Discharge    Condition   Stable    Date/Time   Thu Nov 21, 2024  8:05 AM    Comment   Jessica Oliver discharge to home/self care.                   Assessment & Plan       Medical Decision Making  Nohemy is a 43-year-old female presenting today with left lumbar paraspinal pain  with sciatica that started 3 days ago.  Patient described the pain as sharp, throbbing, 8/10, radiating down her left hip , left groin and left lateral leg.  Denies any previous episodes she also denies any trauma, falls, precipitating factors that started the pain.  Patient she did not take her medication this morning she only took Robaxin with minimal relief.  At the ED, and on examination she had reproducible left lower back pain.  Her CVA's and bilateral straight leg raise were negative.  Patient had very mild left lower leg weakness and numbness however no abnormal gait.  Her differential diagnosis include but not limited to: Musculoskeletal spasm, musculoskeletal sprain, disc herniation, nephrolithiasis.  Patient was given Toradol and lidocaine patch.  Patient was discharged with return precautions given.    Amount and/or Complexity of Data Reviewed  External Data Reviewed: notes.    Risk  OTC drugs.  Prescription drug management.             Medications   ketorolac (TORADOL) injection 15 mg (has no administration in time range)   lidocaine (LIDODERM) 5 % patch 1 patch (has no administration in time range)       ED Risk Strat Scores                                               History of Present Illness       Chief Complaint   Patient presents with    Leg Pain     Pt reports lower back pain into hip, now reports left leg numbness  and pain, reports was at PCP yesterday for same, denies injury, ambulatory in triage       Past Medical History:   Diagnosis Date    Abnormal Pap smear of cervix     2018 HSV 1, 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    Adrenal mass, left (HCC) 2021    1.8 x 2.1 cm on ct 2020    Arthritis     Asthma     Blurry vision 2020    Diabetes mellitus (HCC)     Essential hypertension 2020    Headache 2020    Hidradenitis suppurativa     Hypertension     Immunization deficiency 10/02/2020    Hep a nonimmune    Insomnia 2021    Migraine     Moderate episode of recurrent major depressive disorder (HCC) 2020    Obesity (BMI 30.0-34.9) 2021    Prediabetes 2020    Seasonal allergies     Tobacco use 2021    Vitamin D deficiency 10/02/2020      Past Surgical History:   Procedure Laterality Date    APPENDECTOMY      BREAST BIOPSY Left 2017    BREAST BIOPSY Right     2 core biopsies    BREAST EXCISIONAL BIOPSY Left 2018    BREAST SURGERY Left 2018    Left breast mass excision    CERVICAL BIOPSY  W/ LOOP ELECTRODE EXCISION       SECTION      X2    COLPOSCOPY      CYST REMOVAL      FACIAL/NECK BIOPSY Right 10/03/2023    Procedure: EXCISION MASS RIGHT CHEEK;  Surgeon: Robert Bloch, MD;  Location:  MAIN OR;  Service: General    FL INJECTION RIGHT KNEE (ARTHROGRAM)  01/15/2024    HYSTERECTOMY      heavy bleeding, ovaries remain, also had abnormal pap    KNEE SURGERY      LYMPH NODE DISSECTION      under armpit    TUBAL LIGATION      US GUIDED THYROID BIOPSY  2021      Family History   Problem Relation Age of Onset    Diabetes Mother     Heart attack Mother     Heart disease Mother         Internal Defibrilation    No Known Problems Father     Endometrial cancer Sister 28    Colon cancer Sister 35    No Known Problems Daughter     Diabetes Maternal Grandmother     Diabetes Paternal Grandmother     No Known Problems Son     No Known Problems Son     Lupus  Maternal Aunt 48    Seizures Maternal Aunt     Leukemia Maternal Uncle 18    Diabetes Paternal Aunt     No Known Problems Paternal Aunt       Social History     Tobacco Use    Smoking status: Every Day     Current packs/day: 0.50     Average packs/day: 0.5 packs/day for 20.0 years (10.0 ttl pk-yrs)     Types: Cigarettes     Passive exposure: Current    Smokeless tobacco: Never    Tobacco comments:     pt is down to .5 packs a day   Vaping Use    Vaping status: Never Used   Substance Use Topics    Alcohol use: Not Currently     Comment: occasional    Drug use: No      E-Cigarette/Vaping    E-Cigarette Use Never User       E-Cigarette/Vaping Substances    Nicotine No     THC No     CBD No     Flavoring No     Other No     Unknown No       I have reviewed and agree with the history as documented.     Jessica is a 43-year-old female past medical history of hypertension, depression, and chronic shoulder pain presenting today for left lower back pain.  Her symptoms started 3 days ago as a sharp, 8 out of 10, nontraumatic pain at left lower back that is radiating towards her left hip and left lateral leg.  Patient recalls weakness, numbness in her left leg.  No previous episodes were reported.  No history of trauma, fall.  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  Patient saw her PCP yesterday and she was prescribed Tylenol, ibuprofen, Robaxin.  Patient did not take any pain medication this morning she only took Robaxin with minimal relief.  No fever, chills, chest pain, SOB, abdominal pain, nausea or vomiting, pain or burning while urination, lower leg edema.      Leg Pain  Associated symptoms: back pain    Associated symptoms: no fatigue, no fever and no neck pain        Review of Systems   Constitutional:  Negative for chills, fatigue and fever.   HENT:  Negative for ear pain and sore throat.    Eyes:  Negative for pain and visual disturbance.   Respiratory:  Negative for cough and shortness of  breath.    Cardiovascular:  Negative for chest pain and palpitations.   Gastrointestinal:  Negative for abdominal pain and vomiting.   Genitourinary:  Positive for flank pain. Negative for difficulty urinating, dyspareunia, dysuria, hematuria, menstrual problem, urgency, vaginal bleeding and vaginal discharge.   Musculoskeletal:  Positive for back pain. Negative for arthralgias, gait problem, neck pain and neck stiffness.   Skin:  Negative for color change and rash.   Neurological:  Negative for seizures, syncope, weakness and headaches.   All other systems reviewed and are negative.          Objective       ED Triage Vitals   Temperature Pulse Blood Pressure Respirations SpO2 Patient Position - Orthostatic VS   11/21/24 0715 11/21/24 0715 11/21/24 0715 11/21/24 0715 11/21/24 0715 11/21/24 0715   98.6 °F (37 °C) (!) 118 142/95 18 100 % Sitting      Temp Source Heart Rate Source BP Location FiO2 (%) Pain Score    11/21/24 0715 11/21/24 0715 11/21/24 0715 -- 11/21/24 0717    Oral Monitor Right arm  10 - Worst Possible Pain      Vitals      Date and Time Temp Pulse SpO2 Resp BP Pain Score FACES Pain Rating User   11/21/24 0826 -- 82 -- -- -- -- -- AF   11/21/24 0717 -- -- -- -- -- 10 - Worst Possible Pain -- AB   11/21/24 0715 98.6 °F (37 °C) 118 100 % 18 142/95 -- -- KK            Physical Exam  Vitals and nursing note reviewed.   Constitutional:       General: She is in acute distress.      Appearance: Normal appearance. She is well-developed. She is not ill-appearing.   HENT:      Head: Normocephalic and atraumatic.   Eyes:      Conjunctiva/sclera: Conjunctivae normal.   Cardiovascular:      Rate and Rhythm: Normal rate and regular rhythm.      Pulses: Normal pulses.      Heart sounds: No murmur heard.  Pulmonary:      Effort: Pulmonary effort is normal. No respiratory distress.      Breath sounds: Normal breath sounds. No wheezing.   Abdominal:      Palpations: Abdomen is soft.      Tenderness: There is no  abdominal tenderness. There is no right CVA tenderness, left CVA tenderness, guarding or rebound.   Musculoskeletal:         General: Tenderness (Left paraspinal lower back tenderness) present. No swelling.      Cervical back: Neck supple.      Lumbar back: Spasms and tenderness present. No signs of trauma. Decreased range of motion. Negative right straight leg raise test and negative left straight leg raise test.   Skin:     General: Skin is warm and dry.      Capillary Refill: Capillary refill takes less than 2 seconds.   Neurological:      Mental Status: She is alert and oriented to person, place, and time.      Sensory: No sensory deficit.      Motor: No weakness.      Gait: Gait normal.   Psychiatric:         Mood and Affect: Mood normal.         Results Reviewed       None            No orders to display       Procedures    ED Medication and Procedure Management   Prior to Admission Medications   Prescriptions Last Dose Informant Patient Reported? Taking?   [employer] Vitamin C 500 MG tablet  Self No No   Sig: TAKE 1 TABLET (500 MG TOTAL) BY MOUTH DAILY.   Cholecalciferol (Vitamin D3) 125 MCG (5000 UT) CAPS   No No   Sig: TAKE 1 CAPSULE BY MOUTH EVERY DAY   Euflexxa 20 MG/2ML SOSY   Yes No   Patient not taking: Reported on 11/20/2024   albuterol (PROVENTIL HFA,VENTOLIN HFA) 90 mcg/act inhaler  Self No No   Sig: INHALE 2 PUFFS BY MOUTH EVERY 6 HOURS AS NEEDED FOR WHEEZING   amLODIPine (NORVASC) 5 mg tablet   No No   Sig: TAKE 1 TABLET (5 MG TOTAL) BY MOUTH DAILY BLOOD PRESUURE   buPROPion (WELLBUTRIN XL) 300 mg 24 hr tablet   No No   Sig: Take 1 tablet (300 mg total) by mouth every morning   cyanocobalamin (VITAMIN B-12) 2000 MCG tablet  Self No No   Sig: Take 1 tablet (2,000 mcg total) by mouth daily   lidocaine (XYLOCAINE) 5 % ointment   No No   Sig: Apply topically 3 (three) times a day as needed for moderate pain (vulvar pain)   loratadine (CLARITIN) 10 mg tablet   No No   Sig: TAKE 1 TABLET (10 MG TOTAL) BY MOUTH  DAILY FOR ALLERGIES   metFORMIN (GLUCOPHAGE-XR) 500 mg 24 hr tablet   No No   Sig: TAKE 2 TABLETS BY MOUTH EVERY DAY WITH BREAKFAST   methocarbamol (ROBAXIN) 500 mg tablet   No No   Sig: Take 1 tablet (500 mg total) by mouth 3 (three) times a day for 7 days   methylPREDNISolone 4 MG tablet therapy pack   No No   Sig: Use as directed on package   multivitamin (THERAGRAN) TABS  Self Yes No   Sig: Take 1 tablet by mouth daily   spironolactone (ALDACTONE) 25 mg tablet   No No   Sig: TAKE 1 TABLET BY MOUTH EVERY DAY   tiotropium (SPIRIVA) 18 mcg inhalation capsule  Self Yes No   Sig: Place 18 mcg into inhaler and inhale daily      Facility-Administered Medications: None     Patient's Medications   Discharge Prescriptions    No medications on file       ED SEPSIS DOCUMENTATION   Time reflects when diagnosis was documented in both MDM as applicable and the Disposition within this note       Time User Action Codes Description Comment    11/21/2024  8:05 AM Royce Forrester Add [M54.42] Acute left-sided low back pain with left-sided sciatica                  Scot Lugo MD  11/21/24 0916

## 2024-11-21 NOTE — ED ATTENDING ATTESTATION
11/21/2024  IRoyce DO, saw and evaluated the patient. I have discussed the patient with the resident/non-physician practitioner and agree with the resident's/non-physician practitioner's findings, Plan of Care, and MDM as documented in the resident's/non-physician practitioner's note, except where noted. All available labs and Radiology studies were reviewed.  I was present for key portions of any procedure(s) performed by the resident/non-physician practitioner and I was immediately available to provide assistance.       At this point I agree with the current assessment done in the Emergency Department.  I have conducted an independent evaluation of this patient a history and physical is as follows: 43-year-old female, past medical history per chart, presenting to the emergency department with left lower back pain extending posteriorly down the left leg to the heel. Pt denies trauma, unintentional weight loss or night sweats, extremity weakness or change in sensation, history of CA, IVDA or steroid use, fever, saddle anesthesia, change in bowel or bladder including incontinence.  Patient notes she was seen by PCP yesterday and provided medications and follow-up did not take medications this morning.  Patient did state that she ambulated from her vehicle into the emergency department and has been ambulatory over the last few days.    Vital sign stable.  Patient resting comfortably initially, later intermittently tearful.  Alert and oriented.  Chest clear to auscultation.  No increased breathing.  Heart regular rhythm.  Abdomen soft nontender.  Negative CVA tenderness bilaterally.  Left hip, knee, ankle full range of motion and 5 out of 5 strength.  Positive straight leg raise left lower extremity.  Distractible hyperesthesia to the length of the left lower extremity down to the digits of the toes.    MDM: 43-year-old female presenting to the emergency department with left lower back pain with associated  sciatica.  No warning signs or symptoms to suggest further advanced imaging at this time.  Given that patient did not take her analgesia this a.m., recommended that she continue with the same.  Toradol provided in the emergency department.  Patient provided ambulatory referral to comprehensive spine.  Patient verbalized understanding of plan and agreed.  Ambulated out of the emergency department with minimal change to gait. Reviewed all findings both relevant and incidental with the patient at bedside. Pt verbalized understanding of findings, neccesary follow up, return to ED precautions. Pt agreed to review today's findings with their primary care provider. Pt non-toxic appearing upon discharge.         ED Course         Critical Care Time  Procedures

## 2024-11-21 NOTE — TELEPHONE ENCOUNTER
Call placed to the patient per [facility] referral.    Spoke with patient and explained [facility] would send her for eval in PT with the advanced spine therapist.     Patient is not interested in PT. I suggested she then follow up with her PCP for further eval/suggestions/referrals    [facility] closed

## 2024-11-22 ENCOUNTER — RESULTS FOLLOW-UP (OUTPATIENT)
Age: 44
End: 2024-11-22

## 2024-11-27 ENCOUNTER — OFFICE VISIT (OUTPATIENT)
Age: 44
End: 2024-11-27
Payer: COMMERCIAL

## 2024-11-27 VITALS
OXYGEN SATURATION: 98 % | RESPIRATION RATE: 16 BRPM | TEMPERATURE: 98.7 F | HEART RATE: 84 BPM | HEIGHT: 64 IN | SYSTOLIC BLOOD PRESSURE: 136 MMHG | DIASTOLIC BLOOD PRESSURE: 82 MMHG | BODY MASS INDEX: 36.23 KG/M2 | WEIGHT: 212.2 LBS

## 2024-11-27 DIAGNOSIS — M54.16 LUMBAR RADICULOPATHY: ICD-10-CM

## 2024-11-27 DIAGNOSIS — M54.50 ACUTE LEFT-SIDED LOW BACK PAIN, UNSPECIFIED WHETHER SCIATICA PRESENT: Primary | ICD-10-CM

## 2024-11-27 PROCEDURE — 99213 OFFICE O/P EST LOW 20 MIN: CPT | Performed by: INTERNAL MEDICINE

## 2024-11-27 RX ORDER — GABAPENTIN 100 MG/1
CAPSULE ORAL
Qty: 99 CAPSULE | Refills: 0 | Status: SHIPPED | OUTPATIENT
Start: 2024-11-27 | End: 2025-01-02

## 2024-11-27 NOTE — PROGRESS NOTES
Name: Jessica Oliver      : 1980      MRN: [id number]  Encounter Provider: Jad Johnson MD  Encounter Date: 2024   Encounter department: [facility]  :  Assessment & Plan  Acute left-sided low back pain, unspecified whether sciatica present    Orders:    Ambulatory Referral to Physical Therapy; Future    gabapentin (Neurontin) 100 mg capsule; Take 1 capsule (100 mg total) by mouth daily at bedtime for 3 days, THEN 2 capsules (200 mg total) daily at bedtime for 3 days, THEN 3 capsules (300 mg total) daily at bedtime.    Lumbar radiculopathy  Radicular pain has improved with multimodal pain management and muscle relaxant.  Continues to have sensory symptoms of numbness in the left lower extremity, no bowel incontinence.  No other red flag signs.  Will start on gabapentin, will start physical therapy, follow-up in 4 weeks or earlier if symptoms are not improved  Orders:    Ambulatory Referral to Physical Therapy; Future    gabapentin (Neurontin) 100 mg capsule; Take 1 capsule (100 mg total) by mouth daily at bedtime for 3 days, THEN 2 capsules (200 mg total) daily at bedtime for 3 days, THEN 3 capsules (300 mg total) daily at bedtime.           History of Present Illness     Patient comes for follow-up of left-sided low back pain.  Pain has improved, has some mild pain in the left hip.  Continues to have sensory symptoms of numbness in the left lower extremity.      Review of Systems  Medical History Reviewed by provider this encounter:  Tobacco  Allergies  Meds  Problems  Med Hx  Surg Hx  Fam Hx     .  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    Adrenal mass, left (HCC) 2021    1.8 x 2.1 cm on ct 2020    Arthritis     Asthma     Blurry vision 2020    Diabetes mellitus (HCC)     Essential hypertension 2020    Headache 2020    Hidradenitis  suppurativa     Hypertension     Immunization deficiency 10/02/2020    Hep a nonimmune    Insomnia 2021    Migraine     Moderate episode of recurrent major depressive disorder (HCC) 2020    Obesity (BMI 30.0-34.9) 2021    Prediabetes 2020    Seasonal allergies     Tobacco use 2021    Vitamin D deficiency 10/02/2020     Past Surgical History:   Procedure Laterality Date    APPENDECTOMY      BREAST BIOPSY Left 2017    BREAST BIOPSY Right     2 core biopsies    BREAST EXCISIONAL BIOPSY Left 2018    BREAST SURGERY Left 2018    Left breast mass excision    CERVICAL BIOPSY  W/ LOOP ELECTRODE EXCISION       SECTION      X2    COLPOSCOPY      CYST REMOVAL      FACIAL/NECK BIOPSY Right 10/03/2023    Procedure: EXCISION MASS RIGHT CHEEK;  Surgeon: Robert Bloch, MD;  Location:  MAIN OR;  Service: General    FL INJECTION RIGHT KNEE (ARTHROGRAM)  01/15/2024    HYSTERECTOMY      heavy bleeding, ovaries remain, also had abnormal pap    KNEE SURGERY      LYMPH NODE DISSECTION      under armpit    TUBAL LIGATION      US GUIDED THYROID BIOPSY  2021     Family History   Problem Relation Age of Onset    Diabetes Mother     Heart attack Mother     Heart disease Mother         Internal Defibrilation    No Known Problems Father     Endometrial cancer Sister 28    Colon cancer Sister 35    No Known Problems Daughter     Diabetes Maternal Grandmother     Diabetes Paternal Grandmother     No Known Problems Son     No Known Problems Son     Lupus Maternal Aunt 48    Seizures Maternal Aunt     Leukemia Maternal Uncle 18    Diabetes Paternal Aunt     No Known Problems Paternal Aunt       reports that she has been smoking cigarettes. She has a 10 pack-year smoking history. She has been exposed to tobacco smoke. She has never used smokeless tobacco. She reports that she does not currently use alcohol. She reports that she does not use drugs.  Current Outpatient Medications on  File Prior to Visit   Medication Sig Dispense Refill    albuterol (PROVENTIL HFA,VENTOLIN HFA) 90 mcg/act inhaler INHALE 2 PUFFS BY MOUTH EVERY 6 HOURS AS NEEDED FOR WHEEZING 18 g 1    amLODIPine (NORVASC) 5 mg tablet TAKE 1 TABLET (5 MG TOTAL) BY MOUTH DAILY BLOOD PRESUURE 90 tablet 1    buPROPion (WELLBUTRIN XL) 300 mg 24 hr tablet Take 1 tablet (300 mg total) by mouth every morning 90 tablet 1    Cholecalciferol (Vitamin D3) 125 MCG (5000 UT) CAPS TAKE 1 CAPSULE BY MOUTH EVERY DAY 90 capsule 1    CVS Vitamin C 500 MG tablet TAKE 1 TABLET (500 MG TOTAL) BY MOUTH DAILY. 90 tablet 3    Euflexxa 20 MG/2ML SOSY       lidocaine (XYLOCAINE) 5 % ointment Apply topically 3 (three) times a day as needed for moderate pain (vulvar pain) 50 g 0    loratadine (CLARITIN) 10 mg tablet TAKE 1 TABLET (10 MG TOTAL) BY MOUTH DAILY FOR ALLERGIES 90 tablet 1    metFORMIN (GLUCOPHAGE-XR) 500 mg 24 hr tablet TAKE 2 TABLETS BY MOUTH EVERY DAY WITH BREAKFAST 180 tablet 1    methocarbamol (ROBAXIN) 500 mg tablet Take 1 tablet (500 mg total) by mouth 3 (three) times a day for 7 days 21 tablet 0    methylPREDNISolone 4 MG tablet therapy pack Use as directed on package 21 each 0    multivitamin (THERAGRAN) TABS Take 1 tablet by mouth daily      spironolactone (ALDACTONE) 25 mg tablet TAKE 1 TABLET BY MOUTH EVERY DAY 90 tablet 1    tiotropium (SPIRIVA) 18 mcg inhalation capsule Place 18 mcg into inhaler and inhale daily      cyanocobalamin (VITAMIN B-12) 2000 MCG tablet Take 1 tablet (2,000 mcg total) by mouth daily 90 tablet 2     No current facility-administered medications on file prior to visit.   No Known Allergies   Current Outpatient Medications on File Prior to Visit   Medication Sig Dispense Refill    albuterol (PROVENTIL HFA,VENTOLIN HFA) 90 mcg/act inhaler INHALE 2 PUFFS BY MOUTH EVERY 6 HOURS AS NEEDED FOR WHEEZING 18 g 1    amLODIPine (NORVASC) 5 mg tablet TAKE 1 TABLET (5 MG TOTAL) BY MOUTH DAILY BLOOD PRESUURE 90 tablet 1     buPROPion (WELLBUTRIN XL) 300 mg 24 hr tablet Take 1 tablet (300 mg total) by mouth every morning 90 tablet 1    Cholecalciferol (Vitamin D3) 125 MCG (5000 UT) CAPS TAKE 1 CAPSULE BY MOUTH EVERY DAY 90 capsule 1    CVS Vitamin C 500 MG tablet TAKE 1 TABLET (500 MG TOTAL) BY MOUTH DAILY. 90 tablet 3    Euflexxa 20 MG/2ML SOSY       lidocaine (XYLOCAINE) 5 % ointment Apply topically 3 (three) times a day as needed for moderate pain (vulvar pain) 50 g 0    loratadine (CLARITIN) 10 mg tablet TAKE 1 TABLET (10 MG TOTAL) BY MOUTH DAILY FOR ALLERGIES 90 tablet 1    metFORMIN (GLUCOPHAGE-XR) 500 mg 24 hr tablet TAKE 2 TABLETS BY MOUTH EVERY DAY WITH BREAKFAST 180 tablet 1    methocarbamol (ROBAXIN) 500 mg tablet Take 1 tablet (500 mg total) by mouth 3 (three) times a day for 7 days 21 tablet 0    methylPREDNISolone 4 MG tablet therapy pack Use as directed on package 21 each 0    multivitamin (THERAGRAN) TABS Take 1 tablet by mouth daily      spironolactone (ALDACTONE) 25 mg tablet TAKE 1 TABLET BY MOUTH EVERY DAY 90 tablet 1    tiotropium (SPIRIVA) 18 mcg inhalation capsule Place 18 mcg into inhaler and inhale daily      cyanocobalamin (VITAMIN B-12) 2000 MCG tablet Take 1 tablet (2,000 mcg total) by mouth daily 90 tablet 2     No current facility-administered medications on file prior to visit.      Social History     Tobacco Use    Smoking status: Every Day     Current packs/day: 0.50     Average packs/day: 0.5 packs/day for 20.0 years (10.0 ttl pk-yrs)     Types: Cigarettes     Passive exposure: Current    Smokeless tobacco: Never    Tobacco comments:     pt is down to .5 packs a day   Vaping Use    Vaping status: Never Used   Substance and Sexual Activity    Alcohol use: Not Currently     Comment: occasional    Drug use: No    Sexual activity: Not Currently     Partners: Male     Birth control/protection: Female Sterilization     Comment: HYSTERECTOMY        Objective   /82 (BP Location: Left arm, Patient  "Position: Sitting, Cuff Size: Standard)   Pulse 84   Temp 98.7 °F (37.1 °C) (Tympanic)   Resp 16   Ht 5' 4\" (1.626 m)   Wt 96.3 kg (212 lb 3.2 oz)   SpO2 98%   BMI 36.42 kg/m²      Physical Exam  Constitutional:       General: She is not in acute distress.     Appearance: Normal appearance. She is normal weight. She is not ill-appearing, toxic-appearing or diaphoretic.   Cardiovascular:      Rate and Rhythm: Normal rate and regular rhythm.      Pulses: Normal pulses.      Heart sounds: Normal heart sounds. No murmur heard.     No gallop.   Pulmonary:      Effort: Pulmonary effort is normal. No respiratory distress.      Breath sounds: Normal breath sounds. No stridor. No wheezing, rhonchi or rales.   Chest:      Chest wall: No tenderness.   Musculoskeletal:      Thoracic back: Normal.      Lumbar back: Normal. Negative right straight leg raise test and negative left straight leg raise test.      Right lower leg: No edema.      Left lower leg: No edema.   Neurological:      Mental Status: She is alert and oriented to person, place, and time.      Deep Tendon Reflexes: Babinski sign absent on the right side. Babinski sign absent on the left side.      Reflex Scores:       Patellar reflexes are 2+ on the right side and 2+ on the left side.     Comments: Decree sensation to pinprick in the left lower extremity below the knees.         "

## 2024-12-05 ENCOUNTER — OFFICE VISIT (OUTPATIENT)
Dept: OBGYN CLINIC | Facility: CLINIC | Age: 44
End: 2024-12-05
Payer: COMMERCIAL

## 2024-12-05 ENCOUNTER — APPOINTMENT (OUTPATIENT)
Dept: RADIOLOGY | Facility: AMBULARY SURGERY CENTER | Age: 44
End: 2024-12-05
Attending: ORTHOPAEDIC SURGERY
Payer: COMMERCIAL

## 2024-12-05 VITALS — HEIGHT: 64 IN | WEIGHT: 212 LBS | BODY MASS INDEX: 36.19 KG/M2

## 2024-12-05 DIAGNOSIS — M25.461 EFFUSION OF RIGHT KNEE: ICD-10-CM

## 2024-12-05 DIAGNOSIS — M17.11 PRIMARY OSTEOARTHRITIS OF RIGHT KNEE: Primary | ICD-10-CM

## 2024-12-05 DIAGNOSIS — M17.11 PRIMARY OSTEOARTHRITIS OF RIGHT KNEE: ICD-10-CM

## 2024-12-05 PROCEDURE — 99213 OFFICE O/P EST LOW 20 MIN: CPT | Performed by: ORTHOPAEDIC SURGERY

## 2024-12-05 PROCEDURE — 20610 DRAIN/INJ JOINT/BURSA W/O US: CPT | Performed by: ORTHOPAEDIC SURGERY

## 2024-12-05 PROCEDURE — 73562 X-RAY EXAM OF KNEE 3: CPT

## 2024-12-05 RX ORDER — LIDOCAINE HYDROCHLORIDE 10 MG/ML
5 INJECTION, SOLUTION INFILTRATION; PERINEURAL
Status: COMPLETED | OUTPATIENT
Start: 2024-12-05 | End: 2024-12-05

## 2024-12-05 RX ORDER — TRIAMCINOLONE ACETONIDE 40 MG/ML
80 INJECTION, SUSPENSION INTRA-ARTICULAR; INTRAMUSCULAR
Status: COMPLETED | OUTPATIENT
Start: 2024-12-05 | End: 2024-12-05

## 2024-12-05 RX ORDER — BUPIVACAINE HYDROCHLORIDE 2.5 MG/ML
2 INJECTION, SOLUTION INFILTRATION; PERINEURAL
Status: COMPLETED | OUTPATIENT
Start: 2024-12-05 | End: 2024-12-05

## 2024-12-05 RX ADMIN — BUPIVACAINE HYDROCHLORIDE 2 ML: 2.5 INJECTION, SOLUTION INFILTRATION; PERINEURAL at 15:15

## 2024-12-05 RX ADMIN — TRIAMCINOLONE ACETONIDE 80 MG: 40 INJECTION, SUSPENSION INTRA-ARTICULAR; INTRAMUSCULAR at 15:15

## 2024-12-05 RX ADMIN — LIDOCAINE HYDROCHLORIDE 5 ML: 10 INJECTION, SOLUTION INFILTRATION; PERINEURAL at 15:15

## 2024-12-05 NOTE — PROGRESS NOTES
Assessment:  1. Primary osteoarthritis of right knee  XR knee 3 vw right non injury      2. Effusion of right knee          Patient Active Problem List   Diagnosis    Essential hypertension    Headache    Moderate episode of recurrent major depressive disorder (HCC)    Prediabetes    Vitamin D deficiency    Nodule of left lobe of thyroid gland    Tobacco use    Insomnia    Obesity (BMI 30.0-34.9)    Varicose veins of both lower extremities with pain    HIRAL (obstructive sleep apnea)    Primary osteoarthritis of right knee    Chronic fatigue    History of hysterectomy    Hidradenitis suppurativa    Lack of housing    Family history of colon cancer    Epidermoid cyst of face    Internal derangement of right knee    Patellofemoral disorder of right knee       43 y.o. female with patellofemoral arthritis of the right knee     Plan  Updated x-rays were performed in the office and reviewed demonstrating moderate degenerative changes.   On PE she has a right knee effusion.   Rigth knee aspiration and CSI was performed in the office without complication yielding approx. 50 cc's of clear yellow fluid. Post injection protocol/expectations were reviewed. She was placed into a ace wrap for compression.   Advised to take a daily antiinflammatory over the next few days.   She may call 1/1/25 to authorize repeat right knee Euflexxa injections.             Subjective:     Patient ID:    Chief Complaint:Jessica Oliver 43 y.o. female      HPI    Patient comes in today with regards to right knee patellofemoral arthritis. She completed right knee Euflexxa injections on 7/1/24, which resolved her symptoms. She notes posterior knee pain started on Friday. Pain will worsen with ambulation or prolonged sitting. She feels her knee is swollen. She is taking Advil for pain control.       The following portions of the patient's history were reviewed and updated as appropriate: allergies, current medications, past family history, past social  history, past surgical history and problem list.        Social History     Socioeconomic History    Marital status: Single     Spouse name: Not on file    Number of children: Not on file    Years of education: 10    Highest education level: Not on file   Occupational History    Not on file   Tobacco Use    Smoking status: Every Day     Current packs/day: 0.50     Average packs/day: 0.5 packs/day for 20.0 years (10.0 ttl pk-yrs)     Types: Cigarettes     Passive exposure: Current    Smokeless tobacco: Never    Tobacco comments:     pt is down to .5 packs a day   Vaping Use    Vaping status: Never Used   Substance and Sexual Activity    Alcohol use: Not Currently     Comment: occasional    Drug use: No    Sexual activity: Not Currently     Partners: Male     Birth control/protection: Female Sterilization     Comment: HYSTERECTOMY   Other Topics Concern    Not on file   Social History Narrative    Most recent tobacco use screenin-    Do you currently or have you served in the [employer]: No    Were you activated, into active duty, as a member of the [employer] or as a Reservist: No    Occupation: unemployed    Education: 10    Marital status: Single    Sexual orientation: Heterosexual    Exercise level: Occasional    Diet: Regular    General stress level: Low    Alcohol intake: Occasional    Caffeine intake: Moderate    Chewing tobacco: none    Illicit drugs: denies    Guns present in home: No    Seat belts used routinely: Yes    Sunscreen used routinely: No    Smoke alarm in home: Yes    Advance directive: No    Live alone or with others: with others    Are there stairs in your home: Yes    Pets: Yes    Deaf or serious difficulty hearing: No    Blind or serious difficulty seeing: Yes    Difficulty concentrating, remembering or making decisions: No    Difficulty walking or climbing stairs: No    Difficulty dressing or bathing: No    Difficulty doing errands alone: No    Passive smoke exposure: Yes     Are you currently employed: No    Asbestos exposure: No    TB exposure: No    Environmental exposure: No    Animal exposure: Yes    cat and dog    Blood Transfusion: No    Sexually active: No    Presence of domestic violence: No    Do you feel safe at home: Yes    no cpap or nebulizer     Social Drivers of Health     Financial Resource Strain: Not on file   Food Insecurity: Not on file   Transportation Needs: Not on file   Physical Activity: Not on file   Stress: Not on file   Social Connections: Not on file   Intimate Partner Violence: Not on file   Housing Stability: Not on file     Past Medical History:   Diagnosis Date    Abnormal Pap smear of cervix     2018 HSV 1, 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    Adrenal mass, left (HCC) 2021    1.8 x 2.1 cm on ct 2020    Arthritis     Asthma     Blurry vision 2020    Diabetes mellitus (HCC)     Essential hypertension 2020    Headache 2020    Hidradenitis suppurativa     Hypertension     Immunization deficiency 10/02/2020    Hep a nonimmune    Insomnia 2021    Migraine     Moderate episode of recurrent major depressive disorder (HCC) 2020    Obesity (BMI 30.0-34.9) 2021    Prediabetes 2020    Seasonal allergies     Tobacco use 2021    Vitamin D deficiency 10/02/2020     Past Surgical History:   Procedure Laterality Date    APPENDECTOMY      BREAST BIOPSY Left 2017    BREAST BIOPSY Right     2 core biopsies    BREAST EXCISIONAL BIOPSY Left 2018    BREAST SURGERY Left 2018    Left breast mass excision    CERVICAL BIOPSY  W/ LOOP ELECTRODE EXCISION       SECTION      X2    COLPOSCOPY      CYST REMOVAL      FACIAL/NECK BIOPSY Right 10/03/2023    Procedure: EXCISION MASS RIGHT CHEEK;  Surgeon: Robert Bloch, MD;  Location:  MAIN OR;  Service: General    FL INJECTION RIGHT KNEE (ARTHROGRAM)  01/15/2024    HYSTERECTOMY  2012    heavy bleeding, ovaries remain, also had abnormal pap    KNEE SURGERY      LYMPH  NODE DISSECTION  2018    under armpit    TUBAL LIGATION  2007    US GUIDED THYROID BIOPSY  01/27/2021     No Known Allergies  Current Outpatient Medications on File Prior to Visit   Medication Sig Dispense Refill    albuterol (PROVENTIL HFA,VENTOLIN HFA) 90 mcg/act inhaler INHALE 2 PUFFS BY MOUTH EVERY 6 HOURS AS NEEDED FOR WHEEZING 18 g 1    amLODIPine (NORVASC) 5 mg tablet TAKE 1 TABLET (5 MG TOTAL) BY MOUTH DAILY BLOOD PRESUURE 90 tablet 1    buPROPion (WELLBUTRIN XL) 300 mg 24 hr tablet Take 1 tablet (300 mg total) by mouth every morning 90 tablet 1    Cholecalciferol (Vitamin D3) 125 MCG (5000 UT) CAPS TAKE 1 CAPSULE BY MOUTH EVERY DAY 90 capsule 1    [facility] Vitamin C 500 MG tablet TAKE 1 TABLET (500 MG TOTAL) BY MOUTH DAILY. 90 tablet 3    cyanocobalamin (VITAMIN B-12) 2000 MCG tablet Take 1 tablet (2,000 mcg total) by mouth daily 90 tablet 2    Euflexxa 20 MG/2ML SOSY       gabapentin (Neurontin) 100 mg capsule Take 1 capsule (100 mg total) by mouth daily at bedtime for 3 days, THEN 2 capsules (200 mg total) daily at bedtime for 3 days, THEN 3 capsules (300 mg total) daily at bedtime. 99 capsule 0    lidocaine (XYLOCAINE) 5 % ointment Apply topically 3 (three) times a day as needed for moderate pain (vulvar pain) 50 g 0    loratadine (CLARITIN) 10 mg tablet TAKE 1 TABLET (10 MG TOTAL) BY MOUTH DAILY FOR ALLERGIES 90 tablet 1    metFORMIN (GLUCOPHAGE-XR) 500 mg 24 hr tablet TAKE 2 TABLETS BY MOUTH EVERY DAY WITH BREAKFAST 180 tablet 1    methocarbamol (ROBAXIN) 500 mg tablet Take 1 tablet (500 mg total) by mouth 3 (three) times a day for 7 days 21 tablet 0    methylPREDNISolone 4 MG tablet therapy pack Use as directed on package 21 each 0    multivitamin (THERAGRAN) TABS Take 1 tablet by mouth daily      spironolactone (ALDACTONE) 25 mg tablet TAKE 1 TABLET BY MOUTH EVERY DAY 90 tablet 1    tiotropium (SPIRIVA) 18 mcg inhalation capsule Place 18 mcg into inhaler and inhale daily       No current  facility-administered medications on file prior to visit.              Objective:    Review of Systems   Constitutional:  Negative for chills, fever and unexpected weight change.   HENT:  Negative for hearing loss, nosebleeds and sore throat.    Eyes:  Negative for pain, redness and visual disturbance.   Respiratory:  Negative for cough, shortness of breath and wheezing.    Cardiovascular:  Negative for chest pain, palpitations and leg swelling.   Gastrointestinal:  Negative for abdominal pain, nausea and vomiting.   Endocrine: Negative for polydipsia and polyuria.   Genitourinary:  Negative for difficulty urinating and hematuria.   Musculoskeletal:  Negative for arthralgias, joint swelling and myalgias.   Skin:  Negative for rash and wound.   Neurological:  Negative for dizziness, numbness and headaches.   Psychiatric/Behavioral:  Negative for decreased concentration, dysphoric mood and suicidal ideas. The patient is not nervous/anxious.        Right Knee Exam     Tenderness   The patient is experiencing tenderness in the lateral joint line and medial joint line.    Range of Motion   Extension:  normal   Flexion:  normal     Other   Erythema: absent  Scars: present  Sensation: normal  Pulse: present  Effusion: effusion present            Physical Exam  Vitals and nursing note reviewed.   Constitutional:       Appearance: Normal appearance. She is well-developed.   HENT:      Head: Normocephalic.      Right Ear: External ear normal.      Left Ear: External ear normal.      Nose: Nose normal.   Eyes:      Extraocular Movements: Extraocular movements intact.      Conjunctiva/sclera: Conjunctivae normal.   Cardiovascular:      Rate and Rhythm: Normal rate.      Pulses: Normal pulses.   Pulmonary:      Effort: Pulmonary effort is normal. No respiratory distress.   Abdominal:      Palpations: Abdomen is soft.   Musculoskeletal:      Cervical back: Normal range of motion.      Right knee: Effusion present.      Comments:  "See ortho exam    Skin:     General: Skin is warm and dry.      Capillary Refill: Capillary refill takes less than 2 seconds.   Neurological:      General: No focal deficit present.      Mental Status: She is alert and oriented to person, place, and time.   Psychiatric:         Mood and Affect: Mood normal.         Behavior: Behavior normal.         Thought Content: Thought content normal.         Judgment: Judgment normal.         Large joint arthrocentesis: R knee  [place] Protocol:  procedure performed by consultantConsent: Verbal consent obtained. Written consent not obtained.  Risks and benefits: risks, benefits and alternatives were discussed  Consent given by: patient  Patient understanding: patient states understanding of the procedure being performed  Site marked: the operative site was marked  Patient identity confirmed: verbally with patient  Supporting Documentation  Indications: pain and joint swelling   Procedure Details  Location: knee - R knee  Needle size: 18 G  Medications administered: 2 mL bupivacaine 0.25 %; 5 mL lidocaine 1 %; 80 mg triamcinolone acetonide 40 mg/mL    Aspirate amount: 50 (ML's) mL  Aspirate: clear and yellow  Patient tolerance: patient tolerated the procedure well with no immediate complications  Dressing:  Sterile dressing applied             I have personally reviewed pertinent films in PACS and my interpretation is moderate degenerative changes.      Portions of the record may have been created with voice recognition software.  Occasional wrong word or \"sound a like\" substitutions may have occurred due to the inherent limitations of voice recognition software.  Read the chart carefully and recognize, using context, where substitutions have occurred.    Scribe Attestation      I,:  Tonja Rojas MA am acting as a scribe while in the presence of the attending physician.:       I,:  Yoandy Steel DO personally performed the services described in this documentation    as " scribed in my presence.:

## 2024-12-05 NOTE — LETTER
December 5, 2024     Patient: Jessica Oliver  YOB: 1980  Date of Visit: 12/5/2024      To Whom it May Concern:    Jessica Oliver is under my professional care. Jessica was seen in my office on 12/5/2024.     If you have any questions or concerns, please don't hesitate to call.         Sincerely,          Yoandy Steel, DO

## 2024-12-23 ENCOUNTER — OFFICE VISIT (OUTPATIENT)
Age: 44
End: 2024-12-23
Payer: COMMERCIAL

## 2024-12-23 ENCOUNTER — EVALUATION (OUTPATIENT)
Dept: PHYSICAL THERAPY | Facility: REHABILITATION | Age: 44
End: 2024-12-23
Payer: COMMERCIAL

## 2024-12-23 VITALS
WEIGHT: 209 LBS | DIASTOLIC BLOOD PRESSURE: 80 MMHG | HEIGHT: 64 IN | RESPIRATION RATE: 18 BRPM | HEART RATE: 94 BPM | TEMPERATURE: 97.4 F | SYSTOLIC BLOOD PRESSURE: 122 MMHG | BODY MASS INDEX: 35.68 KG/M2 | OXYGEN SATURATION: 98 %

## 2024-12-23 DIAGNOSIS — Z00.00 ANNUAL PHYSICAL EXAM: Primary | ICD-10-CM

## 2024-12-23 DIAGNOSIS — Z12.31 ENCOUNTER FOR SCREENING MAMMOGRAM FOR BREAST CANCER: ICD-10-CM

## 2024-12-23 DIAGNOSIS — M54.42 ACUTE LEFT-SIDED LOW BACK PAIN WITH LEFT-SIDED SCIATICA: ICD-10-CM

## 2024-12-23 DIAGNOSIS — E66.811 OBESITY (BMI 30.0-34.9): ICD-10-CM

## 2024-12-23 DIAGNOSIS — M54.16 LUMBAR RADICULOPATHY: ICD-10-CM

## 2024-12-23 PROCEDURE — 97161 PT EVAL LOW COMPLEX 20 MIN: CPT | Performed by: PHYSICAL THERAPIST

## 2024-12-23 PROCEDURE — 97110 THERAPEUTIC EXERCISES: CPT | Performed by: PHYSICAL THERAPIST

## 2024-12-23 PROCEDURE — 99396 PREV VISIT EST AGE 40-64: CPT | Performed by: INTERNAL MEDICINE

## 2024-12-23 PROCEDURE — 99213 OFFICE O/P EST LOW 20 MIN: CPT | Performed by: INTERNAL MEDICINE

## 2024-12-23 RX ORDER — TIRZEPATIDE 2.5 MG/.5ML
2.5 INJECTION, SOLUTION SUBCUTANEOUS WEEKLY
Qty: 2 ML | Refills: 0 | Status: SHIPPED | OUTPATIENT
Start: 2024-12-23

## 2024-12-23 NOTE — PATIENT INSTRUCTIONS
"Patient Education     Routine physical for adults   The Basics   Written by the doctors and editors at [facility]   What is a physical? -- A physical is a routine visit, or \"check-up,\" with your doctor. You might also hear it called a \"wellness visit\" or \"preventive visit.\"  During each visit, the doctor will:   Ask about your physical and mental health   Ask about your habits, behaviors, and lifestyle   Do an exam   Give you vaccines if needed   Talk to you about any medicines you take   Give advice about your health   Answer your questions  Getting regular check-ups is an important part of taking care of your health. It can help your doctor find and treat any problems you have. But it's also important for preventing health problems.  A routine physical is different from a \"sick visit.\" A sick visit is when you see a doctor because of a health concern or problem. Since physicals are scheduled ahead of time, you can think about what you want to ask the doctor.  How often should I get a physical? -- It depends on your age and health. In general, for people age 21 years and older:   If you are younger than 50 years, you might be able to get a physical every 3 years.   If you are 50 years or older, your doctor might recommend a physical every year.  If you have an ongoing health condition, like diabetes or high blood pressure, your doctor will probably want to see you more often.  What happens during a physical? -- In general, each visit will include:   Physical exam - The doctor or nurse will check your height, weight, heart rate, and blood pressure. They will also look at your eyes and ears. They will ask about how you are feeling and whether you have any symptoms that bother you.   Medicines - It's a good idea to bring a list of all the medicines you take to each doctor visit. Your doctor will talk to you about your medicines and answer any questions. Tell them if you are having any side effects that bother you. You " "should also tell them if you are having trouble paying for any of your medicines.   Habits and behaviors - This includes:   Your diet   Your exercise habits   Whether you smoke, drink alcohol, or use drugs   Whether you are sexually active   Whether you feel safe at home  Your doctor will talk to you about things you can do to improve your health and lower your risk of health problems. They will also offer help and support. For example, if you want to quit smoking, they can give you advice and might prescribe medicines. If you want to improve your diet or get more physical activity, they can help you with this, too.   Lab tests, if needed - The tests you get will depend on your age and situation. For example, your doctor might want to check your:   Cholesterol   Blood sugar   Iron level   Vaccines - The recommended vaccines will depend on your age, health, and what vaccines you already had. Vaccines are very important because they can prevent certain serious or deadly infections.   Discussion of screening - \"Screening\" means checking for diseases or other health problems before they cause symptoms. Your doctor can recommend screening based on your age, risk, and preferences. This might include tests to check for:   Cancer, such as breast, prostate, cervical, ovarian, colorectal, prostate, lung, or skin cancer   Sexually transmitted infections, such as chlamydia and gonorrhea   Mental health conditions like depression and anxiety  Your doctor will talk to you about the different types of screening tests. They can help you decide which screenings to have. They can also explain what the results might mean.   Answering questions - The physical is a good time to ask the doctor or nurse questions about your health. If needed, they can refer you to other doctors or specialists, too.  Adults older than 65 years often need other care, too. As you get older, your doctor will talk to you about:   How to prevent falling at " home   Hearing or vision tests   Memory testing   How to take your medicines safely   Making sure that you have the help and support you need at home  All topics are updated as new evidence becomes available and our peer review process is complete.  This topic retrieved from [employer] on: May 02, 2024.  Topic 946214 Version 1.0  Release: 32.4.3 - C32.122  © 2024 UpToDate, Inc. and/or its affiliates. All rights reserved.  Consumer Information Use and Disclaimer   Disclaimer: This generalized information is a limited summary of diagnosis, treatment, and/or medication information. It is not meant to be comprehensive and should be used as a tool to help the user understand and/or assess potential diagnostic and treatment options. It does NOT include all information about conditions, treatments, medications, side effects, or risks that may apply to a specific patient. It is not intended to be medical advice or a substitute for the medical advice, diagnosis, or treatment of a health care provider based on the health care provider's examination and assessment of a patient's specific and unique circumstances. Patients must speak with a health care provider for complete information about their health, medical questions, and treatment options, including any risks or benefits regarding use of medications. This information does not endorse any treatments or medications as safe, effective, or approved for treating a specific patient. UpToDate, Inc. and its affiliates disclaim any warranty or liability relating to this information or the use thereof.The use of this information is governed by the Terms of Use, available at https://www.wolterstarpipeuwer.com/en/know/clinical-effectiveness-terms. 2024© UpToDate, Inc. and its affiliates and/or licensors. All rights reserved.  Copyright   © 2024 UpToDate, Inc. and/or its affiliates. All rights reserved.

## 2024-12-23 NOTE — ASSESSMENT & PLAN NOTE
Prior Authorization Clinical Questions for Weight Management Pharmacotherapy    1. Does the patient have a contrainidcation to medication prescribed for weight management?: No  2. Does the patient have a diagnosis of obesity, confirmed by a BMI greater than or equal to 30 kg/m^2?: Yes  3. Does the patient have a BMI of greater than or equal to 27 kg/m^2 with at least one weight-related comorbidity/risk factor/complication (e.g. diabetes, dyslipidemia, coronary artery disease)?: Yes  4. Weight-related co-morbidities/risk factors: hypertension, obstructive sleep apnea (HIRAL), osteoarthritis, depression  5. Has the patient been on a weight loss regimen of low-calorie diet, increased physical activity, and lifestyle modifications for a minimum of 6 months?: Yes  6. Has the patient completed a comprehensive weight loss program (ie, Weight Watchers, Noom, Bariatrics, other mary on phone)? If so, what?: No  7. Does the patient have a history of type 2 diabetes?: No  8. Has the member tried and failed other weight loss medication within the past 12 months?: No  9. Will the member use requested medication in combination with another GLP agonist or weight loss drug?: No  10. Is the medication a controlled substance?: No     Baseline weight (in pounds): 209 lbs       We discussed various weight loss medications, patient has previously failed lifestyle modifications including diet and exercise  She is currently limited in exercise due to ongoing low back pain and osteoarthritis of the knee for which she is undergoing physical therapy  Patient prefers GLP-1 agonist, denies any history of thyroid cancers in self or in the family, personal history of pancreatitis, patient understands the side effects of nausea vomiting and constipation associated with medications which we will closely monitor.  Will start with initial dose and uptitrate as appropriate.  Orders:    Zepbound 2.5 MG/0.5ML auto-injector; Inject 0.5 mL (2.5 mg total)  under the skin once a week

## 2024-12-23 NOTE — PROGRESS NOTES
PT Evaluation     Today's date: 2024  Patient name: Jessica Oliver  : 1980  MRN: [id number]  Referring provider: Jad Johnson MD  Dx:   Encounter Diagnosis     ICD-10-CM    1. Acute left-sided low back pain with left-sided sciatica  M54.42 Ambulatory Referral to Physical Therapy      2. Lumbar radiculopathy  M54.16 Ambulatory Referral to Physical Therapy                     Assessment  Impairments: abnormal muscle firing, abnormal or restricted ROM, activity intolerance, impaired physical strength, lacks appropriate home exercise program and pain with function  Symptom irritability: moderate    Assessment details: Jessica Oliver is a 43 y.o. female present with:   Acute left-sided low back pain with left-sided sciatica  Lumbar radiculopathy    Jessica has the above listed impairments and will benefit from skilled Physical Therapist management to improve deficits to return to prior level of function.   Barriers to therapy: Chronicity of symptoms,   Understanding of Dx/Px/POC: good     Prognosis: good    Goals  Impairment Goals  - Decrease pain 0/10  - Improve ROM to 90 degrees lumbar spine flexion  - Increase strength to 5/5 throughout    Functional Goals  - Return to Prior Level of Function  - Increase Functional Status Measure to: 80  - Patient will be independent with HEP  -Patient will be able to return to activities of daily living without pain    Plan  Patient would benefit from: skilled PT    Planned therapy interventions: joint mobilization, manual therapy, patient education, postural training, activity modification, abdominal trunk stabilization, body mechanics training, flexibility, functional ROM exercises, graded exercise, home exercise program, neuromuscular re-education, strengthening, stretching, therapeutic activities and therapeutic exercise    Frequency: 2x week  Duration in weeks: 8  Plan of Care beginning date: 2024  Plan of Care expiration date: 2025  Treatment  plan discussed with: patient        Subjective Evaluation    History of Present Illness  Mechanism of injury: Jessica reports with left sided low back and left hip pain that started end of November. Describes an BRETT in which she felt a pop in her left hip while lying in bed, describing a ER and Abducted motion while in hooklying which caused her to go to the ED. X-rays of hip and lumbar spine negative. Notes numbness into her right leg specifically from shin down to her whole foot. Notes weakness within ankle dorsiflexors.   Pain  Current pain ratin  At worst pain rating: 10  Quality: needle-like      Objective     Concurrent Complaints  Positive for night pain. Negative for disturbed sleep, bladder dysfunction, bowel dysfunction and history of trauma    Active Range of Motion     Lumbar   Flexion: 60 degrees  with pain  Extension:  with pain Restriction level: maximal  Left lateral flexion:  with pain Restriction level: minimal  Right lateral flexion: Active right lumbar lateral flexion: more pain.  with pain Restriction level: minimal  Left rotation:  with pain Restriction level: minimal  Right rotation:  with pain Restriction level: minimal    Strength/Myotome Testing     Lumbar   Left   Heel walk: abnormal    Right   Heel walk: normal    Left Hip   Planes of Motion   Flexion: 3  Abduction: 3- (pain)    Right Hip   Planes of Motion   Flexion: 4-    Left Ankle/Foot   Dorsiflexion: 3  Great toe flexion: 3+    Right Ankle/Foot   Dorsiflexion: 5  Great toe flexion: 5    Tests     Lumbar     Left   Positive slump test.     Right   Positive slump test.     Left Pelvic Girdle/Sacrum   Positive: active SLR test.     Right Pelvic Girdle/Sacrum   Negative: active SLR test.                   Precautions: asthma, HTN, DM, Ankle DF weakness     Daily Treatment Diary      Manual 24             STM L pirirformis done                                      Therapeutic Ex                    bike nv                   LTR*  10x5s            Hooklying hip abd iso* 10x5s                         Neuro Re-ed                          ADIM nv                   ADIM marches nv                   ADIM heel taps                    Bridges* 3x10                                       Low Rows                                 Therapeutic Activity             Step ups glute focus nv                                      Leg Press                    Goblet squat                    Pallof Press                                                                                                                                                                                                                                     Modalities                                                    *=on HEP

## 2024-12-23 NOTE — PROGRESS NOTES
Adult Annual Physical  Name: Jessica Oliver      : 1980      MRN: [id number]  Encounter Provider: Jad Johnson MD  Encounter Date: 2024   Encounter department: [facility]    Assessment & Plan  Annual physical exam         Encounter for screening mammogram for breast cancer    Orders:    Mammo screening bilateral w 3d and cad; Future    Obesity (BMI 30.0-34.9)  Prior Authorization Clinical Questions for Weight Management Pharmacotherapy    1. Does the patient have a contrainidcation to medication prescribed for weight management?: No  2. Does the patient have a diagnosis of obesity, confirmed by a BMI greater than or equal to 30 kg/m^2?: Yes  3. Does the patient have a BMI of greater than or equal to 27 kg/m^2 with at least one weight-related comorbidity/risk factor/complication (e.g. diabetes, dyslipidemia, coronary artery disease)?: Yes  4. Weight-related co-morbidities/risk factors: hypertension, obstructive sleep apnea (HIRAL), osteoarthritis, depression  5. Has the patient been on a weight loss regimen of low-calorie diet, increased physical activity, and lifestyle modifications for a minimum of 6 months?: Yes  6. Has the patient completed a comprehensive weight loss program (ie, Weight Watchers, Noom, Bariatrics, other mary on phone)? If so, what?: No  7. Does the patient have a history of type 2 diabetes?: No  8. Has the member tried and failed other weight loss medication within the past 12 months?: No  9. Will the member use requested medication in combination with another GLP agonist or weight loss drug?: No  10. Is the medication a controlled substance?: No     Baseline weight (in pounds): 209 lbs       We discussed various weight loss medications, patient has previously failed lifestyle modifications including diet and exercise  She is currently limited in exercise due to ongoing low back pain and osteoarthritis of the knee for which she is undergoing  physical therapy  Patient prefers GLP-1 agonist, denies any history of thyroid cancers in self or in the family, personal history of pancreatitis, patient understands the side effects of nausea vomiting and constipation associated with medications which we will closely monitor.  Will start with initial dose and uptitrate as appropriate.  Orders:    Zepbound 2.5 MG/0.5ML auto-injector; Inject 0.5 mL (2.5 mg total) under the skin once a week    Immunizations and preventive care screenings were discussed with patient today. Appropriate education was printed on patient's after visit summary.    Counseling:  Injury prevention: discussed safety/seat belts, safety helmets, smoke detectors, carbon monoxide detectors, and smoking near bedding or upholstery.  Exercise: the importance of regular exercise/physical activity was discussed. Recommend exercise 3-5 times per week for at least 30 minutes.          History of Present Illness       Comes for annual physical and also to discuss weight loss medications.  Has ongoing back pain and right knee pain.  Undergoing physical therapy s/p CSI to knee  Adult Annual Physical:  Patient presents for annual physical.     Diet and Physical Activity:  - Diet/Nutrition: poor diet.  - Exercise: moderate cardiovascular exercise. stretching    Depression Screening:    - PHQ-9 Score: 5    General Health:  - Sleep: sleeps poorly and 4-6 hours of sleep on average. 4 1/2 sleep  - Hearing: normal hearing bilateral ears.  - Vision: vision problems and most recent eye exam > 1 year ago.  - Dental: regular dental visits and brushes teeth twice daily.    /GYN Health:  - Follows with GYN: yes.   - Menopause: postmenopausal.   - History of STDs: no  - Contraception:. surgical menopause      Advanced Care Planning:  - Has an advanced directive?: no    - Has a durable medical POA?: no    - ACP document given to patient?: no      Review of Systems   Constitutional:  Negative for appetite change, chills,  diaphoresis, fatigue, fever and unexpected weight change.   Respiratory:  Negative for apnea, cough, choking, chest tightness, shortness of breath, wheezing and stridor.    Cardiovascular:  Negative for chest pain, palpitations and leg swelling.   Gastrointestinal:  Negative for abdominal distention, abdominal pain, anal bleeding, blood in stool, constipation, diarrhea, nausea and vomiting.   Genitourinary:  Negative for decreased urine volume, difficulty urinating, frequency and urgency.   Musculoskeletal:  Negative for arthralgias, back pain and myalgias.   Neurological:  Negative for dizziness, light-headedness, numbness and headaches.     Medical History Reviewed by provider this encounter:  Tobacco  Allergies  Meds  Problems  Med Hx  Surg Hx  Fam Hx     .  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    Adrenal mass, left (HCC) 2021    1.8 x 2.1 cm on ct 2020    Arthritis     Asthma     Blurry vision 2020    Diabetes mellitus (HCC)     Essential hypertension 2020    Headache 2020    Hidradenitis suppurativa     Hypertension     Immunization deficiency 10/02/2020    Hep a nonimmune    Insomnia 2021    Migraine     Moderate episode of recurrent major depressive disorder (HCC) 2020    Obesity (BMI 30.0-34.9) 2021    Prediabetes 2020    Seasonal allergies     Tobacco use 2021    Vitamin D deficiency 10/02/2020     Past Surgical History:   Procedure Laterality Date    APPENDECTOMY      BREAST BIOPSY Left 2017    BREAST BIOPSY Right     2 core biopsies    BREAST EXCISIONAL BIOPSY Left 2018    BREAST SURGERY Left 2018    Left breast mass excision    CERVICAL BIOPSY  W/ LOOP ELECTRODE EXCISION       SECTION      X2    COLPOSCOPY      CYST REMOVAL      FACIAL/NECK BIOPSY Right 10/03/2023    Procedure: EXCISION MASS RIGHT CHEEK;  Surgeon: Robert Bloch,  MD;  Location:  MAIN OR;  Service: General    FL INJECTION RIGHT KNEE (ARTHROGRAM)  01/15/2024    HYSTERECTOMY  2012    heavy bleeding, ovaries remain, also had abnormal pap    KNEE SURGERY      LYMPH NODE DISSECTION  2018    under armpit    TUBAL LIGATION  2007    US GUIDED THYROID BIOPSY  01/27/2021     Family History   Problem Relation Age of Onset    Diabetes Mother     Heart attack Mother     Heart disease Mother         Internal Defibrilation    No Known Problems Father     Endometrial cancer Sister 28    Colon cancer Sister 35    No Known Problems Daughter     Diabetes Maternal Grandmother     Diabetes Paternal Grandmother     No Known Problems Son     No Known Problems Son     Lupus Maternal Aunt 48    Seizures Maternal Aunt     Leukemia Maternal Uncle 18    Diabetes Paternal Aunt     No Known Problems Paternal Aunt       reports that she has been smoking cigarettes. She has a 10 pack-year smoking history. She has been exposed to tobacco smoke. She has never used smokeless tobacco. She reports that she does not currently use alcohol. She reports that she does not use drugs.  Current Outpatient Medications on File Prior to Visit   Medication Sig Dispense Refill    albuterol (PROVENTIL HFA,VENTOLIN HFA) 90 mcg/act inhaler INHALE 2 PUFFS BY MOUTH EVERY 6 HOURS AS NEEDED FOR WHEEZING 18 g 1    amLODIPine (NORVASC) 5 mg tablet TAKE 1 TABLET (5 MG TOTAL) BY MOUTH DAILY BLOOD PRESUURE 90 tablet 1    buPROPion (WELLBUTRIN XL) 300 mg 24 hr tablet Take 1 tablet (300 mg total) by mouth every morning 90 tablet 1    Cholecalciferol (Vitamin D3) 125 MCG (5000 UT) CAPS TAKE 1 CAPSULE BY MOUTH EVERY DAY 90 capsule 1    [employer] Vitamin C 500 MG tablet TAKE 1 TABLET (500 MG TOTAL) BY MOUTH DAILY. 90 tablet 3    cyanocobalamin (VITAMIN B-12) 2000 MCG tablet Take 1 tablet (2,000 mcg total) by mouth daily 90 tablet 2    Euflexxa 20 MG/2ML SOSY       gabapentin (Neurontin) 100 mg capsule Take 1 capsule (100 mg total) by mouth daily  at bedtime for 3 days, THEN 2 capsules (200 mg total) daily at bedtime for 3 days, THEN 3 capsules (300 mg total) daily at bedtime. 99 capsule 0    lidocaine (XYLOCAINE) 5 % ointment Apply topically 3 (three) times a day as needed for moderate pain (vulvar pain) 50 g 0    loratadine (CLARITIN) 10 mg tablet TAKE 1 TABLET (10 MG TOTAL) BY MOUTH DAILY FOR ALLERGIES 90 tablet 1    metFORMIN (GLUCOPHAGE-XR) 500 mg 24 hr tablet TAKE 2 TABLETS BY MOUTH EVERY DAY WITH BREAKFAST 180 tablet 1    multivitamin (THERAGRAN) TABS Take 1 tablet by mouth daily      spironolactone (ALDACTONE) 25 mg tablet TAKE 1 TABLET BY MOUTH EVERY DAY 90 tablet 1    tiotropium (SPIRIVA) 18 mcg inhalation capsule Place 18 mcg into inhaler and inhale daily      methocarbamol (ROBAXIN) 500 mg tablet Take 1 tablet (500 mg total) by mouth 3 (three) times a day for 7 days (Patient not taking: Reported on 12/23/2024) 21 tablet 0    methylPREDNISolone 4 MG tablet therapy pack Use as directed on package (Patient not taking: Reported on 12/23/2024) 21 each 0     No current facility-administered medications on file prior to visit.   No Known Allergies   Current Outpatient Medications on File Prior to Visit   Medication Sig Dispense Refill    albuterol (PROVENTIL HFA,VENTOLIN HFA) 90 mcg/act inhaler INHALE 2 PUFFS BY MOUTH EVERY 6 HOURS AS NEEDED FOR WHEEZING 18 g 1    amLODIPine (NORVASC) 5 mg tablet TAKE 1 TABLET (5 MG TOTAL) BY MOUTH DAILY BLOOD PRESUURE 90 tablet 1    buPROPion (WELLBUTRIN XL) 300 mg 24 hr tablet Take 1 tablet (300 mg total) by mouth every morning 90 tablet 1    Cholecalciferol (Vitamin D3) 125 MCG (5000 UT) CAPS TAKE 1 CAPSULE BY MOUTH EVERY DAY 90 capsule 1    [facility] Vitamin C 500 MG tablet TAKE 1 TABLET (500 MG TOTAL) BY MOUTH DAILY. 90 tablet 3    cyanocobalamin (VITAMIN B-12) 2000 MCG tablet Take 1 tablet (2,000 mcg total) by mouth daily 90 tablet 2    Euflexxa 20 MG/2ML SOSY       gabapentin (Neurontin) 100 mg capsule Take 1 capsule  "(100 mg total) by mouth daily at bedtime for 3 days, THEN 2 capsules (200 mg total) daily at bedtime for 3 days, THEN 3 capsules (300 mg total) daily at bedtime. 99 capsule 0    lidocaine (XYLOCAINE) 5 % ointment Apply topically 3 (three) times a day as needed for moderate pain (vulvar pain) 50 g 0    loratadine (CLARITIN) 10 mg tablet TAKE 1 TABLET (10 MG TOTAL) BY MOUTH DAILY FOR ALLERGIES 90 tablet 1    metFORMIN (GLUCOPHAGE-XR) 500 mg 24 hr tablet TAKE 2 TABLETS BY MOUTH EVERY DAY WITH BREAKFAST 180 tablet 1    multivitamin (THERAGRAN) TABS Take 1 tablet by mouth daily      spironolactone (ALDACTONE) 25 mg tablet TAKE 1 TABLET BY MOUTH EVERY DAY 90 tablet 1    tiotropium (SPIRIVA) 18 mcg inhalation capsule Place 18 mcg into inhaler and inhale daily      methocarbamol (ROBAXIN) 500 mg tablet Take 1 tablet (500 mg total) by mouth 3 (three) times a day for 7 days (Patient not taking: Reported on 12/23/2024) 21 tablet 0    methylPREDNISolone 4 MG tablet therapy pack Use as directed on package (Patient not taking: Reported on 12/23/2024) 21 each 0     No current facility-administered medications on file prior to visit.      Social History     Tobacco Use    Smoking status: Every Day     Current packs/day: 0.50     Average packs/day: 0.5 packs/day for 20.0 years (10.0 ttl pk-yrs)     Types: Cigarettes     Passive exposure: Current    Smokeless tobacco: Never    Tobacco comments:     pt is down to .5 packs a day   Vaping Use    Vaping status: Never Used   Substance and Sexual Activity    Alcohol use: Not Currently     Comment: occasional    Drug use: No    Sexual activity: Not Currently     Partners: Male     Birth control/protection: Female Sterilization     Comment: HYSTERECTOMY       Objective   /80 (BP Location: Right arm, Patient Position: Sitting, Cuff Size: Standard)   Pulse 94   Temp (!) 97.4 °F (36.3 °C) (Tympanic)   Resp 18   Ht 5' 4\" (1.626 m)   Wt 94.8 kg (209 lb)   SpO2 98%   BMI 35.87 kg/m² "     Physical Exam  Constitutional:       General: She is not in acute distress.     Appearance: Normal appearance. She is normal weight. She is not ill-appearing, toxic-appearing or diaphoretic.   Cardiovascular:      Rate and Rhythm: Normal rate and regular rhythm.      Pulses: Normal pulses.      Heart sounds: Normal heart sounds. No murmur heard.     No gallop.   Pulmonary:      Effort: Pulmonary effort is normal. No respiratory distress.      Breath sounds: Normal breath sounds. No stridor. No wheezing, rhonchi or rales.   Chest:      Chest wall: No tenderness.   Abdominal:      General: There is no distension.      Palpations: Abdomen is soft.      Tenderness: There is no abdominal tenderness. There is no guarding.   Musculoskeletal:      Right lower leg: No edema.      Left lower leg: No edema.   Neurological:      Mental Status: She is alert and oriented to person, place, and time.

## 2024-12-24 ENCOUNTER — TELEPHONE (OUTPATIENT)
Age: 44
End: 2024-12-24

## 2024-12-24 ENCOUNTER — HOSPITAL ENCOUNTER (EMERGENCY)
Facility: HOSPITAL | Age: 44
Discharge: HOME | End: 2024-12-24
Attending: INTERNAL MEDICINE
Payer: COMMERCIAL

## 2024-12-24 VITALS
TEMPERATURE: 98.3 F | RESPIRATION RATE: 16 BRPM | SYSTOLIC BLOOD PRESSURE: 155 MMHG | OXYGEN SATURATION: 100 % | DIASTOLIC BLOOD PRESSURE: 96 MMHG | HEART RATE: 94 BPM

## 2024-12-24 DIAGNOSIS — H61.002 CHONDRODERMATITIS NODULARIS HELICIS OF LEFT EAR: Primary | ICD-10-CM

## 2024-12-24 DIAGNOSIS — H92.02 LEFT EAR PAIN: ICD-10-CM

## 2024-12-24 PROCEDURE — 99283 EMERGENCY DEPT VISIT LOW MDM: CPT

## 2024-12-24 PROCEDURE — 99284 EMERGENCY DEPT VISIT MOD MDM: CPT | Performed by: INTERNAL MEDICINE

## 2024-12-24 RX ORDER — IBUPROFEN 600 MG/1
600 TABLET, FILM COATED ORAL EVERY 6 HOURS PRN
Qty: 30 TABLET | Refills: 0 | Status: SHIPPED | OUTPATIENT
Start: 2024-12-24

## 2024-12-24 RX ORDER — IBUPROFEN 600 MG/1
600 TABLET, FILM COATED ORAL ONCE
Status: DISCONTINUED | OUTPATIENT
Start: 2024-12-24 | End: 2024-12-24 | Stop reason: HOSPADM

## 2024-12-24 NOTE — ED PROVIDER NOTES
Time reflects when diagnosis was documented in both MDM as applicable and the Disposition within this note       Time User Action Codes Description Comment    12/24/2024  5:55 PM Allen Garcia Add [H61.002] Chondrodermatitis nodularis helicis of left ear     12/24/2024  5:59 PM Allen Garcia Add [H92.02] Left ear pain           ED Disposition       ED Disposition   Discharge    Condition   Stable    Date/Time   Tue Dec 24, 2024  5:59 PM    Comment   Jessica Oliver discharge to home/self care.                   Assessment & Plan       Medical Decision Making  This is 43 years old came for having severe pain of the left ear.  Patient has pain for few days.  Patient has no fever.  Patient has no discharge.  Patient has history of hydradenitis suppurativa and she is prediabetic.  Physical exam is significant for; On the exam of the left ear there is a tender nodule in the external ear canal the nodule is small firm and very tender when pressing on it there is a raised density of the rim of the ear this looks like chondrodermatitis nodularis which is a condition however there is painful small nodule develop in the cartilage of the ear usually in the afternoon causing severe tenderness on touch it.  Patient started on Augmentin, and ibuprofen and told she need to follow-up with his ENT doctor for possible need for removal of this nodule.  Differential diagnoses include but not limited to; otitis externa, chondrodermatitis nodularis, malignant otitis, eczema,    Risk  Prescription drug management.             Medications - No data to display      ED Risk Strat Scores                          SBIRT 22yo+      Flowsheet Row Most Recent Value   Initial Alcohol Screen: US AUDIT-C     1. How often do you have a drink containing alcohol? 0 Filed at: 12/24/2024 1735   2. How many drinks containing alcohol do you have on a typical day you are drinking?  0 Filed at: 12/24/2024 1735   3a. Male UNDER 65: How often do you  have five or more drinks on one occasion? 0 Filed at: 2024 1735   3b. FEMALE Any Age, or MALE 65+: How often do you have 4 or more drinks on one occassion? 0 Filed at: 2024 173   Audit-C Score 0 Filed at: 2024 173   PAULIE: How many times in the past year have you...    Used an illegal drug or used a prescription medication for non-medical reasons? Never Filed at: 2024 1735                            History of Present Illness       Chief Complaint   Patient presents with    Ear Problem     Left ear pain x2 days        Past Medical History:   Diagnosis Date    Abnormal Pap smear of cervix     2018 HSV 1, 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    Adrenal mass, left (HCC) 2021    1.8 x 2.1 cm on ct 2020    Arthritis     Asthma     Blurry vision 2020    Diabetes mellitus (HCC)     Essential hypertension 2020    Headache 2020    Hidradenitis suppurativa     Hypertension     Immunization deficiency 10/02/2020    Hep a nonimmune    Insomnia 2021    Migraine     Moderate episode of recurrent major depressive disorder (HCC) 2020    Obesity (BMI 30.0-34.9) 2021    Prediabetes 2020    Seasonal allergies     Tobacco use 2021    Vitamin D deficiency 10/02/2020      Past Surgical History:   Procedure Laterality Date    APPENDECTOMY      BREAST BIOPSY Left 2017    BREAST BIOPSY Right     2 core biopsies    BREAST EXCISIONAL BIOPSY Left 2018    BREAST SURGERY Left 2018    Left breast mass excision    CERVICAL BIOPSY  W/ LOOP ELECTRODE EXCISION       SECTION      X2    COLPOSCOPY      CYST REMOVAL      FACIAL/NECK BIOPSY Right 10/03/2023    Procedure: EXCISION MASS RIGHT CHEEK;  Surgeon: Robert Bloch, MD;  Location:  MAIN OR;  Service: General    FL INJECTION RIGHT KNEE (ARTHROGRAM)  01/15/2024    HYSTERECTOMY      heavy bleeding, ovaries remain, also had abnormal pap    KNEE SURGERY      LYMPH NODE DISSECTION      under armpit    TUBAL  LIGATION  2007    US GUIDED THYROID BIOPSY  01/27/2021      Family History   Problem Relation Age of Onset    Diabetes Mother     Heart attack Mother     Heart disease Mother         Internal Defibrilation    No Known Problems Father     Endometrial cancer Sister 28    Colon cancer Sister 35    No Known Problems Daughter     Diabetes Maternal Grandmother     Diabetes Paternal Grandmother     No Known Problems Son     No Known Problems Son     Lupus Maternal Aunt 48    Seizures Maternal Aunt     Leukemia Maternal Uncle 18    Diabetes Paternal Aunt     No Known Problems Paternal Aunt       Social History     Tobacco Use    Smoking status: Every Day     Current packs/day: 0.50     Average packs/day: 0.5 packs/day for 20.0 years (10.0 ttl pk-yrs)     Types: Cigarettes     Passive exposure: Current    Smokeless tobacco: Never    Tobacco comments:     pt is down to .5 packs a day   Vaping Use    Vaping status: Never Used   Substance Use Topics    Alcohol use: Not Currently     Comment: occasional    Drug use: No      E-Cigarette/Vaping    E-Cigarette Use Never User       E-Cigarette/Vaping Substances    Nicotine No     THC No     CBD No     Flavoring No     Other No     Unknown No       I have reviewed and agree with the history as documented.     This is a 43 years old came for having left ear pain.  Patient stated that she has a nodule in the left ear which need to be drained.  Patient has no fever.  Patient denies decreased hearing.  Moving the left ear causing a lot of pain.  Patient denies sore throat.  Patient denies any discharge.  Patient stated that she is prediabetic.  Patient has history of back pain and hidradenitis and obesity and sleep apnea.      History provided by:  Patient   used: No        Review of Systems   Constitutional:  Negative for chills and fever.   HENT:  Positive for ear pain. Negative for congestion, dental problem, drooling, ear discharge, facial swelling, hearing loss,  mouth sores, nosebleeds, sinus pressure, sneezing, sore throat, tinnitus and trouble swallowing.    Eyes:  Negative for pain and visual disturbance.   Respiratory:  Negative for cough and shortness of breath.    Cardiovascular:  Negative for chest pain and palpitations.   Gastrointestinal:  Negative for abdominal pain and vomiting.   Genitourinary:  Negative for dysuria and hematuria.   Musculoskeletal:  Negative for arthralgias and back pain.   Skin:  Negative for color change and rash.   Neurological:  Negative for seizures and syncope.   All other systems reviewed and are negative.          Objective       ED Triage Vitals [12/24/24 1722]   Temperature Pulse Blood Pressure Respirations SpO2 Patient Position - Orthostatic VS   98.3 °F (36.8 °C) 94 155/96 16 100 % Sitting      Temp Source Heart Rate Source BP Location FiO2 (%) Pain Score    Oral Monitor Left arm -- --      Vitals      Date and Time Temp Pulse SpO2 Resp BP Pain Score FACES Pain Rating User   12/24/24 1722 98.3 °F (36.8 °C) 94 100 % 16 155/96 -- -- LC            Physical Exam  Vitals and nursing note reviewed.   Constitutional:       General: She is not in acute distress.     Appearance: She is well-developed.   HENT:      Head: Normocephalic and atraumatic.      Right Ear: Tympanic membrane, ear canal and external ear normal. There is no impacted cerumen.      Left Ear: Tympanic membrane and external ear normal. There is no impacted cerumen.      Ears:      Comments: On the exam of the left ear there is a tender nodule in the external ear canal the nodule is small firm and very tender when pressing on it there is a raised density of the rim of the ear this looks like chondrodermatitis nodularis which is a condition however there is painful small nodule develop in the cartilage of the ear usually in the afternoon causing severe tenderness on touch it.  Eyes:      Conjunctiva/sclera: Conjunctivae normal.   Cardiovascular:      Rate and Rhythm: Normal  rate and regular rhythm.      Heart sounds: No murmur heard.  Pulmonary:      Effort: Pulmonary effort is normal. No respiratory distress.      Breath sounds: Normal breath sounds.   Abdominal:      Palpations: Abdomen is soft.      Tenderness: There is no abdominal tenderness.   Musculoskeletal:         General: No swelling or tenderness.      Cervical back: Neck supple.   Skin:     General: Skin is warm and dry.      Capillary Refill: Capillary refill takes less than 2 seconds.      Coloration: Skin is not jaundiced or pale.      Findings: No bruising, erythema, lesion or rash.   Neurological:      Mental Status: She is alert and oriented to person, place, and time.   Psychiatric:         Mood and Affect: Mood normal.         Results Reviewed       None            No orders to display       Procedures    ED Medication and Procedure Management   Prior to Admission Medications   Prescriptions Last Dose Informant Patient Reported? Taking?   [facility] Vitamin C 500 MG tablet  Self No No   Sig: TAKE 1 TABLET (500 MG TOTAL) BY MOUTH DAILY.   Cholecalciferol (Vitamin D3) 125 MCG (5000 UT) CAPS   No No   Sig: TAKE 1 CAPSULE BY MOUTH EVERY DAY   Euflexxa 20 MG/2ML SOSY   Yes No   Zepbound 2.5 MG/0.5ML auto-injector   No No   Sig: Inject 0.5 mL (2.5 mg total) under the skin once a week   albuterol (PROVENTIL HFA,VENTOLIN HFA) 90 mcg/act inhaler  Self No No   Sig: INHALE 2 PUFFS BY MOUTH EVERY 6 HOURS AS NEEDED FOR WHEEZING   amLODIPine (NORVASC) 5 mg tablet   No No   Sig: TAKE 1 TABLET (5 MG TOTAL) BY MOUTH DAILY BLOOD PRESUURE   buPROPion (WELLBUTRIN XL) 300 mg 24 hr tablet   No No   Sig: Take 1 tablet (300 mg total) by mouth every morning   cyanocobalamin (VITAMIN B-12) 2000 MCG tablet  Self No No   Sig: Take 1 tablet (2,000 mcg total) by mouth daily   gabapentin (Neurontin) 100 mg capsule   No No   Sig: Take 1 capsule (100 mg total) by mouth daily at bedtime for 3 days, THEN 2 capsules (200 mg total) daily at bedtime for 3  days, THEN 3 capsules (300 mg total) daily at bedtime.   lidocaine (XYLOCAINE) 5 % ointment   No No   Sig: Apply topically 3 (three) times a day as needed for moderate pain (vulvar pain)   loratadine (CLARITIN) 10 mg tablet   No No   Sig: TAKE 1 TABLET (10 MG TOTAL) BY MOUTH DAILY FOR ALLERGIES   metFORMIN (GLUCOPHAGE-XR) 500 mg 24 hr tablet   No No   Sig: TAKE 2 TABLETS BY MOUTH EVERY DAY WITH BREAKFAST   methocarbamol (ROBAXIN) 500 mg tablet   No No   Sig: Take 1 tablet (500 mg total) by mouth 3 (three) times a day for 7 days   Patient not taking: Reported on 12/23/2024   methylPREDNISolone 4 MG tablet therapy pack   No No   Sig: Use as directed on package   Patient not taking: Reported on 12/23/2024   multivitamin (THERAGRAN) TABS  Self Yes No   Sig: Take 1 tablet by mouth daily   spironolactone (ALDACTONE) 25 mg tablet   No No   Sig: TAKE 1 TABLET BY MOUTH EVERY DAY   tiotropium (SPIRIVA) 18 mcg inhalation capsule  Self Yes No   Sig: Place 18 mcg into inhaler and inhale daily      Facility-Administered Medications: None     Discharge Medication List as of 12/24/2024  6:00 PM        START taking these medications    Details   amoxicillin-clavulanate (AUGMENTIN) 875-125 mg per tablet Take 1 tablet by mouth every 12 (twelve) hours for 7 days, Starting Tue 12/24/2024, Until Tue 12/31/2024, Normal      ibuprofen (MOTRIN) 600 mg tablet Take 1 tablet (600 mg total) by mouth every 6 (six) hours as needed for moderate pain, Starting Tue 12/24/2024, Normal           CONTINUE these medications which have NOT CHANGED    Details   albuterol (PROVENTIL HFA,VENTOLIN HFA) 90 mcg/act inhaler INHALE 2 PUFFS BY MOUTH EVERY 6 HOURS AS NEEDED FOR WHEEZING, Normal      amLODIPine (NORVASC) 5 mg tablet TAKE 1 TABLET (5 MG TOTAL) BY MOUTH DAILY BLOOD PRESUURE, Starting Tue 6/11/2024, Until Mon 12/23/2024, Normal      buPROPion (WELLBUTRIN XL) 300 mg 24 hr tablet Take 1 tablet (300 mg total) by mouth every morning, Starting Tue  10/15/2024, Normal      Cholecalciferol (Vitamin D3) 125 MCG (5000 UT) CAPS TAKE 1 CAPSULE BY MOUTH EVERY DAY, Starting Tue 6/18/2024, Normal      [facility] Vitamin C 500 MG tablet TAKE 1 TABLET (500 MG TOTAL) BY MOUTH DAILY., Normal      cyanocobalamin (VITAMIN B-12) 2000 MCG tablet Take 1 tablet (2,000 mcg total) by mouth daily, Starting Mon 8/22/2022, Until Mon 12/23/2024, Normal      Euflexxa 20 MG/2ML SOSY Historical Med      gabapentin (Neurontin) 100 mg capsule Multiple Dosages:Starting Wed 11/27/2024, Until Fri 11/29/2024 at 2359, THEN Starting Sat 11/30/2024, Until Mon 12/2/2024 at 2359, THEN Starting Tue 12/3/2024, Until Wed 1/1/2025 at 2359Take 1 capsule (100 mg total) by mouth daily at bedtime for 3 da ys, THEN 2 capsules (200 mg total) daily at bedtime for 3 days, THEN 3 capsules (300 mg total) daily at bedtime., Normal      lidocaine (XYLOCAINE) 5 % ointment Apply topically 3 (three) times a day as needed for moderate pain (vulvar pain), Starting Fri 10/18/2024, Normal      loratadine (CLARITIN) 10 mg tablet TAKE 1 TABLET (10 MG TOTAL) BY MOUTH DAILY FOR ALLERGIES, Starting Fri 9/20/2024, Normal      metFORMIN (GLUCOPHAGE-XR) 500 mg 24 hr tablet TAKE 2 TABLETS BY MOUTH EVERY DAY WITH BREAKFAST, Normal      methocarbamol (ROBAXIN) 500 mg tablet Take 1 tablet (500 mg total) by mouth 3 (three) times a day for 7 days, Starting Wed 11/20/2024, Until Mon 12/23/2024, Normal      methylPREDNISolone 4 MG tablet therapy pack Use as directed on package, Normal      multivitamin (THERAGRAN) TABS Take 1 tablet by mouth daily, Historical Med      spironolactone (ALDACTONE) 25 mg tablet TAKE 1 TABLET BY MOUTH EVERY DAY, Normal      tiotropium (SPIRIVA) 18 mcg inhalation capsule Place 18 mcg into inhaler and inhale daily, Historical Med      Zepbound 2.5 MG/0.5ML auto-injector Inject 0.5 mL (2.5 mg total) under the skin once a week, Starting Mon 12/23/2024, Normal             ED SEPSIS DOCUMENTATION   Time reflects when  diagnosis was documented in both MDM as applicable and the Disposition within this note       Time User Action Codes Description Comment    12/24/2024  5:55 PM Allen Garcia [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

## 2024-12-24 NOTE — TELEPHONE ENCOUNTER
PA for Zepbound 2.5 MG/0.5MLSUBMITTED to [employer]     via    []CMM-KEY:   [x]Surescripts-Case ID # [id number]   []Availity-Auth ID # NDC #   []Faxed to plan   []Other website   []Phone call Case ID #     [x]PA sent as URGENT    All office notes, labs and other pertaining documents and studies sent. Clinical questions answered. Awaiting determination from insurance company.     Turnaround time for your insurance to make a decision on your Prior Authorization can take 7-21 business days.

## 2024-12-24 NOTE — DISCHARGE INSTRUCTIONS
Follow-up with ENT as instructed.  Take medication as instructed.  Apply warm compresses to the left ear.

## 2024-12-26 ENCOUNTER — RESULTS FOLLOW-UP (OUTPATIENT)
Age: 44
End: 2024-12-26

## 2024-12-26 ENCOUNTER — OFFICE VISIT (OUTPATIENT)
Dept: PHYSICAL THERAPY | Facility: REHABILITATION | Age: 44
End: 2024-12-26
Payer: COMMERCIAL

## 2024-12-26 ENCOUNTER — HOSPITAL ENCOUNTER (OUTPATIENT)
Dept: MAMMOGRAPHY | Facility: CLINIC | Age: 44
End: 2024-12-26
Payer: COMMERCIAL

## 2024-12-26 DIAGNOSIS — M54.42 ACUTE LEFT-SIDED LOW BACK PAIN WITH LEFT-SIDED SCIATICA: Primary | ICD-10-CM

## 2024-12-26 DIAGNOSIS — R92.8 ABNORMAL MAMMOGRAM: ICD-10-CM

## 2024-12-26 DIAGNOSIS — R92.8 ABNORMAL MAMMOGRAM: Primary | ICD-10-CM

## 2024-12-26 DIAGNOSIS — M54.16 LUMBAR RADICULOPATHY: ICD-10-CM

## 2024-12-26 PROCEDURE — 97112 NEUROMUSCULAR REEDUCATION: CPT | Performed by: PHYSICAL THERAPIST

## 2024-12-26 PROCEDURE — 77066 DX MAMMO INCL CAD BI: CPT

## 2024-12-26 PROCEDURE — 97530 THERAPEUTIC ACTIVITIES: CPT | Performed by: PHYSICAL THERAPIST

## 2024-12-26 PROCEDURE — 97110 THERAPEUTIC EXERCISES: CPT | Performed by: PHYSICAL THERAPIST

## 2024-12-26 NOTE — TELEPHONE ENCOUNTER
PA Zepbound ALL STRENGTHS APPROVED         Patient advised by          [x]MyChart Message  []Phone call   []LMOM  []L/M to call office as no active Communication consent on file  []Unable to leave detailed message as VM not approved on Communication consent       Pharmacy advised by    [x]Fax  []Phone call   seen by colorectal, recs supportive care, oupt kirk with dr mayo

## 2024-12-26 NOTE — PROGRESS NOTES
"Daily Note     Today's date: 2024  Patient name: Jessica Oliver  : 1980  MRN: [id number]  Referring provider: Jad Johnson MD  Dx:   Encounter Diagnosis     ICD-10-CM    1. Acute left-sided low back pain with left-sided sciatica  M54.42       2. Lumbar radiculopathy  M54.16                      Subjective: Jessica reports that her hip and back feel about the same. Has been trying to use her leg a little more but not overdoing it.       Objective: See treatment diary below      Assessment: Tolerated treatment well. Patient demonstrated fatigue post treatment, exhibited good technique with therapeutic exercises, and would benefit from continued PT      Plan: Continue per plan of care.            Precautions: asthma, HTN, DM, Ankle DF weakness     Daily Treatment Diary      Manual 24           STM L pirirformis done done                                     Therapeutic Ex                    bike nv nv                  LTR* 10x5s 10x5s           Hooklying hip abd iso* 10x5s dc           Supine hip abd  3x10 BTB           Supine modified piriformis stretch  5x10s           Standing TFL stretch L  5x10s                        Neuro Re-ed                          ADIM nv                   ADIM marches nv                   ADIM heel taps                    Bridges* 3x10 3x10                  Sidelying clams  3x8 bw                  Low Rows                                 Therapeutic Activity             Step ups glute focus nv 8\" 3x6           Lateral step overs   6\" 2x10                        Leg Press SL  75# 3x8                  Goblet squat                    P[place]of [place]                                                                                                                                                                                                                                     Modalities                                                    *=on HEP        "

## 2024-12-30 ENCOUNTER — OFFICE VISIT (OUTPATIENT)
Dept: PHYSICAL THERAPY | Facility: REHABILITATION | Age: 44
End: 2024-12-30
Payer: COMMERCIAL

## 2024-12-30 DIAGNOSIS — M54.16 LUMBAR RADICULOPATHY: ICD-10-CM

## 2024-12-30 DIAGNOSIS — M54.42 ACUTE LEFT-SIDED LOW BACK PAIN WITH LEFT-SIDED SCIATICA: Primary | ICD-10-CM

## 2024-12-30 PROCEDURE — 97110 THERAPEUTIC EXERCISES: CPT | Performed by: PHYSICAL THERAPIST

## 2024-12-30 PROCEDURE — 97112 NEUROMUSCULAR REEDUCATION: CPT | Performed by: PHYSICAL THERAPIST

## 2024-12-30 PROCEDURE — 97530 THERAPEUTIC ACTIVITIES: CPT | Performed by: PHYSICAL THERAPIST

## 2024-12-30 NOTE — PROGRESS NOTES
"Daily Note     Today's date: 2024  Patient name: Jessica Oliver  : 1980  MRN: [id number]  Referring provider: Jad Johnson MD  Dx:   Encounter Diagnosis     ICD-10-CM    1. Acute left-sided low back pain with left-sided sciatica  M54.42       2. Lumbar radiculopathy  M54.16           Start Time: 1750  Stop Time: 1830  Total time in clinic (min): 40 minutes    Subjective: Jessica reports that her back feels okay, however she notes swelling in her right knee and pain, no BRETT however.       Objective: See treatment diary below      Assessment: Tolerated treatment well. Patient demonstrated fatigue post treatment, exhibited good technique with therapeutic exercises, and would benefit from continued PT      Plan: Continue per plan of care.         Precautions: asthma, HTN, DM, Ankle DF weakness     Daily Treatment Diary      Manual 24          STM L pirirformis done done done                                    Therapeutic Ex                    bike nv nv 5' lvl 1                 LTR* 10x5s 10x5s 10x5s          Hooklying hip abd iso* 10x5s dc           Supine hip abd  3x10 BTB 3x10 BTB          Supine modified piriformis stretch  5x10s 5x10s          Standing TFL stretch L  5x10s 5x10s                       Neuro Re-ed                          ADIM nv                   ADIM marches nv                   ADIM heel taps                    Bridges* 3x10 3x10 3x10 BTB                 Sidelying clams  3x8 bw 3x10 bw  add tb                Low Rows                                 Therapeutic Activity             Step ups glute focus nv 8\" 3x6 8\" 2x10          Lateral step overs   6\" 2x10 np                       Leg Press SL  75# 3x8 55# 3x10                 Goblet squat                    P[place]                                                                                                                                                                                                "                                      Modalities                                                    *=on HEP

## 2025-01-02 ENCOUNTER — TELEPHONE (OUTPATIENT)
Dept: OBGYN CLINIC | Facility: CLINIC | Age: 45
End: 2025-01-02

## 2025-01-02 ENCOUNTER — OFFICE VISIT (OUTPATIENT)
Dept: PHYSICAL THERAPY | Facility: REHABILITATION | Age: 45
End: 2025-01-02
Payer: COMMERCIAL

## 2025-01-02 DIAGNOSIS — M17.11 PRIMARY OSTEOARTHRITIS OF RIGHT KNEE: Primary | ICD-10-CM

## 2025-01-02 DIAGNOSIS — M54.16 LUMBAR RADICULOPATHY: ICD-10-CM

## 2025-01-02 DIAGNOSIS — M54.42 ACUTE LEFT-SIDED LOW BACK PAIN WITH LEFT-SIDED SCIATICA: Primary | ICD-10-CM

## 2025-01-02 PROCEDURE — 97140 MANUAL THERAPY 1/> REGIONS: CPT | Performed by: PHYSICAL THERAPIST

## 2025-01-02 PROCEDURE — 97530 THERAPEUTIC ACTIVITIES: CPT | Performed by: PHYSICAL THERAPIST

## 2025-01-02 PROCEDURE — 97110 THERAPEUTIC EXERCISES: CPT | Performed by: PHYSICAL THERAPIST

## 2025-01-02 NOTE — PROGRESS NOTES
"Daily Note     Today's date: 2025  Patient name: Jessica Oliver  : 1980  MRN: [id number]  Referring provider: Jad Johnson MD  Dx:   Encounter Diagnosis     ICD-10-CM    1. Acute left-sided low back pain with left-sided sciatica  M54.42       2. Lumbar radiculopathy  M54.16           Start Time: 173  Stop Time:   Total time in clinic (min): 51 minutes    Subjective: Jessica reports that her back and hip feel a little improved. Notes her right knee continues to painful and tight, describes a meniscal surgery/injury in the past. Continues with left sided anterior shin tightness as well as numbness and tingling      Objective: See treatment diary below      Assessment: Tolerated treatment well. Patient demonstrated fatigue post treatment, exhibited good technique with therapeutic exercises, and would benefit from continued PT.  Neural tension noted with ankle PF, increased more when in knee ext vs flx.        Plan: Continue per plan of care.         Precautions: asthma, HTN, DM, Ankle DF weakness     Daily Treatment Diary      Manual 24 1/2         STM L pirirformis done done done done                                   Therapeutic Ex                    bike nv nv 5' lvl 1  5' lvl 1               LTR* 10x5s 10x5s 10x5s          Hooklying hip abd iso* 10x5s dc           Supine hip abd  3x10 BTB 3x10 BTB np         Supine modified piriformis stretch  5x10s 5x10s 5x10s         Lateral band walks    GTB 3 laps         Seated fig 4 stretch    5x10s         Standing TFL stretch L  5x10s 5x10s                       Neuro Re-ed                          ADIM nv                   ADIM marches nv                   ADIM heel taps                    Bridges* 3x10 3x10 3x10 BTB  3x10 BTB               Sidelying clams  3x8 bw 3x10 bw Pink 3x8               Low Rows                                 Therapeutic Activity             Step ups glute focus nv 8\" 3x6 8\" 2x10 8\" 3x8         Lateral " "step overs   6\" 2x10 np                       Leg Press SL  75# 3x8 55# 3x10  57# 3x12               Goblet squat                    P[place]                                                                                                                                                                                                                                     Modalities                                                    *=on HEP            "

## 2025-01-06 ENCOUNTER — OFFICE VISIT (OUTPATIENT)
Dept: PHYSICAL THERAPY | Facility: REHABILITATION | Age: 45
End: 2025-01-06
Payer: COMMERCIAL

## 2025-01-06 ENCOUNTER — OFFICE VISIT (OUTPATIENT)
Dept: OBGYN CLINIC | Facility: CLINIC | Age: 45
End: 2025-01-06
Payer: COMMERCIAL

## 2025-01-06 ENCOUNTER — CONSULT (OUTPATIENT)
Dept: CARDIOLOGY CLINIC | Facility: CLINIC | Age: 45
End: 2025-01-06
Payer: COMMERCIAL

## 2025-01-06 VITALS
HEART RATE: 101 BPM | WEIGHT: 206 LBS | TEMPERATURE: 98.1 F | DIASTOLIC BLOOD PRESSURE: 82 MMHG | OXYGEN SATURATION: 98 % | BODY MASS INDEX: 35.17 KG/M2 | SYSTOLIC BLOOD PRESSURE: 118 MMHG | HEIGHT: 64 IN

## 2025-01-06 DIAGNOSIS — Z72.0 TOBACCO USE: ICD-10-CM

## 2025-01-06 DIAGNOSIS — I10 ESSENTIAL HYPERTENSION: ICD-10-CM

## 2025-01-06 DIAGNOSIS — M54.16 LUMBAR RADICULOPATHY: ICD-10-CM

## 2025-01-06 DIAGNOSIS — M54.42 ACUTE LEFT-SIDED LOW BACK PAIN WITH LEFT-SIDED SCIATICA: Primary | ICD-10-CM

## 2025-01-06 DIAGNOSIS — M17.11 PRIMARY OSTEOARTHRITIS OF RIGHT KNEE: Primary | ICD-10-CM

## 2025-01-06 DIAGNOSIS — R07.9 CHEST PAIN: Primary | ICD-10-CM

## 2025-01-06 DIAGNOSIS — G47.33 OSA (OBSTRUCTIVE SLEEP APNEA): ICD-10-CM

## 2025-01-06 PROCEDURE — 97110 THERAPEUTIC EXERCISES: CPT | Performed by: PHYSICAL THERAPIST

## 2025-01-06 PROCEDURE — 97140 MANUAL THERAPY 1/> REGIONS: CPT | Performed by: PHYSICAL THERAPIST

## 2025-01-06 PROCEDURE — 99213 OFFICE O/P EST LOW 20 MIN: CPT | Performed by: PHYSICIAN ASSISTANT

## 2025-01-06 PROCEDURE — 99244 OFF/OP CNSLTJ NEW/EST MOD 40: CPT | Performed by: INTERNAL MEDICINE

## 2025-01-06 PROCEDURE — 20610 DRAIN/INJ JOINT/BURSA W/O US: CPT | Performed by: PHYSICIAN ASSISTANT

## 2025-01-06 RX ORDER — LIDOCAINE HYDROCHLORIDE 10 MG/ML
5 INJECTION, SOLUTION INFILTRATION; PERINEURAL
Status: COMPLETED | OUTPATIENT
Start: 2025-01-06 | End: 2025-01-06

## 2025-01-06 RX ADMIN — LIDOCAINE HYDROCHLORIDE 5 ML: 10 INJECTION, SOLUTION INFILTRATION; PERINEURAL at 15:30

## 2025-01-06 NOTE — PROGRESS NOTES
Assessment:  1. Primary osteoarthritis of right knee          Patient Active Problem List   Diagnosis    Essential hypertension    Headache    Moderate episode of recurrent major depressive disorder (HCC)    Prediabetes    Vitamin D deficiency    Nodule of left lobe of thyroid gland    Tobacco use    Insomnia    Obesity (BMI 30.0-34.9)    Varicose veins of both lower extremities with pain    HIRAL (obstructive sleep apnea)    Primary osteoarthritis of right knee    Chronic fatigue    History of hysterectomy    Hidradenitis suppurativa    Lack of housing    Family history of colon cancer    Epidermoid cyst of face    Internal derangement of right knee    Patellofemoral disorder of right knee       Plan:    44 y.o. female  with right knee effusion secondary to knee arthritis    Diagnosis reviewed with patient, patient wishes to have aspiration of right knee.  Right knee aspirated for 55 mL of clear serous fluid.  Patient tolerated well.  Patient aware that effusion may recur.    Visco injections already in authorization process, will follow-up for injections when approved.    Subjective:   Patient ID: Jessica Oliver is a 44 y.o. female .    HPI    Patient presents to the office for follow up of right knee effusion secondary to arthritis. Since the last visit, the patient reports persistent pain and recurrent effusion. Patient rates their pain as 7/10. Patient states that she had no relief from the last cortisone injection.     The following portions of the patient's history were reviewed and updated as appropriate: allergies, current medications, past family history, past social history, past surgical history and problem list.    Social History     Socioeconomic History    Marital status: Single     Spouse name: Not on file    Number of children: Not on file    Years of education: 10    Highest education level: Not on file   Occupational History    Not on file   Tobacco Use    Smoking status: Every Day     Current  packs/day: 0.50     Average packs/day: 0.5 packs/day for 20.0 years (10.0 ttl pk-yrs)     Types: Cigarettes     Passive exposure: Current    Smokeless tobacco: Never    Tobacco comments:     pt is down to .5 packs a day   Vaping Use    Vaping status: Never Used   Substance and Sexual Activity    Alcohol use: Not Currently     Comment: occasional    Drug use: No    Sexual activity: Not Currently     Partners: Male     Birth control/protection: Female Sterilization     Comment: HYSTERECTOMY   Other Topics Concern    Not on file   Social History Narrative    Most recent tobacco use screenin-    Do you currently or have you served in the [employer]: No    Were you activated, into active duty, as a member of the [employer] or as a Reservist: No    Occupation: unemployed    Education: 10    Marital status: Single    Sexual orientation: Heterosexual    Exercise level: Occasional    Diet: Regular    General stress level: Low    Alcohol intake: Occasional    Caffeine intake: Moderate    Chewing tobacco: none    Illicit drugs: denies    Guns present in home: No    Seat belts used routinely: Yes    Sunscreen used routinely: No    Smoke alarm in home: Yes    Advance directive: No    Live alone or with others: with others    Are there stairs in your home: Yes    Pets: Yes    Deaf or serious difficulty hearing: No    Blind or serious difficulty seeing: Yes    Difficulty concentrating, remembering or making decisions: No    Difficulty walking or climbing stairs: No    Difficulty dressing or bathing: No    Difficulty doing errands alone: No    Passive smoke exposure: Yes    Are you currently employed: No    Asbestos exposure: No    TB exposure: No    Environmental exposure: No    Animal exposure: Yes    cat and dog    Blood Transfusion: No    Sexually active: No    Presence of domestic violence: No    Do you feel safe at home: Yes    no cpap or nebulizer     Social Drivers of Health     Financial Resource  Strain: Not on file   Food Insecurity: Not on file   Transportation Needs: Not on file   Physical Activity: Not on file   Stress: Not on file   Social Connections: Not on file   Intimate Partner Violence: Not on file   Housing Stability: Not on file     Past Medical History:   Diagnosis Date    Abnormal Pap smear of cervix     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, 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    Adrenal mass, left (HCC) 2021    1.8 x 2.1 cm on ct 2020    Arthritis     Asthma     Blurry vision 2020    Diabetes mellitus (HCC)     Essential hypertension 2020    Headache 2020    Hidradenitis suppurativa     Hypertension     Immunization deficiency 10/02/2020    Hep a nonimmune    Insomnia 2021    Migraine     Moderate episode of recurrent major depressive disorder (HCC) 2020    Obesity (BMI 30.0-34.9) 2021    Prediabetes 2020    Seasonal allergies     Tobacco use 2021    Vitamin D deficiency 10/02/2020     Past Surgical History:   Procedure Laterality Date    APPENDECTOMY      BREAST BIOPSY Left 2017    BREAST BIOPSY Right     2 core biopsies    BREAST EXCISIONAL BIOPSY Left 2018    BREAST SURGERY Left 2018    Left breast mass excision    CERVICAL BIOPSY  W/ LOOP ELECTRODE EXCISION       SECTION      X2    COLPOSCOPY      CYST REMOVAL      FACIAL/NECK BIOPSY Right 10/03/2023    Procedure: EXCISION MASS RIGHT CHEEK;  Surgeon: Robert Bloch, MD;  Location:  MAIN OR;  Service: General    FL INJECTION RIGHT KNEE (ARTHROGRAM)  01/15/2024    HYSTERECTOMY      heavy bleeding, ovaries remain, also had abnormal pap    KNEE SURGERY      LYMPH NODE DISSECTION      under armpit    TUBAL LIGATION      US GUIDED THYROID BIOPSY  2021     No Known Allergies  Current Outpatient Medications on File Prior to Visit   Medication Sig Dispense Refill    albuterol (PROVENTIL HFA,VENTOLIN HFA) 90 mcg/act inhaler INHALE 2 PUFFS BY MOUTH EVERY 6 HOURS AS NEEDED FOR WHEEZING 18 g 1     amLODIPine (NORVASC) 5 mg tablet TAKE 1 TABLET (5 MG TOTAL) BY MOUTH DAILY BLOOD PRESUURE 90 tablet 1    buPROPion (WELLBUTRIN XL) 300 mg 24 hr tablet Take 1 tablet (300 mg total) by mouth every morning 90 tablet 1    Cholecalciferol (Vitamin D3) 125 MCG (5000 UT) CAPS TAKE 1 CAPSULE BY MOUTH EVERY DAY 90 capsule 1    [facility] Vitamin C 500 MG tablet TAKE 1 TABLET (500 MG TOTAL) BY MOUTH DAILY. 90 tablet 3    cyanocobalamin (VITAMIN B-12) 2000 MCG tablet Take 1 tablet (2,000 mcg total) by mouth daily 90 tablet 2    Euflexxa 20 MG/2ML SOSY  (Patient not taking: Reported on 1/6/2025)      gabapentin (Neurontin) 100 mg capsule Take 1 capsule (100 mg total) by mouth daily at bedtime for 3 days, THEN 2 capsules (200 mg total) daily at bedtime for 3 days, THEN 3 capsules (300 mg total) daily at bedtime. 99 capsule 0    ibuprofen (MOTRIN) 600 mg tablet Take 1 tablet (600 mg total) by mouth every 6 (six) hours as needed for moderate pain 30 tablet 0    lidocaine (XYLOCAINE) 5 % ointment Apply topically 3 (three) times a day as needed for moderate pain (vulvar pain) 50 g 0    loratadine (CLARITIN) 10 mg tablet TAKE 1 TABLET (10 MG TOTAL) BY MOUTH DAILY FOR ALLERGIES 90 tablet 1    metFORMIN (GLUCOPHAGE-XR) 500 mg 24 hr tablet TAKE 2 TABLETS BY MOUTH EVERY DAY WITH BREAKFAST 180 tablet 1    methocarbamol (ROBAXIN) 500 mg tablet Take 1 tablet (500 mg total) by mouth 3 (three) times a day for 7 days (Patient not taking: Reported on 12/23/2024) 21 tablet 0    methylPREDNISolone 4 MG tablet therapy pack Use as directed on package (Patient not taking: Reported on 1/6/2025) 21 each 0    multivitamin (THERAGRAN) TABS Take 1 tablet by mouth daily      spironolactone (ALDACTONE) 25 mg tablet TAKE 1 TABLET BY MOUTH EVERY DAY 90 tablet 1    tiotropium (SPIRIVA) 18 mcg inhalation capsule Place 18 mcg into inhaler and inhale daily      Zepbound 2.5 MG/0.5ML auto-injector Inject 0.5 mL (2.5 mg total) under the skin once a week (Patient not  taking: Reported on 1/6/2025) 2 mL 0     No current facility-administered medications on file prior to visit.       Review of Systems  See HPi    Objective:    There were no vitals filed for this visit.    Physical Exam  Vitals and nursing note reviewed.   Constitutional:       General: She is not in acute distress.     Appearance: She is well-developed.   HENT:      Head: Normocephalic and atraumatic.   Eyes:      Conjunctiva/sclera: Conjunctivae normal.   Cardiovascular:      Rate and Rhythm: Normal rate and regular rhythm.      Heart sounds: No murmur heard.  Pulmonary:      Effort: Pulmonary effort is normal. No respiratory distress.      Breath sounds: Normal breath sounds.   Abdominal:      Palpations: Abdomen is soft.      Tenderness: There is no abdominal tenderness.   Musculoskeletal:         General: No swelling.      Cervical back: Neck supple.      Right knee: Effusion present.   Skin:     General: Skin is warm and dry.      Capillary Refill: Capillary refill takes less than 2 seconds.   Neurological:      Mental Status: She is alert.   Psychiatric:         Mood and Affect: Mood normal.         Right Knee Exam     Tenderness   The patient is experiencing tenderness in the lateral joint line and medial joint line.    Range of Motion   Extension:  0   Flexion:  100     Other   Swelling: mild  Effusion: effusion present              Large joint arthrocentesis: R knee  Universal Protocol:  Consent: Verbal consent obtained.  Risks and benefits: risks, benefits and alternatives were discussed  Consent given by: patient  Patient understanding: patient states understanding of the procedure being performed  Patient identity confirmed: verbally with patient  Supporting Documentation  Indications: pain and joint swelling   Procedure Details  Location: knee - R knee  Preparation: Patient was prepped and draped in the usual sterile fashion  Needle size: 18 G  Approach: lateral  Medications administered: 5 mL  "lidocaine 1 %    Aspirate amount: 55 mL  Aspirate: serous  Patient tolerance: patient tolerated the procedure well with no immediate complications  Dressing:  Sterile dressing applied                       Portions of the record may have been created with voice recognition software.  Occasional wrong word or \"sound a like\" substitutions may have occurred due to the inherent limitations of voice recognition software.  Read the chart carefully and recognize, using context, where substitutions have occurred.    "

## 2025-01-06 NOTE — PROGRESS NOTES
"Daily Note     Today's date: 2025  Patient name: Jessica Oliver  : 1980  MRN: [id number]  Referring provider: Jad Johnson MD  Dx:   Encounter Diagnosis     ICD-10-CM    1. Acute left-sided low back pain with left-sided sciatica  M54.42       2. Lumbar radiculopathy  M54.16           Start Time: 08  Stop Time: 930  Total time in clinic (min): 35 minutes    Subjective: Jessica reports that her back and knee are more sore today, will be Seeing dr. Steel for her knee.       Objective: See treatment diary below      Assessment: Tolerated treatment poor. Patient demonstrated fatigue post treatment, exhibited good technique with therapeutic exercises, and would benefit from continued PT.  Held off on majority of standing exercises due to knee pain.       Plan: Continue per plan of care.         Precautions: asthma, HTN, DM, Ankle DF weakness     Daily Treatment Diary      Manual 24        STM L pirirformis done done done done done        STM QL     done                     Therapeutic Ex                    bike nv nv 5' lvl 1  5' lvl 1  5' lvl 1             LTR* 10x5s 10x5s 10x5s          Hooklying hip abd iso* 10x5s dc           Supine hip abd  3x10 BTB 3x10 BTB np         Supine modified piriformis stretch  5x10s 5x10s 5x10s 5x10s        Lateral band walks    GTB 3 laps np        Seated fig 4 stretch    5x10s 5x10s        Standing TFL stretch L  5x10s 5x10s                       Neuro Re-ed                          ADIM nv                   ADIM marches nv                   ADIM heel taps                    Bridges* 3x10 3x10 3x10 BTB  3x10 BTB  3x10 BTB             Sidelying clams  3x8 bw 3x10 bw Pink 3x8  bw 3x10             Low Rows                                 Therapeutic Activity             Step ups glute focus nv 8\" 3x6 8\" 2x10 8\" 3x8 Np knee pain        Lateral step overs   6\" 2x10 np                       Leg Press SL  75# 3x8 55# 3x10  57# 3x12  np           "   Goblet squat                    Pallof Press                                                                                                                                                                                                                                     Modalities                                                    *=on HEP

## 2025-01-08 ENCOUNTER — NURSE TRIAGE (OUTPATIENT)
Age: 45
End: 2025-01-08

## 2025-01-08 DIAGNOSIS — N89.8 WHITE VAGINAL DISCHARGE: Primary | ICD-10-CM

## 2025-01-08 RX ORDER — FLUCONAZOLE 150 MG/1
150 TABLET ORAL DAILY
Qty: 1 TABLET | Refills: 0 | Status: SHIPPED | OUTPATIENT
Start: 2025-01-08 | End: 2025-01-09

## 2025-01-08 NOTE — TELEPHONE ENCOUNTER
"Patient calling in stating she was on antibiotics for an ear infection. Began with thick white vaginal discharge, irritation, itching. Symptoms are similar to past yeast infection. Pharmacy on file confirmed. Diflucan sent per protocol. Advised patient call back if symptoms don't improve after treatment. Patient verbalized understanding.      \"How many yeast infections have you had in the past 2 years?\" - about 2     -If 1 or less, prescribe Diflucan 150mg PO. If no improvement after 1 dose treatment, please instruct patient to call back to schedule appointment. (should be seen within 3 days)    -If more than 4 or more yeast infections in a year or if they are recurrent, schedule appointment within 3 days.    For OB patients: if patient wishes to use over the counter monistat, recommend only the 7 day treatment.     Answer Assessment - Initial Assessment Questions  1. SYMPTOM: \"What's the main symptom you're concerned about?\" (e.g., pain, itching, dryness)      Vaginal itching, irritation, discharge  3. ONSET: \"When did the  s/s  start?\"      Started yesterday morning.  4. PAIN: \"Is there any pain?\" If Yes, ask: \"How bad is it?\" (Scale: 1-10; mild, moderate, severe)      Denies  5. ITCHING: \"Is there any itching?\" If Yes, ask: \"How bad is it?\" (Scale: 1-10; mild, moderate, severe)      Mild-moderate  6. CAUSE: \"What do you think is causing the discharge?\" \"Have you had the same problem before?\" \"What happened then?\"      Yeast infection- similar to past   7. OTHER SYMPTOMS: \"Do you have any other symptoms?\" (e.g., fever, itching, vaginal bleeding, pain with urination, injury to genital area, vaginal foreign body)      Thick white discharge    Protocols used: Vaginal Symptoms-Adult-[place]    "

## 2025-01-09 ENCOUNTER — OFFICE VISIT (OUTPATIENT)
Dept: PHYSICAL THERAPY | Facility: REHABILITATION | Age: 45
End: 2025-01-09
Payer: COMMERCIAL

## 2025-01-09 DIAGNOSIS — M54.42 ACUTE LEFT-SIDED LOW BACK PAIN WITH LEFT-SIDED SCIATICA: Primary | ICD-10-CM

## 2025-01-09 DIAGNOSIS — M54.16 LUMBAR RADICULOPATHY: ICD-10-CM

## 2025-01-09 PROCEDURE — 97112 NEUROMUSCULAR REEDUCATION: CPT | Performed by: PHYSICAL THERAPIST

## 2025-01-09 PROCEDURE — 97140 MANUAL THERAPY 1/> REGIONS: CPT | Performed by: PHYSICAL THERAPIST

## 2025-01-09 PROCEDURE — 97110 THERAPEUTIC EXERCISES: CPT | Performed by: PHYSICAL THERAPIST

## 2025-01-09 NOTE — PROGRESS NOTES
Daily Note     Today's date: 2025  Patient name: Jessica Oliver  : 1980  MRN: [id number]  Referring provider: Jad Johnson MD  Dx:   Encounter Diagnosis     ICD-10-CM    1. Acute left-sided low back pain with left-sided sciatica  M54.42       2. Lumbar radiculopathy  M54.16           Start Time: 1647  Stop Time: 1725  Total time in clinic (min): 38 minutes    Subjective: Jessica reports that her low back and left sided thigh pain are improving overall, still has weakness of her ankle DF musculature as well as tingling within that region. Notes having 50cc's aspirated from her right knee by ortho earlier this week, they're also going to be performing visco supplementation injections in the future.        Objective: See treatment diary below      Assessment: Tolerated treatment well. Patient demonstrated fatigue post treatment, exhibited good technique with therapeutic exercises, and would benefit from continued PT.  Refrained from step ups this session due to continued knee pain.       Plan: Continue per plan of care.         Precautions: asthma, HTN, DM, Ankle DF weakness     Daily Treatment Diary      Manual 24       STM L pirirformis done done done done done done       STM QL     done np                    Therapeutic Ex                    bike nv nv 5' lvl 1  5' lvl 1  5' lvl 1  5' lvl 1           LTR* 10x5s 10x5s 10x5s          Hooklying hip abd iso* 10x5s dc           Supine hip abd  3x10 BTB 3x10 BTB np         Supine modified piriformis stretch  5x10s 5x10s 5x10s 5x10s 5x10s       Lateral band walks    GTB 3 laps np np       Seated fig 4 stretch    5x10s 5x10s 5x10s       Standing TFL stretch L  5x10s 5x10s                       Neuro Re-ed                          ADIM nv                   ADIM marches nv                   ADIM heel taps                    Bridges* 3x10 3x10 3x10 BTB  3x10 BTB  3x10 BTB  prpl 3x10            Sidelying clams  3x8 bw 3x10 bw  "Pink 3x8  bw 3x10  3x10 pink           Low Rows                                 Therapeutic Activity             Step ups glute focus nv 8\" 3x6 8\" 2x10 8\" 3x8 Np knee pain nv       Lateral step overs   6\" 2x10 np                       Leg Press SL  75# 3x8 55# 3x10  57# 3x12  np  57# 3x12           Goblet squat                    Pallof Press                                                                                                                                                                                                                                     Modalities                                                    *=on HEP                "

## 2025-01-16 ENCOUNTER — OFFICE VISIT (OUTPATIENT)
Dept: PHYSICAL THERAPY | Facility: REHABILITATION | Age: 45
End: 2025-01-16
Payer: COMMERCIAL

## 2025-01-16 DIAGNOSIS — M54.42 ACUTE LEFT-SIDED LOW BACK PAIN WITH LEFT-SIDED SCIATICA: Primary | ICD-10-CM

## 2025-01-16 DIAGNOSIS — M54.16 LUMBAR RADICULOPATHY: ICD-10-CM

## 2025-01-16 PROCEDURE — 97112 NEUROMUSCULAR REEDUCATION: CPT | Performed by: PHYSICAL MEDICINE & REHABILITATION

## 2025-01-16 PROCEDURE — 97110 THERAPEUTIC EXERCISES: CPT | Performed by: PHYSICAL MEDICINE & REHABILITATION

## 2025-01-16 PROCEDURE — 97140 MANUAL THERAPY 1/> REGIONS: CPT | Performed by: PHYSICAL MEDICINE & REHABILITATION

## 2025-01-16 NOTE — PROGRESS NOTES
"Daily Note     Today's date: 2025  Patient name: Jessica Oliver  : 1980  MRN: [id number]  Referring provider: Jad Johnson MD  Dx:   Encounter Diagnosis     ICD-10-CM    1. Acute left-sided low back pain with left-sided sciatica  M54.42       2. Lumbar radiculopathy  M54.16                      Subjective: Patient notes knee pain today.     Objective: See treatment diary below    Assessment: Tolerated treatment well. Initial soft tissue restrictions respond well to manual therapy. Patient demonstrated fatigue post treatment, exhibited good technique with therapeutic exercises, and would benefit from continued PT.  Continue as able nv.     Plan: Continue per plan of care.       Precautions: asthma, HTN, DM, Ankle DF weakness     Daily Treatment Diary      Manual 24      STM L pirirformis done done done done done done LH      STM QL     done np                    Therapeutic Ex                    bike nv nv 5' lvl 1  5' lvl 1  5' lvl 1  5' lvl 1  L1, 5'         LTR* 10x5s 10x5s 10x5s          Hooklying hip abd iso* 10x5s dc           Supine hip abd  3x10 BTB 3x10 BTB np         Supine modified piriformis stretch  5x10s 5x10s 5x10s 5x10s 5x10s 5x10\"      Lateral band walks    GTB 3 laps np np np      Seated fig 4 stretch    5x10s 5x10s 5x10s 5x10\"      Standing TFL stretch L  5x10s 5x10s                       Neuro Re-ed                          ADIM nv                   ADIM marches nv                   ADIM heel taps                    Bridges* 3x10 3x10 3x10 BTB  3x10 BTB  3x10 BTB  prpl 3x10   purple 3x10         Sidelying clams  3x8 bw 3x10 bw Pink 3x8  bw 3x10  3x10 pink  purple 2x10         Low Rows                                 Therapeutic Activity             Step ups glute focus nv 8\" 3x6 8\" 2x10 8\" 3x8 Np knee pain nv np      Lateral step overs   6\" 2x10 np                       Leg Press SL  75# 3x8 55# 3x10  57# 3x12  np  57# 3x12  57# " 3x12         Goblet squat                    Pall[employer]                                                                                                                                                                                                                                     Modalities                                                    *=on HEP

## 2025-01-20 ENCOUNTER — OFFICE VISIT (OUTPATIENT)
Dept: PHYSICAL THERAPY | Facility: REHABILITATION | Age: 45
End: 2025-01-20
Payer: COMMERCIAL

## 2025-01-20 DIAGNOSIS — M54.42 ACUTE LEFT-SIDED LOW BACK PAIN WITH LEFT-SIDED SCIATICA: Primary | ICD-10-CM

## 2025-01-20 DIAGNOSIS — M54.16 LUMBAR RADICULOPATHY: ICD-10-CM

## 2025-01-20 PROCEDURE — 97112 NEUROMUSCULAR REEDUCATION: CPT | Performed by: PHYSICAL MEDICINE & REHABILITATION

## 2025-01-20 PROCEDURE — 97140 MANUAL THERAPY 1/> REGIONS: CPT | Performed by: PHYSICAL MEDICINE & REHABILITATION

## 2025-01-20 PROCEDURE — 97110 THERAPEUTIC EXERCISES: CPT | Performed by: PHYSICAL MEDICINE & REHABILITATION

## 2025-01-20 NOTE — PROGRESS NOTES
"Daily Note     Today's date: 2025  Patient name: Jessica Oliver  : 1980  MRN: [id number]  Referring provider: Jad Johnson MD  Dx:   Encounter Diagnosis     ICD-10-CM    1. Acute left-sided low back pain with left-sided sciatica  M54.42       2. Lumbar radiculopathy  M54.16                    Subjective: Patient notes hip and knee pain today. R knee injection next Monday.     Objective: See treatment diary below    Assessment: Tolerated treatment well, including changes to hold times with stretches and banded exercise. Less restriction in glute today. Patient demonstrated fatigue post treatment, exhibited good technique with therapeutic exercises, and would benefit from continued PT.  Continue as able nv.     Plan: Continue per plan of care.       Precautions: asthma, HTN, DM, Ankle DF weakness     Daily Treatment Diary      Manual 24 1/     STM L pirirformis done done done done done done [facility]     STM QL     done np                    Therapeutic Ex                  bike nv nv 5' lvl 1  5' lvl 1  5' lvl 1  5' lvl 1  L1, 5'  L1, 5'       LTR* 10x5s 10x5s 10x5s          Hooklying hip abd iso* 10x5s dc           Supine hip abd  3x10 BTB 3x10 BTB np         Supine modified piriformis stretch  5x10s 5x10s 5x10s 5x10s 5x10s 5x10\" 5x20\"     Lateral band walks    GTB 3 laps np np np      Seated fig 4 stretch    5x10s 5x10s 5x10s 5x10\" 5x20\"     Standing TFL stretch L  5x10s 5x10s                       Neuro Re-ed                         ADIM nv                   ADIM marches nv                   ADIM heel taps                    Bridges* 3x10 3x10 3x10 BTB  3x10 BTB  3x10 BTB  prpl 3x10   purple 3x10  Purple 2x10x5\"       Sidelying clams  3x8 bw 3x10 bw Pink 3x8  bw 3x10  3x10 pink  purple 2x10  Purple 2x10x5\"        Low Rows                            SL hip abd 4x5 L     Therapeutic Activity            Step ups glute focus nv 8\" 3x6 8\" " "2x10 8\" 3x8 Np knee pain nv np      Lateral step overs   6\" 2x10 np                       Leg Press SL  75# 3x8 55# 3x10  57# 3x12  np  57# 3x12  57# 3x12  65# 3x10       Goblet squat                    Pallof Press                                                                                                                                                                                                                                     Modalities                                                    *=on HEP                "

## 2025-01-23 ENCOUNTER — OFFICE VISIT (OUTPATIENT)
Dept: PHYSICAL THERAPY | Facility: REHABILITATION | Age: 45
End: 2025-01-23
Payer: COMMERCIAL

## 2025-01-23 DIAGNOSIS — M54.16 LUMBAR RADICULOPATHY: ICD-10-CM

## 2025-01-23 DIAGNOSIS — M54.42 ACUTE LEFT-SIDED LOW BACK PAIN WITH LEFT-SIDED SCIATICA: Primary | ICD-10-CM

## 2025-01-23 PROCEDURE — 97110 THERAPEUTIC EXERCISES: CPT | Performed by: PHYSICAL MEDICINE & REHABILITATION

## 2025-01-23 PROCEDURE — 97112 NEUROMUSCULAR REEDUCATION: CPT | Performed by: PHYSICAL MEDICINE & REHABILITATION

## 2025-01-23 PROCEDURE — 97140 MANUAL THERAPY 1/> REGIONS: CPT | Performed by: PHYSICAL MEDICINE & REHABILITATION

## 2025-01-23 NOTE — PROGRESS NOTES
"Daily Note     Today's date: 2025  Patient name: Jessica Oliver  : 1980  MRN: [id number]  Referring provider: Jad Johnson MD  Dx:   Encounter Diagnosis     ICD-10-CM    1. Acute left-sided low back pain with left-sided sciatica  M54.42       2. Lumbar radiculopathy  M54.16                    Subjective: Patient reports hard day at work, increased pain through lower back and hip- \"almost 10.\"     Objective: See treatment diary below    Assessment: Tolerated treatment fair. Patient would benefit from continued PT to address remaining deficits. Stiffness vs. Sharp pain end of session. Resume TE nv per patient tolerance.      Plan: Continue per plan of care.       Precautions: asthma, HTN, DM, Ankle DF weakness     Daily Treatment Diary      Manual 24 1    STM L pirirformis done done done done done done LH [facility]    STM QL     done np                    Therapeutic Ex                bike nv nv 5' lvl 1  5' lvl 1  5' lvl 1  5' lvl 1  L1, 5'  L1, 5'  5'     LTR* 10x5s 10x5s 10x5s      10x5\"    Hooklying hip abd iso* 10x5s dc           Supine hip abd  3x10 BTB 3x10 BTB np         Supine modified piriformis stretch  5x10s 5x10s 5x10s 5x10s 5x10s 5x10\" 5x20\" 5x20\"    Lateral band walks    GTB 3 laps np np np      Seated fig 4 stretch    5x10s 5x10s 5x10s 5x10\" 5x20\" 5x20\"    Standing TFL stretch L  5x10s 5x10s                   Pball rollout 10x10\" ea    Neuro Re-ed                    PPT 10x5\"    ADIM nv                   ADIM marches nv                   ADIM heel taps                    Bridges* 3x10 3x10 3x10 BTB  3x10 BTB  3x10 BTB  prpl 3x10   purple 3x10  Purple 2x10x5\"       Sidelying clams  3x8 bw 3x10 bw Pink 3x8  bw 3x10  3x10 pink  purple 2x10  Purple 2x10x5\"        Low Rows               SKTC 10x5\" ea             SL hip abd 4x5 L     Therapeutic Activity           Step ups glute focus nv 8\" 3x6 8\" 2x10 8\" " "3x8 Np knee pain nv np      Lateral step overs   6\" 2x10 np                       Leg Press SL  75# 3x8 55# 3x10  57# 3x12  np  57# 3x12  57# 3x12  65# 3x10       Goblet squat                    Pallof Press                                                                                                                                                                                                                                     Modalities                                                    *=on HEP                "

## 2025-01-27 ENCOUNTER — RESULTS FOLLOW-UP (OUTPATIENT)
Dept: CARDIOLOGY CLINIC | Facility: CLINIC | Age: 45
End: 2025-01-27

## 2025-01-27 ENCOUNTER — HOSPITAL ENCOUNTER (OUTPATIENT)
Dept: NON INVASIVE DIAGNOSTICS | Facility: HOSPITAL | Age: 45
Discharge: HOME | End: 2025-01-27
Attending: INTERNAL MEDICINE
Payer: COMMERCIAL

## 2025-01-27 ENCOUNTER — PROCEDURE VISIT (OUTPATIENT)
Dept: OBGYN CLINIC | Facility: CLINIC | Age: 45
End: 2025-01-27
Payer: COMMERCIAL

## 2025-01-27 VITALS
BODY MASS INDEX: 35.17 KG/M2 | HEART RATE: 80 BPM | SYSTOLIC BLOOD PRESSURE: 118 MMHG | WEIGHT: 206 LBS | HEIGHT: 64 IN | DIASTOLIC BLOOD PRESSURE: 82 MMHG

## 2025-01-27 VITALS — HEIGHT: 64 IN | WEIGHT: 206 LBS | BODY MASS INDEX: 35.17 KG/M2

## 2025-01-27 VITALS
BODY MASS INDEX: 35.17 KG/M2 | HEIGHT: 64 IN | HEART RATE: 98 BPM | DIASTOLIC BLOOD PRESSURE: 86 MMHG | OXYGEN SATURATION: 98 % | SYSTOLIC BLOOD PRESSURE: 144 MMHG | WEIGHT: 206 LBS

## 2025-01-27 DIAGNOSIS — R07.9 CHEST PAIN: ICD-10-CM

## 2025-01-27 DIAGNOSIS — M17.11 PRIMARY OSTEOARTHRITIS OF RIGHT KNEE: Primary | ICD-10-CM

## 2025-01-27 DIAGNOSIS — I10 ESSENTIAL HYPERTENSION: ICD-10-CM

## 2025-01-27 LAB
AORTIC ROOT: 3.4 CM
ASCENDING AORTA: 4 CM
AV LVOT MEAN GRADIENT: 3 MMHG
AV LVOT PEAK GRADIENT: 6 MMHG
AV REGURGITATION PRESSURE HALF TIME: 461 MS
BSA FOR ECHO PROCEDURE: 1.98 M2
DOP CALC LVOT PEAK VEL VTI: 24.21 CM
DOP CALC LVOT PEAK VEL: 1.22 M/S
E WAVE DECELERATION TIME: 198 MS
E/A RATIO: 1.23
FRACTIONAL SHORTENING: 36 (ref 28–44)
INTERVENTRICULAR SEPTUM IN DIASTOLE (PARASTERNAL SHORT AXIS VIEW): 0.8 CM
INTERVENTRICULAR SEPTUM: 0.8 CM (ref 0.6–1.1)
LAAS-AP2: 15.2 CM2
LAAS-AP4: 13 CM2
LEFT ATRIUM SIZE: 3.3 CM
LEFT ATRIUM VOLUME (MOD BIPLANE): 31 ML
LEFT ATRIUM VOLUME INDEX (MOD BIPLANE): 15.7 ML/M2
LEFT INTERNAL DIMENSION IN SYSTOLE: 2.7 CM (ref 2.1–4)
LEFT VENTRICULAR INTERNAL DIMENSION IN DIASTOLE: 4.2 CM (ref 3.5–6)
LEFT VENTRICULAR POSTERIOR WALL IN END DIASTOLE: 1 CM
LEFT VENTRICULAR STROKE VOLUME: 50 ML
LV EF US.2D.A4C+ESTIMATED: 60 %
LVSV (TEICH): 50 ML
MAX HR PERCENT: 88 %
MAX HR: 155 BPM
MV E'TISSUE VEL-SEP: 10 CM/S
MV PEAK A VEL: 0.84 M/S
MV PEAK E VEL: 103 CM/S
MV STENOSIS PRESSURE HALF TIME: 58 MS
MV VALVE AREA P 1/2 METHOD: 3.79
RA PRESSURE ESTIMATED: 3 MMHG
RATE PRESSURE PRODUCT: NORMAL
RIGHT ATRIUM AREA SYSTOLE A4C: 14.5 CM2
RIGHT VENTRICLE ID DIMENSION: 3.6 CM
RV PSP: 22 MMHG
SINOTUBULAR JUNCTION: 3.4 CM
SL CV AV DECELERATION TIME RETROGRADE: 1590 MS
SL CV AV PEAK GRADIENT RETROGRADE: 93 MMHG
SL CV LEFT ATRIUM LENGTH A2C: 5.1 CM
SL CV LV EF: 65
SL CV PED ECHO LEFT VENTRICLE DIASTOLIC VOLUME (MOD BIPLANE) 2D: 77 ML
SL CV PED ECHO LEFT VENTRICLE SYSTOLIC VOLUME (MOD BIPLANE) 2D: 27 ML
SL CV SINUS OF VALSALVA 2D: 3.9 CM
SL CV STRESS RECOVERY BP: NORMAL MMHG
SL CV STRESS RECOVERY HR: 102 BPM
SL CV STRESS RECOVERY O2 SAT: 99 %
SL CV STRESS STAGE REACHED: 3
STJ: 3.4 CM
STRESS ANGINA INDEX: 0
STRESS BASELINE BP: NORMAL MMHG
STRESS BASELINE HR: 98 BPM
STRESS O2 SAT REST: 98 %
STRESS PEAK HR: 155 BPM
STRESS POST ESTIMATED WORKLOAD: 8.5 METS
STRESS POST EXERCISE DUR MIN: 7 MIN
STRESS POST EXERCISE DUR SEC: 0 SEC
STRESS POST O2 SAT PEAK: 99 %
STRESS POST PEAK BP: 180 MMHG
TR MAX PG: 19 MMHG
TR PEAK VELOCITY: 2.2 M/S
TRICUSPID ANNULAR PLANE SYSTOLIC EXCURSION: 2 CM
TRICUSPID VALVE PEAK REGURGITATION VELOCITY: 2.18 M/S

## 2025-01-27 PROCEDURE — 93017 CV STRESS TEST TRACING ONLY: CPT

## 2025-01-27 PROCEDURE — 93306 TTE W/DOPPLER COMPLETE: CPT | Performed by: INTERNAL MEDICINE

## 2025-01-27 PROCEDURE — 93306 TTE W/DOPPLER COMPLETE: CPT

## 2025-01-27 PROCEDURE — 93016 CV STRESS TEST SUPVJ ONLY: CPT | Performed by: INTERNAL MEDICINE

## 2025-01-27 PROCEDURE — 93018 CV STRESS TEST I&R ONLY: CPT | Performed by: INTERNAL MEDICINE

## 2025-01-27 PROCEDURE — 20610 DRAIN/INJ JOINT/BURSA W/O US: CPT | Performed by: ORTHOPAEDIC SURGERY

## 2025-01-27 RX ORDER — HYALURONATE SODIUM 10 MG/ML
20 SYRINGE (ML) INTRAARTICULAR
Status: COMPLETED | OUTPATIENT
Start: 2025-01-27 | End: 2025-01-27

## 2025-01-27 RX ADMIN — Medication 20 MG: at 11:30

## 2025-01-27 NOTE — PROGRESS NOTES
1. Primary osteoarthritis of right knee          Patient is here for her first injection of euflexxa into the right knee.     Patient rates their pain as 7/10 today    Physical exam of the knee shows no effusion no ecchymosis.      Large joint arthrocentesis: R knee  Universal Protocol:  Consent given by: patient  Supporting Documentation  Indications: pain   Procedure Details  Location: knee - R knee  Needle size: 22 G  Ultrasound guidance: no  Approach: anterolateral  Medications administered: 20 mg Sodium Hyaluronate (Viscosup) 20 MG/2ML    Patient tolerance: patient tolerated the procedure well with no immediate complications          Patient tolerated procedure follow up 1 week

## 2025-01-28 LAB
CHEST PAIN STATEMENT: NORMAL
MAX DIASTOLIC BP: 76 MMHG
MAX PREDICTED HEART RATE: 175 BPM
PROTOCOL NAME: NORMAL
REASON FOR TERMINATION: NORMAL
STRESS POST EXERCISE DUR MIN: 7 MIN
STRESS POST EXERCISE DUR SEC: 0 SEC
STRESS POST PEAK HR: 155 BPM
STRESS POST PEAK SYSTOLIC BP: 180 MMHG
TARGET HR FORMULA: NORMAL
TEST INDICATION: NORMAL

## 2025-01-30 ENCOUNTER — OFFICE VISIT (OUTPATIENT)
Dept: PHYSICAL THERAPY | Facility: REHABILITATION | Age: 45
End: 2025-01-30
Payer: COMMERCIAL

## 2025-01-30 DIAGNOSIS — M54.42 ACUTE LEFT-SIDED LOW BACK PAIN WITH LEFT-SIDED SCIATICA: Primary | ICD-10-CM

## 2025-01-30 DIAGNOSIS — M54.16 LUMBAR RADICULOPATHY: ICD-10-CM

## 2025-01-30 PROCEDURE — 97112 NEUROMUSCULAR REEDUCATION: CPT

## 2025-01-30 PROCEDURE — 97110 THERAPEUTIC EXERCISES: CPT

## 2025-01-30 PROCEDURE — 97140 MANUAL THERAPY 1/> REGIONS: CPT

## 2025-01-30 NOTE — PROGRESS NOTES
"Daily Note     Today's date: 2025  Patient name: Jessica Oliver  : 1980  MRN: [id number]  Referring provider: Jad Johnson MD  Dx:   Encounter Diagnosis     ICD-10-CM    1. Acute left-sided low back pain with left-sided sciatica  M54.42       2. Lumbar radiculopathy  M54.16           Start Time: 1656  Stop Time: 1750  Total time in clinic (min): 54 minutes    Subjective: Patient reports she has not been experiencing N/T radiating down LLE, however reports low back and left sided hip discomfort. Patient reports she received injection yesterday for her right knee.       Objective: See treatment diary below      Assessment: Tolerated treatment well. Patient demonstrated fatigue post treatment, exhibited good technique with therapeutic exercises, and would benefit from continued PT Trialed REIL today with patient, reporting no N/T, however slight increase of left hip discomfort. Trialed STM/trigger point of L LS/ES/lateral hip with patient reporting some relief after completion. Trialed a lateral prone off set position, with patient able to tolerate about 2.5 minutes before feeling \"stuck.\"       Plan: Continue per plan of care.  Progress treatment as tolerated.       Precautions: asthma, HTN, DM, Ankle DF weakness     Daily Treatment Diary      Manual 24 1/   STM L pirirformis done done done done done done LH LH LH    STM QL     done np       STM L LS/ES/lateral hip/piriformis           Done    Therapeutic Ex                bike nv nv 5' lvl 1  5' lvl 1  5' lvl 1  5' lvl 1  L1, 5'  L1, 5'  5'  5'    LTR* 10x5s 10x5s 10x5s      10x5\" 10x5''   Hooklying hip abd iso* 10x5s dc           Supine hip abd  3x10 BTB 3x10 BTB np         Supine modified piriformis stretch  5x10s 5x10s 5x10s 5x10s 5x10s 5x10\" 5x20\" 5x20\" 5x20''    Lateral band walks    GTB 3 laps np np np      Seated fig 4 stretch    5x10s 5x10s 5x10s 5x10\" 5x20\" 5x20\" 5x20'' " "  Standing TFL stretch L  5x10s 5x10s                   Pball rollout 10x10\" ea Pball roll out 10x10'' ea   Neuro Re-ed       1/16 1/20 1/23             PPT 10x5\"    ADIM nv                   ADIM marches nv                   ADIM heel taps                    Bridges* 3x10 3x10 3x10 BTB  3x10 BTB  3x10 BTB  prpl 3x10   purple 3x10  Purple 2x10x5\"       Sidelying clams  3x8 bw 3x10 bw Pink 3x8  bw 3x10  3x10 pink  purple 2x10  Purple 2x10x5\"        Low Rows               SKTC 10x5\" ea     REIL          Done 10x   JUAN          Done- lateral off set 2.5 minutes           SL hip abd 4x5 L     Therapeutic Activity       1/16 1/20 1/23    Step ups glute focus nv 8\" 3x6 8\" 2x10 8\" 3x8 Np knee pain nv np      Lateral step overs   6\" 2x10 np                       Leg Press SL  75# 3x8 55# 3x10  57# 3x12  np  57# 3x12  57# 3x12  65# 3x10       Goblet squat                    Pallof Press                                                                                                                                                                                                                                     Modalities                                                    *=on HEP                 "

## 2025-02-03 ENCOUNTER — PROCEDURE VISIT (OUTPATIENT)
Dept: OBGYN CLINIC | Facility: CLINIC | Age: 45
End: 2025-02-03
Payer: COMMERCIAL

## 2025-02-03 ENCOUNTER — OFFICE VISIT (OUTPATIENT)
Dept: PHYSICAL THERAPY | Facility: REHABILITATION | Age: 45
End: 2025-02-03
Payer: COMMERCIAL

## 2025-02-03 VITALS — HEIGHT: 64 IN | WEIGHT: 206 LBS | BODY MASS INDEX: 35.17 KG/M2

## 2025-02-03 DIAGNOSIS — M22.2X1 PATELLOFEMORAL DISORDER OF RIGHT KNEE: Primary | ICD-10-CM

## 2025-02-03 DIAGNOSIS — M54.16 LUMBAR RADICULOPATHY: ICD-10-CM

## 2025-02-03 DIAGNOSIS — M54.42 ACUTE LEFT-SIDED LOW BACK PAIN WITH LEFT-SIDED SCIATICA: Primary | ICD-10-CM

## 2025-02-03 PROCEDURE — 97112 NEUROMUSCULAR REEDUCATION: CPT | Performed by: PHYSICAL THERAPIST

## 2025-02-03 PROCEDURE — 20610 DRAIN/INJ JOINT/BURSA W/O US: CPT | Performed by: PHYSICIAN ASSISTANT

## 2025-02-03 PROCEDURE — 97140 MANUAL THERAPY 1/> REGIONS: CPT | Performed by: PHYSICAL THERAPIST

## 2025-02-03 PROCEDURE — 97110 THERAPEUTIC EXERCISES: CPT | Performed by: PHYSICAL THERAPIST

## 2025-02-03 RX ORDER — HYALURONATE SODIUM 10 MG/ML
20 SYRINGE (ML) INTRAARTICULAR
Status: COMPLETED | OUTPATIENT
Start: 2025-02-03 | End: 2025-02-03

## 2025-02-03 RX ADMIN — Medication 20 MG: at 11:45

## 2025-02-03 NOTE — PROGRESS NOTES
.sacc  1. Patellofemoral disorder of right knee            Patient is here for her second injection of euflexxa into the right knee.     Patient rates their pain as 7/10 today    Physical exam of the knee shows no effusion no ecchymosis.      Large joint arthrocentesis: R knee  Universal Protocol:  Consent given by: patient  Patient identity confirmed: verbally with patient  Supporting Documentation  Indications: pain   Procedure Details  Location: knee - R knee  Needle size: 22 G  Ultrasound guidance: no  Approach: anterolateral  Medications administered: 20 mg Sodium Hyaluronate (Viscosup) 20 MG/2ML    Patient tolerance: patient tolerated the procedure well with no immediate complications  Dressing:  Sterile dressing applied          Patient tolerated procedure follow up 1 week

## 2025-02-03 NOTE — PROGRESS NOTES
"Daily Note     Today's date: 2/3/2025  Patient name: Jessica Oliver  : 1980  MRN: [id number]  Referring provider: Jad Johnson MD  Dx:   Encounter Diagnosis     ICD-10-CM    1. Acute left-sided low back pain with left-sided sciatica  M54.42       2. Lumbar radiculopathy  M54.16                      Subjective: Jessica reports that her back felt great a few minutes after last session.       Objective: See treatment diary below      Assessment: Tolerated treatment well. Patient demonstrated fatigue post treatment, exhibited good technique with therapeutic exercises, and would benefit from continued PT.  Encouraged Jessica to perform JUAN at least 3-4x/day as able and progressing to REIL with offset hips.       Plan: Continue per plan of care.         Precautions: asthma, HTN, DM, Ankle DF weakness     Daily Treatment Diary      Manual  1 1 2/3    STM L pirirformis done done done done LH LH LH  ES done    Prone press ups w pressure         done    STM QL   done np         STM L LS/ES/lateral hip/piriformis         Done      Therapeutic Ex                bike 5' lvl 1  5' lvl 1  5' lvl 1  5' lvl 1  L1, 5'  L1, 5'  5'  5'  5' lvl 1    LTR* 10x5s      10x5\" 10x5''     Hooklying hip abd iso*             Supine hip abd 3x10 BTB np           Supine modified piriformis stretch 5x10s 5x10s 5x10s 5x10s 5x10\" 5x20\" 5x20\" 5x20''      Lateral band walks  GTB 3 laps np np np        Seated fig 4 stretch  5x10s 5x10s 5x10s 5x10\" 5x20\" 5x20\" 5x20''     Standing TFL stretch L 5x10s                   Pball rollout 10x10\" ea Pball roll out 10x10'' ea np    Neuro Re-ed                  PPT 10x5\"      ADIM                    ADIM marches                    ADIM heel taps                    Bridges* 3x10 BTB  3x10 BTB  3x10 BTB  prpl 3x10   purple 3x10  Purple 2x10x5\"         Sidelying clams 3x10 bw Pink 3x8  bw 3x10  3x10 pink  purple 2x10  Purple 2x10x5\"      Prpl " "3x8 ea    Low Rows             SKTC 10x5\" ea       REIL        Done 10x 10x, 2x10 offset    JUAN        Done- lateral off set 2.5 minutes 3' lateral offset                       SL hip abd 4x5 L       Therapeutic Activity     1/16 1/20 1/23      Step ups glute focus 8\" 2x10 8\" 3x8 Np knee pain nv np        Lateral step overs  np                         Leg Press SL 55# 3x10  57# 3x12  np  57# 3x12  57# 3x12  65# 3x10         Goblet squat                    Pallof Press                                                                                                                                                                                                                       Modalities                                                *=on HEP                    "

## 2025-02-06 ENCOUNTER — APPOINTMENT (OUTPATIENT)
Dept: PHYSICAL THERAPY | Facility: REHABILITATION | Age: 45
End: 2025-02-06
Payer: COMMERCIAL

## 2025-02-10 ENCOUNTER — OFFICE VISIT (OUTPATIENT)
Dept: PHYSICAL THERAPY | Facility: REHABILITATION | Age: 45
End: 2025-02-10
Payer: COMMERCIAL

## 2025-02-10 ENCOUNTER — TELEPHONE (OUTPATIENT)
Age: 45
End: 2025-02-10

## 2025-02-10 DIAGNOSIS — M54.42 ACUTE LEFT-SIDED LOW BACK PAIN WITH LEFT-SIDED SCIATICA: Primary | ICD-10-CM

## 2025-02-10 PROCEDURE — 97110 THERAPEUTIC EXERCISES: CPT | Performed by: PHYSICAL THERAPIST

## 2025-02-10 PROCEDURE — 97112 NEUROMUSCULAR REEDUCATION: CPT | Performed by: PHYSICAL THERAPIST

## 2025-02-10 PROCEDURE — 97140 MANUAL THERAPY 1/> REGIONS: CPT | Performed by: PHYSICAL THERAPIST

## 2025-02-10 NOTE — PROGRESS NOTES
"Daily Note     Today's date: 2/10/2025  Patient name: Jessica Oliver  : 1980  MRN: [id number]  Referring provider: Jad Johnson MD  Dx:   Encounter Diagnosis     ICD-10-CM    1. Acute left-sided low back pain with left-sided sciatica  M54.42           Start Time: 1017  Stop Time: 1100  Total time in clinic (min): 43 minutes    Subjective: Jessica reports that her back continues to feel pretty good overall. Notes falling last week down her steps outside her residence due to ice. Denies head trauma or LOC, just soreness in her neck and shoulder and low back.       Objective: See treatment diary below      Assessment: Tolerated treatment well. Patient demonstrated fatigue post treatment, exhibited good technique with therapeutic exercises, and would benefit from continued PT      Plan: Continue per plan of care.         Precautions: asthma, HTN, DM, Ankle DF weakness     Daily Treatment Diary      Manual 1/6 1/9 1/16 1/20 1/23 1/30 2/3 2/10    STM L pirirformis done done LH LH LH  ES done done    Prone press ups w pressure       done done    STM QL done np          STM L LS/ES/lateral hip/piriformis       Done       Therapeutic Ex              bike  5' lvl 1  5' lvl 1  L1, 5'  L1, 5'  5'  5'  5' lvl 1 5' lvl 1    LTR*     10x5\" 10x5''      Hooklying hip abd iso*            Supine hip abd            Supine modified piriformis stretch 5x10s 5x10s 5x10\" 5x20\" 5x20\" 5x20''   5x20s    Lateral band walks np np np         Seated fig 4 stretch 5x10s 5x10s 5x10\" 5x20\" 5x20\" 5x20''      Standing TFL stretch L                 Pball rollout 10x10\" ea Pball roll out 10x10'' ea np     Neuro Re-ed               PPT 10x5\"       ADIM                  ADIM marches                  ADIM heel taps                  Bridges*  3x10 BTB  prpl 3x10   purple 3x10  Purple 2x10x5\"          Sidelying clams  bw 3x10  3x10 pink  purple 2x10  Purple 2x10x5\"      Prpl 3x8 ea Prpl 3x8    Low Rows          " "SKTC 10x5\" ea        REIL      Done 10x 10x, 2x10 offset 2x10 w overpressure    JUAN      Done- lateral off set 2.5 minutes 3' lateral offset                     SL hip abd 4x5 L        Therapeutic Activity   1/16 1/20 1/23       Step ups glute focus Np knee pain nv np         Lateral step overs                         Leg Press SL  np  57# 3x12  57# 3x12  65# 3x10     65# 3x10    Goblet squat                  Pallof Press                                                                                                                                                                                            Modalities                                          *=on HEP                      "

## 2025-02-17 ENCOUNTER — PROCEDURE VISIT (OUTPATIENT)
Dept: OBGYN CLINIC | Facility: CLINIC | Age: 45
End: 2025-02-17
Payer: COMMERCIAL

## 2025-02-17 VITALS — HEIGHT: 64 IN | BODY MASS INDEX: 35.17 KG/M2 | WEIGHT: 206 LBS

## 2025-02-17 DIAGNOSIS — M22.2X1 PATELLOFEMORAL DISORDER OF RIGHT KNEE: Primary | ICD-10-CM

## 2025-02-17 PROCEDURE — 20610 DRAIN/INJ JOINT/BURSA W/O US: CPT | Performed by: PHYSICIAN ASSISTANT

## 2025-02-17 RX ORDER — HYALURONATE SODIUM 10 MG/ML
20 SYRINGE (ML) INTRAARTICULAR
Status: COMPLETED | OUTPATIENT
Start: 2025-02-17 | End: 2025-02-17

## 2025-02-17 RX ADMIN — Medication 20 MG: at 12:00

## 2025-02-17 NOTE — PROGRESS NOTES
1. Patellofemoral disorder of right knee              Patient is here for her second injection of euflexxa into the right knee.     Patient rates their pain as 7/10 today    Physical exam of the knee shows no effusion no ecchymosis.      Large joint arthrocentesis: R knee  Universal Protocol:  Consent given by: patient  Patient identity confirmed: verbally with patient  Supporting Documentation  Indications: pain   Procedure Details  Location: knee - R knee  Needle size: 22 G  Ultrasound guidance: no  Approach: anterolateral  Medications administered: 20 mg Sodium Hyaluronate (Viscosup) 20 MG/2ML    Patient tolerance: patient tolerated the procedure well with no immediate complications  Dressing:  Sterile dressing applied          Patient tolerated procedure follow up 1 week

## 2025-02-19 ENCOUNTER — OFFICE VISIT (OUTPATIENT)
Age: 45
End: 2025-02-19
Payer: COMMERCIAL

## 2025-02-19 VITALS
OXYGEN SATURATION: 98 % | BODY MASS INDEX: 33.63 KG/M2 | DIASTOLIC BLOOD PRESSURE: 80 MMHG | SYSTOLIC BLOOD PRESSURE: 122 MMHG | HEIGHT: 64 IN | WEIGHT: 197 LBS | RESPIRATION RATE: 18 BRPM | HEART RATE: 103 BPM | TEMPERATURE: 98.7 F

## 2025-02-19 DIAGNOSIS — R11.0 NAUSEA: Primary | ICD-10-CM

## 2025-02-19 DIAGNOSIS — I10 ESSENTIAL HYPERTENSION: ICD-10-CM

## 2025-02-19 DIAGNOSIS — E66.811 OBESITY (BMI 30.0-34.9): ICD-10-CM

## 2025-02-19 DIAGNOSIS — R19.7 DIARRHEA, UNSPECIFIED TYPE: ICD-10-CM

## 2025-02-19 DIAGNOSIS — E78.2 MIXED HYPERLIPIDEMIA: ICD-10-CM

## 2025-02-19 DIAGNOSIS — F33.1 MODERATE EPISODE OF RECURRENT MAJOR DEPRESSIVE DISORDER (HCC): ICD-10-CM

## 2025-02-19 PROCEDURE — 99214 OFFICE O/P EST MOD 30 MIN: CPT | Performed by: INTERNAL MEDICINE

## 2025-02-19 RX ORDER — AMLODIPINE BESYLATE 5 MG/1
5 TABLET ORAL DAILY
Qty: 90 TABLET | Refills: 1 | Status: SHIPPED | OUTPATIENT
Start: 2025-02-19 | End: 2025-08-18

## 2025-02-19 RX ORDER — ONDANSETRON 4 MG/1
4 TABLET, FILM COATED ORAL EVERY 8 HOURS PRN
Qty: 20 TABLET | Refills: 0 | Status: SHIPPED | OUTPATIENT
Start: 2025-02-19

## 2025-02-19 NOTE — PROGRESS NOTES
Name: Jessica Oliver      : 1980      MRN: [id number]  Encounter Provider: Jad Johnson MD  Encounter Date: 2025   Encounter department: [facility]  :  Assessment & Plan  Essential hypertension  Stable, continue current medications encouraged DASH diet.  Orders:    amLODIPine (NORVASC) 5 mg tablet; Take 1 tablet (5 mg total) by mouth daily Blood presuure    CBC and differential; Future    Comprehensive metabolic panel; Future    Nausea  Secondary to ongoing gastroenteritis likely viral  Will give nausea medications encourage hydration, follow-up for persistent symptoms  Orders:    ondansetron (ZOFRAN) 4 mg tablet; Take 1 tablet (4 mg total) by mouth every 8 (eight) hours as needed for nausea or vomiting    CBC and differential; Future    Comprehensive metabolic panel; Future    Mixed hyperlipidemia  Diet controlled  Encouraged low animal fat diet and regular physical activity.  Orders:    CBC and differential; Future    Comprehensive metabolic panel; Future    Lipid panel; Future    Moderate episode of recurrent major depressive disorder (HCC)  Stable, continue Wellbutrin         Diarrhea, unspecified type  Likely viral  Encourage plenty of hydration, soft low residue diet, follow-up for persistent symptoms.       Obesity (BMI 30.0-34.9)    Patient has Zepbound supplies, has not yet initiated yet.  Wait until after current diarrhea episode is resolved.  Encouraged dietary modification and regular physical activity              History of Present Illness   Patient comes for follow-up of multiple chronic medical conditions as noted above.  Patient has been having some nausea vomiting and loose liquid stools since yesterday, not admixed with any blood.  Similar symptoms in family members, possible viral etiology    HPI  Review of Systems   Constitutional:  Negative for appetite change, chills, diaphoresis, fatigue, fever and unexpected weight change.   Respiratory:   Negative for apnea, cough, choking, chest tightness, shortness of breath, wheezing and stridor.    Cardiovascular:  Negative for chest pain, palpitations and leg swelling.   Gastrointestinal:  Positive for diarrhea, nausea and vomiting. Negative for abdominal distention, abdominal pain, anal bleeding, blood in stool and constipation.   Genitourinary:  Negative for decreased urine volume, difficulty urinating, frequency and urgency.   Musculoskeletal:  Negative for arthralgias, back pain and myalgias.   Neurological:  Negative for dizziness, light-headedness, numbness and headaches.         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    Adrenal mass, left (HCC) 2021    1.8 x 2.1 cm on ct 2020    Arthritis     Asthma     Blurry vision 2020    Diabetes mellitus (HCC)     Essential hypertension 2020    Headache 2020    Hidradenitis suppurativa     Hypertension     Immunization deficiency 10/02/2020    Hep a nonimmune    Insomnia 2021    Migraine     Moderate episode of recurrent major depressive disorder (HCC) 2020    Obesity (BMI 30.0-34.9) 2021    Prediabetes 2020    Seasonal allergies     Tobacco use 2021    Vitamin D deficiency 10/02/2020     Past Surgical History:   Procedure Laterality Date    APPENDECTOMY      BREAST BIOPSY Left 2017    BREAST BIOPSY Right     2 core biopsies    BREAST EXCISIONAL BIOPSY Left 2018    BREAST SURGERY Left 2018    Left breast mass excision    CERVICAL BIOPSY  W/ LOOP ELECTRODE EXCISION       SECTION      X2    COLPOSCOPY      CYST REMOVAL      FACIAL/NECK BIOPSY Right 10/03/2023    Procedure: EXCISION MASS RIGHT CHEEK;  Surgeon: Robert Bloch, MD;  Location:  MAIN OR;  Service: General    FL INJECTION RIGHT KNEE (ARTHROGRAM)  01/15/2024    HYSTERECTOMY      heavy bleeding, ovaries remain, also had abnormal pap     KNEE SURGERY      LYMPH NODE DISSECTION  2018    under armpit    TUBAL LIGATION  2007    US GUIDED THYROID BIOPSY  01/27/2021     Family History   Problem Relation Age of Onset    Diabetes Mother     Heart attack Mother     Heart disease Mother         Internal Defibrilation    No Known Problems Father     Endometrial cancer Sister 28    Colon cancer Sister 35    No Known Problems Daughter     Diabetes Maternal Grandmother     Diabetes Paternal Grandmother     No Known Problems Son     No Known Problems Son     Lupus Maternal Aunt 48    Seizures Maternal Aunt     Leukemia Maternal Uncle 18    Diabetes Paternal Aunt     No Known Problems Paternal Aunt     Breast cancer Neg Hx       reports that she has been smoking cigarettes. She has a 10 pack-year smoking history. She has been exposed to tobacco smoke. She has never used smokeless tobacco. She reports that she does not currently use alcohol. She reports that she does not use drugs.  Current Outpatient Medications   Medication Instructions    albuterol (PROVENTIL HFA,VENTOLIN HFA) 90 mcg/act inhaler INHALE 2 PUFFS BY MOUTH EVERY 6 HOURS AS NEEDED FOR WHEEZING    amLODIPine (NORVASC) 5 mg, Oral, Daily, Blood presuure    buPROPion (WELLBUTRIN XL) 300 mg, Oral, Every morning    [employer] Vitamin C 500 MG tablet TAKE 1 TABLET (500 MG TOTAL) BY MOUTH DAILY.    cyanocobalamin (VITAMIN B-12) 2,000 mcg, Oral, Daily    Euflexxa 20 MG/2ML SOSY     gabapentin (Neurontin) 100 mg capsule Take 1 capsule (100 mg total) by mouth daily at bedtime for 3 days, THEN 2 capsules (200 mg total) daily at bedtime for 3 days, THEN 3 capsules (300 mg total) daily at bedtime.    ibuprofen (MOTRIN) 600 mg, Oral, Every 6 hours PRN    lidocaine (XYLOCAINE) 5 % ointment Topical, 3 times daily PRN    loratadine (CLARITIN) 10 mg, Oral, Daily, For allergies    metFORMIN (GLUCOPHAGE-XR) 500 mg 24 hr tablet TAKE 2 TABLETS BY MOUTH EVERY DAY WITH BREAKFAST    methocarbamol (ROBAXIN) 500 mg, Oral, 3 times  "daily    methylPREDNISolone 4 MG tablet therapy pack Use as directed on package    multivitamin (THERAGRAN) TABS 1 tablet, Daily    ondansetron (ZOFRAN) 4 mg, Oral, Every 8 hours PRN    spironolactone (ALDACTONE) 25 mg tablet TAKE 1 TABLET BY MOUTH EVERY DAY    tiotropium (SPIRIVA) 18 mcg, Daily    Vitamin D3 5,000 Units, Oral, Daily    Zepbound 2.5 mg, Subcutaneous, Weekly   No Known Allergies   Objective   /80 (BP Location: Left arm, Patient Position: Sitting, Cuff Size: Standard)   Pulse 103   Temp 98.7 °F (37.1 °C) (Temporal)   Resp 18   Ht 5' 4\" (1.626 m)   Wt 89.4 kg (197 lb)   SpO2 98%   BMI 33.81 kg/m²      Physical Exam  Constitutional:       General: She is not in acute distress.     Appearance: Normal appearance. She is normal weight. She is not ill-appearing, toxic-appearing or diaphoretic.   Cardiovascular:      Rate and Rhythm: Normal rate and regular rhythm.      Pulses: Normal pulses.      Heart sounds: Normal heart sounds. No murmur heard.     No gallop.   Pulmonary:      Effort: Pulmonary effort is normal. No respiratory distress.      Breath sounds: Normal breath sounds. No stridor. No wheezing, rhonchi or rales.   Chest:      Chest wall: No tenderness.   Abdominal:      General: There is no distension.      Palpations: Abdomen is soft.      Tenderness: There is no abdominal tenderness. There is no guarding.   Musculoskeletal:      Right lower leg: No edema.      Left lower leg: No edema.   Neurological:      Mental Status: She is alert and oriented to person, place, and time.         "

## 2025-02-20 NOTE — ASSESSMENT & PLAN NOTE
Patient has Zepbound supplies, has not yet initiated yet.  Wait until after current diarrhea episode is resolved.  Encouraged dietary modification and regular physical activity

## 2025-02-20 NOTE — ASSESSMENT & PLAN NOTE
Stable, continue current medications encouraged DASH diet.  Orders:    amLODIPine (NORVASC) 5 mg tablet; Take 1 tablet (5 mg total) by mouth daily Blood presuure    CBC and differential; Future    Comprehensive metabolic panel; Future

## 2025-02-26 NOTE — PROGRESS NOTES
Annual Exam Pre-charting    Last Annual Exam: 2024    Last PAP/HPV Test and Result: 2022, PAP/HPV neg    Last Mammo Screenin2024    Last STD Culture Screenin2024    Current BC Method: hysterectomy

## 2025-02-27 ENCOUNTER — APPOINTMENT (OUTPATIENT)
Dept: PHYSICAL THERAPY | Facility: REHABILITATION | Age: 45
End: 2025-02-27
Payer: COMMERCIAL

## 2025-03-03 ENCOUNTER — ANNUAL EXAM (OUTPATIENT)
Dept: OBGYN CLINIC | Facility: CLINIC | Age: 45
End: 2025-03-03
Payer: COMMERCIAL

## 2025-03-03 VITALS
DIASTOLIC BLOOD PRESSURE: 82 MMHG | WEIGHT: 200.6 LBS | HEIGHT: 64 IN | BODY MASS INDEX: 34.25 KG/M2 | SYSTOLIC BLOOD PRESSURE: 136 MMHG

## 2025-03-03 DIAGNOSIS — Z01.419 ENCOUNTER FOR GYNECOLOGICAL EXAMINATION WITHOUT ABNORMAL FINDING: ICD-10-CM

## 2025-03-03 DIAGNOSIS — Z01.419 WOMEN'S ANNUAL ROUTINE GYNECOLOGICAL EXAMINATION: Primary | ICD-10-CM

## 2025-03-03 DIAGNOSIS — Z11.3 SCREENING EXAMINATION FOR STD (SEXUALLY TRANSMITTED DISEASE): ICD-10-CM

## 2025-03-03 PROCEDURE — G0145 SCR C/V CYTO,THINLAYER,RESCR: HCPCS | Performed by: OBSTETRICS & GYNECOLOGY

## 2025-03-03 PROCEDURE — G0476 HPV COMBO ASSAY CA SCREEN: HCPCS | Performed by: OBSTETRICS & GYNECOLOGY

## 2025-03-03 PROCEDURE — 87491 CHLMYD TRACH DNA AMP PROBE: CPT | Performed by: OBSTETRICS & GYNECOLOGY

## 2025-03-03 PROCEDURE — 99396 PREV VISIT EST AGE 40-64: CPT | Performed by: OBSTETRICS & GYNECOLOGY

## 2025-03-03 PROCEDURE — 87591 N.GONORRHOEAE DNA AMP PROB: CPT | Performed by: OBSTETRICS & GYNECOLOGY

## 2025-03-03 NOTE — PROGRESS NOTES
"Subjective      Jessica Oliver is a 44 y.o. female who presents for annual well woman exam.     History of abnormal Pap smear: yes - h/o leep   No fh cancer      Menstrual History:  OB History          5    Para   3    Term   3            AB   2    Living   3         SAB        IAB        Ectopic        Multiple        Live Births   3                  No LMP recorded. Patient has had a hysterectomy.       The following portions of the patient's history were reviewed and updated as appropriate: allergies, current medications, past family history, past medical history, past social history, past surgical history, and problem list.    Review of Systems  Review of Systems   Constitutional:  Negative for activity change, appetite change, chills, fatigue and fever.   Respiratory:  Negative for apnea, cough, chest tightness and shortness of breath.    Cardiovascular:  Negative for chest pain, palpitations and leg swelling.   Gastrointestinal:  Negative for abdominal pain, constipation, diarrhea, nausea and vomiting.   Genitourinary:  Negative for difficulty urinating, dysuria, flank pain, frequency, hematuria and urgency.   Neurological:  Negative for dizziness, seizures, syncope, light-headedness, numbness and headaches.   Psychiatric/Behavioral:  Negative for agitation and confusion.           Objective      /82 (BP Location: Left arm, Patient Position: Sitting, Cuff Size: Adult)   Ht 5' 4\" (1.626 m)   Wt 91 kg (200 lb 9.6 oz)   BMI 34.43 kg/m²     Physical Exam  OBGyn Exam     General:   alert and oriented, in no acute distress, alert, appears stated age, and cooperative   Heart: regular rate and rhythm, S1, S2 normal, no murmur, click, rub or gallop   Lungs: clear to auscultation bilaterally   Abdomen: soft, non-tender, without masses or organomegaly   Vulva: normal   Vagina: normal mucosa, normal discharge   Cervix: surgically absent   Uterus: surgically absent   Adnexa:  Breast Exam:  no mass, " fullness, tenderness  breasts appear normal, no suspicious masses, no skin or nipple changes or axillary nodes.                Assessment      @well woman@ .  44-year-old female  Annual exam  Had total laparoscopic hysterectomy secondary to pelvic pain menorrhagia at age 30  History of LEEP can not remember exact year  Smoker cessation recommended  Chronic hypertension stable on medication  Herpes taking Valtrex with outbreak  Genetic testing neg  Plan  Pap/HPV vaginal cuff  GC/CT/affirm  patient desire STD check  Diet/exercise   Calcium/vitamin-D  Mammogram  Already had colonoscopy  Return to office for annual exam        All questions answered.     There are no Patient Instructions on file for this visit.

## 2025-03-05 ENCOUNTER — RESULTS FOLLOW-UP (OUTPATIENT)
Dept: OBGYN CLINIC | Facility: CLINIC | Age: 45
End: 2025-03-05

## 2025-03-06 LAB
LAB AP GYN PRIMARY INTERPRETATION: NORMAL
Lab: NORMAL

## 2025-03-09 ENCOUNTER — HOSPITAL ENCOUNTER (EMERGENCY)
Facility: HOSPITAL | Age: 45
Discharge: HOME | End: 2025-03-09
Attending: EMERGENCY MEDICINE | Admitting: EMERGENCY MEDICINE
Payer: COMMERCIAL

## 2025-03-09 ENCOUNTER — APPOINTMENT (EMERGENCY)
Dept: RADIOLOGY | Facility: HOSPITAL | Age: 45
End: 2025-03-09
Payer: COMMERCIAL

## 2025-03-09 VITALS
SYSTOLIC BLOOD PRESSURE: 140 MMHG | TEMPERATURE: 98.2 F | RESPIRATION RATE: 16 BRPM | DIASTOLIC BLOOD PRESSURE: 78 MMHG | OXYGEN SATURATION: 100 % | HEART RATE: 74 BPM

## 2025-03-09 DIAGNOSIS — M54.50 ACUTE LEFT-SIDED LOW BACK PAIN WITHOUT SCIATICA: ICD-10-CM

## 2025-03-09 DIAGNOSIS — M25.552 LEFT HIP PAIN: Primary | ICD-10-CM

## 2025-03-09 PROCEDURE — 99284 EMERGENCY DEPT VISIT MOD MDM: CPT

## 2025-03-09 PROCEDURE — 99284 EMERGENCY DEPT VISIT MOD MDM: CPT | Performed by: EMERGENCY MEDICINE

## 2025-03-09 PROCEDURE — 73502 X-RAY EXAM HIP UNI 2-3 VIEWS: CPT

## 2025-03-09 PROCEDURE — 96372 THER/PROPH/DIAG INJ SC/IM: CPT

## 2025-03-09 RX ORDER — IBUPROFEN 600 MG/1
600 TABLET, FILM COATED ORAL EVERY 6 HOURS PRN
Qty: 30 TABLET | Refills: 0 | Status: SHIPPED | OUTPATIENT
Start: 2025-03-09

## 2025-03-09 RX ORDER — METHOCARBAMOL 500 MG/1
500 TABLET, FILM COATED ORAL 2 TIMES DAILY
Qty: 20 TABLET | Refills: 0 | Status: SHIPPED | OUTPATIENT
Start: 2025-03-09 | End: 2025-03-10

## 2025-03-09 RX ORDER — LIDOCAINE 50 MG/G
1 PATCH TOPICAL ONCE
Status: DISCONTINUED | OUTPATIENT
Start: 2025-03-09 | End: 2025-03-09 | Stop reason: HOSPADM

## 2025-03-09 RX ORDER — KETOROLAC TROMETHAMINE 30 MG/ML
30 INJECTION, SOLUTION INTRAMUSCULAR; INTRAVENOUS ONCE
Status: COMPLETED | OUTPATIENT
Start: 2025-03-09 | End: 2025-03-09

## 2025-03-09 RX ADMIN — LIDOCAINE 1 PATCH: 700 PATCH TOPICAL at 13:38

## 2025-03-09 RX ADMIN — KETOROLAC TROMETHAMINE 30 MG: 30 INJECTION, SOLUTION INTRAMUSCULAR at 13:38

## 2025-03-09 NOTE — Clinical Note
Jessica Oliver was seen and treated in our emergency department on 3/9/2025.    No restrictions            Diagnosis: JOSE Lopez  may return to work on return date.    She may return on this date: 03/12/2025         If you have any questions or concerns, please don't hesitate to call.      Cyrus Howell MD    ______________________________           _______________          _______________  Hospital Representative                              Date                                Time
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
## 2025-03-10 ENCOUNTER — OFFICE VISIT (OUTPATIENT)
Dept: ENDOCRINOLOGY | Facility: CLINIC | Age: 45
End: 2025-03-10
Payer: COMMERCIAL

## 2025-03-10 VITALS
SYSTOLIC BLOOD PRESSURE: 120 MMHG | TEMPERATURE: 97.6 F | OXYGEN SATURATION: 98 % | DIASTOLIC BLOOD PRESSURE: 86 MMHG | HEIGHT: 64 IN | BODY MASS INDEX: 34.31 KG/M2 | HEART RATE: 94 BPM | WEIGHT: 201 LBS

## 2025-03-10 DIAGNOSIS — E66.811 OBESITY (BMI 30.0-34.9): ICD-10-CM

## 2025-03-10 DIAGNOSIS — G47.33 OSA (OBSTRUCTIVE SLEEP APNEA): ICD-10-CM

## 2025-03-10 DIAGNOSIS — E55.9 VITAMIN D DEFICIENCY: ICD-10-CM

## 2025-03-10 DIAGNOSIS — E04.2 MULTINODULAR THYROID: Primary | ICD-10-CM

## 2025-03-10 DIAGNOSIS — R73.03 PREDIABETES: ICD-10-CM

## 2025-03-10 DIAGNOSIS — E78.2 MIXED HYPERLIPIDEMIA: ICD-10-CM

## 2025-03-10 PROCEDURE — 99205 OFFICE O/P NEW HI 60 MIN: CPT | Performed by: INTERNAL MEDICINE

## 2025-03-10 RX ORDER — TIRZEPATIDE 5 MG/.5ML
5 INJECTION, SOLUTION SUBCUTANEOUS WEEKLY
Qty: 2 ML | Refills: 1 | Status: SHIPPED | OUTPATIENT
Start: 2025-03-10 | End: 2025-05-05

## 2025-03-10 NOTE — ASSESSMENT & PLAN NOTE
She is borderline diabetes with fasting glucose 121mg/dL and A1c 6.4%. I suspect she may meet diagnostic criteria of pp hyperglycemia if we do a 2 hr GTT.    Today we discussed all aspects of diabetes including pathophysiology, risk factors, complications, SAGM, CGM, diet, lifestyle modifications, medical fitness training, diabetes education, goals of therapy, follow up needs and medications including insulin, metformin, Jardiance and GLP1 agonists.  Advised to maintain goal blood sugars 70-180mg/dL.    We will do further testing if GLP 1 agonist is not covered for weight management.  Follow up in 3 months

## 2025-03-10 NOTE — ASSESSMENT & PLAN NOTE
She has ASCVD risk score of 7.8%. she may need a statin in future but for now will focus on diet and lifestyle changes.

## 2025-03-10 NOTE — ASSESSMENT & PLAN NOTE
She needs a repeat US for surveillance.    8/22/23 US thyroid:  Nodule #1. Lt midgland nodule 3.0 x 2.5 x 2.3 cm. TR 3 previous benign biopsy in 2021  Nodule #2. Lt upper pole nodule 0.7 x 0.5 x 0.6 cm.TR 4  Nodule #3. Lt midgland nodule 1.8 x 1.3 x 1.0 cm. TR 3

## 2025-03-10 NOTE — PATIENT INSTRUCTIONS
Instructions    Diet:   Take 1500 akira/day of balanced diet with 1/3rd carbs, 1/3rd protein and rest fiber and small amount fat.   Drink at least 64 oz fluids daily.    Lifestyle:   30 min brisk activity most days. 150min-220min/week of cardiac and muscle building exercise that causes sweating.  Consider [facility] medical fitness training program.  Medical fitness: follow these exercise links  Full Version - [email or web address]     Warmup - [email or web address]     Lower Body - [email or web address]     Upper Body - [email or web address]     Core -  [email or web address]    Fasting:  Can consider after becoming regular with exercise - 14 to 24 hrs fasting 1-3 times a week.    Medications:   look up Wegovy, Zepbound, saxenda - weight loss meds.  Consider switching from medications that cause weight gain to weight neutral medications.    Other:   Make sure you are treated appropriately if you have sleep apnea.  May consider bariatric surgery if we dont see benefit over 6-12 months of all above.

## 2025-03-10 NOTE — PROGRESS NOTES
"    New patient Note      CC: Borderline diabetes, Obesity BMI 35    History of Present Illness:   44 yr female with borderline diabetes, HTN, HLD, HIRAL, thyroid nodules, vit D deficiency, Obesity BMI 35, LE varicose veins, Hx hydradenitis suppurativa, DJD.    SAGM:    Current meds:  Metformin 1000mg po qdaily    Opthamology:   Podiatry:   vaccination:   Dental:  Pancreatitis:     Ace/ARB: no  Statin: no  Thyroid issues:  8/22/23 US thyroid:  Nodule #1. Lt midgland nodule 3.0 x 2.5 x 2.3 cm. TR 3 previous benign biopsy.  Nodule #2. Lt upper pole nodule 0.7 x 0.5 x 0.6 cm.TR 4  Nodule #3. Lt midgland nodule 1.8 x 1.3 x 1.0 cm. TR 3    Physical Exam:  Body mass index is 34.5 kg/m².  /86 (BP Location: Left arm, Patient Position: Sitting, Cuff Size: Standard)   Pulse 94   Temp 97.6 °F (36.4 °C) (Tympanic)   Ht 5' 4\" (1.626 m)   Wt 91.2 kg (201 lb)   SpO2 98%   BMI 34.50 kg/m²    Vitals:    03/10/25 0904   Weight: 91.2 kg (201 lb)        Physical Exam  Constitutional:       General: She is not in acute distress.     Appearance: She is well-developed.   HENT:      Head: Normocephalic and atraumatic.      Nose: Nose normal.   Eyes:      Conjunctiva/sclera: Conjunctivae normal.   Pulmonary:      Effort: Pulmonary effort is normal.   Abdominal:      General: There is no distension.   Musculoskeletal:      Cervical back: Normal range of motion and neck supple.   Skin:     Findings: No rash.      Comments: No icterus   Neurological:      Mental Status: She is alert and oriented to person, place, and time.         Labs:   Lab Results   Component Value Date    HGBA1C 6.4 (H) 11/19/2024       Lab Results   Component Value Date    OWY3EYOFJRKK 1.334 08/21/2023       Lab Results   Component Value Date    CREATININE 0.77 11/19/2024    CREATININE 0.62 11/06/2024    CREATININE 0.81 10/16/2024    BUN 9 11/19/2024    K 4.1 11/19/2024     11/19/2024    CO2 28 11/19/2024     eGFR   Date Value Ref Range Status   11/19/2024 " 94 ml/min/1.73sq m Final       Lab Results   Component Value Date    ALT 9 11/19/2024    AST 12 (L) 11/19/2024    ALKPHOS 76 11/19/2024       Lab Results   Component Value Date    CHOLESTEROL 153 11/19/2024    CHOLESTEROL 165 08/21/2023    CHOLESTEROL 156 12/30/2021     Lab Results   Component Value Date    HDL 45 (L) 11/19/2024    HDL 41 (L) 08/21/2023    HDL 60 12/30/2021     Lab Results   Component Value Date    TRIG 133 11/19/2024    TRIG 121 08/21/2023    TRIG 149.6 12/30/2021     Lab Results   Component Value Date    NONHDLC 108 11/19/2024    NONHDLC 124 08/21/2023         Assessment/Plan:    1. Multinodular thyroid  Assessment & Plan:  She needs a repeat US for surveillance.    8/22/23 US thyroid:  Nodule #1. Lt midgland nodule 3.0 x 2.5 x 2.3 cm. TR 3 previous benign biopsy in 2021  Nodule #2. Lt upper pole nodule 0.7 x 0.5 x 0.6 cm.TR 4  Nodule #3. Lt midgland nodule 1.8 x 1.3 x 1.0 cm. TR 3  Orders:  -     T4, free; Future  -     TSH, 3rd generation; Future  -     US thyroid; Future; Expected date: 03/10/2025  2. HIRAL (obstructive sleep apnea)  3. Obesity (BMI 30.0-34.9)  Assessment & Plan:  Today we discussed all aspects of obesity and metabolism including pathophysiology, risk factors, complications, goal of sustaining weight loss long term, usual propensity to regain weight with short term approaches, follow up needs and medications - efficacy and limitations.   Discussed role of endocrinopathies, inflammatory conditions, sleep disorders, mental health disorders, lifestyle issues and polypharmacy and weight gain.  Briefly discussed bariatric surgery.  Diet: carb controlled diet <1500cal/day/ VLCD, 64oz fluids/day   lifestyle modifications: intermittent fasting and 10,000 steps/day  medical fitness training: muscle building  education: nutrition input    Rx sent for zepbound 5mg weekly  Orders:  -     Ambulatory Referral to Medical Fitness Exercise Specialist; Future  -     Zepbound 5 MG/0.5ML  auto-injector; Inject 0.5 mL (5 mg total) under the skin once a week  4. Prediabetes  Assessment & Plan:  She is borderline diabetes with fasting glucose 121mg/dL and A1c 6.4%. I suspect she may meet diagnostic criteria of pp hyperglycemia if we do a 2 hr GTT.    Today we discussed all aspects of diabetes including pathophysiology, risk factors, complications, SAGM, CGM, diet, lifestyle modifications, medical fitness training, diabetes education, goals of therapy, follow up needs and medications including insulin, metformin, Jardiance and GLP1 agonists.  Advised to maintain goal blood sugars 70-180mg/dL.    We will do further testing if GLP 1 agonist is not covered for weight management.  Follow up in 3 months  Orders:  -     Hemoglobin A1C; Future  -     Albumin / creatinine urine ratio; Future  -     Zepbound 5 MG/0.5ML auto-injector; Inject 0.5 mL (5 mg total) under the skin once a week  5. Vitamin D deficiency  Assessment & Plan:  Take vit D3 2000IU daily OTC.  6. Mixed hyperlipidemia  Assessment & Plan:  She has ASCVD risk score of 7.8%. she may need a statin in future but for now will focus on diet and lifestyle changes.        I have spent a total time of 60 minutes on 03/10/25 in caring for this patient including greater than 50% of this time was spent in counseling/coordination of care as listed above.       Discussed with the patient and all questioned fully answered. She will contact me with concerns.    Mike Stack

## 2025-03-10 NOTE — ASSESSMENT & PLAN NOTE
Today we discussed all aspects of obesity and metabolism including pathophysiology, risk factors, complications, goal of sustaining weight loss long term, usual propensity to regain weight with short term approaches, follow up needs and medications - efficacy and limitations.   Discussed role of endocrinopathies, inflammatory conditions, sleep disorders, mental health disorders, lifestyle issues and polypharmacy and weight gain.  Briefly discussed bariatric surgery.  Diet: carb controlled diet <1500cal/day/ VLCD, 64oz fluids/day   lifestyle modifications: intermittent fasting and 10,000 steps/day  medical fitness training: muscle building  education: nutrition input    Rx sent for zepbound 5mg weekly

## 2025-03-21 ENCOUNTER — OFFICE VISIT (OUTPATIENT)
Dept: OBGYN CLINIC | Facility: CLINIC | Age: 45
End: 2025-03-21
Payer: COMMERCIAL

## 2025-03-21 VITALS — BODY MASS INDEX: 34.31 KG/M2 | WEIGHT: 201 LBS | HEIGHT: 64 IN

## 2025-03-21 DIAGNOSIS — M17.11 PRIMARY OSTEOARTHRITIS OF RIGHT KNEE: Primary | ICD-10-CM

## 2025-03-21 PROCEDURE — 99213 OFFICE O/P EST LOW 20 MIN: CPT | Performed by: ORTHOPAEDIC SURGERY

## 2025-03-21 PROCEDURE — 20610 DRAIN/INJ JOINT/BURSA W/O US: CPT | Performed by: PHYSICIAN ASSISTANT

## 2025-03-21 RX ORDER — LIDOCAINE HYDROCHLORIDE 10 MG/ML
3 INJECTION, SOLUTION INFILTRATION; PERINEURAL
Status: COMPLETED | OUTPATIENT
Start: 2025-03-21 | End: 2025-03-21

## 2025-03-21 RX ORDER — BUPIVACAINE HYDROCHLORIDE 2.5 MG/ML
2 INJECTION, SOLUTION INFILTRATION; PERINEURAL
Status: COMPLETED | OUTPATIENT
Start: 2025-03-21 | End: 2025-03-21

## 2025-03-21 RX ORDER — TRIAMCINOLONE ACETONIDE 40 MG/ML
80 INJECTION, SUSPENSION INTRA-ARTICULAR; INTRAMUSCULAR
Status: COMPLETED | OUTPATIENT
Start: 2025-03-21 | End: 2025-03-21

## 2025-03-21 RX ADMIN — LIDOCAINE HYDROCHLORIDE 3 ML: 10 INJECTION, SOLUTION INFILTRATION; PERINEURAL at 07:30

## 2025-03-21 RX ADMIN — TRIAMCINOLONE ACETONIDE 80 MG: 40 INJECTION, SUSPENSION INTRA-ARTICULAR; INTRAMUSCULAR at 07:30

## 2025-03-21 RX ADMIN — BUPIVACAINE HYDROCHLORIDE 2 ML: 2.5 INJECTION, SOLUTION INFILTRATION; PERINEURAL at 07:30

## 2025-03-21 NOTE — PROGRESS NOTES
Name: Jessica Oliver      : 1980      MRN: [id number]  Encounter Provider: Yoandy Steel DO  Encounter Date: 3/21/2025   Encounter department: [facility] [place]  :  Assessment & Plan  Primary osteoarthritis of right knee             Plan:  Jessica Oliver is a 44 y.o. female with right knee arthritis, effusion  Physical exam, diagnostic imaging, and subjective history are all most consistent with the above diagnosis. The pathoanatomy and natural history of this diagnosis was explained to the patient in detail.   Patient was offered, accepted, and received a cortisone injection of the right knee today. Knee was also aspirated for 60 mL of clear, straw-colored fluid.  Patient tolerated procedure well with no immediate complications. Post-injection protocols and expectations were discussed with the patient.      Subjective:    Patient ID:  Jessica Oliver 44 y.o. female    Jessica Oliver is a 44 y.o. female who presents today with right knee pain, effusion secondary to arthritis. Patient states that she had minimal relief with the visco injections. She states that her knee is painful and swollen. Patient denies recent injury.      The following portions of the patient's history were reviewed and updated as appropriate: allergies, current medications, past family history, past social history, past surgical history and problem list.      Social History     Socioeconomic History    Marital status: Single     Spouse name: Not on file    Number of children: Not on file    Years of education: 10    Highest education level: Not on file   Occupational History    Not on file   Tobacco Use    Smoking status: Every Day     Current packs/day: 0.50     Average packs/day: 0.5 packs/day for 20.0 years (10.0 ttl pk-yrs)     Types: Cigarettes     Passive exposure: Current    Smokeless tobacco: Never    Tobacco comments:     pt is down to .5 packs a day   Vaping Use    Vaping status: Never Used    Substance and Sexual Activity    Alcohol use: Not Currently     Comment: occasional    Drug use: No    Sexual activity: Yes     Partners: Male     Birth control/protection: Female Sterilization     Comment: HYSTERECTOMY   Other Topics Concern    Not on file   Social History Narrative    Most recent tobacco use screenin-    Do you currently or have you served in the [employer]: No    Were you activated, into active duty, as a member of the [employer] or as a Reservist: No    Occupation: unemployed    Education: 10    Marital status: Single    Sexual orientation: Heterosexual    Exercise level: Occasional    Diet: Regular    General stress level: Low    Alcohol intake: Occasional    Caffeine intake: Moderate    Chewing tobacco: none    Illicit drugs: denies    Guns present in home: No    Seat belts used routinely: Yes    Sunscreen used routinely: No    Smoke alarm in home: Yes    Advance directive: No    Live alone or with others: with others    Are there stairs in your home: Yes    Pets: Yes    Deaf or serious difficulty hearing: No    Blind or serious difficulty seeing: Yes    Difficulty concentrating, remembering or making decisions: No    Difficulty walking or climbing stairs: No    Difficulty dressing or bathing: No    Difficulty doing errands alone: No    Passive smoke exposure: Yes    Are you currently employed: No    Asbestos exposure: No    TB exposure: No    Environmental exposure: No    Animal exposure: Yes    cat and dog    Blood Transfusion: No    Sexually active: No    Presence of domestic violence: No    Do you feel safe at home: Yes    no cpap or nebulizer     Social Drivers of Health     Financial Resource Strain: Not on file   Food Insecurity: Not on file   Transportation Needs: Not on file   Physical Activity: Not on file   Stress: Not on file   Social Connections: Not on file   Intimate Partner Violence: Not on file   Housing Stability: Not on file     Past Medical History:    Diagnosis Date    Abnormal Pap smear of cervix     2018 HSV 1, 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    Adrenal mass, left (HCC) 2021    1.8 x 2.1 cm on ct 2020    Arthritis     Asthma     Blurry vision 2020    Diabetes mellitus (HCC)     Essential hypertension 2020    Headache 2020    Hidradenitis suppurativa     Hypertension     Immunization deficiency 10/02/2020    Hep a nonimmune    Insomnia 2021    Migraine     Moderate episode of recurrent major depressive disorder (HCC) 2020    Obesity (BMI 30.0-34.9) 2021    Prediabetes 2020    Seasonal allergies     Tobacco use 2021    Vitamin D deficiency 10/02/2020     Past Surgical History:   Procedure Laterality Date    APPENDECTOMY      BREAST BIOPSY Left 2017    BREAST BIOPSY Right     2 core biopsies    BREAST EXCISIONAL BIOPSY Left 2018    BREAST SURGERY Left 2018    Left breast mass excision    CERVICAL BIOPSY  W/ LOOP ELECTRODE EXCISION       SECTION      X2    COLPOSCOPY      CYST REMOVAL      FACIAL/NECK BIOPSY Right 10/03/2023    Procedure: EXCISION MASS RIGHT CHEEK;  Surgeon: Robert Bloch, MD;  Location: [facility];  Service: General    FL INJECTION RIGHT KNEE (ARTHROGRAM)  01/15/2024    HYSTERECTOMY  2012    heavy bleeding, ovaries remain, also had abnormal pap    KNEE SURGERY      LYMPH NODE DISSECTION      under armpit    TUBAL LIGATION      US GUIDED THYROID BIOPSY  2021     No Known Allergies  Current Outpatient Medications on File Prior to Visit   Medication Sig Dispense Refill    albuterol (PROVENTIL HFA,VENTOLIN HFA) 90 mcg/act inhaler INHALE 2 PUFFS BY MOUTH EVERY 6 HOURS AS NEEDED FOR WHEEZING 18 g 1    amLODIPine (NORVASC) 5 mg tablet Take 1 tablet (5 mg total) by mouth daily Blood presuure 90 tablet 1    buPROPion (WELLBUTRIN XL) 300 mg 24 hr tablet Take 1 tablet (300 mg total) by mouth every morning 90 tablet 1    Cholecalciferol (Vitamin D3) 125 MCG (5000 UT) CAPS TAKE  "1 CAPSULE BY MOUTH EVERY DAY 90 capsule 1    [facility] Vitamin C 500 MG tablet TAKE 1 TABLET (500 MG TOTAL) BY MOUTH DAILY. 90 tablet 3    cyanocobalamin (VITAMIN B-12) 2000 MCG tablet Take 1 tablet (2,000 mcg total) by mouth daily 90 tablet 2    ibuprofen (MOTRIN) 600 mg tablet Take 1 tablet (600 mg total) by mouth every 6 (six) hours as needed for mild pain or moderate pain 30 tablet 0    lidocaine (XYLOCAINE) 5 % ointment Apply topically 3 (three) times a day as needed for moderate pain (vulvar pain) 50 g 0    loratadine (CLARITIN) 10 mg tablet TAKE 1 TABLET (10 MG TOTAL) BY MOUTH DAILY FOR ALLERGIES 90 tablet 1    metFORMIN (GLUCOPHAGE-XR) 500 mg 24 hr tablet TAKE 2 TABLETS BY MOUTH EVERY DAY WITH BREAKFAST 180 tablet 1    multivitamin (THERAGRAN) TABS Take 1 tablet by mouth daily      spironolactone (ALDACTONE) 25 mg tablet TAKE 1 TABLET BY MOUTH EVERY DAY 90 tablet 1    tiotropium (SPIRIVA) 18 mcg inhalation capsule Place 18 mcg into inhaler and inhale daily      Zepbound 5 MG/0.5ML auto-injector Inject 0.5 mL (5 mg total) under the skin once a week 2 mL 1     No current facility-administered medications on file prior to visit.            Objective:    Review of Systems  Pertinent items are noted in HPI.  All other systems were reviewed and are negative.    Physical Exam  Cons: Appears well.  No apparent distress.  Psych: Alert. Oriented x3.  Mood and affect normal.  Eyes: PERRLA, EOMI  Resp: Normal effort.  No audible wheezing or stridor.  CV: Palpable pulse.  No discernable arrhythmia.  No LE edema.  Lymph:  No palpable cervical, axillary, or inguinal lymphadenopathy.  Skin: Warm.  No palpable masses.  No visible lesions.  Neuro: Normal muscle tone.  Normal and symmetric DTR's.    BMI:   Estimated body mass index is 34.5 kg/m² as calculated from the following:    Height as of this encounter: 5' 4\" (1.626 m).    Weight as of this encounter: 91.2 kg (201 lb).  Lab Results   Component Value Date    HGBA1C 6.4 (H) " "11/19/2024         Right Knee Exam     Tenderness   The patient is experiencing tenderness in the medial joint line.    Range of Motion   The patient has normal right knee ROM.    Other   Pulse: present  Swelling: none  Effusion: effusion present            Procedures  Large joint arthrocentesis: R knee  Universal Protocol:  Consent: Verbal consent obtained.  Risks and benefits: risks, benefits and alternatives were discussed  Consent given by: patient  Patient understanding: patient states understanding of the procedure being performed  Patient identity confirmed: verbally with patient  Supporting Documentation  Indications: pain   Procedure Details  Location: knee - R knee  Preparation: Patient was prepped and draped in the usual sterile fashion  Needle size: 18 G  Approach: lateral  Medications administered: 2 mL bupivacaine 0.25 %; 3 mL lidocaine 1 %; 80 mg triamcinolone acetonide 40 mg/mL    Aspirate amount: 60 mL  Aspirate: clear and yellow  Patient tolerance: patient tolerated the procedure well with no immediate complications  Dressing:  Sterile dressing applied        -        Portions of the record may have been created with voice recognition software.  Occasional wrong word or \"sound a like\" substitutions may have occurred due to the inherent limitations of voice recognition software.  Read the chart carefully and recognize, using context, where substitutions have occurred.   "

## 2025-03-24 ENCOUNTER — HOSPITAL ENCOUNTER (OUTPATIENT)
Dept: ULTRASOUND IMAGING | Facility: HOSPITAL | Age: 45
Discharge: HOME | End: 2025-03-24
Attending: INTERNAL MEDICINE
Payer: COMMERCIAL

## 2025-03-24 DIAGNOSIS — R73.03 PREDIABETES: ICD-10-CM

## 2025-03-24 DIAGNOSIS — E04.2 MULTINODULAR THYROID: ICD-10-CM

## 2025-03-24 PROCEDURE — 76536 US EXAM OF HEAD AND NECK: CPT

## 2025-03-24 NOTE — TELEPHONE ENCOUNTER
Reason for call:   [x] Refill   [] Prior Auth  [] Other:     Office:   [x] PCP/Provider - [facility] - Johanny Childs MD   [] Specialty/Provider -     Medication: metFORMIN (GLUCOPHAGE-XR) 500 mg 24 hr tablet    Dose/Frequency:  TAKE 2 TABLETS BY MOUTH EVERY DAY WITH BREAKFAST     Quantity: 180 tablet    Pharmacy: [facility]/pharmacy #[id number] - [place] - [address] [phone]    Local Pharmacy   Does the patient have enough for 3 days?   [x] Yes   [] No - Send as HP to POD    Mail Away Pharmacy   Does the patient have enough for 10 days?   [] Yes   [] No - Send as HP to POD

## 2025-03-25 RX ORDER — METFORMIN HYDROCHLORIDE 500 MG/1
TABLET, EXTENDED RELEASE ORAL
Qty: 180 TABLET | Refills: 1 | Status: SHIPPED | OUTPATIENT
Start: 2025-03-25

## 2025-03-31 ENCOUNTER — RESULTS FOLLOW-UP (OUTPATIENT)
Age: 45
End: 2025-03-31

## 2025-03-31 DIAGNOSIS — E04.2 MULTINODULAR THYROID: Primary | ICD-10-CM

## 2025-04-14 ENCOUNTER — OFFICE VISIT (OUTPATIENT)
Age: 45
End: 2025-04-14
Payer: COMMERCIAL

## 2025-04-14 VITALS
OXYGEN SATURATION: 98 % | SYSTOLIC BLOOD PRESSURE: 134 MMHG | HEART RATE: 98 BPM | RESPIRATION RATE: 18 BRPM | HEIGHT: 64 IN | BODY MASS INDEX: 35.1 KG/M2 | WEIGHT: 205.6 LBS | DIASTOLIC BLOOD PRESSURE: 92 MMHG | TEMPERATURE: 98 F

## 2025-04-14 DIAGNOSIS — R73.03 PRE-DIABETES: ICD-10-CM

## 2025-04-14 DIAGNOSIS — G89.29 CHRONIC LEFT-SIDED LOW BACK PAIN WITHOUT SCIATICA: ICD-10-CM

## 2025-04-14 DIAGNOSIS — M54.50 CHRONIC LEFT-SIDED LOW BACK PAIN WITHOUT SCIATICA: ICD-10-CM

## 2025-04-14 DIAGNOSIS — E66.9 OBESITY (BMI 30-39.9): ICD-10-CM

## 2025-04-14 DIAGNOSIS — I10 ESSENTIAL HYPERTENSION: Primary | ICD-10-CM

## 2025-04-14 DIAGNOSIS — M17.11 PRIMARY OSTEOARTHRITIS OF RIGHT KNEE: ICD-10-CM

## 2025-04-14 PROCEDURE — 99214 OFFICE O/P EST MOD 30 MIN: CPT | Performed by: INTERNAL MEDICINE

## 2025-04-14 RX ORDER — METHOCARBAMOL 500 MG/1
500 TABLET, FILM COATED ORAL
Qty: 30 TABLET | Refills: 0 | Status: SHIPPED | OUTPATIENT
Start: 2025-04-14 | End: 2025-05-14

## 2025-04-16 NOTE — PROGRESS NOTES
Name: Jessica Oliver      : 1980      MRN: [id number]  Encounter Provider: Jad Johnson MD  Encounter Date: 2025   Encounter department: [facility]  :  Assessment & Plan  Essential hypertension  Reviewed, continue current medication       Primary osteoarthritis of right knee  Patient has ongoing mild pain, pain has improved with local SI       Pre-diabetes  Stable, continue metformin       Obesity (BMI 30-39.9)    Has been recently initiated on Zepbound by endocrinology.  I discussed lifestyle modification and physical activity.       Chronic left-sided low back pain without sciatica  Pleated physical therapy, continues to have residual pain, likely myofascial.  Continue Motrin as needed for pain, additionally will give her Robaxin to help with muscle relaxation.  Previously could not tolerate gabapentin due to drowsiness..  Patient warned against drowsiness with the Robaxin.  Patient verbalized understanding.  Needs FMLA paperwork for intermittent leave due to acute on chronic low back pain.  Orders:    methocarbamol (ROBAXIN) 500 mg tablet; Take 1 tablet (500 mg total) by mouth daily at bedtime as needed for muscle spasms           History of Present Illness   HPI    Patient comes for follow-up of chronic medical conditions, review of recent labs and imaging wherever applicable.  Denies any chest pain, shortness of breath, nausea or vomiting.     She reports a chronic bilateral low back pain 3 months.  Went to physical therapy in February, continues to have some intermittent pain.    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    Adrenal mass, left (HCC) 2021    1.8 x 2.1 cm on ct 2020    Arthritis     Asthma     Blurry vision 2020    Diabetes mellitus (HCC)     Essential hypertension 2020    Headache 2020    Hidradenitis suppurativa     Hypertension      Immunization deficiency 10/02/2020    Hep a nonimmune    Insomnia 2021    Migraine     Moderate episode of recurrent major depressive disorder (HCC) 2020    Obesity (BMI 30.0-34.9) 2021    Prediabetes 2020    Seasonal allergies     Tobacco use 2021    Vitamin D deficiency 10/02/2020     Past Surgical History:   Procedure Laterality Date    APPENDECTOMY      BREAST BIOPSY Left 2017    BREAST BIOPSY Right     2 core biopsies    BREAST EXCISIONAL BIOPSY Left 2018    BREAST SURGERY Left 2018    Left breast mass excision    CERVICAL BIOPSY  W/ LOOP ELECTRODE EXCISION       SECTION      X2    COLPOSCOPY      CYST REMOVAL      FACIAL/NECK BIOPSY Right 10/03/2023    Procedure: EXCISION MASS RIGHT CHEEK;  Surgeon: Robert Bloch, MD;  Location: [facility];  Service: General    FL INJECTION RIGHT KNEE (ARTHROGRAM)  01/15/2024    HYSTERECTOMY      heavy bleeding, ovaries remain, also had abnormal pap    KNEE SURGERY      LYMPH NODE DISSECTION      under armpit    TUBAL LIGATION      US GUIDED THYROID BIOPSY  2021     Family History   Problem Relation Age of Onset    Diabetes Mother     Heart attack Mother     Heart disease Mother         Internal Defibrilation    No Known Problems Father     Endometrial cancer Sister 28    Colon cancer Sister 35    No Known Problems Daughter     Diabetes Maternal Grandmother     Diabetes Paternal Grandmother     No Known Problems Son     No Known Problems Son     Lupus Maternal Aunt 48    Seizures Maternal Aunt     Leukemia Maternal Uncle 18    Diabetes Paternal Aunt     No Known Problems Paternal Aunt     Breast cancer Neg Hx       reports that she has been smoking cigarettes. She has a 10 pack-year smoking history. She has been exposed to tobacco smoke. She has never used smokeless tobacco. She reports that she does not currently use alcohol. She reports that she does not use drugs.  Current Outpatient Medications  "  Medication Instructions    albuterol (PROVENTIL HFA,VENTOLIN HFA) 90 mcg/act inhaler INHALE 2 PUFFS BY MOUTH EVERY 6 HOURS AS NEEDED FOR WHEEZING    amLODIPine (NORVASC) 5 mg, Oral, Daily, Blood presuure    buPROPion (WELLBUTRIN XL) 300 mg, Oral, Every morning    [facility]S Vitamin C 500 MG tablet TAKE 1 TABLET (500 MG TOTAL) BY MOUTH DAILY.    cyanocobalamin (VITAMIN B-12) 2,000 mcg, Oral, Daily    ibuprofen (MOTRIN) 600 mg, Oral, Every 6 hours PRN    lidocaine (XYLOCAINE) 5 % ointment Topical, 3 times daily PRN    loratadine (CLARITIN) 10 mg, Oral, Daily, For allergies    metFORMIN (GLUCOPHAGE-XR) 500 mg 24 hr tablet TAKE 2 TABLETS BY MOUTH EVERY DAY WITH BREAKFAST    methocarbamol (ROBAXIN) 500 mg, Oral, Daily at bedtime PRN    multivitamin (THERAGRAN) TABS 1 tablet, Daily    spironolactone (ALDACTONE) 25 mg tablet TAKE 1 TABLET BY MOUTH EVERY DAY    tiotropium (SPIRIVA) 18 mcg, Daily    Vitamin D3 5,000 Units, Oral, Daily    Zepbound 5 mg, Subcutaneous, Weekly   No Known Allergies   Review of Systems   Constitutional:  Negative for appetite change, chills, diaphoresis, fatigue, fever and unexpected weight change.   Respiratory:  Negative for apnea, cough, choking, chest tightness, shortness of breath, wheezing and stridor.    Cardiovascular:  Negative for chest pain, palpitations and leg swelling.   Gastrointestinal:  Negative for abdominal distention, abdominal pain, anal bleeding, blood in stool, constipation, diarrhea, nausea and vomiting.   Genitourinary:  Negative for decreased urine volume, difficulty urinating, frequency and urgency.   Musculoskeletal:  Negative for arthralgias, back pain and myalgias.   Neurological:  Negative for dizziness, light-headedness, numbness and headaches.       Objective   /92 (BP Location: Left arm, Patient Position: Sitting, Cuff Size: Standard)   Pulse 98   Temp 98 °F (36.7 °C) (Tympanic)   Resp 18   Ht 5' 4\" (1.626 m)   Wt 93.3 kg (205 lb 9.6 oz)   SpO2 98%   BMI " 35.29 kg/m²      Physical Exam  Constitutional:       General: She is not in acute distress.     Appearance: Normal appearance. She is normal weight. She is not ill-appearing, toxic-appearing or diaphoretic.   Cardiovascular:      Rate and Rhythm: Normal rate and regular rhythm.      Pulses: Normal pulses.      Heart sounds: Normal heart sounds. No murmur heard.     No gallop.   Pulmonary:      Effort: Pulmonary effort is normal. No respiratory distress.      Breath sounds: Normal breath sounds. No stridor. No wheezing, rhonchi or rales.   Chest:      Chest wall: No tenderness.   Musculoskeletal:      Thoracic back: Normal.      Lumbar back: Normal.      Right lower leg: No edema.      Left lower leg: No edema.   Neurological:      Mental Status: She is alert and oriented to person, place, and time.

## 2025-04-22 ENCOUNTER — PATIENT MESSAGE (OUTPATIENT)
Dept: CARDIOLOGY CLINIC | Facility: CLINIC | Age: 45
End: 2025-04-22

## 2025-05-13 ENCOUNTER — APPOINTMENT (OUTPATIENT)
Dept: LAB | Facility: HOSPITAL | Age: 45
End: 2025-05-13
Attending: INTERNAL MEDICINE
Payer: COMMERCIAL

## 2025-05-13 DIAGNOSIS — M54.50 CHRONIC LEFT-SIDED LOW BACK PAIN WITHOUT SCIATICA: ICD-10-CM

## 2025-05-13 DIAGNOSIS — G89.29 CHRONIC LEFT-SIDED LOW BACK PAIN WITHOUT SCIATICA: ICD-10-CM

## 2025-05-13 DIAGNOSIS — E78.2 MIXED HYPERLIPIDEMIA: ICD-10-CM

## 2025-05-13 DIAGNOSIS — R73.03 PREDIABETES: ICD-10-CM

## 2025-05-13 DIAGNOSIS — R11.0 NAUSEA: ICD-10-CM

## 2025-05-13 DIAGNOSIS — E04.2 MULTINODULAR THYROID: ICD-10-CM

## 2025-05-13 DIAGNOSIS — I10 ESSENTIAL HYPERTENSION: ICD-10-CM

## 2025-05-13 LAB
ALBUMIN SERPL BCG-MCNC: 4.4 G/DL (ref 3.5–5)
ALP SERPL-CCNC: 66 U/L (ref 34–104)
ALT SERPL W P-5'-P-CCNC: 10 U/L (ref 7–52)
ANION GAP SERPL CALCULATED.3IONS-SCNC: 8 MMOL/L (ref 4–13)
AST SERPL W P-5'-P-CCNC: 12 U/L (ref 13–39)
BASOPHILS # BLD AUTO: 0.08 THOUSANDS/ÂΜL (ref 0–0.1)
BASOPHILS NFR BLD AUTO: 1 % (ref 0–1)
BILIRUB SERPL-MCNC: 0.26 MG/DL (ref 0.2–1)
BUN SERPL-MCNC: 9 MG/DL (ref 5–25)
CALCIUM SERPL-MCNC: 9.7 MG/DL (ref 8.4–10.2)
CHLORIDE SERPL-SCNC: 104 MMOL/L (ref 96–108)
CHOLEST SERPL-MCNC: 157 MG/DL (ref ?–200)
CO2 SERPL-SCNC: 27 MMOL/L (ref 21–32)
CREAT SERPL-MCNC: 0.86 MG/DL (ref 0.6–1.3)
CREAT UR-MCNC: 84.9 MG/DL
EOSINOPHIL # BLD AUTO: 0.14 THOUSAND/ÂΜL (ref 0–0.61)
EOSINOPHIL NFR BLD AUTO: 2 % (ref 0–6)
ERYTHROCYTE [DISTWIDTH] IN BLOOD BY AUTOMATED COUNT: 14.3 % (ref 11.6–15.1)
EST. AVERAGE GLUCOSE BLD GHB EST-MCNC: 120 MG/DL
GFR SERPL CREATININE-BSD FRML MDRD: 81 ML/MIN/1.73SQ M
GLUCOSE P FAST SERPL-MCNC: 86 MG/DL (ref 65–99)
HBA1C MFR BLD: 5.8 %
HCT VFR BLD AUTO: 44.3 % (ref 34.8–46.1)
HDLC SERPL-MCNC: 48 MG/DL
HGB BLD-MCNC: 14.4 G/DL (ref 11.5–15.4)
IMM GRANULOCYTES # BLD AUTO: 0.02 THOUSAND/UL (ref 0–0.2)
IMM GRANULOCYTES NFR BLD AUTO: 0 % (ref 0–2)
LDLC SERPL CALC-MCNC: 93 MG/DL (ref 0–100)
LYMPHOCYTES # BLD AUTO: 4.1 THOUSANDS/ÂΜL (ref 0.6–4.47)
LYMPHOCYTES NFR BLD AUTO: 45 % (ref 14–44)
MCH RBC QN AUTO: 28.9 PG (ref 26.8–34.3)
MCHC RBC AUTO-ENTMCNC: 32.5 G/DL (ref 31.4–37.4)
MCV RBC AUTO: 89 FL (ref 82–98)
MICROALBUMIN UR-MCNC: <7 MG/L
MONOCYTES # BLD AUTO: 0.78 THOUSAND/ÂΜL (ref 0.17–1.22)
MONOCYTES NFR BLD AUTO: 9 % (ref 4–12)
NEUTROPHILS # BLD AUTO: 3.82 THOUSANDS/ÂΜL (ref 1.85–7.62)
NEUTS SEG NFR BLD AUTO: 43 % (ref 43–75)
NONHDLC SERPL-MCNC: 109 MG/DL
NRBC BLD AUTO-RTO: 0 /100 WBCS
PLATELET # BLD AUTO: 325 THOUSANDS/UL (ref 149–390)
PMV BLD AUTO: 9.2 FL (ref 8.9–12.7)
POTASSIUM SERPL-SCNC: 3.8 MMOL/L (ref 3.5–5.3)
PROT SERPL-MCNC: 7.2 G/DL (ref 6.4–8.4)
RBC # BLD AUTO: 4.98 MILLION/UL (ref 3.81–5.12)
SODIUM SERPL-SCNC: 139 MMOL/L (ref 135–147)
T4 FREE SERPL-MCNC: 0.77 NG/DL (ref 0.61–1.12)
TRIGL SERPL-MCNC: 78 MG/DL (ref ?–150)
TSH SERPL DL<=0.05 MIU/L-ACNC: 1.95 UIU/ML (ref 0.45–4.5)
WBC # BLD AUTO: 8.94 THOUSAND/UL (ref 4.31–10.16)

## 2025-05-13 PROCEDURE — 84439 ASSAY OF FREE THYROXINE: CPT

## 2025-05-13 PROCEDURE — 82570 ASSAY OF URINE CREATININE: CPT

## 2025-05-13 PROCEDURE — 85025 COMPLETE CBC W/AUTO DIFF WBC: CPT

## 2025-05-13 PROCEDURE — 80053 COMPREHEN METABOLIC PANEL: CPT

## 2025-05-13 PROCEDURE — 83036 HEMOGLOBIN GLYCOSYLATED A1C: CPT

## 2025-05-13 PROCEDURE — 84443 ASSAY THYROID STIM HORMONE: CPT

## 2025-05-13 PROCEDURE — 36415 COLL VENOUS BLD VENIPUNCTURE: CPT

## 2025-05-13 PROCEDURE — 80061 LIPID PANEL: CPT

## 2025-05-13 PROCEDURE — 82043 UR ALBUMIN QUANTITATIVE: CPT

## 2025-05-13 RX ORDER — METHOCARBAMOL 500 MG/1
500 TABLET, FILM COATED ORAL
Qty: 30 TABLET | Refills: 0 | Status: SHIPPED | OUTPATIENT
Start: 2025-05-13

## 2025-06-04 DIAGNOSIS — I10 ESSENTIAL HYPERTENSION: ICD-10-CM

## 2025-06-04 RX ORDER — AMLODIPINE BESYLATE 5 MG/1
5 TABLET ORAL DAILY
Qty: 90 TABLET | Refills: 1 | Status: SHIPPED | OUTPATIENT
Start: 2025-06-04 | End: 2025-12-01

## 2025-06-04 NOTE — TELEPHONE ENCOUNTER
Reason for call: Not a duplicate, pharmacy change  [x] Refill   [] Prior Auth  [] Other:     Office: [facility]   [x] PCP/Provider - Jad Johnson   [] Specialty/Provider -     Medication: amlodipine     Dose/Frequency: 5 mg/ daily     Quantity: 90 day supply     Pharmacy: [facility]   Does the patient have enough for 3 days?   [] Yes   [x] No - Send as HP to POD- out completely.     Mail Away Pharmacy   Does the patient have enough for 10 days?   [] Yes   [] No - Send as HP to POD

## 2025-06-15 DIAGNOSIS — M54.50 CHRONIC LEFT-SIDED LOW BACK PAIN WITHOUT SCIATICA: ICD-10-CM

## 2025-06-15 DIAGNOSIS — G89.29 CHRONIC LEFT-SIDED LOW BACK PAIN WITHOUT SCIATICA: ICD-10-CM

## 2025-06-16 ENCOUNTER — OFFICE VISIT (OUTPATIENT)
Dept: OBGYN CLINIC | Facility: CLINIC | Age: 45
End: 2025-06-16
Payer: COMMERCIAL

## 2025-06-16 VITALS
WEIGHT: 188.2 LBS | BODY MASS INDEX: 32.13 KG/M2 | DIASTOLIC BLOOD PRESSURE: 90 MMHG | HEIGHT: 64 IN | SYSTOLIC BLOOD PRESSURE: 124 MMHG

## 2025-06-16 DIAGNOSIS — Z11.3 SCREEN FOR STD (SEXUALLY TRANSMITTED DISEASE): Primary | ICD-10-CM

## 2025-06-16 PROCEDURE — 87491 CHLMYD TRACH DNA AMP PROBE: CPT | Performed by: OBSTETRICS & GYNECOLOGY

## 2025-06-16 PROCEDURE — 87563 M. GENITALIUM AMP PROBE: CPT | Performed by: OBSTETRICS & GYNECOLOGY

## 2025-06-16 PROCEDURE — 87591 N.GONORRHOEAE DNA AMP PROB: CPT | Performed by: OBSTETRICS & GYNECOLOGY

## 2025-06-16 PROCEDURE — 87661 TRICHOMONAS VAGINALIS AMPLIF: CPT | Performed by: OBSTETRICS & GYNECOLOGY

## 2025-06-16 PROCEDURE — 99213 OFFICE O/P EST LOW 20 MIN: CPT | Performed by: OBSTETRICS & GYNECOLOGY

## 2025-06-16 RX ORDER — METHOCARBAMOL 500 MG/1
500 TABLET, FILM COATED ORAL
Qty: 30 TABLET | Refills: 0 | Status: SHIPPED | OUTPATIENT
Start: 2025-06-16

## 2025-06-16 NOTE — PROGRESS NOTES
"Assessment/Plan:     Diagnoses and all orders for this visit:    Screen for STD (sexually transmitted disease)  -     HIV 1/2 AG/AB w Reflex [facility] for 2 yr old and above  -     RPR-Syphilis Screening (Total Syphilis IGG/IGM)  -     Hepatitis B surface antigen; Future  -     Hepatitis C antibody; Future  -     Herpes I/II IgG Antibodies; Future  -     Chlamydia/GC amplified DNA by PCR  -     Trichomonas vaginalis/Mycoplasma genitalium PCR         44-year-old female  Had total laparoscopic hysterectomy secondary to pelvic pain menorrhagia at age 30  History of LEEP can not remember exact year  Smoker cessation recommended  Chronic hypertension stable on medication  Herpes taking Valtrex with outbreak  Genetic testing neg  Desires STD check  Plan  GC/CT/affirm  patient desire STD check  Diet/exercise   Calcium/vitamin-D  Mammogram  Already had colonoscopy  Return to office for annual exam       Subjective:      Patient ID: Jessica Oliver is a 44 y.o. female.    Gynecologic Exam      Patient seen evaluated presented office today desires STD check  Denies any complaint    The following portions of the patient's history were reviewed and updated as appropriate: allergies, current medications, past family history, past medical history, past social history, past surgical history and problem list.    Review of Systems      Objective:      /90 (BP Location: Left arm, Patient Position: Sitting, Cuff Size: Adult)   Ht 5' 4\" (1.626 m)   Wt 85.4 kg (188 lb 3.2 oz)   BMI 32.30 kg/m²          Physical Exam  Constitutional:       Appearance: She is well-developed.   Abdominal:      General: There is no distension.      Palpations: Abdomen is soft.      Tenderness: There is no abdominal tenderness.   Genitourinary:     Labia:         Right: No rash, tenderness or lesion.         Left: No rash, tenderness or lesion.       Vagina: No signs of injury. Vaginal discharge present. No erythema or tenderness.      Comments: White " frothy discharge    Neurological:      Mental Status: She is alert and oriented to person, place, and time.     Psychiatric:         Behavior: Behavior normal.

## 2025-06-17 ENCOUNTER — APPOINTMENT (OUTPATIENT)
Dept: LAB | Facility: HOSPITAL | Age: 45
End: 2025-06-17
Payer: COMMERCIAL

## 2025-06-17 DIAGNOSIS — Z11.3 SCREEN FOR STD (SEXUALLY TRANSMITTED DISEASE): ICD-10-CM

## 2025-06-17 LAB
C TRACH DNA SPEC QL NAA+PROBE: NEGATIVE
M GENITALIUM DNA SPEC QL NAA+PROBE: NEGATIVE
N GONORRHOEA DNA SPEC QL NAA+PROBE: NEGATIVE
T VAGINALIS DNA SPEC QL NAA+PROBE: NEGATIVE

## 2025-06-17 PROCEDURE — 86780 TREPONEMA PALLIDUM: CPT | Performed by: OBSTETRICS & GYNECOLOGY

## 2025-06-17 PROCEDURE — 86803 HEPATITIS C AB TEST: CPT

## 2025-06-17 PROCEDURE — 87340 HEPATITIS B SURFACE AG IA: CPT

## 2025-06-17 PROCEDURE — 36415 COLL VENOUS BLD VENIPUNCTURE: CPT | Performed by: OBSTETRICS & GYNECOLOGY

## 2025-06-17 PROCEDURE — 86696 HERPES SIMPLEX TYPE 2 TEST: CPT

## 2025-06-17 PROCEDURE — 86695 HERPES SIMPLEX TYPE 1 TEST: CPT

## 2025-06-17 PROCEDURE — 87389 HIV-1 AG W/HIV-1&-2 AB AG IA: CPT | Performed by: OBSTETRICS & GYNECOLOGY

## 2025-06-18 LAB
HIV 1+2 AB+HIV1 P24 AG SERPL QL IA: NORMAL
HSV2 IGG SERPL QL IA: NEGATIVE
HSV2 IGG SERPL QL IA: POSITIVE
TREPONEMA PALLIDUM IGG+IGM AB [PRESENCE] IN SERUM OR PLASMA BY IMMUNOASSAY: NORMAL

## 2025-06-19 LAB
HBV SURFACE AG SER QL: NORMAL
HCV AB SER QL: NORMAL

## 2025-07-02 NOTE — PATIENT COMMUNICATION
Called and spoke with patient to reschedule Dr. García's appointment on 7/21 to a new cardiologist. Changed appointment to 7/14/25 with Dr. Almanza.

## 2025-07-14 ENCOUNTER — OFFICE VISIT (OUTPATIENT)
Dept: CARDIOLOGY CLINIC | Facility: CLINIC | Age: 45
End: 2025-07-14

## 2025-07-14 VITALS
WEIGHT: 188.2 LBS | HEART RATE: 98 BPM | TEMPERATURE: 98.1 F | BODY MASS INDEX: 32.13 KG/M2 | OXYGEN SATURATION: 98 % | DIASTOLIC BLOOD PRESSURE: 78 MMHG | HEIGHT: 64 IN | SYSTOLIC BLOOD PRESSURE: 112 MMHG

## 2025-07-14 DIAGNOSIS — Z72.0 TOBACCO USE: ICD-10-CM

## 2025-07-14 DIAGNOSIS — E78.2 MIXED HYPERLIPIDEMIA: ICD-10-CM

## 2025-07-14 DIAGNOSIS — I77.89 ASCENDING AORTA ENLARGEMENT (HCC): ICD-10-CM

## 2025-07-14 DIAGNOSIS — I10 ESSENTIAL HYPERTENSION: ICD-10-CM

## 2025-07-14 DIAGNOSIS — I35.1 NONRHEUMATIC AORTIC VALVE INSUFFICIENCY: Primary | ICD-10-CM

## 2025-07-14 PROCEDURE — 99214 OFFICE O/P EST MOD 30 MIN: CPT | Performed by: INTERNAL MEDICINE

## 2025-07-14 NOTE — ASSESSMENT & PLAN NOTE
BP Readings from Last 3 Encounters:   07/14/25 112/78   06/16/25 124/90   04/14/25 134/92     Continue current medicines.  Lifestyle modification.  Home blood pressure monitoring advised.

## 2025-07-14 NOTE — ASSESSMENT & PLAN NOTE
Lipids from 5/25: HDL 48, LDL 93 and triglycerides 78.  Currently in the normal range.  No therapy indicated.

## 2025-07-14 NOTE — PROGRESS NOTES
[facility] [place]  [facility] [address]  Phone#  [phone]  Fax#  [phone]  [facility] Cardiology Office Follow-up Visit             NAME: Jessica Oliver  AGE: 44 y.o. SEX: female   : 1980   MRN: [id number]    DATE: 2025  TIME: 9:58 AM    Cardiology Problem list:  Aortic regurgitation with ascending aortic enlargement  : EF 65%, mild-moderate AI, ascending aorta 4 cm  Chest pain  : Stress test normal at 7 minutes  Hypertension  Family history of CAD  Smoker    Assessment & Plan  Nonrheumatic aortic valve insufficiency  Echo from : Mild to moderate aortic regurgitation.  Trileaflet valve.  Ascending aorta 4 cm.  Repeat echo next year.  Currently asymptomatic.    Orders:    Echo complete w/ contrast if indicated; Future    Tobacco use  Encouraged complete tobacco cessation.         Essential hypertension  BP Readings from Last 3 Encounters:   25 112/78   25 124/90   25 134/92     Continue current medicines.  Lifestyle modification.  Home blood pressure monitoring advised.           Mixed hyperlipidemia  Lipids from : HDL 48, LDL 93 and triglycerides 78.  Currently in the normal range.  No therapy indicated.         Ascending aorta enlargement (HCC)  Ascending aorta 4 cm on echo in .  Recheck next year.  Echo in 2026.  Orders:    Echo complete w/ contrast if indicated; Future    Echo reviewed.  Diet and exercise discussed.        HPI:    Jessica Oliver is a 44 y.o.-year-old female who presents to the cardiology clinic for follow up for the above-listed problems.  She has previously been seen by Dr. Raquel mary in .   Prior office notes reviewed.  Prior cardiac studies reviewed.  Prior visit was for follow-up on chest pain that began in 10/24.  ER cardiac workup that time was negative.  She has a history of hypertension, cigarette smoking and family history of premature MI in her mother.  In light of this finding,  she was advised stress testing and echocardiogram.  She was also referred for sleep apnea treatment and advised tobacco cessation.  Her cardiac testing was reviewed which showed a normal stress test at 7 minutes.  Her EKG also was normal.  Echocardiogram showed EF of 65% with normal LV and RV systolic function, normal diastolic function, moderate aortic valve regurgitation of a trileaflet valve and mild ascending aortic enlargement at 4 cm.  Findings are summarized today.    Past history, family history, social history, current medications, vital signs, recent lab and imaging studies and  prior cardiology studies reviewed independently on this visit.    Current Outpatient Medications   Medication Instructions    albuterol (PROVENTIL HFA,VENTOLIN HFA) 90 mcg/act inhaler INHALE 2 PUFFS BY MOUTH EVERY 6 HOURS AS NEEDED FOR WHEEZING    amLODIPine (NORVASC) 5 mg, Oral, Daily, Blood presuure    buPROPion (WELLBUTRIN XL) 300 mg, Oral, Every morning    [employer] Vitamin C 500 MG tablet TAKE 1 TABLET (500 MG TOTAL) BY MOUTH DAILY.    cyanocobalamin (VITAMIN B-12) 2,000 mcg, Oral, Daily    ibuprofen (MOTRIN) 600 mg, Oral, Every 6 hours PRN    lidocaine (XYLOCAINE) 5 % ointment Topical, 3 times daily PRN    loratadine (CLARITIN) 10 mg, Oral, Daily, For allergies    metFORMIN (GLUCOPHAGE-XR) 500 mg 24 hr tablet TAKE 2 TABLETS BY MOUTH EVERY DAY WITH BREAKFAST    methocarbamol (ROBAXIN) 500 mg, Oral, Daily at bedtime PRN    multivitamin (THERAGRAN) TABS 1 tablet, Daily    spironolactone (ALDACTONE) 25 mg tablet TAKE 1 TABLET BY MOUTH EVERY DAY    tiotropium (SPIRIVA) 18 mcg, Daily    Vitamin D3 5,000 Units, Oral, Daily        Review of Systems   Constitutional:  Negative for fatigue.   HENT: Negative.     Eyes: Negative.    Respiratory:  Negative for shortness of breath.    Cardiovascular:  Positive for leg swelling. Negative for chest pain and palpitations.   Gastrointestinal: Negative.    Endocrine: Negative.    Neurological:   Negative for syncope.   Hematological: Negative.    All other systems reviewed and are negative.      Objective:     Vitals:    07/14/25 0950   BP: 112/78   Pulse: 98   Temp: 98.1 °F (36.7 °C)   SpO2: 98%     Wt Readings from Last 3 Encounters:   07/14/25 85.4 kg (188 lb 3.2 oz)   06/16/25 85.4 kg (188 lb 3.2 oz)   04/14/25 93.3 kg (205 lb 9.6 oz)     Pulse Readings from Last 3 Encounters:   07/14/25 98   04/14/25 98   03/10/25 94     BP Readings from Last 3 Encounters:   07/14/25 112/78   06/16/25 124/90   04/14/25 134/92     Physical Exam  Vitals reviewed.   Constitutional:       General: She is not in acute distress.  HENT:      Head: Normocephalic.     Cardiovascular:      Rate and Rhythm: Normal rate and regular rhythm.      Heart sounds: S1 normal and S2 normal. Murmur heard.      Systolic murmur is present with a grade of 2/6.   Pulmonary:      Breath sounds: No wheezing or rales.     Musculoskeletal:         General: No swelling.      Right lower leg: No edema.      Left lower leg: No edema.     Skin:     General: Skin is warm.     Neurological:      Mental Status: She is alert.     Psychiatric:         Mood and Affect: Mood normal.         Pertinent Laboratory/Diagnostic Studies:    Laboratory studies reviewed personally by José Miguel Almanza MD    BMP:   Lab Results   Component Value Date    SODIUM 139 05/13/2025    K 3.8 05/13/2025     05/13/2025    CO2 27 05/13/2025    BUN 9 05/13/2025    CREATININE 0.86 05/13/2025    GLUC 138 11/06/2024    CALCIUM 9.7 05/13/2025     CBC:  Lab Results   Component Value Date    WBC 8.94 05/13/2025    HGB 14.4 05/13/2025    HCT 44.3 05/13/2025    MCV 89 05/13/2025     05/13/2025     Lipid Profile:   Lab Results   Component Value Date    HDL 48 (L) 05/13/2025     Lab Results   Component Value Date    LDLCALC 93 05/13/2025     Lab Results   Component Value Date    TRIG 78 05/13/2025      Other labs:  Lab Results   Component Value Date    KGM6CSJBZUUO 1.947 05/13/2025  "    Lab Results   Component Value Date    ALT 10 05/13/2025    AST 12 (L) 05/13/2025     Lab Results   Component Value Date    HGBA1C 5.8 (H) 05/13/2025         Imaging Studies:     Pertinent imaging studies and cardiac studies were independently reviewed on this visit and findings summarized.    I have spent a total time of 42  minutes in caring for this patient on the day of the visit/encounter including reviewing and entering of medical history, physical examination, diagnostic results, instructions for management, patient education, risk factor reduction, documenting in the medical record, sending communications to other providers, reviewing/ordering tests, medicine, procedures etc.    José Miguel Almanza MD, [facility]    Portions of the record may have been created with voice recognition software.  Occasional wrong word or \"sound alike\" substitutions may have occurred due to the inherent limitations of voice recognition software.  Read the chart carefully and recognize, using context, where substitutions have occurred. Please reach out to me directly for any clarifications.  "

## 2025-07-14 NOTE — PATIENT INSTRUCTIONS
Echo in March or April 2026.  Continue efforts at weight loss including diet modification, increased physical activity and avoidance of calorie dense foods.    Mediterranean style plant based diet and calorie restriction will be beneficial.      Quit smoking!

## 2025-07-14 NOTE — ASSESSMENT & PLAN NOTE
Ascending aorta 4 cm on echo in 1/25.  Recheck next year.  Echo in March 2026.  Orders:    Echo complete w/ contrast if indicated; Future

## 2025-07-14 NOTE — ASSESSMENT & PLAN NOTE
Echo from 1/25: Mild to moderate aortic regurgitation.  Trileaflet valve.  Ascending aorta 4 cm.  Repeat echo next year.  Currently asymptomatic.    Orders:    Echo complete w/ contrast if indicated; Future

## 2025-07-16 ENCOUNTER — OFFICE VISIT (OUTPATIENT)
Age: 45
End: 2025-07-16

## 2025-07-16 VITALS
SYSTOLIC BLOOD PRESSURE: 110 MMHG | RESPIRATION RATE: 18 BRPM | TEMPERATURE: 98.5 F | BODY MASS INDEX: 31.96 KG/M2 | DIASTOLIC BLOOD PRESSURE: 80 MMHG | OXYGEN SATURATION: 98 % | WEIGHT: 187.2 LBS | HEART RATE: 87 BPM | HEIGHT: 64 IN

## 2025-07-16 DIAGNOSIS — E78.2 MIXED HYPERLIPIDEMIA: ICD-10-CM

## 2025-07-16 DIAGNOSIS — F33.1 MODERATE EPISODE OF RECURRENT MAJOR DEPRESSIVE DISORDER (HCC): ICD-10-CM

## 2025-07-16 DIAGNOSIS — R73.03 PREDIABETES: ICD-10-CM

## 2025-07-16 DIAGNOSIS — Z72.0 TOBACCO USE: ICD-10-CM

## 2025-07-16 DIAGNOSIS — G47.33 OSA (OBSTRUCTIVE SLEEP APNEA): ICD-10-CM

## 2025-07-16 DIAGNOSIS — L73.2 HIDRADENITIS SUPPURATIVA: ICD-10-CM

## 2025-07-16 DIAGNOSIS — I10 ESSENTIAL HYPERTENSION: Primary | ICD-10-CM

## 2025-07-16 DIAGNOSIS — G58.9 MONONEUROPATHY: ICD-10-CM

## 2025-07-16 DIAGNOSIS — M54.50 CHRONIC MIDLINE LOW BACK PAIN WITHOUT SCIATICA: ICD-10-CM

## 2025-07-16 DIAGNOSIS — I35.1 NONRHEUMATIC AORTIC VALVE INSUFFICIENCY: ICD-10-CM

## 2025-07-16 DIAGNOSIS — E66.9 OBESITY (BMI 30-39.9): ICD-10-CM

## 2025-07-16 DIAGNOSIS — M17.11 PRIMARY OSTEOARTHRITIS OF RIGHT KNEE: ICD-10-CM

## 2025-07-16 DIAGNOSIS — G89.29 CHRONIC MIDLINE LOW BACK PAIN WITHOUT SCIATICA: ICD-10-CM

## 2025-07-16 PROCEDURE — 99214 OFFICE O/P EST MOD 30 MIN: CPT | Performed by: INTERNAL MEDICINE

## 2025-07-16 RX ORDER — TIRZEPATIDE 5 MG/.5ML
5 INJECTION, SOLUTION SUBCUTANEOUS WEEKLY
Qty: 2 ML | Refills: 0 | Status: SHIPPED | OUTPATIENT
Start: 2025-07-16

## 2025-07-16 RX ORDER — GABAPENTIN 100 MG/1
CAPSULE ORAL
Qty: 277 CAPSULE | Refills: 0 | Status: SHIPPED | OUTPATIENT
Start: 2025-07-16 | End: 2025-10-21

## 2025-07-16 RX ORDER — SPIRONOLACTONE 25 MG/1
TABLET ORAL
Qty: 90 TABLET | Refills: 1 | Status: SHIPPED | OUTPATIENT
Start: 2025-07-16

## 2025-07-16 NOTE — PROGRESS NOTES
Name: Jessica Oliver      : 1980      MRN: [id number]  Encounter Provider: Jad Johnson MD  Encounter Date: 2025   Encounter department: [facility] [place] [facility]    Assessment & Plan  Essential hypertension  Stable/well-controlled  Continue current medications  Will reevaluate in the next office visit   Orders:    spironolactone (ALDACTONE) 25 mg tablet; TAKE 1 TABLET BY MOUTH EVERY DAY    HIRAL (obstructive sleep apnea)  Suspected, referral has been made to sleep medicine, patient is yet to make appointment.       Primary osteoarthritis of right knee  S/p right knee arthrocentesis, patient continues to have some pain.  Follow-up with orthopedics.  Orders:    tirzepatide (Zepbound) 5 mg/0.5 mL auto-injector; Inject 0.5 mL (5 mg total) under the skin once a week    Moderate episode of recurrent major depressive disorder (HCC)    Stable, continue current medications       Tobacco use  Continues to smoke, has cut down, plans to stop eventually.  Declines NRT.  Encourage complete cessation       Mixed hyperlipidemia  Stable, continue to monitor with labs, diet controlled       Prediabetes  A1c of 5.8, we discussed carb controlled diet, will benefit from weight loss  Orders:    tirzepatide (Zepbound) 5 mg/0.5 mL auto-injector; Inject 0.5 mL (5 mg total) under the skin once a week    Mononeuropathy  Involving left lower extremity.  Patient reports left lower extremity numbness below the knees.  No motor deficits.  Will check EMG and B12.  Orders:    Vitamin B12    EMG; Future    gabapentin (NEURONTIN) 100 mg capsule; Take 1 capsule (100 mg total) by mouth daily at bedtime for 7 days, THEN 3 capsules (300 mg total) daily at bedtime.    Hidradenitis suppurativa  Dermatology referral has been placed, patient recommended to follow-up  Orders:    spironolactone (ALDACTONE) 25 mg tablet; TAKE 1 TABLET BY MOUTH EVERY DAY    Obesity (BMI 30-39.9)    Patient reports failure to lose weight with  lifestyle modification including calorie counting, portion restriction and physical activity.     We extensively discussed lifestyle modifications including low calorie diet of 1500 akira/day, high-protein diet of 1 g/kg body weight, low-carb diet of the less than 150 g of carbs a daily.  Encouraged 2 to 3 L of fluids every day    She was prescribed a Zepbound and completed the initial dose of for 4 weeks, refills of the uptitrate dose was entered by endocrinology, unfortunately patient's pharmacy close and she failed to  or notify our office.  New refills are given.  He was able to tolerate medications well without any side effects.  Orders:    tirzepatide (Zepbound) 5 mg/0.5 mL auto-injector; Inject 0.5 mL (5 mg total) under the skin once a week    Nonrheumatic aortic valve insufficiency  Continue follow-up with cardiology.         Chronic midline low back pain without sciatica  Ongoing, lumbar spine x-ray without any significant lumbar degenerative changes  Patient manages pain with simple analgesic.            History of Present Illness     HPI    Patient comes for follow-up of chronic medical conditions, review of recent labs and imaging wherever applicable.  Denies any chest pain, shortness of breath, nausea or vomiting.   Review of Systems   Constitutional:  Negative for appetite change, chills, diaphoresis, fatigue, fever and unexpected weight change.   Respiratory:  Negative for apnea, cough, choking, chest tightness, shortness of breath, wheezing and stridor.    Cardiovascular:  Negative for chest pain, palpitations and leg swelling.   Gastrointestinal:  Negative for abdominal distention, abdominal pain, anal bleeding, blood in stool, constipation, diarrhea, nausea and vomiting.   Genitourinary:  Negative for decreased urine volume, difficulty urinating, frequency and urgency.   Musculoskeletal:  Positive for back pain. Negative for arthralgias and myalgias.   Neurological:  Positive for numbness.  "Negative for dizziness, tremors, seizures, syncope, speech difficulty, weakness, light-headedness and headaches.     Past Medical History[1]  Past Surgical History[2]  Family History[3]  Social History[4]  Medications[5]  No Known Allergies  Immunization History   Administered Date(s) Administered    HPV9 04/20/2022, 06/20/2022, 12/05/2022    Hep A, adult 10/02/2020, 06/23/2021    Hep B, adult 01/01/2019    INFLUENZA 11/17/2011, 01/01/2012, 02/20/2014, 12/05/2022    Influenza, injectable, quadrivalent, preservative free 0.5 mL 10/02/2020    Influenza, recombinant, quadrivalent,injectable, preservative free 12/05/2022    Influenza, seasonal, injectable, preservative free 11/04/2024    Pneumococcal Polysaccharide PPV23 06/23/2021    Tdap 01/28/2019     Objective   /80 (BP Location: Left arm, Patient Position: Sitting, Cuff Size: Standard)   Pulse 87   Temp 98.5 °F (36.9 °C) (Temporal)   Resp 18   Ht 5' 4\" (1.626 m)   Wt 84.9 kg (187 lb 3.2 oz)   SpO2 98%   BMI 32.13 kg/m²     Physical Exam  Constitutional:       General: She is not in acute distress.     Appearance: Normal appearance. She is normal weight. She is not ill-appearing, toxic-appearing or diaphoretic.     Cardiovascular:      Rate and Rhythm: Normal rate and regular rhythm.      Pulses: Normal pulses.      Heart sounds: Normal heart sounds. No murmur heard.     No gallop.   Pulmonary:      Effort: Pulmonary effort is normal. No respiratory distress.      Breath sounds: Normal breath sounds. No stridor. No wheezing, rhonchi or rales.   Chest:      Chest wall: No tenderness.     Musculoskeletal:      Right lower leg: No edema.      Left lower leg: No edema.     Neurological:      Mental Status: She is alert and oriented to person, place, and time.      Sensory: No sensory deficit.      Motor: No weakness, tremor, atrophy, abnormal muscle tone, seizure activity or pronator drift.      Deep Tendon Reflexes:      Reflex Scores:       Patellar " reflexes are 2+ on the right side and 2+ on the left side.     Comments: Decreased sensation to sharp pain below the left knee.            [1]   Past Medical History:  Diagnosis Date    Abnormal Pap smear of cervix     2018 HSV 1, 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    Adrenal mass, left (HCC) 2021    1.8 x 2.1 cm on ct 2020    Arthritis     Asthma     Blurry vision 2020    Diabetes mellitus (HCC)     Essential hypertension 2020    Headache 2020    Hidradenitis suppurativa     Hypertension     Immunization deficiency 10/02/2020    Hep a nonimmune    Insomnia 2021    Migraine     Moderate episode of recurrent major depressive disorder (HCC) 2020    Obesity (BMI 30.0-34.9) 2021    Prediabetes 2020    Seasonal allergies     Tobacco use 2021    Vitamin D deficiency 10/02/2020   [2]   Past Surgical History:  Procedure Laterality Date    APPENDECTOMY      BREAST BIOPSY Left 2017    BREAST BIOPSY Right     2 core biopsies    BREAST EXCISIONAL BIOPSY Left 2018    BREAST SURGERY Left 2018    Left breast mass excision    CERVICAL BIOPSY  W/ LOOP ELECTRODE EXCISION       SECTION      X2    COLPOSCOPY      CYST REMOVAL      FACIAL/NECK BIOPSY Right 10/03/2023    Procedure: EXCISION MASS RIGHT CHEEK;  Surgeon: Robert Bloch, MD;  Location:  MAIN OR;  Service: General    FL INJECTION RIGHT KNEE (ARTHROGRAM)  01/15/2024    HYSTERECTOMY      heavy bleeding, ovaries remain, also had abnormal pap    KNEE SURGERY      LYMPH NODE DISSECTION      under armpit    TUBAL LIGATION      US GUIDED THYROID BIOPSY  2021   [3]   Family History  Problem Relation Name Age of Onset    Diabetes Mother Kenisha rolle     Heart attack Mother Kenisha rolle     Heart disease Mother Kenisha rolle         Internal Defibrilation    No Known Problems Father      Endometrial cancer Sister Silvana 28    Colon cancer Sister Silvana 35    No Known Problems Daughter       Diabetes Maternal Grandmother Allison robertson     Diabetes Paternal Grandmother Bertha french     No Known Problems Son      No Known Problems Son      Lupus Maternal Aunt  48    Seizures Maternal Aunt      Leukemia Maternal Uncle  18    Diabetes Paternal Aunt      No Known Problems Paternal Aunt      Breast cancer Neg Hx     [4]   Social History  Tobacco Use    Smoking status: Every Day     Current packs/day: 0.50     Average packs/day: 0.5 packs/day for 20.0 years (10.0 ttl pk-yrs)     Types: Cigarettes     Passive exposure: Current    Smokeless tobacco: Never    Tobacco comments:     pt is down to .5 packs a day   Vaping Use    Vaping status: Never Used   Substance and Sexual Activity    Alcohol use: Not Currently     Comment: occasional    Drug use: No    Sexual activity: Yes     Partners: Male     Birth control/protection: Female Sterilization     Comment: HYSTERECTOMY   [5]   Current Outpatient Medications on File Prior to Visit   Medication Sig    albuterol (PROVENTIL HFA,VENTOLIN HFA) 90 mcg/act inhaler INHALE 2 PUFFS BY MOUTH EVERY 6 HOURS AS NEEDED FOR WHEEZING    amLODIPine (NORVASC) 5 mg tablet Take 1 tablet (5 mg total) by mouth daily Blood presuure    buPROPion (WELLBUTRIN XL) 300 mg 24 hr tablet Take 1 tablet (300 mg total) by mouth every morning    Cholecalciferol (Vitamin D3) 125 MCG (5000 UT) CAPS TAKE 1 CAPSULE BY MOUTH EVERY DAY    [employer]S Vitamin C 500 MG tablet TAKE 1 TABLET (500 MG TOTAL) BY MOUTH DAILY.    cyanocobalamin (VITAMIN B-12) 2000 MCG tablet Take 1 tablet (2,000 mcg total) by mouth daily    ibuprofen (MOTRIN) 600 mg tablet Take 1 tablet (600 mg total) by mouth every 6 (six) hours as needed for mild pain or moderate pain    lidocaine (XYLOCAINE) 5 % ointment Apply topically 3 (three) times a day as needed for moderate pain (vulvar pain)    loratadine (CLARITIN) 10 mg tablet TAKE 1 TABLET (10 MG TOTAL) BY MOUTH DAILY FOR ALLERGIES    metFORMIN (GLUCOPHAGE-XR) 500 mg 24 hr tablet TAKE 2  TABLETS BY MOUTH EVERY DAY WITH BREAKFAST    tiotropium (SPIRIVA) 18 mcg inhalation capsule Place 18 mcg into inhaler and inhale daily (Patient taking differently: Place 18 mcg into inhaler and inhale if needed)    [DISCONTINUED] spironolactone (ALDACTONE) 25 mg tablet TAKE 1 TABLET BY MOUTH EVERY DAY    methocarbamol (ROBAXIN) 500 mg tablet TAKE 1 TABLET (500 MG TOTAL) BY MOUTH DAILY AT BEDTIME AS NEEDED FOR MUSCLE SPASMS (Patient not taking: Reported on 7/16/2025)    multivitamin (THERAGRAN) TABS Take 1 tablet by mouth in the morning. (Patient not taking: Reported on 7/16/2025)

## 2025-07-16 NOTE — ASSESSMENT & PLAN NOTE
Dermatology referral has been placed, patient recommended to follow-up  Orders:    spironolactone (ALDACTONE) 25 mg tablet; TAKE 1 TABLET BY MOUTH EVERY DAY

## 2025-07-16 NOTE — ASSESSMENT & PLAN NOTE
Stable/well-controlled  Continue current medications  Will reevaluate in the next office visit   Orders:    spironolactone (ALDACTONE) 25 mg tablet; TAKE 1 TABLET BY MOUTH EVERY DAY

## 2025-07-16 NOTE — ASSESSMENT & PLAN NOTE
S/p right knee arthrocentesis, patient continues to have some pain.  Follow-up with orthopedics.  Orders:    tirzepatide (Zepbound) 5 mg/0.5 mL auto-injector; Inject 0.5 mL (5 mg total) under the skin once a week

## 2025-07-16 NOTE — ASSESSMENT & PLAN NOTE
A1c of 5.8, we discussed carb controlled diet, will benefit from weight loss  Orders:    tirzepatide (Zepbound) 5 mg/0.5 mL auto-injector; Inject 0.5 mL (5 mg total) under the skin once a week

## 2025-07-16 NOTE — ASSESSMENT & PLAN NOTE
Continues to smoke, has cut down, plans to stop eventually.  Declines NRT.  Encourage complete cessation

## 2025-07-17 ENCOUNTER — TELEPHONE (OUTPATIENT)
Age: 45
End: 2025-07-17

## 2025-07-18 ENCOUNTER — VBI (OUTPATIENT)
Dept: ADMINISTRATIVE | Facility: OTHER | Age: 45
End: 2025-07-18

## 2025-07-18 NOTE — TELEPHONE ENCOUNTER
07/18/25 12:49 PM     Chart reviewed for Diabetic Eye Exam ; nothing is submitted to the patient's insurance at this time.     Solange Griffith   [facility]

## (undated) DEVICE — GLOVE INDICATOR PI UNDERGLOVE SZ 8 BLUE

## (undated) DEVICE — NEPTUNE E-SEP SMOKE EVACUATION PENCIL, COATED, 70MM BLADE, PUSH BUTTON SWITCH: Brand: NEPTUNE E-SEP

## (undated) DEVICE — DRAPE SMALL FENESTRATED 30 X 30 STRL

## (undated) DEVICE — SUT PROLENE 5-0 P-3 18 IN 8698G

## (undated) DEVICE — INTENDED FOR TISSUE SEPARATION, AND OTHER PROCEDURES THAT REQUIRE A SHARP SURGICAL BLADE TO PUNCTURE OR CUT.: Brand: BARD-PARKER SAFETY BLADES SIZE 15, STERILE

## (undated) DEVICE — SUT VICRYL PLUS 4-0 RB-1 27 IN VCP214H

## (undated) DEVICE — BETHLEHEM UNIVERSAL OUTPATIENT: Brand: CARDINAL HEALTH

## (undated) DEVICE — GLOVE SRG BIOGEL 7.5

## (undated) DEVICE — NEEDLE 25G X 1 1/2